# Patient Record
Sex: FEMALE | Race: BLACK OR AFRICAN AMERICAN | Employment: FULL TIME | URBAN - METROPOLITAN AREA
[De-identification: names, ages, dates, MRNs, and addresses within clinical notes are randomized per-mention and may not be internally consistent; named-entity substitution may affect disease eponyms.]

---

## 2017-01-09 ENCOUNTER — OFFICE VISIT (OUTPATIENT)
Dept: INTERNAL MEDICINE CLINIC | Age: 57
End: 2017-01-09

## 2017-01-09 VITALS
DIASTOLIC BLOOD PRESSURE: 89 MMHG | HEIGHT: 67 IN | WEIGHT: 260 LBS | RESPIRATION RATE: 20 BRPM | HEART RATE: 70 BPM | SYSTOLIC BLOOD PRESSURE: 151 MMHG | BODY MASS INDEX: 40.81 KG/M2 | TEMPERATURE: 98.1 F | OXYGEN SATURATION: 98 %

## 2017-01-09 DIAGNOSIS — M17.12 OSTEOARTHRITIS OF LEFT KNEE, UNSPECIFIED OSTEOARTHRITIS TYPE: ICD-10-CM

## 2017-01-09 RX ORDER — HYDROCODONE BITARTRATE AND ACETAMINOPHEN 7.5; 325 MG/1; MG/1
TABLET ORAL
Qty: 180 TAB | Refills: 0 | Status: SHIPPED | OUTPATIENT
Start: 2017-01-09 | End: 2017-02-10 | Stop reason: SDUPTHER

## 2017-01-09 NOTE — PROGRESS NOTES
1. Have you been to the ER, urgent care clinic since your last visit? Hospitalized since your last visit? No.    2. Have you seen or consulted any other health care providers outside of the [address] [place] [address] since your last visit? Include any pap smears or colon screening. No.  Had pain med today.

## 2017-01-09 NOTE — MR AVS SNAPSHOT
Visit Information Date & Time Provider Department Dept. Phone Encounter #  
 1/9/2017  2:30 PM Forrest Roblero, [id number] [id number] Follow-up Instructions Return in about 4 weeks (around 2/6/2017) for pain med. Your Appointments 2/17/2017  2:45 PM  
ROUTINE CARE with Forrest Roblero MD  
[address] [facility] [address] [place] [address]) Appt Note: [address] [facility] [address] [place] [address]  
[phone]  
  
   
 [facility] [address] Upcoming Health Maintenance Date Due  
 FOOT EXAM Q1 3/4/1968 Pneumococcal 19-64 Medium Risk (1 of 1 - PPSV23) 3/4/1977 DTaP/Tdap/Td series (1 - Tdap) 3/4/1979 PAP AKA CERVICAL CYTOLOGY 3/4/1979 EYE EXAM RETINAL OR DILATED Q1 3/31/2016 MICROALBUMIN Q1 1/8/2017 LIPID PANEL Q1 5/2/2017 HEMOGLOBIN A1C Q6M 5/14/2017 BREAST CANCER SCRN MAMMOGRAM 10/13/2017 COLONOSCOPY 6/14/2020 Allergies as of 1/9/2017  Review Complete On: 1/9/2017 By: Riddhi Evans LPN Severity Noted Reaction Type Reactions Influenza Virus Vaccine, Specific High 05/08/2013   Side Effect Other (comments) States body got very hot Tramadol  05/20/2014    Seizures Aspirin Low 06/15/2010   Side Effect Other (comments) Pt reports having a h/o gastric ulcers. Pt was on IBU when she was dx'd. Current Immunizations  Reviewed on 5/9/2013 Name Date Influenza Vaccine Split  Deferred (Patient Refused) Not reviewed this visit You Were Diagnosed With   
  
 Codes Comments Osteoarthritis of left knee, unspecified osteoarthritis type     ICD-10-CM: M17.9 ICD-9-CM: 715.96 Vitals BP Pulse Temp Resp Height(growth percentile) Weight(growth percentile) 151/89 (BP 1 Location: Right arm, BP Patient Position: Sitting) 70 98.1 °F (36.7 °C) (Oral) 20 5' 7\" (1.702 m) 260 lb (117.9 kg) SpO2 BMI OB Status Smoking Status 98% 40.72 kg/m2 Hysterectomy Former Smoker Vitals History BMI and BSA Data Body Mass Index Body Surface Area 40.72 kg/m 2 2.36 m 2 Preferred Pharmacy Pharmacy Name Phone [facility] [phone] Your Updated Medication List  
  
   
This list is accurate as of: 1/9/17  3:48 PM.  Always use your most recent med list. amLODIPine 10 mg tablet Commonly known as:  Harley Chafe TAKE 1 TABLET BY MOUTH DAILY  
  
 atorvastatin 20 mg tablet Commonly known as:  LIPITOR  
TAKE 1 TABLET BY MOUTH DAILY FOR CHOLESTEROL  
  
 escitalopram oxalate 10 mg tablet Commonly known as:  Celesta Murders TAKE 1 TABLET BY MOUTH EVERY DAY  
  
 FLONASE 50 mcg/actuation nasal spray Generic drug:  fluticasone 2 Sprays by Both Nostrils route daily. * glimepiride 4 mg tablet Commonly known as:  AMARYL Take 1 Tab by mouth two (2) times a day. * glimepiride 4 mg tablet Commonly known as:  AMARYL  
TAKE 1 TABLET BY MOUTH TWICE DAILY * glimepiride 4 mg tablet Commonly known as:  AMARYL  
TAKE 1 TABLET BY MOUTH TWICE DAILY * glucose blood VI test strips strip Commonly known as:  blood glucose test  
Check sugars bid  Dx: 250.02  
  
 * glucose blood VI test strips strip Commonly known as:  ACCU-CHEK MEENU PLUS TEST STRP  
USE TO TEST BLOOD SUGAR TWO TO THREE TIMES A DAY AS DIRECTED * glucose blood VI test strips strip Commonly known as:  ACCU-CHEK MEENU PLUS TEST STRP Check sugars 3 times a day Dx E11.65  
  
 * HYDROcodone-acetaminophen  mg tablet Commonly known as:  Smiley Didier Take 1 Tab by mouth every six (6) hours as needed for Pain. Max Daily Amount: 4 Tabs. * HYDROcodone-acetaminophen 7.5-325 mg per tablet Commonly known as:  Smiley Didier  
1 tab every 4-6 hours as needed for pain. Max of 6 pills per day  
  
 ibuprofen 800 mg tablet Commonly known as:  MOTRIN  
TAKE 1 TABLET BY MOUTH EVERY 6 HOURS AS NEEDED FOR PAIN WITH FOOD  
  
 meclizine 25 mg tablet Commonly known as:  ANTIVERT Take 1 Tab by mouth three (3) times daily as needed for Dizziness. metFORMIN 1,000 mg tablet Commonly known as:  GLUCOPHAGE  
TAKE 1 TABLET BY MOUTH TWICE DAILY WITH MEALS  
  
 metoprolol succinate 100 mg tablet Commonly known as:  TOPROL-XL  
TAKE 1 TABLET BY MOUTH DAILY FOR HIGH BLOOD PRESSURE  
  
 ondansetron 4 mg disintegrating tablet Commonly known as:  ZOFRAN ODT Take 1 Tab by mouth every eight (8) hours as needed for Nausea. valsartan-hydroCHLOROthiazide 320-25 mg per tablet Commonly known as:  DIOVAN-HCT  
TAKE 1 TABLET BY MOUTH DAILY  
  
 zolpidem 10 mg tablet Commonly known as:  AMBIEN  
TAKE 1 TABLET BY MOUTH EVERY NIGHT AT BEDTIME AS NEEDED * Notice: This list has 8 medication(s) that are the same as other medications prescribed for you. Read the directions carefully, and ask your doctor or other care provider to review them with you. Prescriptions Printed Refills HYDROcodone-acetaminophen (NORCO) 7.5-325 mg per tablet 0 Si tab every 4-6 hours as needed for pain. Max of 6 pills per day Class: Print We Performed the Following PAIN MGMT PANEL W/REFL, UR [[id number] Custom] Follow-up Instructions Return in about 4 weeks (around 2017) for pain med. Introducing hospitals & HEALTH SERVICES! Dear [employer] [place]: Thank you for requesting a [employer] account. Our records indicate that you already have an active [employer] account. You can access your account anytime at [email or web address]. [email or web address] Did you know that you can access your hospital and ER discharge instructions at any time in [employer]? You can also review all of your test results from your hospital stay or ER visit. Additional Information If you have questions, please visit the Frequently Asked Questions section of the [email or web address] website at [email or web address] Remember, [facility] is NOT to be used for urgent needs. For medical emergencies, dial 911. Now available from your iPhone and Android! Please provide this summary of care documentation to your next provider. Your primary care clinician is listed as [address]. If you have any questions after today's visit, please call [phone].

## 2017-01-09 NOTE — PROGRESS NOTES
Marvin Stevens is a 60 y.o. female and presents with Back Pain and Leg Pain  . Last pain pill was yest and today. C/o pills wearing off too soon and she is really hurting. The 10mg tablet makes her feel sick to her stomach and she doesn't like it. She want to decrease the dose. Review of Systems  Constitutional: negative for fevers, chills, anorexia and weight loss  Eyes:   negative for visual disturbance and irritation  ENT:   negative for tinnitus,sore throat,nasal congestion,ear pains. hoarseness  Respiratory:  negative for cough, hemoptysis, dyspnea,wheezing  CV:   negative for chest pain, palpitations, lower extremity edema  GI:   negative for nausea, vomiting, diarrhea, abdominal pain,melena  Endo:               negative for polyuria,polydipsia,polyphagia,heat intolerance  Genitourinary: negative for frequency, dysuria and hematuria  Integument:  negative for rash and pruritus  Hematologic:  negative for easy bruising and gum/nose bleeding  Musculoskel: negative for myalgias, arthralgias, back pain, muscle weakness, joint pain  Neurological:  negative for headaches, dizziness, vertigo, memory problems and gait   Behavl/Psych: negative for feelings of anxiety, depression, mood changes    Past Medical History   Diagnosis Date    Acute pancreatitis 8/9/2012    Acute pancreatitis 1/21/2011    Arthritis      both knees    Chronic pain      knees    Delivery normal      x1    Diabetes ([facility] [address]     DJD (degenerative joint disease) of knee     Fatty liver 1/22/2011    GERD (gastroesophageal reflux disease)     Hypertension     Hypothyroidism     Obesity     PUD (peptic ulcer disease)     UTI (urinary tract infection) 1/21/2011     Past Surgical History   Procedure Laterality Date    Hx parathyroidectomy  01/27/11    Hx breast lumpectomy       left breast    Hx gyn       hysterectomy    Hx orthopaedic       left total knee replacement    Hx orthopaedic  6/16/15     RIGHT TOTAL KNEE ARTHROPLASTY Social History     Social History    Marital status:      Spouse name: N/A    Number of children: N/A    Years of education: N/A     Social History Main Topics    Smoking status: Former Smoker     Packs/day: 0.25     Years: 25.00     Types: Cigarettes     Quit date: 1/24/2008    Smokeless tobacco: Never Used    Alcohol use No    Drug use: No    Sexual activity: No     Other Topics Concern    None     Social History Narrative     Current Outpatient Prescriptions   Medication Sig Dispense Refill    HYDROcodone-acetaminophen (NORCO) 7.5-325 mg per tablet 1 tab every 4-6 hours as needed for pain. Max of 6 pills per day 180 Tab 0    zolpidem (AMBIEN) 10 mg tablet TAKE 1 TABLET BY MOUTH EVERY NIGHT AT BEDTIME AS NEEDED 30 Tab 5    HYDROcodone-acetaminophen (NORCO)  mg tablet Take 1 Tab by mouth every six (6) hours as needed for Pain. Max Daily Amount: 4 Tabs.  120 Tab 0    glimepiride (AMARYL) 4 mg tablet TAKE 1 TABLET BY MOUTH TWICE DAILY 60 Tab 11    amLODIPine (NORVASC) 10 mg tablet TAKE 1 TABLET BY MOUTH DAILY 90 Tab 3    metFORMIN (GLUCOPHAGE) 1,000 mg tablet TAKE 1 TABLET BY MOUTH TWICE DAILY WITH MEALS 180 Tab 3    valsartan-hydrochlorothiazide (DIOVAN-HCT) 320-25 mg per tablet TAKE 1 TABLET BY MOUTH DAILY 90 Tab 11    escitalopram oxalate (LEXAPRO) 10 mg tablet TAKE 1 TABLET BY MOUTH EVERY DAY 90 Tab 11    glimepiride (AMARYL) 4 mg tablet TAKE 1 TABLET BY MOUTH TWICE DAILY 180 Tab 11    metoprolol succinate (TOPROL-XL) 100 mg XL tablet TAKE 1 TABLET BY MOUTH DAILY FOR HIGH BLOOD PRESSURE 90 Tab 11    atorvastatin (LIPITOR) 20 mg tablet TAKE 1 TABLET BY MOUTH DAILY FOR CHOLESTEROL 90 Tab 11    glucose blood VI test strips (ACCU-CHEK MEENU PLUS TEST STRP) strip Check sugars 3 times a day Dx E11.65 300 Strip 11    ibuprofen (MOTRIN) 800 mg tablet TAKE 1 TABLET BY MOUTH EVERY 6 HOURS AS NEEDED FOR PAIN WITH FOOD 120 Tab 11    fluticasone (FLONASE) 50 mcg/actuation nasal spray 2 Sprays by Both Nostrils route daily.  glucose blood VI test strips (ACCU-CHEK MEENU PLUS TEST STRP) strip USE TO TEST BLOOD SUGAR TWO TO THREE TIMES A DAY AS DIRECTED 100 Strip 11    glimepiride (AMARYL) 4 mg tablet Take 1 Tab by mouth two (2) times a day. 60 Tab 11    glucose blood VI test strips (BLOOD GLUCOSE TEST) strip Check sugars bid  Dx: 250.02 100 Strip 11    meclizine (ANTIVERT) 25 mg tablet Take 1 Tab by mouth three (3) times daily as needed for Dizziness. 20 Tab 0    ondansetron (ZOFRAN ODT) 4 mg disintegrating tablet Take 1 Tab by mouth every eight (8) hours as needed for Nausea. 10 Tab 0     Allergies   Allergen Reactions    Influenza Virus Vaccine, Specific Other (comments)     States body got very hot    Tramadol Seizures    Aspirin Other (comments)     Pt reports having a h/o gastric ulcers. Pt was on IBU when she was dx'd. Objective:  Visit Vitals    /89 (BP 1 Location: Right arm, BP Patient Position: Sitting)    Pulse 70    Temp 98.1 °F (36.7 °C) (Oral)    Resp 20    Ht 5' 7\" (1.702 m)    Wt 260 lb (117.9 kg)    SpO2 98%    BMI 40.72 kg/m2     Physical Exam:   General appearance - alert, well appearing, and in no distress  Mental status - alert, oriented to person, place, and time  Chest - clear to auscultation, no wheezes, rales or rhonchi, symmetric air entry   Heart - normal rate, regular rhythm, normal S1, S2, no murmurs, rubs, clicks or gallops   Abdomen - soft, nontender, nondistended, no masses or organomegaly  Lymph- no adenopathy palpable  Ext-peripheral pulses normal, no pedal edema, no clubbing or cyanosis  Skin-Warm and dry.  no hyperpigmentation, vitiligo, or suspicious lesions  Neuro -alert, oriented, normal speech, no focal findings or movement disorder noted      Results for orders placed or performed in visit on 01/09/17   PAIN MGMT PANEL W/REFL, UR   Result Value Ref Range    Amphetamine Screen, urine Negative Mrosgy=9003 ng/mL    Barbiturates Screen, urine Negative Hzktjo=956 ng/mL    Benzodiazepines Screen, urine Negative Bqqxwu=398 ng/mL    Cannabinoid Screen, urine Negative Cutoff=20 ng/mL    Cocaine Metab. Screen, urine Negative Uprhta=904 ng/mL    Opiate Screen, urine Positive (A) Monfoe=287 ng/mL    Oxycodone/Oxymorphone, urine Negative Kohaem=817 ng/mL    Phencyclidine Screen, urine Negative Cutoff=25 ng/mL    Methadone Screen, urine Negative Xnpnhj=777 ng/mL    Propoxyphene Screen, urine Negative Titplk=841 ng/mL    Meperidine screen Negative Zqepsb=498 ng/mL    FENTANYL, URINE Negative Ybvxwu=9682 pg/mL    Tramadol Screen, urine Negative Gegide=220 ng/mL    Creatinine, urine 54.0 20.0 - 300.0 mg/dL    Specific Gravity 1.016     pH, urine 8.5 4.5 - 8.9    Please Note Comment        Assessment/Plan:    ICD-10-CM ICD-9-CM    1. Osteoarthritis of left knee, unspecified osteoarthritis type M17.9 715.96 HYDROcodone-acetaminophen (NORCO) 7.5-325 mg per tablet      PAIN MGMT PANEL W/REFL, UR     Orders Placed This Encounter    PAIN MGMT PANEL W/REFL, UR    HYDROcodone-acetaminophen (NORCO) 7.5-325 mg per tablet     Si tab every 4-6 hours as needed for pain. Max of 6 pills per day     Dispense:  180 Tab     Refill:  0     Chronic pain- UDS, changing frequency of dosing rather than increase the dose. Follow-up Disposition:  Return in about 4 weeks (around 2017) for pain med.

## 2017-01-18 LAB
AMPHETAMINES UR QL SCN: NEGATIVE NG/ML
BARBITURATES UR QL SCN: NEGATIVE NG/ML
BENZODIAZ UR QL SCN: NEGATIVE NG/ML
BZE UR QL SCN: NEGATIVE NG/ML
CANNABINOIDS UR QL SCN: NEGATIVE NG/ML
CREAT UR-MCNC: 54 MG/DL (ref 20–300)
FENTANYL+NORFENTANYL UR QL SCN: NEGATIVE PG/ML
MEPERIDINE UR QL: NEGATIVE NG/ML
METHADONE UR QL SCN: NEGATIVE NG/ML
OPIATES UR QL SCN: POSITIVE NG/ML
OXYCODONE+OXYMORPHONE UR QL SCN: NEGATIVE NG/ML
PCP UR QL: NEGATIVE NG/ML
PH UR: 8.5 [PH] (ref 4.5–8.9)
PLEASE NOTE:, 733157: ABNORMAL
PROPOXYPH UR QL SCN: NEGATIVE NG/ML
SP GR UR: 1.02
TRAMADOL UR QL SCN: NEGATIVE NG/ML

## 2017-02-10 ENCOUNTER — OFFICE VISIT (OUTPATIENT)
Dept: INTERNAL MEDICINE CLINIC | Age: 57
End: 2017-02-10

## 2017-02-10 VITALS
SYSTOLIC BLOOD PRESSURE: 136 MMHG | WEIGHT: 260 LBS | OXYGEN SATURATION: 97 % | HEART RATE: 72 BPM | TEMPERATURE: 98.2 F | RESPIRATION RATE: 20 BRPM | HEIGHT: 67 IN | BODY MASS INDEX: 40.81 KG/M2 | DIASTOLIC BLOOD PRESSURE: 89 MMHG

## 2017-02-10 DIAGNOSIS — I10 ESSENTIAL HYPERTENSION, BENIGN: Primary | ICD-10-CM

## 2017-02-10 DIAGNOSIS — M17.12 OSTEOARTHRITIS OF LEFT KNEE, UNSPECIFIED OSTEOARTHRITIS TYPE: ICD-10-CM

## 2017-02-10 RX ORDER — HYDROCODONE BITARTRATE AND ACETAMINOPHEN 7.5; 325 MG/1; MG/1
TABLET ORAL
Qty: 180 TAB | Refills: 0 | Status: SHIPPED | OUTPATIENT
Start: 2017-02-10 | End: 2017-05-12 | Stop reason: SDUPTHER

## 2017-02-10 RX ORDER — METOPROLOL SUCCINATE 100 MG/1
TABLET, EXTENDED RELEASE ORAL
Qty: 90 TAB | Refills: 3 | Status: SHIPPED | OUTPATIENT
Start: 2017-02-10 | End: 2018-03-12 | Stop reason: SDUPTHER

## 2017-02-10 RX ORDER — ATORVASTATIN CALCIUM 20 MG/1
TABLET, FILM COATED ORAL
Qty: 90 TAB | Refills: 11 | Status: SHIPPED | OUTPATIENT
Start: 2017-02-10 | End: 2018-03-19 | Stop reason: SDUPTHER

## 2017-02-10 NOTE — PROGRESS NOTES
Hardik Alfredo is a 60 y.o. female and presents with Medication Refill  . Pt has been doing much better with the increased dosing frequency. Last pain pill was yest and today. Review of Systems  Constitutional: negative for fevers, chills, anorexia and weight loss  Eyes:   negative for visual disturbance and irritation  ENT:   negative for tinnitus,sore throat,nasal congestion,ear pains. hoarseness  Respiratory:  negative for cough, hemoptysis, dyspnea,wheezing  CV:   negative for chest pain, palpitations, lower extremity edema  GI:   negative for nausea, vomiting, diarrhea, abdominal pain,melena  Endo:               negative for polyuria,polydipsia,polyphagia,heat intolerance  Genitourinary: negative for frequency, dysuria and hematuria  Integument:  negative for rash and pruritus  Hematologic:  negative for easy bruising and gum/nose bleeding  Musculoskel: negative for myalgias, arthralgias, back pain, muscle weakness,  Neurological:  negative for headaches, dizziness, vertigo, memory problems and gait   Behavl/Psych: negative for feelings of anxiety, depression, mood changes    Past Medical History   Diagnosis Date    Acute pancreatitis 8/9/2012    Acute pancreatitis 1/21/2011    Arthritis      both knees    Chronic pain      knees    Delivery normal      x1    [facility] ([facility] [address]     DJD (degenerative joint disease) of knee     Fatty liver 1/22/2011    GERD (gastroesophageal reflux disease)     Hypertension     Hypothyroidism     Obesity     PUD (peptic ulcer disease)     UTI (urinary tract infection) 1/21/2011     Past Surgical History   Procedure Laterality Date    Hx parathyroidectomy  01/27/11    Hx breast lumpectomy       left breast    Hx gyn       hysterectomy    Hx orthopaedic       left total knee replacement    Hx orthopaedic  6/16/15     RIGHT TOTAL KNEE ARTHROPLASTY      Social History     Social History    Marital status:      Spouse name: N/A    Number of children: N/A  Years of education: N/A     Social History Main Topics    Smoking status: Former Smoker     Packs/day: 0.25     Years: 25.00     Types: Cigarettes     Quit date: 1/24/2008    Smokeless tobacco: Never Used    Alcohol use No    Drug use: No    Sexual activity: No     Other Topics Concern    None     Social History Narrative     Current Outpatient Prescriptions   Medication Sig Dispense Refill    HYDROcodone-acetaminophen (NORCO) 7.5-325 mg per tablet 1 tab every 4-6 hours as needed for pain. Max of 6 pills per day 180 Tab 0    metoprolol succinate (TOPROL-XL) 100 mg tablet TAKE 1 TABLET BY MOUTH DAILY FOR HIGH BLOOD PRESSURE 90 Tab 3    atorvastatin (LIPITOR) 20 mg tablet TAKE 1 TABLET BY MOUTH DAILY FOR CHOLESTEROL 90 Tab 11    glucose blood VI test strips (ACCU-CHEK MEENU PLUS TEST STRP) strip Check sugars 3 times a day Dx E11.65 300 Strip 11    zolpidem (AMBIEN) 10 mg tablet TAKE 1 TABLET BY MOUTH EVERY NIGHT AT BEDTIME AS NEEDED 30 Tab 5    glimepiride (AMARYL) 4 mg tablet TAKE 1 TABLET BY MOUTH TWICE DAILY 60 Tab 11    amLODIPine (NORVASC) 10 mg tablet TAKE 1 TABLET BY MOUTH DAILY 90 Tab 3    metFORMIN (GLUCOPHAGE) 1,000 mg tablet TAKE 1 TABLET BY MOUTH TWICE DAILY WITH MEALS 180 Tab 3    valsartan-hydrochlorothiazide (DIOVAN-HCT) 320-25 mg per tablet TAKE 1 TABLET BY MOUTH DAILY 90 Tab 11    escitalopram oxalate (LEXAPRO) 10 mg tablet TAKE 1 TABLET BY MOUTH EVERY DAY 90 Tab 11    glimepiride (AMARYL) 4 mg tablet TAKE 1 TABLET BY MOUTH TWICE DAILY 180 Tab 11    meclizine (ANTIVERT) 25 mg tablet Take 1 Tab by mouth three (3) times daily as needed for Dizziness. 20 Tab 0    ibuprofen (MOTRIN) 800 mg tablet TAKE 1 TABLET BY MOUTH EVERY 6 HOURS AS NEEDED FOR PAIN WITH FOOD 120 Tab 11    fluticasone (FLONASE) 50 mcg/actuation nasal spray 2 Sprays by Both Nostrils route daily.       glucose blood VI test strips (ACCU-CHEK MEENU PLUS TEST STRP) strip USE TO TEST BLOOD SUGAR TWO TO THREE TIMES A DAY AS DIRECTED 100 Strip 11    glimepiride (AMARYL) 4 mg tablet Take 1 Tab by mouth two (2) times a day. 60 Tab 11    glucose blood VI test strips (BLOOD GLUCOSE TEST) strip Check sugars bid  Dx: 250.02 100 Strip 11    ondansetron (ZOFRAN ODT) 4 mg disintegrating tablet Take 1 Tab by mouth every eight (8) hours as needed for Nausea. 10 Tab 0     Allergies   Allergen Reactions    Influenza Virus Vaccine, Specific Other (comments)     States body got very hot    Tramadol Seizures    Aspirin Other (comments)     Pt reports having a h/o gastric ulcers. Pt was on IBU when she was dx'd. Objective:  Visit Vitals    /89 (BP 1 Location: Left arm, BP Patient Position: Sitting)    Pulse 72    Temp 98.2 °F (36.8 °C) (Oral)    Resp 20    Ht 5' 7\" (1.702 m)    Wt 260 lb (117.9 kg)    SpO2 97%    BMI 40.72 kg/m2     Physical Exam:   General appearance - alert, well appearing, and in no distress  Mental status - alert, oriented to person, place, and time  Chest - clear to auscultation, no wheezes, rales or rhonchi, symmetric air entry   Heart - normal rate, regular rhythm, normal S1, S2, no murmurs, rubs, clicks or gallops   Abdomen - soft, nontender, nondistended, no masses or organomegaly  Lymph- no adenopathy palpable  Ext-peripheral pulses normal, no pedal edema, no clubbing or cyanosis  Skin-Warm and dry. no hyperpigmentation, vitiligo, or suspicious lesions  Neuro -alert, oriented, normal speech, no focal findings or movement disorder noted    Assessment/Plan:    ICD-10-CM ICD-9-CM    1. Essential hypertension, benign I10 401.1    2. Osteoarthritis of left knee, unspecified osteoarthritis type M17.9 715.96 HYDROcodone-acetaminophen (NORCO) 7.5-325 mg per tablet      PAIN MGMT PANEL W/REFL, UR     Orders Placed This Encounter    PAIN MGMT PANEL W/REFL, UR    HYDROcodone-acetaminophen (NORCO) 7.5-325 mg per tablet     Si tab every 4-6 hours as needed for pain.  Max of 6 pills per day Dispense:  180 Tab     Refill:  0    metoprolol succinate (TOPROL-XL) 100 mg tablet     Sig: TAKE 1 TABLET BY MOUTH DAILY FOR HIGH BLOOD PRESSURE     Dispense:  90 Tab     Refill:  3     **Patient requests 90 days supply**    atorvastatin (LIPITOR) 20 mg tablet     Sig: TAKE 1 TABLET BY MOUTH DAILY FOR CHOLESTEROL     Dispense:  90 Tab     Refill:  11     **Patient requests 90 days supply**    glucose blood VI test strips (ACCU-CHEK MEENU PLUS TEST STRP) strip     Sig: Check sugars 3 times a day Dx E11.65     Dispense:  300 Strip     Refill:  11     Chronic knee pain due to DJD- UDS, pain meds refilled x 3m  HTN & hyperlipidemia- stable    Follow-up Disposition:  Return in about 3 months (around 5/10/2017) for pain med.

## 2017-02-10 NOTE — PROGRESS NOTES
1. Have you been to the ER, urgent care clinic since your last visit? Hospitalized since your last visit? No.    2. Have you seen or consulted any other health care providers outside of the [address] [place] [address] since your last visit? Include any pap smears or colon screening. No.  Had pain med med today.

## 2017-02-10 NOTE — MR AVS SNAPSHOT
Visit Information Date & Time Provider Department Dept. Phone Encounter #  
 2/10/2017  3:00 PM Romulo Vidal, [address]  [id number] Follow-up Instructions Return in about 3 months (around 5/10/2017) for pain med. Upcoming Health Maintenance Date Due  
 FOOT EXAM Q1 3/4/1968 Pneumococcal 19-64 Medium Risk (1 of 1 - PPSV23) 3/4/1977 DTaP/Tdap/Td series (1 - Tdap) 3/4/1979 PAP AKA CERVICAL CYTOLOGY 3/4/1979 EYE EXAM RETINAL OR DILATED Q1 3/31/2016 MICROALBUMIN Q1 1/8/2017 LIPID PANEL Q1 5/2/2017 HEMOGLOBIN A1C Q6M 5/14/2017 BREAST CANCER SCRN MAMMOGRAM 10/13/2017 COLONOSCOPY 6/14/2020 Allergies as of 2/10/2017  Review Complete On: 2/10/2017 By: Rena Wilson LPN Severity Noted Reaction Type Reactions Influenza Virus Vaccine, Specific High 05/08/2013   Side Effect Other (comments) States body got very hot Tramadol  05/20/2014    Seizures Aspirin Low 06/15/2010   Side Effect Other (comments) Pt reports having a h/o gastric ulcers. Pt was on IBU when she was dx'd. Current Immunizations  Reviewed on 5/9/2013 Name Date Influenza Vaccine Split  Deferred (Patient Refused) Not reviewed this visit You Were Diagnosed With   
  
 Codes Comments Essential hypertension, benign    -  Primary ICD-10-CM: I10 
ICD-9-CM: 401.1 Osteoarthritis of left knee, unspecified osteoarthritis type     ICD-10-CM: M17.9 ICD-9-CM: 715.96 Vitals BP Pulse Temp Resp Height(growth percentile) Weight(growth percentile) 136/89 (BP 1 Location: Left arm, BP Patient Position: Sitting) 72 98.2 °F (36.8 °C) (Oral) 20 5' 7\" (1.702 m) 260 lb (117.9 kg) SpO2 BMI OB Status Smoking Status 97% 40.72 kg/m2 Hysterectomy Former Smoker Vitals History BMI and BSA Data Body Mass Index Body Surface Area 40.72 kg/m 2 2.36 m 2 Preferred Pharmacy Pharmacy Name Phone [address] [phone] Your Updated Medication List  
  
   
This list is accurate as of: 2/10/17  3:22 PM.  Always use your most recent med list. amLODIPine 10 mg tablet Commonly known as:  Suzon Salle TAKE 1 TABLET BY MOUTH DAILY  
  
 atorvastatin 20 mg tablet Commonly known as:  LIPITOR  
TAKE 1 TABLET BY MOUTH DAILY FOR CHOLESTEROL  
  
 escitalopram oxalate 10 mg tablet Commonly known as:  Neptali Dub TAKE 1 TABLET BY MOUTH EVERY DAY  
  
 FLONASE 50 mcg/actuation nasal spray Generic drug:  fluticasone 2 Sprays by Both Nostrils route daily. * glimepiride 4 mg tablet Commonly known as:  AMARYL Take 1 Tab by mouth two (2) times a day. * glimepiride 4 mg tablet Commonly known as:  AMARYL  
TAKE 1 TABLET BY MOUTH TWICE DAILY * glimepiride 4 mg tablet Commonly known as:  AMARYL  
TAKE 1 TABLET BY MOUTH TWICE DAILY * glucose blood VI test strips strip Commonly known as:  blood glucose test  
Check sugars bid  Dx: 250.02  
  
 * glucose blood VI test strips strip Commonly known as:  ACCU-CHEK MEENU PLUS TEST STRP  
USE TO TEST BLOOD SUGAR TWO TO THREE TIMES A DAY AS DIRECTED * glucose blood VI test strips strip Commonly known as:  ACCU-CHEK MEENU PLUS TEST STRP Check sugars 3 times a day Dx E11.65 HYDROcodone-acetaminophen 7.5-325 mg per tablet Commonly known as:  Lew Songster  
1 tab every 4-6 hours as needed for pain. Max of 6 pills per day  
  
 ibuprofen 800 mg tablet Commonly known as:  MOTRIN  
TAKE 1 TABLET BY MOUTH EVERY 6 HOURS AS NEEDED FOR PAIN WITH FOOD  
  
 meclizine 25 mg tablet Commonly known as:  ANTIVERT Take 1 Tab by mouth three (3) times daily as needed for Dizziness. metFORMIN 1,000 mg tablet Commonly known as:  GLUCOPHAGE  
TAKE 1 TABLET BY MOUTH TWICE DAILY WITH MEALS  
  
 metoprolol succinate 100 mg tablet Commonly known as:  TOPROL-XL  
TAKE 1 TABLET BY MOUTH DAILY FOR HIGH BLOOD PRESSURE  
  
 ondansetron 4 mg disintegrating tablet Commonly known as:  ZOFRAN ODT Take 1 Tab by mouth every eight (8) hours as needed for Nausea. valsartan-hydroCHLOROthiazide 320-25 mg per tablet Commonly known as:  DIOVAN-HCT  
TAKE 1 TABLET BY MOUTH DAILY  
  
 zolpidem 10 mg tablet Commonly known as:  AMBIEN  
TAKE 1 TABLET BY MOUTH EVERY NIGHT AT BEDTIME AS NEEDED * Notice: This list has 6 medication(s) that are the same as other medications prescribed for you. Read the directions carefully, and ask your doctor or other care provider to review them with you. Prescriptions Printed Refills HYDROcodone-acetaminophen (NORCO) 7.5-325 mg per tablet 0 Si tab every 4-6 hours as needed for pain. Max of 6 pills per day Class: Print Prescriptions Sent to Pharmacy Refills  
 metoprolol succinate (TOPROL-XL) 100 mg tablet 3 Sig: TAKE 1 TABLET BY MOUTH DAILY FOR HIGH BLOOD PRESSURE Class: Normal  
 Pharmacy: [facility] [address] [place] [address] Ph #: [phone]  
 atorvastatin (LIPITOR) 20 mg tablet 11 Sig: TAKE 1 TABLET BY MOUTH DAILY FOR CHOLESTEROL Class: Normal  
 Pharmacy: [facility] [address] [place] [address] Ph #: [phone]  
 glucose blood VI test strips (ACCU-CHEK MEENU PLUS TEST STRP) strip 11 Sig: Check sugars 3 times a day Dx E11.65 Class: Normal  
 Pharmacy: [facility] [address] [place] [address] Ph #: [phone] We Performed the Following PAIN MGMT PANEL W/REFL, UR [[id number] Custom] Follow-up Instructions Return in about 3 months (around 5/10/2017) for pain med. Introducing \[facility] [place] [facility]\"" & HEALTH SERVICES! Dear Burak Cason: Thank you for requesting a [employer] account. Our records indicate that you already have an active [employer] account. You can access your account anytime at [email or web address] Did you know that you can access your hospital and ER discharge instructions at any time in [employer]? You can also review all of your test results from your hospital stay or ER visit. Additional Information If you have questions, please visit the Frequently Asked Questions section of the [employer] website at [email or web address] Remember, [employer] is NOT to be used for urgent needs. For medical emergencies, dial 911. Now available from your iPhone and Android! Please provide this summary of care documentation to your next provider. Your primary care clinician is listed as Kezia Parker. If you have any questions after today's visit, please call [phone].

## 2017-02-16 LAB
AMPHETAMINES UR QL SCN: NEGATIVE NG/ML
BARBITURATES UR QL SCN: NEGATIVE NG/ML
BENZODIAZ UR QL SCN: NEGATIVE NG/ML
BZE UR QL SCN: NEGATIVE NG/ML
CANNABINOIDS UR QL SCN: NEGATIVE NG/ML
CREAT UR-MCNC: 56.9 MG/DL (ref 20–300)
FENTANYL+NORFENTANYL UR QL SCN: NEGATIVE PG/ML
MEPERIDINE UR QL: NEGATIVE NG/ML
METHADONE UR QL SCN: NEGATIVE NG/ML
OPIATES UR QL SCN: POSITIVE NG/ML
OXYCODONE+OXYMORPHONE UR QL SCN: NEGATIVE NG/ML
PCP UR QL: NEGATIVE NG/ML
PH UR: 5.9 [PH] (ref 4.5–8.9)
PLEASE NOTE:, 733157: ABNORMAL
PROPOXYPH UR QL SCN: NEGATIVE NG/ML
SP GR UR: 1.01
TRAMADOL UR QL SCN: NEGATIVE NG/ML

## 2017-02-21 RX ORDER — IBUPROFEN 800 MG/1
TABLET ORAL
Qty: 120 TAB | Refills: 0 | Status: SHIPPED | OUTPATIENT
Start: 2017-02-21 | End: 2017-03-24 | Stop reason: SDUPTHER

## 2017-03-06 ENCOUNTER — OFFICE VISIT (OUTPATIENT)
Dept: INTERNAL MEDICINE CLINIC | Age: 57
End: 2017-03-06

## 2017-03-06 VITALS
RESPIRATION RATE: 18 BRPM | HEART RATE: 78 BPM | TEMPERATURE: 98 F | WEIGHT: 258.3 LBS | BODY MASS INDEX: 40.54 KG/M2 | HEIGHT: 67 IN | SYSTOLIC BLOOD PRESSURE: 149 MMHG | OXYGEN SATURATION: 97 % | DIASTOLIC BLOOD PRESSURE: 79 MMHG

## 2017-03-06 DIAGNOSIS — R14.0 ABDOMINAL BLOATING: Primary | ICD-10-CM

## 2017-03-06 NOTE — PATIENT INSTRUCTIONS

## 2017-03-06 NOTE — PROGRESS NOTES
Subjective: (As above and below)     Chief Complaint   Patient presents with    Bloated     abdominal bloating, painful, since last week      Marlys ChemaGurmeet Mckeon is a 59y.o. year old female who presents for abdominal bloating since Wednesday. She has been having increasing abdominal distention since Wednesday. She is unable to zip up her normal pants all the way or adjust her belt to it's usual hole. She reports no fevers, no nausea or vomiting. No urinary symptoms. She reports having normal bowel movements daily. She has generalized cramping all over. She has not been eating anything out of the ordinary. She states she can hear her stomach \"gurgling\"    When symptoms began she took otc stool softeners, milk of magnesium, and magnesium citrate. Since taking these things she continues to have bowel movements, although they are looser. She denies any hematochezia, no foul or frothy stools. She has not taken anything today. She states this has never occurred before. No known hx of liver disease. She is 2 pounds LESS than last visit on 2/10/17. Reviewed PmHx, RxHx, FmHx, SocHx, AllgHx and updated in chart.   Family History   Problem Relation Age of Onset   Umang Adilson Cancer Mother     Hypertension Father     Diabetes Sister     Hypertension Sister     Diabetes Brother     Hypertension Brother        Past Medical History:   Diagnosis Date    Acute pancreatitis 8/9/2012    Acute pancreatitis 1/21/2011    Arthritis     both knees    Chronic pain     knees    Delivery normal     x1    Diabetes ([facility] [address]     DJD (degenerative joint disease) of knee     Fatty liver 1/22/2011    GERD (gastroesophageal reflux disease)     Hypertension     Hypothyroidism     Obesity     PUD (peptic ulcer disease)     UTI (urinary tract infection) 1/21/2011      Social History     Social History    Marital status:      Spouse name: N/A    Number of children: N/A    Years of education: N/A     Social History Main Topics    Smoking status: Former Smoker     Packs/day: 0.25     Years: 25.00     Types: Cigarettes     Quit date: 1/24/2008    Smokeless tobacco: Never Used    Alcohol use No    Drug use: No    Sexual activity: No     Other Topics Concern    None     Social History Narrative          Current Outpatient Prescriptions   Medication Sig    HYDROcodone-acetaminophen (NORCO) 7.5-325 mg per tablet 1 tab every 4-6 hours as needed for pain. Max of 6 pills per day    metoprolol succinate (TOPROL-XL) 100 mg tablet TAKE 1 TABLET BY MOUTH DAILY FOR HIGH BLOOD PRESSURE    atorvastatin (LIPITOR) 20 mg tablet TAKE 1 TABLET BY MOUTH DAILY FOR CHOLESTEROL    glucose blood VI test strips (ACCU-CHEK MEENU PLUS TEST STRP) strip Check sugars 3 times a day Dx E11.65    zolpidem (AMBIEN) 10 mg tablet TAKE 1 TABLET BY MOUTH EVERY NIGHT AT BEDTIME AS NEEDED    glimepiride (AMARYL) 4 mg tablet TAKE 1 TABLET BY MOUTH TWICE DAILY    amLODIPine (NORVASC) 10 mg tablet TAKE 1 TABLET BY MOUTH DAILY    metFORMIN (GLUCOPHAGE) 1,000 mg tablet TAKE 1 TABLET BY MOUTH TWICE DAILY WITH MEALS    valsartan-hydrochlorothiazide (DIOVAN-HCT) 320-25 mg per tablet TAKE 1 TABLET BY MOUTH DAILY    escitalopram oxalate (LEXAPRO) 10 mg tablet TAKE 1 TABLET BY MOUTH EVERY DAY    glimepiride (AMARYL) 4 mg tablet TAKE 1 TABLET BY MOUTH TWICE DAILY    fluticasone (FLONASE) 50 mcg/actuation nasal spray 2 Sprays by Both Nostrils route daily.  glimepiride (AMARYL) 4 mg tablet Take 1 Tab by mouth two (2) times a day.  glucose blood VI test strips (BLOOD GLUCOSE TEST) strip Check sugars bid  Dx: 250.02    ibuprofen (MOTRIN) 800 mg tablet TAKE 1 TABLET BY MOUTH EVERY 6 HOURS AS NEEDED FOR PAIN WITH FOOD    meclizine (ANTIVERT) 25 mg tablet Take 1 Tab by mouth three (3) times daily as needed for Dizziness.  ondansetron (ZOFRAN ODT) 4 mg disintegrating tablet Take 1 Tab by mouth every eight (8) hours as needed for Nausea.     glucose blood VI test strips (ACCU-CHEK MEENU PLUS TEST STRP) strip USE TO TEST BLOOD SUGAR TWO TO THREE TIMES A DAY AS DIRECTED     No current facility-administered medications for this visit. Review of Systems:   Constitutional:    Negative for fever and chills, negative diaphoresis. HEENT:              Negative for neck pain and stiffness. Eyes:                  Negative for visual disturbance, itching, redness or discharge. Respiratory:        Negative for cough and shortness of breath. Cardiovascular:  Negative for chest pain and palpitations. Gastrointestinal: Negative for nausea, vomiting, diarrhea or constipation. +abdominal bloating, generalized cramping  Genitourinary:     Negative for dysuria and frequency. Musculoskeletal: Negative for falls, tenderness and swelling. Skin:                    Negative for rash, masses or lesions. Neurological:       Negative for dizzyness, seizure, loss of consciousness, weakness and numbness. Objective:     Vitals:    03/06/17 1458   BP: 149/79   Pulse: 78   Resp: 18   Temp: 98 °F (36.7 °C)   TempSrc: Oral   SpO2: 97%   Weight: 258 lb 4.8 oz (117.2 kg)   Height: 5' 7\" (1.702 m)       Results for orders placed or performed in visit on 02/10/17   PAIN MGMT PANEL W/REFL, UR   Result Value Ref Range    Amphetamine Screen, urine Negative Awyfib=6811 ng/mL    Barbiturates Screen, urine Negative Kbvxbw=167 ng/mL    Benzodiazepines Screen, urine Negative Lbnhze=196 ng/mL    Cannabinoid Screen, urine Negative Cutoff=20 ng/mL    Cocaine Metab.  Screen, urine Negative Ttsqyc=561 ng/mL    Opiate Screen, urine Positive (A) Dqvaeh=863 ng/mL    Oxycodone/Oxymorphone, urine Negative Jrhcrd=237 ng/mL    Phencyclidine Screen, urine Negative Cutoff=25 ng/mL    Methadone Screen, urine Negative Tpwzmy=636 ng/mL    Propoxyphene Screen, urine Negative Ilqzhd=727 ng/mL    Meperidine screen Negative Tnwpap=419 ng/mL    FENTANYL, URINE Negative Htctbj=4904 pg/mL    Tramadol Screen, urine Negative Vezhjf=411 ng/mL    Creatinine, urine 56.9 20.0 - 300.0 mg/dL    Specific Gravity 1.012     pH, urine 5.9 4.5 - 8.9    Please Note Comment          Physical Examination: General appearance - alert, well appearing, and in no distress  Chest - clear to auscultation, no wheezes, rales or rhonchi, symmetric air entry  Heart - normal rate, regular rhythm, normal S1, S2, no murmurs, rubs, clicks or gallops  Abdomen - abdomen is quite bloated, it is soft, BS present throughout, discomfort with palpation above and below umbilicus. Assessment/ Plan:   Follow-up Disposition:  Return if symptoms worsen or fail to improve. 1. Abdominal bloating  Pt go get this done stat. Advised if worsening symptoms tonight, present to ED. Advised hold on otc meds  - US ABD COMP; Future        I have discussed the diagnosis with the patient and the intended plan as seen in the above orders. The patient has received an after-visit summary and questions were answered concerning future plans. Pt conveyed understanding of plan. Medication Side Effects and Warnings were discussed with patient: yes  Patient Labs were reviewed: yes  Patient Past Records were reviewed:  yes    Sandi Junior.  Marleni Arnold NP

## 2017-03-06 NOTE — MR AVS SNAPSHOT
Visit Information Date & Time Provider Department Dept. Phone Encounter #  
 3/6/2017  3:30 PM Fransisca Zamudio, [address] [id number] Follow-up Instructions Return if symptoms worsen or fail to improve. Your Appointments 3/6/2017  3:30 PM  
ROUTINE CARE with Fransisca Zamudio NP  
[address]) Appt Note: Swollen stomach, cp 30.00 pb 90.00 la 3/6/17  
 [place] [address] [address]  
[phone]  
  
   
 [place] [address] 5/12/2017  3:00 PM  
ACUTE CARE with Carrie Tan MD  
[address]) Appt Note: for pain med. [place] [address] [address]  
[phone]  
  
   
 [place] [address] Upcoming Health Maintenance Date Due  
 FOOT EXAM Q1 3/4/1968 Pneumococcal 19-64 Medium Risk (1 of 1 - PPSV23) 3/4/1977 DTaP/Tdap/Td series (1 - Tdap) 3/4/1979 PAP AKA CERVICAL CYTOLOGY 3/4/1979 EYE EXAM RETINAL OR DILATED Q1 3/31/2016 MICROALBUMIN Q1 1/8/2017 LIPID PANEL Q1 5/2/2017 HEMOGLOBIN A1C Q6M 5/14/2017 BREAST CANCER SCRN MAMMOGRAM 10/13/2017 COLONOSCOPY 6/14/2020 Allergies as of 3/6/2017  Review Complete On: 3/6/2017 By: Buddy Glover. Shanon Zamudio NP Severity Noted Reaction Type Reactions Influenza Virus Vaccine, Specific High 05/08/2013   Side Effect Other (comments) States body got very hot Tramadol  05/20/2014    Seizures Aspirin Low 06/15/2010   Side Effect Other (comments) Pt reports having a h/o gastric ulcers. Pt was on IBU when she was dx'd. Current Immunizations  Reviewed on 5/9/2013 Name Date Influenza Vaccine Split  Deferred (Patient Refused) Not reviewed this visit You Were Diagnosed With   
  
 Codes Comments Abdominal bloating    -  Primary ICD-10-CM: R14.0 ICD-9-CM: 692. 3 Vitals BP Pulse Temp Resp Height(growth percentile) Weight(growth percentile) 149/79 (BP 1 Location: Left arm, BP Patient Position: Sitting) 78 98 °F (36.7 °C) (Oral) 18 5' 7\" (1.702 m) 258 lb 4.8 oz (117.2 kg) SpO2 BMI OB Status Smoking Status 97% 40.46 kg/m2 Hysterectomy Former Smoker Vitals History BMI and BSA Data Body Mass Index Body Surface Area 40.46 kg/m 2 2.35 m 2 Preferred Pharmacy Pharmacy Name Phone [facility] [phone] Your Updated Medication List  
  
   
This list is accurate as of: 3/6/17  3:23 PM.  Always use your most recent med list. amLODIPine 10 mg tablet Commonly known as:  Huerta Del TAKE 1 TABLET BY MOUTH DAILY  
  
 atorvastatin 20 mg tablet Commonly known as:  LIPITOR  
TAKE 1 TABLET BY MOUTH DAILY FOR CHOLESTEROL  
  
 escitalopram oxalate 10 mg tablet Commonly known as:  Linda Hart TAKE 1 TABLET BY MOUTH EVERY DAY  
  
 FLONASE 50 mcg/actuation nasal spray Generic drug:  fluticasone 2 Sprays by Both Nostrils route daily. * glimepiride 4 mg tablet Commonly known as:  AMARYL Take 1 Tab by mouth two (2) times a day. * glimepiride 4 mg tablet Commonly known as:  AMARYL  
TAKE 1 TABLET BY MOUTH TWICE DAILY * glimepiride 4 mg tablet Commonly known as:  AMARYL  
TAKE 1 TABLET BY MOUTH TWICE DAILY * glucose blood VI test strips strip Commonly known as:  blood glucose test  
Check sugars bid  Dx: 250.02  
  
 * glucose blood VI test strips strip Commonly known as:  ACCU-CHEK MEENU PLUS TEST STRP  
USE TO TEST BLOOD SUGAR TWO TO THREE TIMES A DAY AS DIRECTED * glucose blood VI test strips strip Commonly known as:  ACCU-CHEK MEENU PLUS TEST STRP Check sugars 3 times a day Dx E11.65 HYDROcodone-acetaminophen 7.5-325 mg per tablet Commonly known as:  Leana Arts  
1 tab every 4-6 hours as needed for pain. Max of 6 pills per day  
  
 ibuprofen 800 mg tablet Commonly known as:  MOTRIN  
TAKE 1 TABLET BY MOUTH EVERY 6 HOURS AS NEEDED FOR PAIN WITH FOOD  
  
 meclizine 25 mg tablet Commonly known as:  ANTIVERT Take 1 Tab by mouth three (3) times daily as needed for Dizziness. metFORMIN 1,000 mg tablet Commonly known as:  GLUCOPHAGE  
TAKE 1 TABLET BY MOUTH TWICE DAILY WITH MEALS  
  
 metoprolol succinate 100 mg tablet Commonly known as:  TOPROL-XL  
TAKE 1 TABLET BY MOUTH DAILY FOR HIGH BLOOD PRESSURE  
  
 ondansetron 4 mg disintegrating tablet Commonly known as:  ZOFRAN ODT Take 1 Tab by mouth every eight (8) hours as needed for Nausea. valsartan-hydroCHLOROthiazide 320-25 mg per tablet Commonly known as:  DIOVAN-HCT  
TAKE 1 TABLET BY MOUTH DAILY  
  
 zolpidem 10 mg tablet Commonly known as:  AMBIEN  
TAKE 1 TABLET BY MOUTH EVERY NIGHT AT BEDTIME AS NEEDED * Notice: This list has 6 medication(s) that are the same as other medications prescribed for you. Read the directions carefully, and ask your doctor or other care provider to review them with you. Follow-up Instructions Return if symptoms worsen or fail to improve. To-Do List   
 03/06/2017 Imaging:  US ABD COMP Referral Information Referral ID Referred By Referred To  
  
 [id number] Galindo LIM Not Available Visits Status Start Date End Date 1 New Request 3/6/17 3/6/18 If your referral has a status of pending review or denied, additional information will be sent to support the outcome of this decision. Patient Instructions Abdominal Pain: Care Instructions Your Care Instructions Abdominal pain has many possible causes. Some aren't serious and get better on their own in a few days. Others need more testing and treatment. If your pain continues or gets worse, you need to be rechecked and may need more tests to find out what is wrong. You may need surgery to correct the problem. Don't ignore new symptoms, such as fever, nausea and vomiting, urination problems, pain that gets worse, and dizziness. These may be signs of a more serious problem. Your doctor may have recommended a follow-up visit in the next 8 to 12 hours. If you are not getting better, you may need more tests or treatment. The doctor has checked you carefully, but problems can develop later. If you notice any problems or new symptoms, get medical treatment right away. Follow-up care is a key part of your treatment and safety. Be sure to make and go to all appointments, and call your doctor if you are having problems. It's also a good idea to know your test results and keep a list of the medicines you take. How can you care for yourself at home? · Rest until you feel better. · To prevent dehydration, drink plenty of fluids, enough so that your urine is light yellow or clear like water. Choose water and other caffeine-free clear liquids until you feel better. If you have kidney, heart, or liver disease and have to limit fluids, talk with your doctor before you increase the amount of fluids you drink. · If your stomach is upset, eat mild foods, such as rice, dry toast or crackers, bananas, and applesauce. Try eating several small meals instead of two or three large ones. · Wait until 48 hours after all symptoms have gone away before you have spicy foods, alcohol, and drinks that contain caffeine. · Do not eat foods that are high in fat. · Avoid anti-inflammatory medicines such as aspirin, ibuprofen (Advil, Motrin), and naproxen (Aleve). These can cause stomach upset. Talk to your doctor if you take daily aspirin for another health problem. When should you call for help? Call 911 anytime you think you may need emergency care. For example, call if: · You passed out (lost consciousness). · You pass maroon or very bloody stools. · You vomit blood or what looks like coffee grounds. · You have new, severe belly pain. Call your doctor now or seek immediate medical care if: 
· Your pain gets worse, especially if it becomes focused in one area of your belly. · You have a new or higher fever. · Your stools are black and look like tar, or they have streaks of blood. · You have unexpected vaginal bleeding. · You have symptoms of a urinary tract infection. These may include: 
¨ Pain when you urinate. ¨ Urinating more often than usual. 
¨ Blood in your urine. · You are dizzy or lightheaded, or you feel like you may faint. Watch closely for changes in your health, and be sure to contact your doctor if: 
· You are not getting better after 1 day (24 hours). Where can you learn more? Go to http://kendell-jonathan.info/. Enter Z513 in the search box to learn more about \"Abdominal Pain: Care Instructions. \" Current as of: May 27, 2016 Content Version: 11.1 © 3712-1700 [employer]MyApp. Care instructions adapted under license by [employer] (which disclaims liability or warranty for this information). If you have questions about a medical condition or this instruction, always ask your healthcare professional. [address] any warranty or liability for your use of this information. Introducing [facility] [place] & HEALTH SERVICES! Dear Valarie Carl: Thank you for requesting a [employer] account. Our records indicate that you already have an active [employer] account. You can access your account anytime at [email or web address] Did you know that you can access your hospital and ER discharge instructions at any time in [employer]? You can also review all of your test results from your hospital stay or ER visit. Additional Information If you have questions, please visit the Frequently Asked Questions section of the [employer] website at [email or web address] Remember, [employer] is NOT to be used for urgent needs. For medical emergencies, dial 911. Now available from your iPhone and Android! Please provide this summary of care documentation to your next provider. Your primary care clinician is listed as Aniceto Habermann. If you have any questions after today's visit, please call [phone].

## 2017-03-07 ENCOUNTER — HOSPITAL ENCOUNTER (OUTPATIENT)
Dept: ULTRASOUND IMAGING | Age: 57
Discharge: HOME | End: 2017-03-07
Attending: NURSE PRACTITIONER
Payer: COMMERCIAL

## 2017-03-07 ENCOUNTER — TELEPHONE (OUTPATIENT)
Dept: INTERNAL MEDICINE CLINIC | Age: 57
End: 2017-03-07

## 2017-03-07 ENCOUNTER — DOCUMENTATION ONLY (OUTPATIENT)
Dept: INTERNAL MEDICINE CLINIC | Age: 57
End: 2017-03-07

## 2017-03-07 ENCOUNTER — HOSPITAL ENCOUNTER (EMERGENCY)
Age: 57
Discharge: HOME | End: 2017-03-07
Attending: EMERGENCY MEDICINE | Admitting: EMERGENCY MEDICINE
Payer: COMMERCIAL

## 2017-03-07 ENCOUNTER — APPOINTMENT (OUTPATIENT)
Dept: CT IMAGING | Age: 57
End: 2017-03-07
Attending: EMERGENCY MEDICINE
Payer: COMMERCIAL

## 2017-03-07 VITALS
SYSTOLIC BLOOD PRESSURE: 154 MMHG | DIASTOLIC BLOOD PRESSURE: 93 MMHG | RESPIRATION RATE: 18 BRPM | OXYGEN SATURATION: 96 % | BODY MASS INDEX: 40.17 KG/M2 | WEIGHT: 255.95 LBS | HEIGHT: 67 IN | HEART RATE: 76 BPM | TEMPERATURE: 98.2 F

## 2017-03-07 DIAGNOSIS — R14.0 ABDOMINAL BLOATING: ICD-10-CM

## 2017-03-07 DIAGNOSIS — R14.0 ABDOMINAL BLOATING: Primary | ICD-10-CM

## 2017-03-07 DIAGNOSIS — R10.10 UPPER ABDOMINAL PAIN: Primary | ICD-10-CM

## 2017-03-07 LAB
ALBUMIN SERPL BCP-MCNC: 4.1 G/DL (ref 3.5–5)
ALBUMIN/GLOB SERPL: 1.5 {RATIO} (ref 1.1–2.2)
ALP SERPL-CCNC: 70 U/L (ref 45–117)
ALT SERPL-CCNC: 45 U/L (ref 12–78)
ANION GAP BLD CALC-SCNC: 6 MMOL/L (ref 5–15)
APPEARANCE UR: CLEAR
AST SERPL W P-5'-P-CCNC: 25 U/L (ref 15–37)
BACTERIA URNS QL MICRO: ABNORMAL /HPF
BASOPHILS # BLD AUTO: 0 K/UL (ref 0–0.1)
BASOPHILS # BLD: 0 % (ref 0–1)
BILIRUB SERPL-MCNC: 0.6 MG/DL (ref 0.2–1)
BILIRUB UR QL: NEGATIVE
BUN SERPL-MCNC: 13 MG/DL (ref 6–20)
BUN/CREAT SERPL: 18 (ref 12–20)
CALCIUM SERPL-MCNC: 9.4 MG/DL (ref 8.5–10.1)
CHLORIDE SERPL-SCNC: 104 MMOL/L (ref 97–108)
CO2 SERPL-SCNC: 33 MMOL/L (ref 21–32)
COLOR UR: ABNORMAL
CREAT SERPL-MCNC: 0.72 MG/DL (ref 0.55–1.02)
EOSINOPHIL # BLD: 0.1 K/UL (ref 0–0.4)
EOSINOPHIL NFR BLD: 2 % (ref 0–7)
EPITH CASTS URNS QL MICRO: ABNORMAL /LPF
ERYTHROCYTE [DISTWIDTH] IN BLOOD BY AUTOMATED COUNT: 12.9 % (ref 11.5–14.5)
GLOBULIN SER CALC-MCNC: 2.7 G/DL (ref 2–4)
GLUCOSE SERPL-MCNC: 123 MG/DL (ref 65–100)
GLUCOSE UR STRIP.AUTO-MCNC: NEGATIVE MG/DL
HCT VFR BLD AUTO: 40.8 % (ref 35–47)
HGB BLD-MCNC: 13.5 G/DL (ref 11.5–16)
HGB UR QL STRIP: NEGATIVE
HYALINE CASTS URNS QL MICRO: ABNORMAL /LPF (ref 0–5)
KETONES UR QL STRIP.AUTO: NEGATIVE MG/DL
LACTATE BLD-SCNC: 1.6 MMOL/L (ref 0.4–2)
LACTATE SERPL-SCNC: 2.6 MMOL/L (ref 0.4–2)
LEUKOCYTE ESTERASE UR QL STRIP.AUTO: NEGATIVE
LIPASE SERPL-CCNC: 164 U/L (ref 73–393)
LYMPHOCYTES # BLD AUTO: 34 % (ref 12–49)
LYMPHOCYTES # BLD: 2.5 K/UL (ref 0.8–3.5)
MCH RBC QN AUTO: 29.7 PG (ref 26–34)
MCHC RBC AUTO-ENTMCNC: 33.1 G/DL (ref 30–36.5)
MCV RBC AUTO: 89.7 FL (ref 80–99)
MONOCYTES # BLD: 0.5 K/UL (ref 0–1)
MONOCYTES NFR BLD AUTO: 7 % (ref 5–13)
NEUTS SEG # BLD: 4.1 K/UL (ref 1.8–8)
NEUTS SEG NFR BLD AUTO: 57 % (ref 32–75)
NITRITE UR QL STRIP.AUTO: NEGATIVE
PH UR STRIP: 6 [PH] (ref 5–8)
PLATELET # BLD AUTO: 331 K/UL (ref 150–400)
POTASSIUM SERPL-SCNC: 4.2 MMOL/L (ref 3.5–5.1)
PROT SERPL-MCNC: 6.8 G/DL (ref 6.4–8.2)
PROT UR STRIP-MCNC: NEGATIVE MG/DL
RBC # BLD AUTO: 4.55 M/UL (ref 3.8–5.2)
RBC #/AREA URNS HPF: ABNORMAL /HPF (ref 0–5)
SODIUM SERPL-SCNC: 143 MMOL/L (ref 136–145)
SP GR UR REFRACTOMETRY: <1.005 (ref 1–1.03)
TROPONIN I SERPL-MCNC: <0.04 NG/ML
UROBILINOGEN UR QL STRIP.AUTO: 0.2 EU/DL (ref 0.2–1)
WBC # BLD AUTO: 7.2 K/UL (ref 3.6–11)
WBC URNS QL MICRO: ABNORMAL /HPF (ref 0–4)

## 2017-03-07 PROCEDURE — 74011250636 HC RX REV CODE- 250/636: Performed by: EMERGENCY MEDICINE

## 2017-03-07 PROCEDURE — 84484 ASSAY OF TROPONIN QUANT: CPT | Performed by: EMERGENCY MEDICINE

## 2017-03-07 PROCEDURE — 76700 US EXAM ABDOM COMPLETE: CPT

## 2017-03-07 PROCEDURE — 96361 HYDRATE IV INFUSION ADD-ON: CPT

## 2017-03-07 PROCEDURE — 93005 ELECTROCARDIOGRAM TRACING: CPT

## 2017-03-07 PROCEDURE — 83605 ASSAY OF LACTIC ACID: CPT | Performed by: EMERGENCY MEDICINE

## 2017-03-07 PROCEDURE — 74177 CT ABD & PELVIS W/CONTRAST: CPT

## 2017-03-07 PROCEDURE — 74011250637 HC RX REV CODE- 250/637: Performed by: EMERGENCY MEDICINE

## 2017-03-07 PROCEDURE — 83605 ASSAY OF LACTIC ACID: CPT

## 2017-03-07 PROCEDURE — 80053 COMPREHEN METABOLIC PANEL: CPT | Performed by: EMERGENCY MEDICINE

## 2017-03-07 PROCEDURE — 96375 TX/PRO/DX INJ NEW DRUG ADDON: CPT

## 2017-03-07 PROCEDURE — 36415 COLL VENOUS BLD VENIPUNCTURE: CPT | Performed by: EMERGENCY MEDICINE

## 2017-03-07 PROCEDURE — 96374 THER/PROPH/DIAG INJ IV PUSH: CPT

## 2017-03-07 PROCEDURE — 74011000250 HC RX REV CODE- 250: Performed by: EMERGENCY MEDICINE

## 2017-03-07 PROCEDURE — 99284 EMERGENCY DEPT VISIT MOD MDM: CPT

## 2017-03-07 PROCEDURE — 83690 ASSAY OF LIPASE: CPT | Performed by: EMERGENCY MEDICINE

## 2017-03-07 PROCEDURE — 74011636320 HC RX REV CODE- 636/320: Performed by: EMERGENCY MEDICINE

## 2017-03-07 PROCEDURE — 85025 COMPLETE CBC W/AUTO DIFF WBC: CPT | Performed by: EMERGENCY MEDICINE

## 2017-03-07 PROCEDURE — 81001 URINALYSIS AUTO W/SCOPE: CPT | Performed by: EMERGENCY MEDICINE

## 2017-03-07 RX ORDER — ONDANSETRON 2 MG/ML
4 INJECTION INTRAMUSCULAR; INTRAVENOUS
Status: COMPLETED | OUTPATIENT
Start: 2017-03-07 | End: 2017-03-07

## 2017-03-07 RX ORDER — LIDOCAINE HYDROCHLORIDE 20 MG/ML
10 SOLUTION OROPHARYNGEAL
Status: COMPLETED | OUTPATIENT
Start: 2017-03-07 | End: 2017-03-07

## 2017-03-07 RX ORDER — SODIUM CHLORIDE 9 MG/ML
50 INJECTION, SOLUTION INTRAVENOUS
Status: COMPLETED | OUTPATIENT
Start: 2017-03-07 | End: 2017-03-07

## 2017-03-07 RX ORDER — SODIUM CHLORIDE 0.9 % (FLUSH) 0.9 %
10 SYRINGE (ML) INJECTION
Status: COMPLETED | OUTPATIENT
Start: 2017-03-07 | End: 2017-03-07

## 2017-03-07 RX ORDER — MORPHINE SULFATE 10 MG/ML
6 INJECTION, SOLUTION INTRAMUSCULAR; INTRAVENOUS
Status: COMPLETED | OUTPATIENT
Start: 2017-03-07 | End: 2017-03-07

## 2017-03-07 RX ADMIN — ONDANSETRON HYDROCHLORIDE 4 MG: 2 INJECTION, SOLUTION INTRAMUSCULAR; INTRAVENOUS at 21:20

## 2017-03-07 RX ADMIN — DIATRIZOATE MEGLUMINE AND DIATRIZOATE SODIUM 30 ML: 600; 100 SOLUTION ORAL; RECTAL at 21:20

## 2017-03-07 RX ADMIN — SODIUM CHLORIDE 1000 ML: 900 INJECTION, SOLUTION INTRAVENOUS at 22:08

## 2017-03-07 RX ADMIN — MORPHINE SULFATE 6 MG: 10 INJECTION INTRAMUSCULAR; INTRAVENOUS; SUBCUTANEOUS at 21:23

## 2017-03-07 RX ADMIN — ALUMINUM HYDROXIDE AND MAGNESIUM HYDROXIDE 30 ML: 200; 200 SUSPENSION ORAL at 23:02

## 2017-03-07 RX ADMIN — Medication 10 ML: at 22:39

## 2017-03-07 RX ADMIN — SODIUM CHLORIDE 50 ML/HR: 900 INJECTION, SOLUTION INTRAVENOUS at 22:39

## 2017-03-07 RX ADMIN — IOPAMIDOL 100 ML: 755 INJECTION, SOLUTION INTRAVENOUS at 22:39

## 2017-03-07 RX ADMIN — LIDOCAINE HYDROCHLORIDE 10 ML: 20 SOLUTION ORAL; TOPICAL at 23:02

## 2017-03-07 NOTE — LETTER
[address] 
[facility] EMERGENCY DEPT 
[address] [place] [address] [address] [id number] 
[phone] Work/School Note Date: 3/7/2017 To Whom It May concern: 
 
Adams Lubin was seen and treated today in the emergency room by the following provider(s): 
Attending Provider: Eve Knight MD. Adams Lubin may return to work on 3/8/17. Sincerely, Alyssa Gould RN

## 2017-03-07 NOTE — LETTER
[address] 
[facility] EMERGENCY DEPT 
[address] [place] [address] [address] [id number] [phone] Work/School Note Date: 3/7/2017 To Whom It May concern: 
 
Shae Baum was seen and treated today in the emergency room by the following provider(s): 
Attending Provider: Matt Gergg MD. Shae Baum may return to work on 3/9/17. Sincerely, Jose Menchaca RN

## 2017-03-07 NOTE — PROGRESS NOTES
Per Meron Romero NP request. I scheduled patient for CT scan of abdomen with and without contrast. Patient is scheduled at [facility] ([facility]] 03/09/2017 arrival time 9:30am to drink contrast for 11:30am appointment. Per [employer], they will obtain authorization.

## 2017-03-07 NOTE — TELEPHONE ENCOUNTER
Verified   Pt is unchanged ( no worsening but no improvement)  Reviewed US results    sched CT scan for Thursday (1st avail) but pt strongly advised to go to ED if any worsening  Pt verb understanding

## 2017-03-08 ENCOUNTER — TELEPHONE (OUTPATIENT)
Dept: INTERNAL MEDICINE CLINIC | Age: 57
End: 2017-03-08

## 2017-03-08 LAB
ATRIAL RATE: 55 BPM
CALCULATED P AXIS, ECG09: 47 DEGREES
CALCULATED R AXIS, ECG10: -5 DEGREES
CALCULATED T AXIS, ECG11: 67 DEGREES
DIAGNOSIS, 93000: NORMAL
P-R INTERVAL, ECG05: 190 MS
Q-T INTERVAL, ECG07: 426 MS
QRS DURATION, ECG06: 90 MS
QTC CALCULATION (BEZET), ECG08: 407 MS
VENTRICULAR RATE, ECG03: 55 BPM

## 2017-03-08 NOTE — ED PROVIDER NOTES
HPI Comments: Melina Pike is a 59 y.o. female with PMhx significant for HTN, DM, hypothyroidism, DJD, and GERD who presents ambulatory to the ED c/o an acute onset of abdominal distention and diffuse upper abdominal pain x 1 week. She reports associated sx of nausea and decreased flatus although her last BM was earlier today. Pt states that she was seen by her PCP who ordered an abdominal US which was unremarkable. Pt expresses that she was sent home with instructions to present to the ED if her sx did not resolve or if her sx worsened. She notes that she has an appointment for a CT in 2 days but could not tolerate the pain leading her to the ED for further evaluation. She denies any chest pain, SOB, fever, vomiting, or chills at this time. She denies history of bowel obstruction. PCP: Jordan Roca MD      There are no other complaints, changes or physical findings at this time. Written by Miki Oppenheim Barrett, ED Scribe, as dictated by Riddhi Patel MD     The history is provided by the patient. No  was used.         Past Medical History:   Diagnosis Date    Acute pancreatitis 8/9/2012    Acute pancreatitis 1/21/2011    Arthritis     both knees    Chronic pain     knees    Delivery normal     x1    Diabetes ([facility] [address]     DJD (degenerative joint disease) of knee     Fatty liver 1/22/2011    GERD (gastroesophageal reflux disease)     Hypertension     Hypothyroidism     Obesity     PUD (peptic ulcer disease)     UTI (urinary tract infection) 1/21/2011       Past Surgical History:   Procedure Laterality Date    HX BREAST LUMPECTOMY      left breast    HX GYN      hysterectomy    HX ORTHOPAEDIC      left total knee replacement    HX ORTHOPAEDIC  6/16/15    RIGHT TOTAL KNEE ARTHROPLASTY     HX PARATHYROIDECTOMY  01/27/11         Family History:   Problem Relation Age of Onset    Cancer Mother     Hypertension Father     Diabetes Sister     Hypertension Sister    Jose A Aguilar Diabetes Brother     Hypertension Brother        Social History     Social History    Marital status:      Spouse name: N/A    Number of children: N/A    Years of education: N/A     Occupational History    Not on file. Social History Main Topics    Smoking status: Former Smoker     Packs/day: 0.25     Years: 25.00     Types: Cigarettes     Quit date: 1/24/2008    Smokeless tobacco: Never Used    Alcohol use No    Drug use: No    Sexual activity: No     Other Topics Concern    Not on file     Social History Narrative         ALLERGIES: Influenza virus vaccine, specific; Tramadol; and Aspirin    Review of Systems   Constitutional: Negative for chills, fatigue and fever. HENT: Negative for congestion, rhinorrhea and sore throat. Eyes: Negative for pain, discharge and visual disturbance. Respiratory: Negative for cough, chest tightness, shortness of breath and wheezing. Cardiovascular: Negative for chest pain, palpitations and leg swelling. Gastrointestinal: Positive for abdominal distention, abdominal pain (upper abd) and nausea. Negative for constipation, diarrhea and vomiting.        (+) decreased flatus    Genitourinary: Negative for dysuria, frequency and hematuria. Musculoskeletal: Negative for arthralgias, back pain and myalgias. Skin: Negative for rash. Neurological: Negative for dizziness, weakness, light-headedness and headaches. Psychiatric/Behavioral: Negative. Vitals:    03/07/17 1845 03/07/17 2158   BP: 158/78 (!) 161/93   Pulse: 70 65   Resp: 18 18   Temp: 98.2 °F (36.8 °C)    SpO2: 100% 98%   Weight: 116.1 kg (255 lb 15.3 oz)    Height: 5' 7\" (1.702 m)             Physical Exam   Constitutional: She is oriented to person, place, and time. She appears well-developed and well-nourished. No distress. HENT:   Head: Normocephalic and atraumatic. Eyes: EOM are normal. Right eye exhibits no discharge. Left eye exhibits no discharge. No scleral icterus. Neck: Normal range of motion. Neck supple. No tracheal deviation present. Cardiovascular: Normal rate, regular rhythm, normal heart sounds and intact distal pulses. Exam reveals no gallop and no friction rub. No murmur heard. Pulmonary/Chest: Effort normal and breath sounds normal. No respiratory distress. She has no wheezes. She has no rales. Abdominal: Soft. She exhibits distension. There is tenderness (diffuse upper and LLQ). There is no rebound and no guarding. Musculoskeletal: Normal range of motion. She exhibits no edema. Lymphadenopathy:     She has no cervical adenopathy. Neurological: She is alert and oriented to person, place, and time. No focal neuro deficits   Skin: Skin is warm and dry. No rash noted. Psychiatric: She has a normal mood and affect. Nursing note and vitals reviewed. MDM  Number of Diagnoses or Management Options  Upper abdominal pain:   Diagnosis management comments:     Differential includes ileus, obstruction, diverticulitis, pancreatitis, cholecystitis, gastritis, GERD, PUD, ischemia, atypical ACS  - CBC, CMP, lipase, lactate  - CT a/p  - GI cocktail, symptomatic management and reevaluate         Amount and/or Complexity of Data Reviewed  Clinical lab tests: ordered and reviewed  Tests in the radiology section of CPT®: ordered and reviewed  Tests in the medicine section of CPT®: ordered and reviewed  Review and summarize past medical records: yes  Independent visualization of images, tracings, or specimens: yes    Patient Progress  Patient progress: stable    ED Course       Procedures    EKG interpretation: (Preliminary)  2215  Rhythm: sinus bradycardia; and regular . Rate (approx.): 55; Axis: normal; IL interval: normal; QRS interval: normal ; ST/T wave: normal;  Other findings: normal.  Written by Sarita Jo ED Scribe as dictated by Luciano Vilchis MD      PROGRESS NOTE:  11:20 PM  Labs and imaging unremarkable. Patient is tolerating po. Discussed CT results including renal lesion requiring further imaging and advised follow up with PCP for renal CT/MRI. Discussed results and follow up plan with patient. Provided customary return to ED instructions. Patient expressed understanding. Donavan Barcenas MD    LABORATORY TESTS:  Recent Results (from the past 12 hour(s))   CBC WITH AUTOMATED DIFF    Collection Time: 03/07/17  8:01 PM   Result Value Ref Range    WBC 7.2 3.6 - 11.0 K/uL    RBC 4.55 3.80 - 5.20 M/uL    HGB 13.5 11.5 - 16.0 g/dL    HCT 40.8 35.0 - 47.0 %    MCV 89.7 80.0 - 99.0 FL    MCH 29.7 26.0 - 34.0 PG    MCHC 33.1 30.0 - 36.5 g/dL    RDW 12.9 11.5 - 14.5 %    PLATELET 719 667 - 272 K/uL    NEUTROPHILS 57 32 - 75 %    LYMPHOCYTES 34 12 - 49 %    MONOCYTES 7 5 - 13 %    EOSINOPHILS 2 0 - 7 %    BASOPHILS 0 0 - 1 %    ABS. NEUTROPHILS 4.1 1.8 - 8.0 K/UL    ABS. LYMPHOCYTES 2.5 0.8 - 3.5 K/UL    ABS. MONOCYTES 0.5 0.0 - 1.0 K/UL    ABS. EOSINOPHILS 0.1 0.0 - 0.4 K/UL    ABS. BASOPHILS 0.0 0.0 - 0.1 K/UL   METABOLIC PANEL, COMPREHENSIVE    Collection Time: 03/07/17  8:01 PM   Result Value Ref Range    Sodium 143 136 - 145 mmol/L    Potassium 4.2 3.5 - 5.1 mmol/L    Chloride 104 97 - 108 mmol/L    CO2 33 (H) 21 - 32 mmol/L    Anion gap 6 5 - 15 mmol/L    Glucose 123 (H) 65 - 100 mg/dL    BUN 13 6 - 20 MG/DL    Creatinine 0.72 0.55 - 1.02 MG/DL    BUN/Creatinine ratio 18 12 - 20      GFR est AA >60 >60 ml/min/1.73m2    GFR est non-AA >60 >60 ml/min/1.73m2    Calcium 9.4 8.5 - 10.1 MG/DL    Bilirubin, total 0.6 0.2 - 1.0 MG/DL    ALT (SGPT) 45 12 - 78 U/L    AST (SGOT) 25 15 - 37 U/L    Alk.  phosphatase 70 45 - 117 U/L    Protein, total 6.8 6.4 - 8.2 g/dL    Albumin 4.1 3.5 - 5.0 g/dL    Globulin 2.7 2.0 - 4.0 g/dL    A-G Ratio 1.5 1.1 - 2.2     LIPASE    Collection Time: 03/07/17  8:01 PM   Result Value Ref Range    Lipase 164 73 - 393 U/L   LACTIC ACID, PLASMA    Collection Time: 03/07/17  9:18 PM   Result Value Ref Range    Lactic acid 2.6 (HH) 0.4 - 2.0 MMOL/L   TROPONIN I    Collection Time: 03/07/17  9:18 PM   Result Value Ref Range    Troponin-I, Qt. <0.04 <0.05 ng/mL   EKG, 12 LEAD, INITIAL    Collection Time: 03/07/17 10:15 PM   Result Value Ref Range    Ventricular Rate 55 BPM    Atrial Rate 55 BPM    P-R Interval 190 ms    QRS Duration 90 ms    Q-T Interval 426 ms    QTC Calculation (Bezet) 407 ms    Calculated P Axis 47 degrees    Calculated R Axis -5 degrees    Calculated T Axis 67 degrees    Diagnosis       Sinus bradycardia  When compared with ECG of 08-MAY-2013 11:36,  No significant change was found         IMAGING RESULTS:  CT ABD PELV W CONT   Final Result   EXAM: CT abdomen pelvis with contrast     INDICATION: Abdominal distention and abdominal pain.     COMPARISON: Ultrasound abdomen 3/7/2017.     TECHNIQUE: Helical CT of the abdomen and pelvis with oral contrast following  the uneventful intravenous administration of nonionic contrast. Coronal and  sagittal reformats are performed. CT dose reduction was achieved through use of  a standardized protocol tailored for this examination and automatic exposure  control for dose modulation.     FINDINGS:   The visualized lung bases demonstrate no mass or consolidation. The heart size  is normal. There is no pericardial or pleural effusion.     Liver is diffusely low in attenuation without focal mass. The spleen, pancreas,  and adrenal glands are normal. The kidneys are symmetric without hydronephrosis. A 1.4 cm lesion at the upper pole of the right kidney with a Hounsfield unit of  45 is incompletely characterized (series 601B, image 98). A few tiny low-density  bilateral kidney lesions are too small to characterize. The gall bladder is  present without intra- or extra-hepatic biliary dilatation.      There are no dilated bowel loops. The appendix is normal. There are no  enlarged lymph nodes. There is no free fluid or free air. The aorta tapers  without aneurysm.  There is mild aortoiliac atherosclerosis.     The urinary bladder is normal. There is no pelvic mass.      There is a small fat-containing umbilical hernia.     The bony structures are age-appropriate.     IMPRESSION  IMPRESSION:   1. There is no acute abnormality in the abdomen or pelvis. Hepatic steatosis.     2. A 1.4 cm lesion at the upper pole of the right kidney is incompletely  characterized. This is greater density than a simple cyst and further evaluation  with nonemergent MRI or CT without and with contrast is recommended. MEDICATIONS GIVEN:  Medications   diatrizoate meglumine-d.sodium (MD-GASTROVIEW,GASTROGRAFIN) 66-10 % contrast solution 30 mL (30 mL Oral Given 3/7/17 2120)   morphine injection 6 mg (6 mg IntraVENous Given 3/7/17 2123)   ondansetron (ZOFRAN) injection 4 mg (4 mg IntraVENous Given 3/7/17 2120)   sodium chloride 0.9 % bolus infusion 1,000 mL (1,000 mL IntraVENous New Bag 3/7/17 2208)   0.9% sodium chloride infusion (50 mL/hr IntraVENous New Bag 3/7/17 2239)   iopamidol (ISOVUE-370) 76 % injection 100 mL (100 mL IntraVENous Given 3/7/17 2239)   sodium chloride (NS) flush 10 mL (10 mL IntraVENous Given 3/7/17 2239)   aluminum-magnesium hydroxide (MAALOX) oral suspension 30 mL (30 mL Oral Given 3/7/17 2302)   lidocaine (XYLOCAINE) 2 % viscous solution 10 mL (10 mL Mouth/Throat Given 3/7/17 2302)       IMPRESSION:  1. Upper abdominal pain        PLAN:  1. Current Discharge Medication List      CONTINUE these medications which have NOT CHANGED    Details   ibuprofen (MOTRIN) 800 mg tablet TAKE 1 TABLET BY MOUTH EVERY 6 HOURS AS NEEDED FOR PAIN WITH FOOD  Qty: 120 Tab, Refills: 0      HYDROcodone-acetaminophen (NORCO) 7.5-325 mg per tablet 1 tab every 4-6 hours as needed for pain.  Max of 6 pills per day  Qty: 180 Tab, Refills: 0    Associated Diagnoses: Osteoarthritis of left knee, unspecified osteoarthritis type      metoprolol succinate (TOPROL-XL) 100 mg tablet TAKE 1 TABLET BY MOUTH DAILY FOR HIGH BLOOD PRESSURE  Qty: 90 Tab, Refills: 3    Comments: **Patient requests 90 days supply**      atorvastatin (LIPITOR) 20 mg tablet TAKE 1 TABLET BY MOUTH DAILY FOR CHOLESTEROL  Qty: 90 Tab, Refills: 11    Comments: **Patient requests 90 days supply**      !! glucose blood VI test strips (ACCU-CHEK MEENU PLUS TEST STRP) strip Check sugars 3 times a day Dx E11.65  Qty: 300 Strip, Refills: 11      zolpidem (AMBIEN) 10 mg tablet TAKE 1 TABLET BY MOUTH EVERY NIGHT AT BEDTIME AS NEEDED  Qty: 30 Tab, Refills: 5      !! glimepiride (AMARYL) 4 mg tablet TAKE 1 TABLET BY MOUTH TWICE DAILY  Qty: 60 Tab, Refills: 11      amLODIPine (NORVASC) 10 mg tablet TAKE 1 TABLET BY MOUTH DAILY  Qty: 90 Tab, Refills: 3      metFORMIN (GLUCOPHAGE) 1,000 mg tablet TAKE 1 TABLET BY MOUTH TWICE DAILY WITH MEALS  Qty: 180 Tab, Refills: 3      valsartan-hydrochlorothiazide (DIOVAN-HCT) 320-25 mg per tablet TAKE 1 TABLET BY MOUTH DAILY  Qty: 90 Tab, Refills: 11    Comments: **Patient requests 90 days supply**      escitalopram oxalate (LEXAPRO) 10 mg tablet TAKE 1 TABLET BY MOUTH EVERY DAY  Qty: 90 Tab, Refills: 11    Comments: **Patient requests 90 days supply**      !! glimepiride (AMARYL) 4 mg tablet TAKE 1 TABLET BY MOUTH TWICE DAILY  Qty: 180 Tab, Refills: 11    Comments: **Patient requests 90 days supply**      meclizine (ANTIVERT) 25 mg tablet Take 1 Tab by mouth three (3) times daily as needed for Dizziness. Qty: 20 Tab, Refills: 0      fluticasone (FLONASE) 50 mcg/actuation nasal spray 2 Sprays by Both Nostrils route daily. ondansetron (ZOFRAN ODT) 4 mg disintegrating tablet Take 1 Tab by mouth every eight (8) hours as needed for Nausea. Qty: 10 Tab, Refills: 0      !! glucose blood VI test strips (ACCU-CHEK MEENU PLUS TEST STRP) strip USE TO TEST BLOOD SUGAR TWO TO THREE TIMES A DAY AS DIRECTED  Qty: 100 Strip, Refills: 11      !! glimepiride (AMARYL) 4 mg tablet Take 1 Tab by mouth two (2) times a day.   Qty: 60 Tab, Refills: 11    Associated Diagnoses: Diabetes mellitus due to underlying condition without complications ([facility] [address]      ! ! glucose blood VI test strips (BLOOD GLUCOSE TEST) strip Check sugars bid  Dx: 250.02  Qty: 100 Strip, Refills: 11       !! - Potential duplicate medications found. Please discuss with provider. 2.   Follow-up Information     Follow up With Details Comments 4060 Hoang Acevedo MD In 2 days  [place]  [address]      Naomy De León MD In 2 days Gastroenterology [address] 7 21       [facility] EMERGENCY DEPT  As needed, If symptoms worsen [address]  [address] [facility]  [phone]        3. Return to ED if worse   Discharge Note:  11:25 PM  The patient is ready for discharge. The patient's signs, symptoms, diagnosis, and discharge instruction have been discussed and the patient has conveyed their understanding. The patient is to follow up as recommended or return to the ER should their symptoms worsen. Plan has been discussed and the patient is in agreement. Written by Candy Jo ED Scribe, as dictated by Mike Bustos MD    Attestation: This note is prepared by Henrry Jo, acting as Scribe for Mike Bustos MD.      Mike Bustos MD: The scribe's documentation has been prepared under my direction and personally reviewed by me in its entirety. I confirm that the note above accurately reflects all work, treatment, procedures, and medical decision making performed by me.

## 2017-03-08 NOTE — TELEPHONE ENCOUNTER
Spoke with patient regarding CT scan/recent ED visit, unclear cause of symptoms. She is going to follow up with MD regarding need for more follow up on kidney cyst and follow up on stomach symptoms if not improving. recc watch food that she eats - try smaller more frequent meals, nothing late at night.  rtc if worsening

## 2017-03-08 NOTE — DISCHARGE INSTRUCTIONS

## 2017-03-08 NOTE — ED NOTES
Dr. Stacia Rosales gave and reviewed discharge instructions with the patient. The patient verbalized understanding. The patient was given opportunity for questions. Patient discharged in stable condition to the waiting room with male visitor.

## 2017-03-08 NOTE — ED NOTES
Assumed care of pt from triage. Pt presents to ED with chief complaint of epigastric pain and lower abdominal pain since yesterday. Pt was seen here yesterday for the abdominal pain. Pt is A&O x 4. Pt denies any other symptoms at this time. Pt resting comfortably on the stretcher in a position of comfort. Pt in no acute distress at this time. Call bell within reach. Side rails x 2. Stretcher locked in the lowest position. Pt aware of plan to await for MD/PA-C/NP assessment, and pt/family verbalizes understanding. Will continue to monitor.

## 2017-03-08 NOTE — ED NOTES
Hourly rounds completed. Concerns and questions addressed at this time. Pt in no acute distress at this time. Call bell within reach. Side rails x 2. Stretcher locked in lowest position.

## 2017-05-12 ENCOUNTER — OFFICE VISIT (OUTPATIENT)
Dept: INTERNAL MEDICINE CLINIC | Age: 57
End: 2017-05-12

## 2017-05-12 VITALS
TEMPERATURE: 98.2 F | RESPIRATION RATE: 20 BRPM | WEIGHT: 266 LBS | OXYGEN SATURATION: 98 % | HEIGHT: 67 IN | HEART RATE: 68 BPM | BODY MASS INDEX: 41.75 KG/M2 | SYSTOLIC BLOOD PRESSURE: 149 MMHG | DIASTOLIC BLOOD PRESSURE: 80 MMHG

## 2017-05-12 DIAGNOSIS — I10 ESSENTIAL HYPERTENSION: Primary | ICD-10-CM

## 2017-05-12 DIAGNOSIS — M17.12 OSTEOARTHRITIS OF LEFT KNEE, UNSPECIFIED OSTEOARTHRITIS TYPE: ICD-10-CM

## 2017-05-12 DIAGNOSIS — E11.00 UNCONTROLLED TYPE 2 DIABETES MELLITUS WITH HYPEROSMOLARITY WITHOUT COMA, WITHOUT LONG-TERM CURRENT USE OF INSULIN (HCC): ICD-10-CM

## 2017-05-12 LAB
CHOLEST SERPL-MCNC: >100 MG/DL
GLUCOSE POC: 224 MG/DL
HBA1C MFR BLD HPLC: 8.4 %
HDLC SERPL-MCNC: 31 MG/DL
LDL CHOLESTEROL POC: NORMAL
NON-HDL GOAL (POC): NORMAL
TCHOL/HDL RATIO (POC): NORMAL
TRIGL SERPL-MCNC: 212 MG/DL

## 2017-05-12 RX ORDER — ZOLPIDEM TARTRATE 10 MG/1
TABLET ORAL
Qty: 30 TAB | Refills: 5 | Status: SHIPPED | OUTPATIENT
Start: 2017-05-12 | End: 2017-08-01 | Stop reason: DRUGHIGH

## 2017-05-12 RX ORDER — ESCITALOPRAM OXALATE 10 MG/1
TABLET ORAL
Qty: 90 TAB | Refills: 3 | Status: SHIPPED | OUTPATIENT
Start: 2017-05-12 | End: 2017-10-23 | Stop reason: SDUPTHER

## 2017-05-12 RX ORDER — GLIMEPIRIDE 4 MG/1
TABLET ORAL
Qty: 180 TAB | Refills: 11 | Status: SHIPPED | OUTPATIENT
Start: 2017-05-12 | End: 2018-05-29 | Stop reason: SDUPTHER

## 2017-05-12 RX ORDER — HYDROCODONE BITARTRATE AND ACETAMINOPHEN 7.5; 325 MG/1; MG/1
TABLET ORAL
Qty: 180 TAB | Refills: 0 | Status: SHIPPED | OUTPATIENT
Start: 2017-05-12 | End: 2017-08-01 | Stop reason: SDUPTHER

## 2017-05-12 RX ORDER — VALSARTAN AND HYDROCHLOROTHIAZIDE 320; 25 MG/1; MG/1
TABLET, FILM COATED ORAL
Qty: 90 TAB | Refills: 3 | Status: SHIPPED | OUTPATIENT
Start: 2017-05-12 | End: 2017-08-01 | Stop reason: SDUPTHER

## 2017-05-12 RX ORDER — AMLODIPINE BESYLATE 10 MG/1
TABLET ORAL
Qty: 90 TAB | Refills: 3 | Status: SHIPPED | OUTPATIENT
Start: 2017-05-12 | End: 2017-08-21 | Stop reason: SDUPTHER

## 2017-05-12 NOTE — PROGRESS NOTES
1. Have you been to the ER, urgent care clinic since your last visit? Hospitalized since your last visit? Yes When: 3/7/17 [facility] ED  abd. pain. 2. Have you seen or consulted any other health care providers outside of the [facility] since your last visit? Include any pap smears or colon screening. No.  Had pain med today. blood glucose Veto Nicole /Lipids verbal order DR. Templeton/MARY Pickard.

## 2017-05-12 NOTE — MR AVS SNAPSHOT
Visit Information Date & Time Provider Department Dept. Phone Encounter #  
 5/12/2017  3:00  [address] [id number] Follow-up Instructions Return in about 3 months (around 8/12/2017) for pain med. Upcoming Health Maintenance Date Due  
 FOOT EXAM Q1 3/4/1968 Pneumococcal 19-64 Medium Risk (1 of 1 - PPSV23) 3/4/1977 DTaP/Tdap/Td series (1 - Tdap) 3/4/1979 PAP AKA CERVICAL CYTOLOGY 3/4/1979 EYE EXAM RETINAL OR DILATED Q1 3/31/2016 MICROALBUMIN Q1 1/8/2017 LIPID PANEL Q1 5/2/2017 HEMOGLOBIN A1C Q6M 5/14/2017 INFLUENZA AGE 9 TO ADULT 8/1/2017 BREAST CANCER SCRN MAMMOGRAM 10/13/2017 COLONOSCOPY 6/14/2020 Allergies as of 5/12/2017  Review Complete On: 5/12/2017 By: Liat Flores LPN Severity Noted Reaction Type Reactions Influenza Virus Vaccine, Specific High 05/08/2013   Side Effect Other (comments) States body got very hot Tramadol  05/20/2014    Seizures Aspirin Low 06/15/2010   Side Effect Other (comments) Pt reports having a h/o gastric ulcers. Pt was on IBU when she was dx'd. Current Immunizations  Reviewed on 5/9/2013 Name Date Influenza Vaccine Split  Deferred (Patient Refused) Not reviewed this visit You Were Diagnosed With   
  
 Codes Comments Essential hypertension    -  Primary ICD-10-CM: I10 
ICD-9-CM: 401.9 Osteoarthritis of left knee, unspecified osteoarthritis type     ICD-10-CM: M17.12 
ICD-9-CM: 715.96 Diabetes mellitus due to underlying condition with hyperosmolarity without coma, with long-term current use of insulin (HCC)     ICD-10-CM: E08.00, Z79.4 ICD-9-CM: 249.20, V58.67 Vitals Resp Height(growth percentile) Weight(growth percentile) BMI OB Status Smoking Status 20 5' 7\" (1.702 m) 266 lb (120.7 kg) 41.66 kg/m2 Hysterectomy Former Smoker Vitals History BMI and BSA Data Body Mass Index Body Surface Area  
 41.66 kg/m 2 2.39 m 2 Preferred Pharmacy Pharmacy Name Phone [facility] [phone] Your Updated Medication List  
  
   
This list is accurate as of: 5/12/17  3:08 PM.  Always use your most recent med list. amLODIPine 10 mg tablet Commonly known as:  [facility] TAKE 1 TABLET BY MOUTH DAILY  
  
 atorvastatin 20 mg tablet Commonly known as:  LIPITOR  
TAKE 1 TABLET BY MOUTH DAILY FOR CHOLESTEROL  
  
 escitalopram oxalate 10 mg tablet Commonly known as:  Candice Gambler TAKE 1 TABLET BY MOUTH EVERY DAY  
  
 FLONASE 50 mcg/actuation nasal spray Generic drug:  fluticasone 2 Sprays by Both Nostrils route daily. * glimepiride 4 mg tablet Commonly known as:  AMARYL Take 1 Tab by mouth two (2) times a day. * glimepiride 4 mg tablet Commonly known as:  AMARYL  
TAKE 1 TABLET BY MOUTH TWICE DAILY * glimepiride 4 mg tablet Commonly known as:  AMARYL  
TAKE 1 TABLET BY MOUTH TWICE DAILY * glucose blood VI test strips strip Commonly known as:  blood glucose test  
Check sugars bid  Dx: 250.02  
  
 * glucose blood VI test strips strip Commonly known as:  ACCU-CHEK MEENU PLUS TEST STRP  
USE TO TEST BLOOD SUGAR TWO TO THREE TIMES A DAY AS DIRECTED * glucose blood VI test strips strip Commonly known as:  ACCU-CHEK MEENU PLUS TEST STRP Check sugars 3 times a day Dx E11.65 HYDROcodone-acetaminophen 7.5-325 mg per tablet Commonly known as:  Jarome Marc  
1 tab every 4-6 hours as needed for pain. Max of 6 pills per day  
  
 ibuprofen 800 mg tablet Commonly known as:  MOTRIN  
TAKE 1 TABLET BY MOUTH EVERY 6 HOURS AS NEEDED FOR PAIN WITH FOOD  
  
 meclizine 25 mg tablet Commonly known as:  ANTIVERT Take 1 Tab by mouth three (3) times daily as needed for Dizziness. metFORMIN 1,000 mg tablet Commonly known as:  GLUCOPHAGE  
TAKE 1 TABLET BY MOUTH TWICE DAILY WITH MEALS  
  
 metoprolol succinate 100 mg tablet Commonly known as:  TOPROL-XL  
TAKE 1 TABLET BY MOUTH DAILY FOR HIGH BLOOD PRESSURE  
  
 ondansetron 4 mg disintegrating tablet Commonly known as:  ZOFRAN ODT Take 1 Tab by mouth every eight (8) hours as needed for Nausea. valsartan-hydroCHLOROthiazide 320-25 mg per tablet Commonly known as:  DIOVAN-HCT  
TAKE 1 TABLET BY MOUTH DAILY  
  
 zolpidem 10 mg tablet Commonly known as:  AMBIEN  
TAKE 1 TABLET BY MOUTH EVERY NIGHT AT BEDTIME AS NEEDED * Notice: This list has 6 medication(s) that are the same as other medications prescribed for you. Read the directions carefully, and ask your doctor or other care provider to review them with you. Prescriptions Printed Refills HYDROcodone-acetaminophen (NORCO) 7.5-325 mg per tablet 0 Si tab every 4-6 hours as needed for pain. Max of 6 pills per day Class: Print Prescriptions Sent to Pharmacy Refills  
 escitalopram oxalate (LEXAPRO) 10 mg tablet 3 Sig: TAKE 1 TABLET BY MOUTH EVERY DAY Class: Normal  
 Pharmacy: [address] Ph #: [phone]  
 amLODIPine (NORVASC) 10 mg tablet 3 Sig: TAKE 1 TABLET BY MOUTH DAILY Class: Normal  
 Pharmacy: [address] Ph #: [phone]  
 valsartan-hydroCHLOROthiazide (DIOVAN-HCT) 320-25 mg per tablet 3 Sig: TAKE 1 TABLET BY MOUTH DAILY Class: Normal  
 Pharmacy: [address] Ph #: [phone]  
 glimepiride (AMARYL) 4 mg tablet 11 Sig: TAKE 1 TABLET BY MOUTH TWICE DAILY  Class: Normal  
 Pharmacy: [address] [place], [address] [facility] [place] [address]  #: [phone] We Performed the Following PAIN MGMT PANEL W/REFL, UR [[id number] Custom] Follow-up Instructions Return in about 3 months (around 8/12/2017) for pain med. [facility] SERVICES! Dear Miesha Mckeon: Thank you for requesting a [employer] account. Our records indicate that you already have an active [employer]Flow account. You can access your account anytime at [email or web address] Did you know that you can access your hospital and ER discharge instructions at any time in [employer]Flow? You can also review all of your test results from your hospital stay or ER visit. Additional Information If you have questions, please visit the Frequently Asked Questions section of the RawFlow website at [email or web address] Remember, RawFlow is NOT to be used for urgent needs. For medical emergencies, dial 911. Now available from your iPhone and Android! Please provide this summary of care documentation to your next provider. Your primary care clinician is listed as Nel Bishop. If you have any questions after today's visit, please call [phone].

## 2017-05-20 LAB
AMPHETAMINES UR QL SCN: NEGATIVE NG/ML
BARBITURATES UR QL SCN: NEGATIVE NG/ML
BENZODIAZ UR QL SCN: NEGATIVE NG/ML
BZE UR QL SCN: NEGATIVE NG/ML
CANNABINOIDS UR QL SCN: NEGATIVE NG/ML
CREAT UR-MCNC: 45.8 MG/DL (ref 20–300)
FENTANYL+NORFENTANYL UR QL SCN: NEGATIVE PG/ML
MEPERIDINE UR QL: NEGATIVE NG/ML
METHADONE UR QL SCN: NEGATIVE NG/ML
OPIATES UR QL SCN: POSITIVE NG/ML
OXYCODONE+OXYMORPHONE UR QL SCN: NEGATIVE NG/ML
PCP UR QL: NEGATIVE NG/ML
PH UR: 5.8 [PH] (ref 4.5–8.9)
PLEASE NOTE:, 733157: ABNORMAL
PROPOXYPH UR QL SCN: NEGATIVE NG/ML
SP GR UR: 1.01
TRAMADOL UR QL SCN: NEGATIVE NG/ML

## 2017-06-15 RX ORDER — VALSARTAN AND HYDROCHLOROTHIAZIDE 320; 25 MG/1; MG/1
TABLET, FILM COATED ORAL
Qty: 90 TAB | Refills: 0 | Status: SHIPPED | OUTPATIENT
Start: 2017-06-15 | End: 2017-08-01 | Stop reason: DRUGHIGH

## 2017-06-16 RX ORDER — BLOOD SUGAR DIAGNOSTIC
STRIP MISCELLANEOUS
Qty: 300 STRIP | Refills: 0 | Status: SHIPPED | OUTPATIENT
Start: 2017-06-16 | End: 2017-10-04 | Stop reason: SDUPTHER

## 2017-06-18 NOTE — PROGRESS NOTES
Marcy Santillan is a 59 y.o. female and presents with Medication Refill  . Pt comes in to get refill on her pain meds and also to f/u on diabetes. Compliant with meds and DM diet. No CP, SOB, or edema. Checks BS every day. Review of Systems  Constitutional: negative for fevers, chills, anorexia and weight loss  Eyes:   negative for visual disturbance and irritation  ENT:   negative for tinnitus,sore throat,nasal congestion,ear pains. hoarseness  Respiratory:  negative for cough, hemoptysis, dyspnea,wheezing  CV:   negative for chest pain, palpitations, lower extremity edema  GI:   negative for nausea, vomiting, diarrhea, abdominal pain,melena  Endo:               negative for polyuria,polydipsia,polyphagia,heat intolerance  Genitourinary: negative for frequency, dysuria and hematuria  Integument:  negative for rash and pruritus  Hematologic:  negative for easy bruising and gum/nose bleeding  Musculoskel: negative for myalgias, arthralgias, back pain, muscle weakness, joint pain  Neurological:  negative for headaches, dizziness, vertigo, memory problems and gait   Behavl/Psych: negative for feelings of anxiety, depression, mood changes    Past Medical History:   Diagnosis Date    Acute pancreatitis 8/9/2012    Acute pancreatitis 1/21/2011    Arthritis     both knees    Chronic pain     knees    Delivery normal     x1    [facility] ([facility] [address]     DJD (degenerative joint disease) of knee     Fatty liver 1/22/2011    GERD (gastroesophageal reflux disease)     Hypertension     Hypothyroidism     Obesity     PUD (peptic ulcer disease)     UTI (urinary tract infection) 1/21/2011     Past Surgical History:   Procedure Laterality Date    HX BREAST LUMPECTOMY      left breast    HX GYN      hysterectomy    HX ORTHOPAEDIC      left total knee replacement    HX ORTHOPAEDIC  6/16/15    RIGHT TOTAL KNEE ARTHROPLASTY     HX PARATHYROIDECTOMY  01/27/11     Social History     Social History    Marital status:  Spouse name: N/A    Number of children: N/A    Years of education: N/A     Social History Main Topics    Smoking status: Former Smoker     Packs/day: 0.25     Years: 25.00     Types: Cigarettes     Quit date: 1/24/2008    Smokeless tobacco: Never Used    Alcohol use No    Drug use: No    Sexual activity: No     Other Topics Concern    None     Social History Narrative     Current Outpatient Prescriptions   Medication Sig Dispense Refill    escitalopram oxalate (LEXAPRO) 10 mg tablet TAKE 1 TABLET BY MOUTH EVERY DAY 90 Tab 3    amLODIPine (NORVASC) 10 mg tablet TAKE 1 TABLET BY MOUTH DAILY 90 Tab 3    valsartan-hydroCHLOROthiazide (DIOVAN-HCT) 320-25 mg per tablet TAKE 1 TABLET BY MOUTH DAILY 90 Tab 3    glimepiride (AMARYL) 4 mg tablet TAKE 1 TABLET BY MOUTH TWICE DAILY 180 Tab 11    HYDROcodone-acetaminophen (NORCO) 7.5-325 mg per tablet 1 tab every 4-6 hours as needed for pain. Max of 6 pills per day 180 Tab 0    zolpidem (AMBIEN) 10 mg tablet TAKE 1 TABLET BY MOUTH EVERY NIGHT AT BEDTIME AS NEEDED 30 Tab 5    ibuprofen (MOTRIN) 800 mg tablet TAKE 1 TABLET BY MOUTH EVERY 6 HOURS AS NEEDED FOR PAIN WITH FOOD 120 Tab 5    metFORMIN (GLUCOPHAGE) 1,000 mg tablet TAKE 1 TABLET BY MOUTH TWICE DAILY WITH MEALS 180 Tab 3    metoprolol succinate (TOPROL-XL) 100 mg tablet TAKE 1 TABLET BY MOUTH DAILY FOR HIGH BLOOD PRESSURE 90 Tab 3    atorvastatin (LIPITOR) 20 mg tablet TAKE 1 TABLET BY MOUTH DAILY FOR CHOLESTEROL 90 Tab 11    glucose blood VI test strips (ACCU-CHEK MEENU PLUS TEST STRP) strip Check sugars 3 times a day Dx E11.65 300 Strip 11    glimepiride (AMARYL) 4 mg tablet TAKE 1 TABLET BY MOUTH TWICE DAILY 60 Tab 11    fluticasone (FLONASE) 50 mcg/actuation nasal spray 2 Sprays by Both Nostrils route daily.       ACCU-CHEK MEENU PLUS TEST STRP strip CHECK SUGAR THREE TIMES DAILY 300 Strip 0    valsartan-hydroCHLOROthiazide (DIOVAN-HCT) 320-25 mg per tablet TAKE 1 TABLET BY MOUTH DAILY 90 Tab 0    LEXAPRO 10 mg tablet TAKE 1 TABLET BY MOUTH EVERY DAY 30 Tab 11    ondansetron (ZOFRAN ODT) 4 mg disintegrating tablet Take 1 Tab by mouth every eight (8) hours as needed for Nausea. 10 Tab 0     Allergies   Allergen Reactions    Influenza Virus Vaccine, Specific Other (comments)     States body got very hot    Tramadol Seizures    Aspirin Other (comments)     Pt reports having a h/o gastric ulcers. Pt was on IBU when she was dx'd. Objective:  Visit Vitals    /80 (BP 1 Location: Right arm, BP Patient Position: Sitting)    Pulse 68    Temp 98.2 °F (36.8 °C) (Oral)    Resp 20    Ht 5' 7\" (1.702 m)    Wt 266 lb (120.7 kg)    SpO2 98%    BMI 41.66 kg/m2     Physical Exam:   General appearance - alert, well appearing, and in no distress  Mental status - alert, oriented to person, place, and time  Chest - clear to auscultation, no wheezes, rales or rhonchi, symmetric air entry   Heart - normal rate, regular rhythm, normal S1, S2, no murmurs, rubs, clicks or gallops   Abdomen - soft, nontender, nondistended, no masses or organomegaly  Lymph- no adenopathy palpable  Ext-peripheral pulses normal, no pedal edema, no clubbing or cyanosis  Skin-Warm and dry. no hyperpigmentation, vitiligo, or suspicious lesions  Neuro -alert, oriented, normal speech, no focal findings or movement disorder noted      Results for orders placed or performed in visit on 05/12/17   PAIN MGMT PANEL W/REFL, UR   Result Value Ref Range    Amphetamine Screen, urine Negative Dxoyly=7017 ng/mL    Barbiturates Screen, urine Negative Itrfox=718 ng/mL    Benzodiazepines Screen, urine Negative Fqhtlj=665 ng/mL    Cannabinoid Screen, urine Negative Cutoff=20 ng/mL    Cocaine Metab.  Screen, urine Negative Arufef=238 ng/mL    Opiate Screen, urine Positive (A) Yqjlxb=246 ng/mL    Oxycodone/Oxymorphone, urine Negative Tcbtfs=652 ng/mL    Phencyclidine Screen, urine Negative Cutoff=25 ng/mL    Methadone Screen, urine Negative Sjopqe=391 ng/mL    Propoxyphene Screen, urine Negative Nghuki=904 ng/mL    Meperidine screen Negative Npkgrv=872 ng/mL    FENTANYL, URINE Negative Ihsfdg=4512 pg/mL    Tramadol Screen, urine Negative Dhmzhp=779 ng/mL    Creatinine, urine 45.8 20.0 - 300.0 mg/dL    Specific Gravity 1.014     pH, urine 5.8 4.5 - 8.9    Please Note Comment    AMB POC GLUCOSE BLOOD, BY GLUCOSE MONITORING DEVICE   Result Value Ref Range    Glucose  mg/dL   AMB POC HEMOGLOBIN A1C   Result Value Ref Range    Hemoglobin A1c (POC) 8.4 %   AMB POC LIPID PROFILE   Result Value Ref Range    Cholesterol (POC) >100     Triglycerides (POC) 212     HDL Cholesterol (POC) 31     LDL Cholesterol (POC) n/a     Non-HDL Goal (POC) n/a     TChol/HDL Ratio (POC) n/a        Assessment/Plan:    ICD-10-CM ICD-9-CM    1. Essential hypertension I10 401.9    2. Osteoarthritis of left knee, unspecified osteoarthritis type M17.12 715.96 HYDROcodone-acetaminophen (NORCO) 7.5-325 mg per tablet      PAIN MGMT PANEL W/REFL, UR      REFERRAL TO PAIN CLINIC   3.  Diabetes mellitus due to underlying condition with hyperosmolarity without coma, with long-term current use of insulin ([facility]) E08.00 249.20 AMB POC GLUCOSE BLOOD, BY GLUCOSE MONITORING DEVICE    Z79.4 V58.67 AMB POC HEMOGLOBIN A1C      AMB POC LIPID PROFILE     Orders Placed This Encounter    PAIN MGMT PANEL W/REFL, UR    REFERRAL TO PAIN CLINIC     Referral Priority:   Routine     Referral Type:   Consultation     Referral Reason:   Specialty Services Required    AMB POC GLUCOSE BLOOD, BY GLUCOSE MONITORING DEVICE    AMB POC HEMOGLOBIN A1C    AMB POC LIPID PROFILE    escitalopram oxalate (LEXAPRO) 10 mg tablet     Sig: TAKE 1 TABLET BY MOUTH EVERY DAY     Dispense:  90 Tab     Refill:  3     **Patient requests 90 days supply**    amLODIPine (NORVASC) 10 mg tablet     Sig: TAKE 1 TABLET BY MOUTH DAILY     Dispense:  90 Tab     Refill:  3    valsartan-hydroCHLOROthiazide (DIOVAN-HCT) 320-25 mg per tablet     Sig: TAKE 1 TABLET BY MOUTH DAILY     Dispense:  90 Tab     Refill:  3     **Patient requests 90 days supply**    glimepiride (AMARYL) 4 mg tablet     Sig: TAKE 1 TABLET BY MOUTH TWICE DAILY     Dispense:  180 Tab     Refill:  11     **Patient requests 90 days supply**    HYDROcodone-acetaminophen (NORCO) 7.5-325 mg per tablet     Si tab every 4-6 hours as needed for pain. Max of 6 pills per day     Dispense:  180 Tab     Refill:  0    zolpidem (AMBIEN) 10 mg tablet     Sig: TAKE 1 TABLET BY MOUTH EVERY NIGHT AT BEDTIME AS NEEDED     Dispense:  30 Tab     Refill:  5     Chronic pain due to DJD in knees- UDS, pain meds x 3m, pain clinic referral  DM- slightly worsening control. Cont to work on diet adherence  HTN-elevated on Diovan and norvasc (max dose)  Insomnia- ambien refilled    Follow-up Disposition:  Return in about 3 months (around 2017) for pain med.

## 2017-08-01 ENCOUNTER — OFFICE VISIT (OUTPATIENT)
Dept: INTERNAL MEDICINE CLINIC | Age: 57
End: 2017-08-01

## 2017-08-01 VITALS
TEMPERATURE: 98.2 F | OXYGEN SATURATION: 96 % | HEART RATE: 77 BPM | RESPIRATION RATE: 18 BRPM | WEIGHT: 261.9 LBS | SYSTOLIC BLOOD PRESSURE: 150 MMHG | BODY MASS INDEX: 41.11 KG/M2 | HEIGHT: 67 IN | DIASTOLIC BLOOD PRESSURE: 88 MMHG

## 2017-08-01 DIAGNOSIS — M17.12 OSTEOARTHRITIS OF LEFT KNEE, UNSPECIFIED OSTEOARTHRITIS TYPE: ICD-10-CM

## 2017-08-01 DIAGNOSIS — Z79.891 LONG TERM PRESCRIPTION OPIATE USE: Primary | ICD-10-CM

## 2017-08-01 DIAGNOSIS — I10 ESSENTIAL HYPERTENSION: ICD-10-CM

## 2017-08-01 DIAGNOSIS — Z79.4 TYPE 2 DIABETES MELLITUS WITH HYPEROSMOLARITY WITHOUT COMA, WITH LONG-TERM CURRENT USE OF INSULIN (HCC): ICD-10-CM

## 2017-08-01 DIAGNOSIS — M17.0 PRIMARY OSTEOARTHRITIS OF BOTH KNEES: ICD-10-CM

## 2017-08-01 DIAGNOSIS — E78.5 HYPERLIPIDEMIA, UNSPECIFIED HYPERLIPIDEMIA TYPE: ICD-10-CM

## 2017-08-01 DIAGNOSIS — E11.00 TYPE 2 DIABETES MELLITUS WITH HYPEROSMOLARITY WITHOUT COMA, WITH LONG-TERM CURRENT USE OF INSULIN (HCC): ICD-10-CM

## 2017-08-01 DIAGNOSIS — E11.42 DIABETIC POLYNEUROPATHY ASSOCIATED WITH TYPE 2 DIABETES MELLITUS (HCC): ICD-10-CM

## 2017-08-01 DIAGNOSIS — Z12.39 SCREENING FOR BREAST CANCER: ICD-10-CM

## 2017-08-01 RX ORDER — PREGABALIN 75 MG/1
75 CAPSULE ORAL 2 TIMES DAILY
Qty: 60 CAP | Refills: 3 | Status: SHIPPED | OUTPATIENT
Start: 2017-08-01 | End: 2017-09-11 | Stop reason: SINTOL

## 2017-08-01 RX ORDER — VALSARTAN AND HYDROCHLOROTHIAZIDE 160; 12.5 MG/1; MG/1
1 TABLET, FILM COATED ORAL DAILY
Qty: 90 TAB | Refills: 3 | Status: SHIPPED | OUTPATIENT
Start: 2017-08-01 | End: 2017-09-11

## 2017-08-01 RX ORDER — ZOLPIDEM TARTRATE 5 MG/1
5 TABLET ORAL
Qty: 30 TAB | Refills: 2 | Status: SHIPPED | OUTPATIENT
Start: 2017-08-01 | End: 2017-11-17 | Stop reason: SDUPTHER

## 2017-08-01 RX ORDER — HYDROCODONE BITARTRATE AND ACETAMINOPHEN 7.5; 325 MG/1; MG/1
1 TABLET ORAL
Qty: 120 TAB | Refills: 0 | Status: SHIPPED | OUTPATIENT
Start: 2017-08-01 | End: 2017-09-11 | Stop reason: SDUPTHER

## 2017-08-01 RX ORDER — VALSARTAN AND HYDROCHLOROTHIAZIDE 160; 12.5 MG/1; MG/1
1 TABLET, FILM COATED ORAL DAILY
Qty: 60 TAB | Refills: 3 | Status: SHIPPED | OUTPATIENT
Start: 2017-08-01 | End: 2017-08-01 | Stop reason: SDUPTHER

## 2017-08-01 NOTE — PATIENT INSTRUCTIONS
Insomnia: Care Instructions  Your Care Instructions  Insomnia is the inability to sleep well. It is a common problem for most people at some time. Insomnia may make it hard for you to get to sleep, stay asleep, or sleep as long as you need to. This can make you tired and grouchy during the day. It can also make you forgetful, less effective at work, and unhappy. Insomnia can be caused by conditions such as depression or anxiety. Pain can also affect your ability to sleep. When these problems are solved, the insomnia usually clears up. But sometimes bad sleep habits can cause insomnia. If insomnia is affecting your work or your enjoyment of life, you can take steps to improve your sleep. Follow-up care is a key part of your treatment and safety. Be sure to make and go to all appointments, and call your doctor if you are having problems. It's also a good idea to know your test results and keep a list of the medicines you take. How can you care for yourself at home? What to avoid  · Do not have drinks with caffeine, such as coffee or black tea, for 8 hours before bed. · Do not smoke or use other types of tobacco near bedtime. Nicotine is a stimulant and can keep you awake. · Avoid drinking alcohol late in the evening, because it can cause you to wake in the middle of the night. · Do not eat a big meal close to bedtime. If you are hungry, eat a light snack. · Do not drink a lot of water close to bedtime, because the need to urinate may wake you up during the night. · Do not read or watch TV in bed. Use the bed only for sleeping and sexual activity. What to try  · Go to bed at the same time every night, and wake up at the same time every morning. Do not take naps during the day. · Keep your bedroom quiet, dark, and cool. · Sleep on a comfortable pillow and mattress. · If watching the clock makes you anxious, turn it facing away from you so you cannot see the time.   · If you worry when you lie down, start a worry book. Well before bedtime, write down your worries, and then set the book and your concerns aside. · Try meditation or other relaxation techniques before you go to bed. · If you cannot fall asleep, get up and go to another room until you feel sleepy. Do something relaxing. Repeat your bedtime routine before you go to bed again. · Make your house quiet and calm about an hour before bedtime. Turn down the lights, turn off the TV, log off the computer, and turn down the volume on music. This can help you relax after a busy day. When should you call for help? Watch closely for changes in your health, and be sure to contact your doctor if:  · Your efforts to improve your sleep do not work. · Your insomnia gets worse. · You have been feeling down, depressed, or hopeless or have lost interest in things that you usually enjoy. Where can you learn more? Go to http://kendellPyron Solarjonathan.info/. Enter P513 in the search box to learn more about \"Insomnia: Care Instructions. \"  Current as of: July 26, 2016  Content Version: 11.3  © 9270-8370 [employer]. Care instructions adapted under license by [employer] (which disclaims liability or warranty for this information). If you have questions about a medical condition or this instruction, always ask your healthcare professional. [address] any warranty or liability for your use of this information. Learning About Diabetes Food Guidelines  Your Care Instructions  Meal planning is important to manage diabetes. It helps keep your blood sugar at a target level (which you set with your doctor). You don't have to eat special foods. You can eat what your family eats, including sweets once in a while. But you do have to pay attention to how often you eat and how much you eat of certain foods.   You may want to work with a dietitian or a certified diabetes educator (CDE) to help you plan meals and snacks. A dietitian or CDE can also help you lose weight if that is one of your goals. What should you know about eating carbs? Managing the amount of carbohydrate (carbs) you eat is an important part of healthy meals when you have diabetes. Carbohydrate is found in many foods. · Learn which foods have carbs. And learn the amounts of carbs in different foods. ¨ Bread, cereal, pasta, and rice have about 15 grams of carbs in a serving. A serving is 1 slice of bread (1 ounce), ½ cup of cooked cereal, or 1/3 cup of cooked pasta or rice. ¨ Fruits have 15 grams of carbs in a serving. A serving is 1 small fresh fruit, such as an apple or orange; ½ of a banana; ½ cup of cooked or canned fruit; ½ cup of fruit juice; 1 cup of melon or raspberries; or 2 tablespoons of dried fruit. ¨ Milk and no-sugar-added yogurt have 15 grams of carbs in a serving. A serving is 1 cup of milk or 2/3 cup of no-sugar-added yogurt. ¨ Starchy vegetables have 15 grams of carbs in a serving. A serving is ½ cup of mashed potatoes or sweet potato; 1 cup winter squash; ½ of a small baked potato; ½ cup of cooked beans; or ½ cup cooked corn or green peas. · Learn how much carbs to eat each day and at each meal. A dietitian or CDE can teach you how to keep track of the amount of carbs you eat. This is called carbohydrate counting. · If you are not sure how to count carbohydrate grams, use the Plate Method to plan meals. It is a good, quick way to make sure that you have a balanced meal. It also helps you spread carbs throughout the day. ¨ Divide your plate by types of foods. Put non-starchy vegetables on half the plate, meat or other protein food on one-quarter of the plate, and a grain or starchy vegetable in the final quarter of the plate.  To this you can add a small piece of fruit and 1 cup of milk or yogurt, depending on how many carbs you are supposed to eat at a meal.  · Try to eat about the same amount of carbs at each meal. Do zoltan Alvarez up\" your daily allowance of carbs to eat at one meal.  · Proteins have very little or no carbs per serving. Examples of proteins are beef, chicken, turkey, fish, eggs, tofu, cheese, cottage cheese, and peanut butter. A serving size of meat is 3 ounces, which is about the size of a deck of cards. Examples of meat substitute serving sizes (equal to 1 ounce of meat) are 1/4 cup of cottage cheese, 1 egg, 1 tablespoon of peanut butter, and ½ cup of tofu. How can you eat out and still eat healthy? · Learn to estimate the serving sizes of foods that have carbohydrate. If you measure food at home, it will be easier to estimate the amount in a serving of restaurant food. · If the meal you order has too much carbohydrate (such as potatoes, corn, or baked beans), ask to have a low-carbohydrate food instead. Ask for a salad or green vegetables. · If you use insulin, check your blood sugar before and after eating out to help you plan how much to eat in the future. · If you eat more carbohydrate at a meal than you had planned, take a walk or do other exercise. This will help lower your blood sugar. What else should you know? · Limit saturated fat, such as the fat from meat and dairy products. This is a healthy choice because people who have diabetes are at higher risk of heart disease. So choose lean cuts of meat and nonfat or low-fat dairy products. Use olive or canola oil instead of butter or shortening when cooking. · Don't skip meals. Your blood sugar may drop too low if you skip meals and take insulin or certain medicines for diabetes. · Check with your doctor before you drink alcohol. Alcohol can cause your blood sugar to drop too low. Alcohol can also cause a bad reaction if you take certain diabetes medicines. Follow-up care is a key part of your treatment and safety. Be sure to make and go to all appointments, and call your doctor if you are having problems.  It's also a good idea to know your test results and keep a list of the medicines you take. Where can you learn more? Go to http://kendell-jonathan.info/. Enter I938 in the search box to learn more about \"Learning About Diabetes Food Guidelines. \"  Current as of: March 13, 2017  Content Version: 11.3  © 3232-9927 [employer]. Care instructions adapted under license by [employer] (which disclaims liability or warranty for this information). If you have questions about a medical condition or this instruction, always ask your healthcare professional. [address] any warranty or liability for your use of this information.

## 2017-08-01 NOTE — PROGRESS NOTES
1. Have you been to the ER, urgent care clinic since your last visit? Hospitalized since your last visit? No    2. Have you seen or consulted any other health care providers outside of the [address] [place] [address] since your last visit? Include any pap smears or colon screening. No     Patient states pain of 7 of 10 to upper back and arms.  Last pain med was 9 am.    PHQ over the last two weeks 8/1/2017   Little interest or pleasure in doing things Not at all   Feeling down, depressed or hopeless Not at all   Total Score PHQ 2 0

## 2017-08-01 NOTE — PROGRESS NOTES
Jaime Guardado is a 59 y.o. female and presents with Medication Refill  Pt name lola Benz        Subjective:    Hypertension Review:  The patient has essential hypertension  Diet and Lifestyle: generally follows a  low sodium diet, exercises sporadically  Home BP Monitoring: is not measured at home. Pertinent ROS: taking medications as instructed, no medication side effects noted, no TIA's, no chest pain on exertion, no dyspnea on exertion, no swelling of ankles. Diabetes Mellitus Review:  She has diabetes mellitus. Diabetic ROS - medication compliance: compliant all of the time, diabetic diet compliance: compliant all of the time, home glucose monitoring: is performed. Known diabetic complications: none  Cardiovascular risk factors: family history, dyslipidemia, diabetes mellitus, obesity, hypertension  Current diabetic medications include oral agents  Eye exam current (within one year): no  Weight trend: stable  Prior visit with dietician: no  Current diet: \"healthy\" diet  in general  Current exercise: walking  Current monitoring regimen: home blood tests - daily  Home blood sugar records: trend: stable  Any episodes of hypoglycemia? no  Is She on ACE inhibitor or angiotensin II receptor blocker? Yes       OA knee-s/p harmony TKR    LE pain-pt relays she takes norco for le neuropathy.  checked and shows no sign of misuse or abuse. Pt brings-in her pill bottle. Med was filled 4 days ago, but she only has ~12 pills in the bottle. She relays she only took a few with her. The rest are at home. Nausea-pt has been feeling nauseous since starting valsartan/hctz 320/25.     Review of Systems  Constitutional: negative for fevers, chills, anorexia and weight loss  Respiratory:  negative for cough, hemoptysis, dyspnea,wheezing  CV:   negative for chest pain, palpitations, lower extremity edema  GI:   positive for nausea, no vomiting, diarrhea, abdominal pain,melena  Endo:               negative for polyuria,polydipsia,polyphagia,heat intolerance  Genitourinary: negative for frequency, dysuria and hematuria  Integument:  negative for rash and pruritus  Hematologic:  negative for easy bruising and gum/nose bleeding  Musculoskel: negative for myalgias, arthralgias, back pain, muscle weakness, joint pain  Behavl/Psych: negative for feelings of anxiety, depression, mood changes    Past Medical History:   Diagnosis Date    Acute pancreatitis 8/9/2012    Acute pancreatitis 1/21/2011    Arthritis     both knees    Chronic pain     knees    Delivery normal     x1    Diabetes ([facility] [address]     DJD (degenerative joint disease) of knee     Fatty liver 1/22/2011    GERD (gastroesophageal reflux disease)     Hypertension     Hypothyroidism     Obesity     PUD (peptic ulcer disease)     UTI (urinary tract infection) 1/21/2011     Past Surgical History:   Procedure Laterality Date    HX BREAST LUMPECTOMY      left breast    HX GYN      hysterectomy    HX ORTHOPAEDIC      left total knee replacement    HX ORTHOPAEDIC  6/16/15    RIGHT TOTAL KNEE ARTHROPLASTY     HX PARATHYROIDECTOMY  01/27/11     Social History     Social History    Marital status:      Spouse name: N/A    Number of children: N/A    Years of education: N/A     Social History Main Topics    Smoking status: Former Smoker     Packs/day: 0.25     Years: 25.00     Types: Cigarettes     Quit date: 1/24/2008    Smokeless tobacco: Never Used    Alcohol use No    Drug use: No    Sexual activity: No     Other Topics Concern    None     Social History Narrative     Family History   Problem Relation Age of Onset    Cancer Mother     Hypertension Father     Diabetes Sister     Hypertension Sister     Diabetes Brother     Hypertension Brother      Current Outpatient Prescriptions   Medication Sig Dispense Refill    valsartan-hydroCHLOROthiazide (DIOVAN-HCT) 160-12.5 mg per tablet Take 1 Tab by mouth daily.  60 Tab 3    HYDROcodone-acetaminophen (NORCO) 7.5-325 mg per tablet Take 1 Tab by mouth every six (6) hours as needed for Pain. Max Daily Amount: 4 Tabs. 1 tab every 6 hours as needed for pain. Max of 4 pills per day  Indications: Pain 120 Tab 0    pregabalin (LYRICA) 75 mg capsule Take 1 Cap by mouth two (2) times a day. Max Daily Amount: 150 mg. Indications: DIABETIC PERIPHERAL NEUROPATHY 60 Cap 3    zolpidem (AMBIEN) 5 mg tablet Take 1 Tab by mouth nightly as needed for Sleep. Max Daily Amount: 5 mg. 30 Tab 2    ACCU-CHEK MEENU PLUS TEST STRP strip CHECK SUGAR THREE TIMES DAILY 300 Strip 0    LEXAPRO 10 mg tablet TAKE 1 TABLET BY MOUTH EVERY DAY 30 Tab 11    amLODIPine (NORVASC) 10 mg tablet TAKE 1 TABLET BY MOUTH DAILY 90 Tab 3    glimepiride (AMARYL) 4 mg tablet TAKE 1 TABLET BY MOUTH TWICE DAILY 180 Tab 11    metFORMIN (GLUCOPHAGE) 1,000 mg tablet TAKE 1 TABLET BY MOUTH TWICE DAILY WITH MEALS 180 Tab 3    metoprolol succinate (TOPROL-XL) 100 mg tablet TAKE 1 TABLET BY MOUTH DAILY FOR HIGH BLOOD PRESSURE 90 Tab 3    atorvastatin (LIPITOR) 20 mg tablet TAKE 1 TABLET BY MOUTH DAILY FOR CHOLESTEROL 90 Tab 11    glucose blood VI test strips (ACCU-CHEK MEENU PLUS TEST STRP) strip Check sugars 3 times a day Dx E11.65 300 Strip 11    escitalopram oxalate (LEXAPRO) 10 mg tablet TAKE 1 TABLET BY MOUTH EVERY DAY 90 Tab 3    ibuprofen (MOTRIN) 800 mg tablet TAKE 1 TABLET BY MOUTH EVERY 6 HOURS AS NEEDED FOR PAIN WITH FOOD 120 Tab 5    glimepiride (AMARYL) 4 mg tablet TAKE 1 TABLET BY MOUTH TWICE DAILY 60 Tab 11    fluticasone (FLONASE) 50 mcg/actuation nasal spray 2 Sprays by Both Nostrils route daily.  ondansetron (ZOFRAN ODT) 4 mg disintegrating tablet Take 1 Tab by mouth every eight (8) hours as needed for Nausea.  10 Tab 0     Allergies   Allergen Reactions    Influenza Virus Vaccine, Specific Other (comments)     States body got very hot    Tramadol Seizures    Aspirin Other (comments)     Pt reports having a h/o gastric ulcers. Pt was on IBU when she was dx'd. Objective:  Visit Vitals    /88 (BP 1 Location: Right arm, BP Patient Position: Sitting)    Pulse 77    Temp 98.2 °F (36.8 °C) (Oral)    Resp 18    Ht 5' 7\" (1.702 m)    Wt 261 lb 14.4 oz (118.8 kg)    SpO2 96%    BMI 41.02 kg/m2     Physical Exam:   General appearance - alert, well appearing, and in no distress obese, pleasant  Mental status - alert, oriented to person, place, and time  EYE-MATEUSZ, EOMI, corneas normal, no foreign bodies  Neck - supple, no significant adenopathy   Chest - clear to auscultation, no wheezes, rales or rhonchi, symmetric air entry   Heart - normal rate, regular rhythm, normal S1, S2, no murmurs, rubs, clicks or gallops   Abdomen - soft, nontender, nondistended, no masses or organomegaly  Lymph- no adenopathy palpable  Ext-peripheral pulses normal, no pedal edema, no clubbing or cyanosis  Skin-Warm and dry. no hyperpigmentation, vitiligo, or suspicious lesions  Neuro -alert, oriented, normal speech, no focal findings or movement disorder noted  Neck-normal C-spine, no tenderness, full ROM without pain      Results for orders placed or performed in visit on 05/12/17   PAIN MGMT PANEL W/REFL, UR   Result Value Ref Range    Amphetamine Screen, urine Negative Icbfhj=5675 ng/mL    Barbiturates Screen, urine Negative Axbget=251 ng/mL    Benzodiazepines Screen, urine Negative Qhpota=489 ng/mL    Cannabinoid Screen, urine Negative Cutoff=20 ng/mL    Cocaine Metab.  Screen, urine Negative Cijckv=498 ng/mL    Opiate Screen, urine Positive (A) Ysawqr=174 ng/mL    Oxycodone/Oxymorphone, urine Negative Qcrzxu=785 ng/mL    Phencyclidine Screen, urine Negative Cutoff=25 ng/mL    Methadone Screen, urine Negative Gptpdk=440 ng/mL    Propoxyphene Screen, urine Negative Rbilhw=371 ng/mL    Meperidine screen Negative Hdwodv=111 ng/mL    FENTANYL, URINE Negative Rrbzac=4895 pg/mL    Tramadol Screen, urine Negative Qahgfg=644 ng/mL Creatinine, urine 45.8 20.0 - 300.0 mg/dL    Specific Gravity 1.014     pH, urine 5.8 4.5 - 8.9    Please Note Comment    AMB POC GLUCOSE BLOOD, BY GLUCOSE MONITORING DEVICE   Result Value Ref Range    Glucose  mg/dL   AMB POC HEMOGLOBIN A1C   Result Value Ref Range    Hemoglobin A1c (POC) 8.4 %   AMB POC LIPID PROFILE   Result Value Ref Range    Cholesterol (POC) >100     Triglycerides (POC) 212     HDL Cholesterol (POC) 31     LDL Cholesterol (POC) n/a     Non-HDL Goal (POC) n/a     TChol/HDL Ratio (POC) n/a        Assessment/Plan:    ICD-10-CM ICD-9-CM    1. Long term prescription opiate use Z79.891 V58.69 COMPLIANCE DRUG SCREEN/PRESCRIPTION MONITORING   2. Essential hypertension I10 401.9 valsartan-hydroCHLOROthiazide (DIOVAN-HCT) 160-12.5 mg per tablet   3. Type 2 diabetes mellitus with hyperosmolarity without coma, with long-term current use of insulin (HCC) E11.00 250.20     Z79.4 V58.67    4. Primary osteoarthritis of both knees M17.0 715.16    5. Hyperlipidemia, unspecified hyperlipidemia type E78.5 272.4    6. Osteoarthritis of left knee, unspecified osteoarthritis type M17.12 715.96 HYDROcodone-acetaminophen (NORCO) 7.5-325 mg per tablet   7. Screening for breast cancer Z12.39 V76.10 [facility] MAMMO BI SCREENING INCL CAD   8. Diabetic polyneuropathy associated with type 2 diabetes mellitus (HCC) E11.42 250.60      357.2      Orders Placed This Encounter    [facility] MAMMO BI SCREENING INCL CAD     Standing Status:   Future     Standing Expiration Date:   9/1/2018     Order Specific Question:   Reason for Exam     Answer:   breast ca screening    COMPLIANCE DRUG SCREEN/PRESCRIPTION MONITORING    valsartan-hydroCHLOROthiazide (DIOVAN-HCT) 160-12.5 mg per tablet     Sig: Take 1 Tab by mouth daily. Dispense:  60 Tab     Refill:  3    HYDROcodone-acetaminophen (NORCO) 7.5-325 mg per tablet     Sig: Take 1 Tab by mouth every six (6) hours as needed for Pain. Max Daily Amount: 4 Tabs.  1 tab every 6 hours as needed for pain. Max of 4 pills per day  Indications: Pain     Dispense:  120 Tab     Refill:  0    pregabalin (LYRICA) 75 mg capsule     Sig: Take 1 Cap by mouth two (2) times a day. Max Daily Amount: 150 mg. Indications: DIABETIC PERIPHERAL NEUROPATHY     Dispense:  60 Cap     Refill:  3    zolpidem (AMBIEN) 5 mg tablet     Sig: Take 1 Tab by mouth nightly as needed for Sleep. Max Daily Amount: 5 mg. Dispense:  30 Tab     Refill:  2       Decreased # norcos mthly  Start lyrica  Decrease ambien from 10-5mg  Decrease valsartan 160/12.5mg, monitor nausea. Unclear if nausea is indeed caused bythe valsartan  F/u 3 mths  ,Take 81mg aspirin daily  Patient Instructions        Insomnia: Care Instructions  Your Care Instructions  Insomnia is the inability to sleep well. It is a common problem for most people at some time. Insomnia may make it hard for you to get to sleep, stay asleep, or sleep as long as you need to. This can make you tired and grouchy during the day. It can also make you forgetful, less effective at work, and unhappy. Insomnia can be caused by conditions such as depression or anxiety. Pain can also affect your ability to sleep. When these problems are solved, the insomnia usually clears up. But sometimes bad sleep habits can cause insomnia. If insomnia is affecting your work or your enjoyment of life, you can take steps to improve your sleep. Follow-up care is a key part of your treatment and safety. Be sure to make and go to all appointments, and call your doctor if you are having problems. It's also a good idea to know your test results and keep a list of the medicines you take. How can you care for yourself at home? What to avoid  · Do not have drinks with caffeine, such as coffee or black tea, for 8 hours before bed. · Do not smoke or use other types of tobacco near bedtime. Nicotine is a stimulant and can keep you awake.   · Avoid drinking alcohol late in the evening, because it can cause you to wake in the middle of the night. · Do not eat a big meal close to bedtime. If you are hungry, eat a light snack. · Do not drink a lot of water close to bedtime, because the need to urinate may wake you up during the night. · Do not read or watch TV in bed. Use the bed only for sleeping and sexual activity. What to try  · Go to bed at the same time every night, and wake up at the same time every morning. Do not take naps during the day. · Keep your bedroom quiet, dark, and cool. · Sleep on a comfortable pillow and mattress. · If watching the clock makes you anxious, turn it facing away from you so you cannot see the time. · If you worry when you lie down, start a worry book. Well before bedtime, write down your worries, and then set the book and your concerns aside. · Try meditation or other relaxation techniques before you go to bed. · If you cannot fall asleep, get up and go to another room until you feel sleepy. Do something relaxing. Repeat your bedtime routine before you go to bed again. · Make your house quiet and calm about an hour before bedtime. Turn down the lights, turn off the TV, log off the computer, and turn down the volume on music. This can help you relax after a busy day. When should you call for help? Watch closely for changes in your health, and be sure to contact your doctor if:  · Your efforts to improve your sleep do not work. · Your insomnia gets worse. · You have been feeling down, depressed, or hopeless or have lost interest in things that you usually enjoy. Where can you learn more? Go to http://kendell-jonathan.info/. Enter P513 in the search box to learn more about \"Insomnia: Care Instructions. \"  Current as of: July 26, 2016  Content Version: 11.3  © 9112-6298 [employer]. Care instructions adapted under license by [employer] (which disclaims liability or warranty for this information).  If you have questions about a medical condition or this instruction, always ask your healthcare professional. [address] any warranty or liability for your use of this information. Learning About Diabetes Food Guidelines  Your Care Instructions  Meal planning is important to manage diabetes. It helps keep your blood sugar at a target level (which you set with your doctor). You don't have to eat special foods. You can eat what your family eats, including sweets once in a while. But you do have to pay attention to how often you eat and how much you eat of certain foods. You may want to work with a dietitian or a certified diabetes educator (CDE) to help you plan meals and snacks. A dietitian or CDE can also help you lose weight if that is one of your goals. What should you know about eating carbs? Managing the amount of carbohydrate (carbs) you eat is an important part of healthy meals when you have diabetes. Carbohydrate is found in many foods. · Learn which foods have carbs. And learn the amounts of carbs in different foods. ¨ Bread, cereal, pasta, and rice have about 15 grams of carbs in a serving. A serving is 1 slice of bread (1 ounce), ½ cup of cooked cereal, or 1/3 cup of cooked pasta or rice. ¨ Fruits have 15 grams of carbs in a serving. A serving is 1 small fresh fruit, such as an apple or orange; ½ of a banana; ½ cup of cooked or canned fruit; ½ cup of fruit juice; 1 cup of melon or raspberries; or 2 tablespoons of dried fruit. ¨ Milk and no-sugar-added yogurt have 15 grams of carbs in a serving. A serving is 1 cup of milk or 2/3 cup of no-sugar-added yogurt. ¨ Starchy vegetables have 15 grams of carbs in a serving. A serving is ½ cup of mashed potatoes or sweet potato; 1 cup winter squash; ½ of a small baked potato; ½ cup of cooked beans; or ½ cup cooked corn or green peas.   · Learn how much carbs to eat each day and at each meal. A dietitian or CDE can teach you how to keep track of the amount of carbs you eat. This is called carbohydrate counting. · If you are not sure how to count carbohydrate grams, use the Plate Method to plan meals. It is a good, quick way to make sure that you have a balanced meal. It also helps you spread carbs throughout the day. ¨ Divide your plate by types of foods. Put non-starchy vegetables on half the plate, meat or other protein food on one-quarter of the plate, and a grain or starchy vegetable in the final quarter of the plate. To this you can add a small piece of fruit and 1 cup of milk or yogurt, depending on how many carbs you are supposed to eat at a meal.  · Try to eat about the same amount of carbs at each meal. Do not \"save up\" your daily allowance of carbs to eat at one meal.  · Proteins have very little or no carbs per serving. Examples of proteins are beef, chicken, turkey, fish, eggs, tofu, cheese, cottage cheese, and peanut butter. A serving size of meat is 3 ounces, which is about the size of a deck of cards. Examples of meat substitute serving sizes (equal to 1 ounce of meat) are 1/4 cup of cottage cheese, 1 egg, 1 tablespoon of peanut butter, and ½ cup of tofu. How can you eat out and still eat healthy? · Learn to estimate the serving sizes of foods that have carbohydrate. If you measure food at home, it will be easier to estimate the amount in a serving of restaurant food. · If the meal you order has too much carbohydrate (such as potatoes, corn, or baked beans), ask to have a low-carbohydrate food instead. Ask for a salad or green vegetables. · If you use insulin, check your blood sugar before and after eating out to help you plan how much to eat in the future. · If you eat more carbohydrate at a meal than you had planned, take a walk or do other exercise. This will help lower your blood sugar. What else should you know? · Limit saturated fat, such as the fat from meat and dairy products.  This is a healthy choice because people who have diabetes are at higher risk of heart disease. So choose lean cuts of meat and nonfat or low-fat dairy products. Use olive or canola oil instead of butter or shortening when cooking. · Don't skip meals. Your blood sugar may drop too low if you skip meals and take insulin or certain medicines for diabetes. · Check with your doctor before you drink alcohol. Alcohol can cause your blood sugar to drop too low. Alcohol can also cause a bad reaction if you take certain diabetes medicines. Follow-up care is a key part of your treatment and safety. Be sure to make and go to all appointments, and call your doctor if you are having problems. It's also a good idea to know your test results and keep a list of the medicines you take. Where can you learn more? Go to http://kendell-jonathan.info/. Enter R156 in the search box to learn more about \"Learning About Diabetes Food Guidelines. \"  Current as of: March 13, 2017  Content Version: 11.3  © 2319-8859 [employer]. Care instructions adapted under license by [employer] (which disclaims liability or warranty for this information). If you have questions about a medical condition or this instruction, always ask your healthcare professional. [place] [address] any warranty or liability for your use of this information. Follow-up Disposition:  Return in about 3 months (around 11/1/2017) for pap and reg f/u. I have reviewed with the patient details of the assessment and plan and all questions were answered. Relevent patient education was performed. The most recent lab findings were reviewed with the patient. An After Visit Summary was printed and given to the patient.

## 2017-08-09 LAB — DRUGS UR: NORMAL

## 2017-09-11 ENCOUNTER — HOSPITAL ENCOUNTER (OUTPATIENT)
Dept: GENERAL RADIOLOGY | Age: 57
Discharge: HOME | End: 2017-09-11

## 2017-09-11 ENCOUNTER — OFFICE VISIT (OUTPATIENT)
Dept: INTERNAL MEDICINE CLINIC | Age: 57
End: 2017-09-11

## 2017-09-11 VITALS
HEIGHT: 67 IN | SYSTOLIC BLOOD PRESSURE: 148 MMHG | TEMPERATURE: 97.2 F | WEIGHT: 263 LBS | HEART RATE: 63 BPM | OXYGEN SATURATION: 98 % | DIASTOLIC BLOOD PRESSURE: 74 MMHG | RESPIRATION RATE: 20 BRPM | BODY MASS INDEX: 41.28 KG/M2

## 2017-09-11 DIAGNOSIS — G89.29 CHRONIC MIDLINE LOW BACK PAIN WITH BILATERAL SCIATICA: ICD-10-CM

## 2017-09-11 DIAGNOSIS — M54.41 CHRONIC MIDLINE LOW BACK PAIN WITH BILATERAL SCIATICA: ICD-10-CM

## 2017-09-11 DIAGNOSIS — M54.42 CHRONIC MIDLINE LOW BACK PAIN WITH BILATERAL SCIATICA: Primary | ICD-10-CM

## 2017-09-11 DIAGNOSIS — M54.42 CHRONIC MIDLINE LOW BACK PAIN WITH BILATERAL SCIATICA: ICD-10-CM

## 2017-09-11 DIAGNOSIS — Z79.891 CHRONIC PRESCRIPTION OPIATE USE: ICD-10-CM

## 2017-09-11 DIAGNOSIS — G89.29 CHRONIC MIDLINE LOW BACK PAIN WITH BILATERAL SCIATICA: Primary | ICD-10-CM

## 2017-09-11 DIAGNOSIS — M17.12 OSTEOARTHRITIS OF LEFT KNEE, UNSPECIFIED OSTEOARTHRITIS TYPE: ICD-10-CM

## 2017-09-11 DIAGNOSIS — M54.41 CHRONIC MIDLINE LOW BACK PAIN WITH BILATERAL SCIATICA: Primary | ICD-10-CM

## 2017-09-11 RX ORDER — DICLOFENAC SODIUM 10 MG/G
GEL TOPICAL 4 TIMES DAILY
Qty: 100 G | Refills: 3 | Status: SHIPPED | OUTPATIENT
Start: 2017-09-11 | End: 2018-08-14

## 2017-09-11 RX ORDER — HYDROCODONE BITARTRATE AND ACETAMINOPHEN 7.5; 325 MG/1; MG/1
1 TABLET ORAL
Qty: 120 TAB | Refills: 0 | Status: SHIPPED | OUTPATIENT
Start: 2017-09-25 | End: 2017-10-23 | Stop reason: DRUGHIGH

## 2017-09-11 RX ORDER — VALSARTAN AND HYDROCHLOROTHIAZIDE 320; 25 MG/1; MG/1
1 TABLET, FILM COATED ORAL DAILY
Qty: 60 TAB | Refills: 3
Start: 2017-09-11 | End: 2018-06-08 | Stop reason: SDUPTHER

## 2017-09-11 NOTE — PROGRESS NOTES
Pinky Bernard is a 59 y.o. female and presents with Medication Refill and Follow-up  . Subjective:    Pt comes-in for pain med refill. Pt relays she could not tolerate the lyrica given due to SE. Pt has been using the narcotic pain medication more than written. Ot was recently filled 8/24 and she is here for refill. Upon doing a pt chasrt review, pts x-ray of her ls spine was nml. Pt relays she has pain in her back and legs. She says she went to PT/water tx and wa seeing a Pain Mngmnt MD in [facility], but stopped due to $$. Pt was being tx for pain of unclear etiology. Pt also relays she went back to taking the higher dose of valsartan as her sweats she attributed to the high dosage resolved.     Review of Systems  Constitutional: negative for fevers, chills, anorexia and weight loss  Respiratory:  negative for cough, hemoptysis, dyspnea,wheezing  CV:   negative for chest pain, palpitations, lower extremity edema  GI:   negative for nausea, vomiting, diarrhea, abdominal pain,melena  Genitourinary: negative for frequency, dysuria and hematuria  Integument:  negative for rash and pruritus  Neurological:  negative for headaches, dizziness, vertigo, memory problems and gait   Behavl/Psych: negative for feelings of anxiety, depression, mood changes    Past Medical History:   Diagnosis Date    Acute pancreatitis 8/9/2012    Acute pancreatitis 1/21/2011    Arthritis     both knees    Chronic pain     knees    Delivery normal     x1    [facility] ([facility] [address]     DJD (degenerative joint disease) of knee     Fatty liver 1/22/2011    GERD (gastroesophageal reflux disease)     Hypertension     Hypothyroidism     Obesity     PUD (peptic ulcer disease)     UTI (urinary tract infection) 1/21/2011     Past Surgical History:   Procedure Laterality Date    HX BREAST LUMPECTOMY      left breast    HX GYN      hysterectomy    HX ORTHOPAEDIC      left total knee replacement    HX ORTHOPAEDIC  6/16/15    RIGHT TOTAL KNEE ARTHROPLASTY     HX PARATHYROIDECTOMY  01/27/11     Social History     Social History    Marital status:      Spouse name: N/A    Number of children: N/A    Years of education: N/A     Social History Main Topics    Smoking status: Former Smoker     Packs/day: 0.25     Years: 25.00     Types: Cigarettes     Quit date: 1/24/2008    Smokeless tobacco: Never Used    Alcohol use No    Drug use: No    Sexual activity: No     Other Topics Concern    None     Social History Narrative     Family History   Problem Relation Age of Onset    Cancer Mother     Hypertension Father     Diabetes Sister     Hypertension Sister     Diabetes Brother     Hypertension Brother      Current Outpatient Prescriptions   Medication Sig Dispense Refill    diclofenac (VOLTAREN) 1 % gel Apply  to affected area four (4) times daily. 100 g 3    [START ON 9/25/2017] HYDROcodone-acetaminophen (NORCO) 7.5-325 mg per tablet Take 1 Tab by mouth every six (6) hours as needed for Pain. Max Daily Amount: 4 Tabs. 1 tab every 6 hours as needed for pain. Max of 4 pills per day  Indications: Pain 120 Tab 0    valsartan-hydroCHLOROthiazide (DIOVAN-HCT) 320-25 mg per tablet Take 1 Tab by mouth daily. 60 Tab 3    amLODIPine (NORVASC) 10 mg tablet TAKE 1 TABLET BY MOUTH DAILY 90 Tab 0    zolpidem (AMBIEN) 5 mg tablet Take 1 Tab by mouth nightly as needed for Sleep.  Max Daily Amount: 5 mg. 30 Tab 2    ACCU-CHEK MEENU PLUS TEST STRP strip CHECK SUGAR THREE TIMES DAILY 300 Strip 0    LEXAPRO 10 mg tablet TAKE 1 TABLET BY MOUTH EVERY DAY 30 Tab 11    escitalopram oxalate (LEXAPRO) 10 mg tablet TAKE 1 TABLET BY MOUTH EVERY DAY 90 Tab 3    glimepiride (AMARYL) 4 mg tablet TAKE 1 TABLET BY MOUTH TWICE DAILY 180 Tab 11    ibuprofen (MOTRIN) 800 mg tablet TAKE 1 TABLET BY MOUTH EVERY 6 HOURS AS NEEDED FOR PAIN WITH FOOD 120 Tab 5    metFORMIN (GLUCOPHAGE) 1,000 mg tablet TAKE 1 TABLET BY MOUTH TWICE DAILY WITH MEALS 180 Tab 3    metoprolol succinate (TOPROL-XL) 100 mg tablet TAKE 1 TABLET BY MOUTH DAILY FOR HIGH BLOOD PRESSURE 90 Tab 3    atorvastatin (LIPITOR) 20 mg tablet TAKE 1 TABLET BY MOUTH DAILY FOR CHOLESTEROL 90 Tab 11    glucose blood VI test strips (ACCU-CHEK MEENU PLUS TEST STRP) strip Check sugars 3 times a day Dx E11.65 300 Strip 11    glimepiride (AMARYL) 4 mg tablet TAKE 1 TABLET BY MOUTH TWICE DAILY 60 Tab 11    fluticasone (FLONASE) 50 mcg/actuation nasal spray 2 Sprays by Both Nostrils route daily. Allergies   Allergen Reactions    Influenza Virus Vaccine, Specific Other (comments)     States body got very hot    Tramadol Seizures    Aspirin Other (comments)     Pt reports having a h/o gastric ulcers. Pt was on IBU when she was dx'd. Objective:  Visit Vitals    /74 (BP 1 Location: Left arm, BP Patient Position: Sitting)    Pulse 63    Temp 97.2 °F (36.2 °C) (Oral)    Resp 20    Ht 5' 7\" (1.702 m)    Wt 263 lb (119.3 kg)    SpO2 98%    BMI 41.19 kg/m2     Physical Exam:   General appearance - alert, well appearing, and in no distress obese  Mental status - alert, oriented to person, place, and time  EYE-EOMI  Neck - supple,   Chest - symmetric air entry    Abdomen - obese  Ext-no pedal edema, no clubbing or cyanosis  Skin-Warm and dry. no hyperpigmentation, vitiligo, or suspicious lesions  Neuro -alert, oriented, normal speech, no focal findings or movement disorder noted        Results for orders placed or performed in visit on 08/01/17   COMPLIANCE DRUG SCREEN/PRESCRIPTION MONITORING   Result Value Ref Range    Summary FINAL        Assessment/Plan:    ICD-10-CM ICD-9-CM    1. Chronic midline low back pain with bilateral sciatica M54.41 724.2 XR SPINE LUMB MIN 4 V    M54.42 724.3     G89.29 338.29    2. Osteoarthritis of left knee, unspecified osteoarthritis type M17.12 715.96 HYDROcodone-acetaminophen (NORCO) 7.5-325 mg per tablet   3.  Chronic prescription opiate use Z79.891 V58.69 Orders Placed This Encounter    XR SPINE LUMB MIN 4 V     Standing Status:   Future     Standing Expiration Date:   10/11/2018     Order Specific Question:   Reason for Exam     Answer:   back pain    diclofenac (VOLTAREN) 1 % gel     Sig: Apply  to affected area four (4) times daily. Dispense:  100 g     Refill:  3    HYDROcodone-acetaminophen (NORCO) 7.5-325 mg per tablet     Sig: Take 1 Tab by mouth every six (6) hours as needed for Pain. Max Daily Amount: 4 Tabs. 1 tab every 6 hours as needed for pain. Max of 4 pills per day  Indications: Pain     Dispense:  120 Tab     Refill:  0    valsartan-hydroCHLOROthiazide (DIOVAN-HCT) 320-25 mg per tablet     Sig: Take 1 Tab by mouth daily. Dispense:  60 Tab     Refill:  3     It is unclear to me what pt has been tx for given nml studies. Pt reports she has NO pain in her knees, only her \"legs\"  Check x-ray back  Refill med and post date to 9/25/17  Start voltaren gel for back to space out need for   There are no Patient Instructions on file for this visit. Follow-up Disposition:  Return in about 2 months (around 11/11/2017) for routine f/u. I have reviewed with the patient details of the assessment and plan and all questions were answered. Relevent patient education was performed. The most recent lab findings were reviewed with the patient. An After Visit Summary was printed and given to the patient.

## 2017-09-11 NOTE — PROGRESS NOTES
1. Have you been to the ER, urgent care clinic since your last visit? Hospitalized since your last visit? No.    2. Have you seen or consulted any other health care providers outside of the [address] [place] [address] since your last visit? Include any pap smears or colon screening.  No.

## 2017-10-09 ENCOUNTER — APPOINTMENT (OUTPATIENT)
Dept: GENERAL RADIOLOGY | Age: 57
End: 2017-10-09
Attending: PHYSICIAN ASSISTANT
Payer: COMMERCIAL

## 2017-10-09 ENCOUNTER — HOSPITAL ENCOUNTER (EMERGENCY)
Age: 57
Discharge: HOME | End: 2017-10-09
Attending: EMERGENCY MEDICINE
Payer: COMMERCIAL

## 2017-10-09 VITALS
SYSTOLIC BLOOD PRESSURE: 153 MMHG | RESPIRATION RATE: 16 BRPM | WEIGHT: 261.47 LBS | DIASTOLIC BLOOD PRESSURE: 82 MMHG | TEMPERATURE: 98.2 F | BODY MASS INDEX: 36.6 KG/M2 | OXYGEN SATURATION: 100 % | HEART RATE: 72 BPM | HEIGHT: 71 IN

## 2017-10-09 DIAGNOSIS — M54.50 CHRONIC BILATERAL LOW BACK PAIN WITHOUT SCIATICA: Primary | ICD-10-CM

## 2017-10-09 DIAGNOSIS — Z86.59 H/O: DEPRESSION: ICD-10-CM

## 2017-10-09 DIAGNOSIS — G89.29 CHRONIC BILATERAL LOW BACK PAIN WITHOUT SCIATICA: Primary | ICD-10-CM

## 2017-10-09 PROCEDURE — 74011250637 HC RX REV CODE- 250/637: Performed by: PHYSICIAN ASSISTANT

## 2017-10-09 PROCEDURE — 99283 EMERGENCY DEPT VISIT LOW MDM: CPT

## 2017-10-09 PROCEDURE — 72100 X-RAY EXAM L-S SPINE 2/3 VWS: CPT

## 2017-10-09 RX ORDER — LIDOCAINE 50 MG/G
1 PATCH TOPICAL EVERY 24 HOURS
Status: DISCONTINUED | OUTPATIENT
Start: 2017-10-09 | End: 2017-10-09 | Stop reason: HOSPADM

## 2017-10-09 RX ORDER — HYDROCODONE BITARTRATE AND ACETAMINOPHEN 5; 325 MG/1; MG/1
1 TABLET ORAL
Status: COMPLETED | OUTPATIENT
Start: 2017-10-09 | End: 2017-10-09

## 2017-10-09 RX ORDER — METHYLPREDNISOLONE 4 MG/1
TABLET ORAL
Qty: 1 DOSE PACK | Refills: 0 | Status: SHIPPED | OUTPATIENT
Start: 2017-10-09 | End: 2017-10-23

## 2017-10-09 RX ORDER — METHOCARBAMOL 750 MG/1
750 TABLET, FILM COATED ORAL 4 TIMES DAILY
Qty: 20 TAB | Refills: 0 | Status: SHIPPED | OUTPATIENT
Start: 2017-10-09 | End: 2017-10-23

## 2017-10-09 RX ADMIN — HYDROCODONE BITARTRATE AND ACETAMINOPHEN 1 TABLET: 5; 325 TABLET ORAL at 16:43

## 2017-10-09 NOTE — ED NOTES
Pt given discharge instructions by Jose Eduardo Moy. Discharged ambulatory with steady gait. No acute distress at time of discharge. Declines wheelchair.

## 2017-10-09 NOTE — LETTER
[address] 
[facility] EMERGENCY DEPT 
[address] [place] [address] [address] [id number] [phone] Work/School Note Date: 10/9/2017 To Whom It May concern: 
 
Basil Munoz was seen and treated today in the emergency room by the following provider(s): 
Attending Provider: Ubaldo Yusuf MD 
Physician Assistant: James Du. Jose Eduardo Moy. Basil Munoz may return to work on 10/11/2017. Sincerely, 
 
 
 
 
James Du.  Jose Eduardo Moy

## 2017-10-09 NOTE — ED NOTES
At bedside to apply lidocaine patch as ordered. Pt refuses patch. \"I don't do patches because of the blood clots. \" Spoke with Jose Eduardo Moy. New orders received.

## 2017-10-09 NOTE — ED PROVIDER NOTES
[facility] [address]  EMERGENCY DEPARTMENT HISTORY AND PHYSICAL EXAM       Date of Service: 10/9/2017   Patient Name: Kimberly Hernandez   YOB: 1960  Medical Record Number: [id number]    History of Presenting Illness     Chief Complaint   Patient presents with    Back Pain     Pt reports lower back pain x several months that radiates down bilateral legs. History Provided By:  patient    Additional History:   Kimberly Hernandez is a 59 y.o. female with PMhx significant for HTN, DM, arthritis and chronic pain of bilateral knees, and bilateral total knee replacement who presents ambulatory to the ED with cc of acute on chronic low back pain that radiates down her bilateral legs x several months. She states her PCP manages her chronic pain, however she has since run out of hydrocodone and is unable to schedule an appointment with PCP at this time. She endorses taking Motrin at home wnr of sx's. Pt denies any recent falls, injury, or trauma that could contribute to her current sx's. Pt explicitly requesting imaging while in the ED. She specifically denies urinary/fecal incontinence, fever, and dysuria. Social Hx: - Tobacco, - EtOH, - Illicit Drugs    There are no other complaints, changes or physical findings at this time.     Primary Care Provider: Jono Daily MD   Ortho: Liam Rm MD    Past History     Past Medical History:   Past Medical History:   Diagnosis Date    Acute pancreatitis 8/9/2012    Acute pancreatitis 1/21/2011    Arthritis     both knees    Chronic pain     knees    Delivery normal     x1    Diabetes ([facility] [address]     DJD (degenerative joint disease) of knee     Fatty liver 1/22/2011    GERD (gastroesophageal reflux disease)     Hypertension     Hypothyroidism     Obesity     PUD (peptic ulcer disease)     UTI (urinary tract infection) 1/21/2011        Past Surgical History:   Past Surgical History:   Procedure Laterality Date    HX BREAST LUMPECTOMY      left breast    HX GYN      hysterectomy    HX ORTHOPAEDIC      left total knee replacement    HX ORTHOPAEDIC  6/16/15    RIGHT TOTAL KNEE ARTHROPLASTY     HX PARATHYROIDECTOMY  01/27/11        Family History:   Family History   Problem Relation Age of Onset    Cancer Mother     Hypertension Father     Diabetes Sister     Hypertension Sister     Diabetes Brother     Hypertension Brother         Social History:   Social History   Substance Use Topics    Smoking status: Former Smoker     Packs/day: 0.25     Years: 25.00     Types: Cigarettes     Quit date: 1/24/2008    Smokeless tobacco: Never Used    Alcohol use No        Allergies: Allergies   Allergen Reactions    Influenza Virus Vaccine, Specific Other (comments)     States body got very hot    Tramadol Seizures    Aspirin Other (comments)     Pt reports having a h/o gastric ulcers. Pt was on IBU when she was dx'd. Review of Systems   Review of Systems   Constitutional: Positive for chills. Negative for activity change, appetite change, diaphoresis, fever and unexpected weight change. HENT: Negative for congestion, hearing loss, rhinorrhea, sinus pressure, sneezing, sore throat and trouble swallowing. Eyes: Negative for pain, redness, itching and visual disturbance. Respiratory: Negative for cough, shortness of breath and wheezing. Cardiovascular: Negative for chest pain, palpitations and leg swelling. Gastrointestinal: Negative for abdominal pain, constipation, diarrhea, nausea and vomiting.        - fecal incontinence   Genitourinary: Negative for dysuria. - urinary incontinence   Musculoskeletal: Positive for back pain (low) and myalgias (bilateral legs). Negative for arthralgias and gait problem. Skin: Negative for color change, pallor, rash and wound. Neurological: Negative for tremors, weakness, light-headedness, numbness and headaches.    All other systems reviewed and are negative. Physical Exam  Vitals:    10/09/17 1509   BP: 153/82   Pulse: 72   Resp: 16   Temp: 98.2 °F (36.8 °C)   SpO2: 100%   Weight: 118.6 kg (261 lb 7.5 oz)   Height: 5' 11\" (1.803 m)       Physical Exam   Constitutional: She is oriented to person, place, and time. She appears well-developed and well-nourished. No distress. 59 y.o.  female in NAD  Communicates appropriately and in full sentences   HENT:   Head: Normocephalic and atraumatic. Eyes: Conjunctivae are normal. Pupils are equal, round, and reactive to light. Right eye exhibits no discharge. Left eye exhibits no discharge. Neck: Normal range of motion. Neck supple. No nuchal rigidity or meningeal signs   Pulmonary/Chest: Effort normal. No respiratory distress. Musculoskeletal: Normal range of motion. She exhibits no edema, tenderness or deformity. No neurologic, motor, vascular, or compartment embarrassment observed on exam. No focal neurologic deficits. BACK: Normal spinal curvatures. No step off or deformity. NT to palpation along midline. Negative seated SLR bilaterally. Flexion/extension movement's at pt's baseline. Ambulatory without difficulty. Neurological: She is alert and oriented to person, place, and time. No focal neuro deficits. NVI. Neurologically intact of UE and LE B/L  Sensation intact and symmetrical of UE and LE B/L. Strength 5/5 of UE B/L, Strength 5/5 of LE B/L. Symmetric bulk and tone of LE muscle groups. Skin: Skin is warm and dry. No rash noted. She is not diaphoretic. No erythema. No pallor. Psychiatric: She has a normal mood and affect.  Her behavior is normal.       Medical Decision Making   I reviewed our electronic medical record system for any past medical records that were available that may contribute to the patients current condition, the nursing notes and vital signs from today's visit     Nursing notes will be reviewed as they become available in realtime while the pt is in the ED. Provider Notes:      DDx: lumbar strain, lumbar sprain, HNP, DDD, DJD, spondylolysis, spondylolisthesis, muscle spasm, compression fracture      ED Course:  4:08 PM  The patients presenting problems have been discussed, and they are in agreement with the care plan formulated and outlined with them. I have encouraged them to ask questions as they arise throughout their visit. Will continue to monitor. Diagnostic Study Results     Radiologic Studies -  The following have been ordered and reviewed:  XR SPINE LUMB 2 OR 3 V   Final Result   EXAM:  XR SPINE LUMB 2 OR 3 V  INDICATION: Lower back pain with no injury, patient requesting x-rays. COMPARISON: 12/13/2013.     FINDINGS: AP, lateral and spot lateral views of the lumbar spine demonstrate  mild degenerative changes with anterior osteophytes of the upper lumbar spine. There is mild facet arthrosis. The vertebral body heights are preserved. There  is no fracture, subluxation or other acute abnormality.     IMPRESSION  IMPRESSION: Lumbar spondylosis, unchanged. Vital Signs-Reviewed the patient's vital signs. Patient Vitals for the past 12 hrs:   Temp Pulse Resp BP SpO2   10/09/17 1509 98.2 °F (36.8 °C) 72 16 153/82 100 %       Medications Given in the ED:  Medications   lidocaine (LIDODERM) 5 % patch 1 Patch (1 Patch TransDERmal Refused 10/9/17 1617)   HYDROcodone-acetaminophen (NORCO) 5-325 mg per tablet 1 Tab (1 Tab Oral Given 10/9/17 1643)       Diagnosis:  Clinical Impression:   1. Chronic bilateral low back pain without sciatica    2. H/O: depression         Plan:  1. Return precautions  2. Medications as prescribed  3. Follow-ups as discussed  Discharge Medication List as of 10/9/2017  5:15 PM      START taking these medications    Details   methocarbamol (ROBAXIN-750) 750 mg tablet Take 1 Tab by mouth four (4) times daily. , Normal, Disp-20 Tab, R-0      methylPREDNISolone (MEDROL, SHLOMO,) 4 mg tablet Per dose pack, Normal, Disp-1 Dose Pack, R-0         CONTINUE these medications which have NOT CHANGED    Details   ! ! ACCU-CHEK MEENU PLUS TEST STRP strip CHECK SUGAR THREE TIMES DAILY, Normal, Disp-300 Strip, R-5      diclofenac (VOLTAREN) 1 % gel Apply  to affected area four (4) times daily. , Normal, Disp-100 g, R-3      HYDROcodone-acetaminophen (NORCO) 7.5-325 mg per tablet Take 1 Tab by mouth every six (6) hours as needed for Pain. Max Daily Amount: 4 Tabs. 1 tab every 6 hours as needed for pain. Max of 4 pills per day  Indications: Pain, Print, Disp-120 Tab, R-0      valsartan-hydroCHLOROthiazide (DIOVAN-HCT) 320-25 mg per tablet Take 1 Tab by mouth daily. , No Print, Disp-60 Tab, R-3      amLODIPine (NORVASC) 10 mg tablet TAKE 1 TABLET BY MOUTH DAILY, Normal, Disp-90 Tab, R-0      zolpidem (AMBIEN) 5 mg tablet Take 1 Tab by mouth nightly as needed for Sleep. Max Daily Amount: 5 mg., Print, Disp-30 Tab, R-2      !!  LEXAPRO 10 mg tablet TAKE 1 TABLET BY MOUTH EVERY DAY, Normal, Disp-30 Tab, R-11      !! escitalopram oxalate (LEXAPRO) 10 mg tablet TAKE 1 TABLET BY MOUTH EVERY DAY, Normal**Patient requests 90 days supply**Disp-90 Tab, R-3      !! glimepiride (AMARYL) 4 mg tablet TAKE 1 TABLET BY MOUTH TWICE DAILY, Normal**Patient requests 90 days supply**Disp-180 Tab, R-11      ibuprofen (MOTRIN) 800 mg tablet TAKE 1 TABLET BY MOUTH EVERY 6 HOURS AS NEEDED FOR PAIN WITH FOOD, Normal, Disp-120 Tab, R-5      metFORMIN (GLUCOPHAGE) 1,000 mg tablet TAKE 1 TABLET BY MOUTH TWICE DAILY WITH MEALS, Normal, Disp-180 Tab, R-3      metoprolol succinate (TOPROL-XL) 100 mg tablet TAKE 1 TABLET BY MOUTH DAILY FOR HIGH BLOOD PRESSURE, Normal**Patient requests 90 days supply**Disp-90 Tab, R-3      atorvastatin (LIPITOR) 20 mg tablet TAKE 1 TABLET BY MOUTH DAILY FOR CHOLESTEROL, Normal**Patient requests 90 days supply**Disp-90 Tab, R-11      !! glucose blood VI test strips (ACCU-CHEK MEENU PLUS TEST STRP) strip Check sugars 3 times a day Dx E11.65, Normal, Disp-300 Strip, R-11      !! glimepiride (AMARYL) 4 mg tablet TAKE 1 TABLET BY MOUTH TWICE DAILY, Normal, Disp-60 Tab, R-11      fluticasone (FLONASE) 50 mcg/actuation nasal spray 2 Sprays by Both Nostrils route daily. , Historical Med       !! - Potential duplicate medications found. Please discuss with provider. Follow-up Information     Follow up With Details Comments [address], [place] Schedule an appointment as soon as possible for a visit in 2 days As needed, If symptoms worsen, Possible further evaluation and treatment [address] [place]  [phone]      \[facility]\"" EMERGENCY DEPT Go to As needed, If symptoms worsen [address] [facility] [address]  [phone]    Brandon Diego MD Schedule an appointment as soon as possible for a visit in 2 days As needed, If symptoms worsen, Possible further evaluation and treatment [address] [address]  [address] [phone]          Return to the closest emergency room or follow up sooner for any deterioration    Disposition:  DISCHARGE NOTE:  5:20 PM  Crista Carla  results have been reviewed with her. She has been counseled regarding her diagnosis. She verbally conveys understanding and agreement of the signs, symptoms, diagnosis, treatment and prognosis and additionally agrees to follow up as recommended with Dr. Seble Johnson MD in 24 - 48 hours. She also agrees with the care-plan and conveys that all of her questions have been answered. I have also put together some discharge instructions for her that include: 1) educational information regarding their diagnosis, 2) how to care for their diagnosis at home, as well a 3) list of reasons why they would want to return to the ED prior to their follow-up appointment, should their condition change. She and/or family's questions have been answered.  I have encouraged them to see the official results in [facility]\" or to retrieve the specifics of their results from medical records. _______________________________   Attestations: This note is prepared by Wes Muir, acting as Scribe for [employer], PA-C. [employer], PA-C: The scribe's documentation has been prepared under my direction and personally reviewed by me in its entirety.  I confirm that the note above accurately reflects all work, treatment, procedures, and medical decision making performed by me.      _______________________________     Kaley Becerra from [address]

## 2017-10-09 NOTE — DISCHARGE INSTRUCTIONS
Learning About How to Have a Healthy Back  What causes back pain? Back pain is often caused by overuse, strain, or injury. For example, people often hurt their backs playing sports or working in the yard, being jolted in a car accident, or lifting something too heavy. Aging plays a part too. Your bones and muscles tend to lose strength as you age, which makes injury more likely. The spongy discs between the bones of the spine (vertebrae) may suffer from wear and tear and no longer provide enough cushion between the bones. A disc that bulges or breaks open (herniated disc) can press on nerves, causing back pain. In some people, back pain is the result of arthritis, broken vertebrae caused by bone loss (osteoporosis), illness, or a spine problem. Although most people have back pain at one time or another, there are steps you can take to make it less likely. How can you have a healthy back? Reduce stress on your back through good posture  Slumping or slouching alone may not cause low back pain. But after the back has been strained or injured, bad posture can make pain worse. · Sleep in a position that maintains your back's normal curves and on a mattress that feels comfortable. Sleep on your side with a pillow between your knees, or sleep on your back with a pillow under your knees. These positions can reduce strain on your back. · Stand and sit up straight. \"Good posture\" generally means your ears, shoulders, and hips are in a straight line. · If you must stand for a long time, put one foot on a stool, ledge, or box. Switch feet every now and then. · Sit in a chair that is low enough to let you place both feet flat on the floor with both knees nearly level with your hips. If your chair or desk is too high, use a footrest to raise your knees. Place a small pillow, a rolled-up towel, or a lumbar roll in the curve of your back if you need extra support.   · Try a kneeling chair, which helps tilt your hips forward. This takes pressure off your lower back. · Try sitting on an exercise ball. It can rock from side to side, which helps keep your back loose. · When driving, keep your knees nearly level with your hips. Sit straight, and drive with both hands on the steering wheel. Your arms should be in a slightly bent position. Reduce stress on your back through careful lifting  · Squat down, bending at the hips and knees only. If you need to, put one knee to the floor and extend your other knee in front of you, bent at a right angle (half kneeling). · Press your chest straight forward. This helps keep your upper back straight while keeping a slight arch in your low back. · Hold the load as close to your body as possible, at the level of your belly button (navel). · Use your feet to change direction, taking small steps. · Lead with your hips as you change direction. Keep your shoulders in line with your hips as you move. · Set down your load carefully, squatting with your knees and hips only. Exercise and stretch your back  · Do some exercise on most days of the week, if your doctor says it is okay. You can walk, run, swim, or cycle. · Stretch your back muscles. Here are a few exercises to try:  Beatriz Gal on your back, and gently pull one bent knee to your chest. Put that foot back on the floor, and then pull the other knee to your chest.  ¨ Do pelvic tilts. Lie on your back with your knees bent. Tighten your stomach muscles. Pull your belly button (navel) in and up toward your ribs. You should feel like your back is pressing to the floor and your hips and pelvis are slightly lifting off the floor. Hold for 6 seconds while breathing smoothly. ¨ Sit with your back flat against a wall. · Keep your core muscles strong. The muscles of your back, belly (abdomen), and buttocks support your spine. ¨ Pull in your belly and imagine pulling your navel toward your spine. Hold this for 6 seconds, then relax.  Remember to keep breathing normally as you tense your muscles. ¨ Do curl-ups. Always do them with your knees bent. Keep your low back on the floor, and curl your shoulders toward your knees using a smooth, slow motion. Keep your arms folded across your chest. If this bothers your neck, try putting your hands behind your neck (not your head), with your elbows spread apart. ¨ Lie on your back with your knees bent and your feet flat on the floor. Tighten your belly muscles, and then push with your feet and raise your buttocks up a few inches. Hold this position 6 seconds as you continue to breathe normally, then lower yourself slowly to the floor. Repeat 8 to 12 times. ¨ If you like group exercise, try Pilates or yoga. These classes have poses that strengthen the core muscles. Lead a healthy lifestyle  · Stay at a healthy weight to avoid strain on your back. · Do not smoke. Smoking increases the risk of osteoporosis, which weakens the spine. If you need help quitting, talk to your doctor about stop-smoking programs and medicines. These can increase your chances of quitting for good. Where can you learn more? Go to http://[employer] Managementjonathan.info/. Enter L315 in the search box to learn more about \"Learning About How to Have a Healthy Back. \"  Current as of: March 21, 2017  Content Version: 11.3  © 2491-8158 [employer]. Care instructions adapted under license by [employer] (which disclaims liability or warranty for this information). If you have questions about a medical condition or this instruction, always ask your healthcare professional. [place] [address] any warranty or liability for your use of this information. Back Pain: Care Instructions  Your Care Instructions    Back pain has many possible causes. It is often related to problems with muscles and ligaments of the back. It may also be related to problems with the nerves, discs, or bones of the back.  Moving, lifting, standing, sitting, or sleeping in an awkward way can strain the back. Sometimes you don't notice the injury until later. Arthritis is another common cause of back pain. Although it may hurt a lot, back pain usually improves on its own within several weeks. Most people recover in 12 weeks or less. Using good home treatment and being careful not to stress your back can help you feel better sooner. Follow-up care is a key part of your treatment and safety. Be sure to make and go to all appointments, and call your doctor if you are having problems. Its also a good idea to know your test results and keep a list of the medicines you take. How can you care for yourself at home? · Sit or lie in positions that are most comfortable and reduce your pain. Try one of these positions when you lie down:  ¨ Lie on your back with your knees bent and supported by large pillows. ¨ Lie on the floor with your legs on the seat of a sofa or chair. Chloe Louis on your side with your knees and hips bent and a pillow between your legs. ¨ Lie on your stomach if it does not make pain worse. · Do not sit up in bed, and avoid soft couches and twisted positions. Bed rest can help relieve pain at first, but it delays healing. Avoid bed rest after the first day of back pain. · Change positions every 30 minutes. If you must sit for long periods of time, take breaks from sitting. Get up and walk around, or lie in a comfortable position. · Try using a heating pad on a low or medium setting for 15 to 20 minutes every 2 or 3 hours. Try a warm shower in place of one session with the heating pad. · You can also try an ice pack for 10 to 15 minutes every 2 to 3 hours. Put a thin cloth between the ice pack and your skin. · Take pain medicines exactly as directed. ¨ If the doctor gave you a prescription medicine for pain, take it as prescribed.   ¨ If you are not taking a prescription pain medicine, ask your doctor if you can take an over-the-counter medicine. · Take short walks several times a day. You can start with 5 to 10 minutes, 3 or 4 times a day, and work up to longer walks. Walk on level surfaces and avoid hills and stairs until your back is better. · Return to work and other activities as soon as you can. Continued rest without activity is usually not good for your back. · To prevent future back pain, do exercises to stretch and strengthen your back and stomach. Learn how to use good posture, safe lifting techniques, and proper body mechanics. When should you call for help? Call your doctor now or seek immediate medical care if:  · You have new or worsening numbness in your legs. · You have new or worsening weakness in your legs. (This could make it hard to stand up.)  · You lose control of your bladder or bowels. Watch closely for changes in your health, and be sure to contact your doctor if:  · Your pain gets worse. · You are not getting better after 2 weeks. Where can you learn more? Go to http://kendell-jonathan.info/. Enter O003 in the search box to learn more about \"Back Pain: Care Instructions. \"  Current as of: March 21, 2017  Content Version: 11.3  © 4306-5729 [employer]. Care instructions adapted under license by [employer] (which disclaims liability or warranty for this information). If you have questions about a medical condition or this instruction, always ask your healthcare professional. [place] [address] any warranty or liability for your use of this information. Learning About Relief for Back Pain  What is back tension and strain? Back strain happens when you overstretch, or pull, a muscle in your back. You may hurt your back in an accident or when you exercise or lift something. Most back pain will get better with rest and time. You can take care of yourself at home to help your back heal.  What can you do first to relieve back pain?   When you first feel back pain, try these steps:  · Walk. Take a short walk (10 to 20 minutes) on a level surface (no slopes, hills, or stairs) every 2 to 3 hours. Walk only distances you can manage without pain, especially leg pain. · Relax. Find a comfortable position for rest. Some people are comfortable on the floor or a medium-firm bed with a small pillow under their head and another under their knees. Some people prefer to lie on their side with a pillow between their knees. Don't stay in one position for too long. · Try heat or ice. Try using a heating pad on a low or medium setting, or take a warm shower, for 15 to 20 minutes every 2 to 3 hours. Or you can buy single-use heat wraps that last up to 8 hours. You can also try an ice pack for 10 to 15 minutes every 2 to 3 hours. You can use an ice pack or a bag of frozen vegetables wrapped in a thin towel. There is not strong evidence that either heat or ice will help, but you can try them to see if they help. You may also want to try switching between heat and cold. · Take pain medicine exactly as directed. ¨ If the doctor gave you a prescription medicine for pain, take it as prescribed. ¨ If you are not taking a prescription pain medicine, ask your doctor if you can take an over-the-counter medicine. What else can you do? · Stretch and exercise. Exercises that increase flexibility may relieve your pain and make it easier for your muscles to keep your spine in a good, neutral position. And don't forget to keep walking. · Do self-massage. You can use self-massage to unwind after work or school or to energize yourself in the morning. You can easily massage your feet, hands, or neck. Self-massage works best if you are in comfortable clothes and are sitting or lying in a comfortable position. Use oil or lotion to massage bare skin. · Reduce stress.  Back pain can lead to a vicious [place]: Distress about the pain tenses the muscles in your back, which in turn causes more pain. Learn how to relax your mind and your muscles to lower your stress. Where can you learn more? Go to http://kendell-jonathan.info/. Enter C585 in the search box to learn more about \"Learning About Relief for Back Pain. \"  Current as of: March 21, 2017  Content Version: 11.3  © 4220-8728 [employer]. Care instructions adapted under license by [employer] (which disclaims liability or warranty for this information). If you have questions about a medical condition or this instruction, always ask your healthcare professional. [place] [address] any warranty or liability for your use of this information.

## 2017-10-23 ENCOUNTER — OFFICE VISIT (OUTPATIENT)
Dept: INTERNAL MEDICINE CLINIC | Age: 57
End: 2017-10-23

## 2017-10-23 VITALS
BODY MASS INDEX: 36.26 KG/M2 | DIASTOLIC BLOOD PRESSURE: 95 MMHG | RESPIRATION RATE: 18 BRPM | HEART RATE: 71 BPM | SYSTOLIC BLOOD PRESSURE: 155 MMHG | TEMPERATURE: 98.1 F | WEIGHT: 259 LBS | HEIGHT: 71 IN | OXYGEN SATURATION: 97 %

## 2017-10-23 DIAGNOSIS — M54.16 LUMBAR RADICULOPATHY: Primary | ICD-10-CM

## 2017-10-23 DIAGNOSIS — Z79.891 CHRONIC PRESCRIPTION OPIATE USE: ICD-10-CM

## 2017-10-23 PROBLEM — M47.22 OSTEOARTHRITIS OF SPINE WITH RADICULOPATHY, CERVICAL REGION: Status: ACTIVE | Noted: 2017-10-23

## 2017-10-23 PROBLEM — E66.9 OBESITY (BMI 30-39.9): Status: ACTIVE | Noted: 2017-10-23

## 2017-10-23 RX ORDER — HYDROCODONE BITARTRATE AND ACETAMINOPHEN 5; 325 MG/1; MG/1
1 TABLET ORAL
Qty: 120 TAB | Refills: 0 | Status: SHIPPED | OUTPATIENT
Start: 2017-10-23 | End: 2017-11-21 | Stop reason: SDUPTHER

## 2017-10-23 NOTE — PROGRESS NOTES
1. Have you been to the ER, urgent care clinic since your last visit? Hospitalized since your last visit? Yes When: 10/9/2017 [facility] for back pain    2. Have you seen or consulted any other health care providers outside of the [address] [place] [address] since your last visit? Include any pap smears or colon screening. No     Patient states pain of 9/10 to lower back and legs.

## 2017-10-23 NOTE — MR AVS SNAPSHOT
Visit Information Date & Time Provider Department Dept. Phone Encounter #  
 10/23/2017  3:20 PM Jono Daily MD [id number]  [id number] Follow-up Instructions Return in about 5 weeks (around 11/27/2017). Your Appointments 11/3/2017  3:00 PM  
ROUTINE CARE with Jono Daily MD  
[facility]) Appt Note: 3 month follow up and pap; 3 month follow up and pap [place] [address] [facility] [id number]  
[phone]  
  
   
 [place] [address] Upcoming Health Maintenance Date Due  
 FOOT EXAM Q1 3/4/1968 Pneumococcal 19-64 Medium Risk (1 of 1 - PPSV23) 3/4/1977 DTaP/Tdap/Td series (1 - Tdap) 3/4/1979 PAP AKA CERVICAL CYTOLOGY 3/4/1979 EYE EXAM RETINAL OR DILATED Q1 3/31/2016 MICROALBUMIN Q1 1/8/2017 BREAST CANCER SCRN MAMMOGRAM 10/13/2017 HEMOGLOBIN A1C Q6M 11/12/2017 LIPID PANEL Q1 5/12/2018 COLONOSCOPY 6/14/2020 Allergies as of 10/23/2017  Review Complete On: 10/23/2017 By: Fantasma Vyas LPN Severity Noted Reaction Type Reactions Influenza Virus Vaccine, Specific High 05/08/2013   Side Effect Other (comments) States body got very hot Tramadol  05/20/2014    Seizures Aspirin Low 06/15/2010   Side Effect Other (comments) Pt reports having a h/o gastric ulcers. Pt was on IBU when she was dx'd. Current Immunizations  Reviewed on 5/9/2013 Name Date Influenza Vaccine Split  Deferred (Patient Refused) Not reviewed this visit You Were Diagnosed With   
  
 Codes Comments Lumbar radiculopathy    -  Primary ICD-10-CM: M54.16 
ICD-9-CM: 724.4 Vitals BP Pulse Temp Resp Height(growth percentile) Weight(growth percentile) (!) 155/95 71 98.1 °F (36.7 °C) (Oral) 18 5' 11\" (1.803 m) 259 lb (117.5 kg) SpO2 BMI OB Status Smoking Status 97% 36.12 kg/m2 Hysterectomy Former Smoker Vitals History BMI and BSA Data Body Mass Index Body Surface Area  
 36.12 kg/m 2 2.43 m 2 Preferred Pharmacy Pharmacy Name Phone Alli Mejia [phone] Your Updated Medication List  
  
   
This list is accurate as of: 10/23/17  4:08 PM.  Always use your most recent med list. amLODIPine 10 mg tablet Commonly known as:  Lennis Eagles TAKE 1 TABLET BY MOUTH DAILY  
  
 atorvastatin 20 mg tablet Commonly known as:  LIPITOR  
TAKE 1 TABLET BY MOUTH DAILY FOR CHOLESTEROL  
  
 diclofenac 1 % Gel Commonly known as:  VOLTAREN Apply  to affected area four (4) times daily. FLONASE 50 mcg/actuation nasal spray Generic drug:  fluticasone 2 Sprays by Both Nostrils route daily. * glimepiride 4 mg tablet Commonly known as:  AMARYL  
TAKE 1 TABLET BY MOUTH TWICE DAILY * glimepiride 4 mg tablet Commonly known as:  AMARYL  
TAKE 1 TABLET BY MOUTH TWICE DAILY * glucose blood VI test strips strip Commonly known as:  ACCU-CHEK MEENU PLUS TEST STRP Check sugars 3 times a day Dx E11.65  
  
 * ACCU-CHEK MEENU PLUS TEST STRP strip Generic drug:  glucose blood VI test strips CHECK SUGAR THREE TIMES DAILY HYDROcodone-acetaminophen 5-325 mg per tablet Commonly known as:  [address] Take 1 Tab by mouth every six (6) hours as needed for Pain (NOT TO BE TAKEN AS A STANDING DOSE). Max Daily Amount: 4 Tabs. LEXAPRO 10 mg tablet Generic drug:  escitalopram oxalate TAKE 1 TABLET BY MOUTH EVERY DAY  
  
 metFORMIN 1,000 mg tablet Commonly known as:  GLUCOPHAGE  
TAKE 1 TABLET BY MOUTH TWICE DAILY WITH MEALS  
  
 metoprolol succinate 100 mg tablet Commonly known as:  TOPROL-XL  
TAKE 1 TABLET BY MOUTH DAILY FOR HIGH BLOOD PRESSURE  
  
 valsartan-hydroCHLOROthiazide 320-25 mg per tablet Commonly known as:  DIOVAN-HCT Take 1 Tab by mouth daily. zolpidem 5 mg tablet Commonly known as:  AMBIEN Take 1 Tab by mouth nightly as needed for Sleep. Max Daily Amount: 5 mg.  
  
 * Notice: This list has 4 medication(s) that are the same as other medications prescribed for you. Read the directions carefully, and ask your doctor or other care provider to review them with you. Prescriptions Printed Refills HYDROcodone-acetaminophen (NORCO) 5-325 mg per tablet 0 Sig: Take 1 Tab by mouth every six (6) hours as needed for Pain (NOT TO BE TAKEN AS A STANDING DOSE). Max Daily Amount: 4 Tabs. Class: Print Route: Oral  
  
We Performed the Following REFERRAL TO ORTHOPEDICS [UKX717 Custom] Follow-up Instructions Return in about 5 weeks (around 11/27/2017). Referral Information Referral ID Referred By Referred To  
  
 [id number] ATILIO, [facility] [id number] [place], [address] [place] [address], 3 [facility] Visits Status Start Date End Date 1 New Request 10/23/17 10/23/18 If your referral has a status of pending review or denied, additional information will be sent to support the outcome of this decision. Patient Instructions Back Pain: Care Instructions Your Care Instructions Back pain has many possible causes. It is often related to problems with muscles and ligaments of the back. It may also be related to problems with the nerves, discs, or bones of the back. Moving, lifting, standing, sitting, or sleeping in an awkward way can strain the back. Sometimes you don't notice the injury until later. Arthritis is another common cause of back pain. Although it may hurt a lot, back pain usually improves on its own within several weeks. Most people recover in 12 weeks or less. Using good home treatment and being careful not to stress your back can help you feel better sooner. Follow-up care is a key part of your treatment and safety. Be sure to make and go to all appointments, and call your doctor if you are having problems. Its also a good idea to know your test results and keep a list of the medicines you take. How can you care for yourself at home? · Sit or lie in positions that are most comfortable and reduce your pain. Try one of these positions when you lie down: ¨ Lie on your back with your knees bent and supported by large pillows. ¨ Lie on the floor with your legs on the seat of a sofa or chair. Josephus Phlegm on your side with your knees and hips bent and a pillow between your legs. ¨ Lie on your stomach if it does not make pain worse. · Do not sit up in bed, and avoid soft couches and twisted positions. Bed rest can help relieve pain at first, but it delays healing. Avoid bed rest after the first day of back pain. · Change positions every 30 minutes. If you must sit for long periods of time, take breaks from sitting. Get up and walk around, or lie in a comfortable position. · Try using a heating pad on a low or medium setting for 15 to 20 minutes every 2 or 3 hours. Try a warm shower in place of one session with the heating pad. · You can also try an ice pack for 10 to 15 minutes every 2 to 3 hours. Put a thin cloth between the ice pack and your skin. · Take pain medicines exactly as directed. ¨ If the doctor gave you a prescription medicine for pain, take it as prescribed. ¨ If you are not taking a prescription pain medicine, ask your doctor if you can take an over-the-counter medicine. · Take short walks several times a day. You can start with 5 to 10 minutes, 3 or 4 times a day, and work up to longer walks. Walk on level surfaces and avoid hills and stairs until your back is better. · Return to work and other activities as soon as you can. Continued rest without activity is usually not good for your back. · To prevent future back pain, do exercises to stretch and strengthen your back and stomach. Learn how to use good posture, safe lifting techniques, and proper body mechanics. When should you call for help? Call your doctor now or seek immediate medical care if: 
· You have new or worsening numbness in your legs. · You have new or worsening weakness in your legs. (This could make it hard to stand up.) · You lose control of your bladder or bowels. Watch closely for changes in your health, and be sure to contact your doctor if: 
· Your pain gets worse. · You are not getting better after 2 weeks. Where can you learn more? Go to http://kendellReally Simplejonathan.info/. Enter X551 in the search box to learn more about \"Back Pain: Care Instructions. \" Current as of: March 21, 2017 Content Version: 11.3 © 2704-6312 [employer]. Care instructions adapted under license by [employer] (which disclaims liability or warranty for this information). If you have questions about a medical condition or this instruction, always ask your healthcare professional. [address] any warranty or liability for your use of this information. Chronic Pain: Care Instructions Your Care Instructions Chronic pain is pain that lasts a long time (months or even years) and may or may not have a clear cause. It is different from acute pain, which usually does have a clear causelike an injury or illnessand gets better over time. Chronic pain: 
· Lasts over time but may vary from day to day. · Does not go away despite efforts to end it. · May disrupt your sleep and lead to fatigue. · May cause depression or anxiety. · May make your muscles tense, causing more pain. · Can disrupt your work, hobbies, home life, and relationships with friends and family. Chronic pain is a very real condition. It is not just in your head. Treatment can help and usually includes several methods used together, such as medicines, physical therapy, exercise, and other treatments. Learning how to relax and changing negative thought patterns can also help you cope. Chronic pain is complex. Taking an active role in your treatment will help you better manage your pain. Tell your doctor if you have trouble dealing with your pain. You may have to try several things before you find what works best for you. Follow-up care is a key part of your treatment and safety. Be sure to make and go to all appointments, and call your doctor if you are having problems. Its also a good idea to know your test results and keep a list of the medicines you take. How can you care for yourself at home? · Pace yourself. Break up large jobs into smaller tasks. Save harder tasks for days when you have less pain, or go back and forth between hard tasks and easier ones. Take rest breaks. · Relax, and reduce stress. Relaxation techniques such as deep breathing or meditation can help. · Keep moving. Gentle, daily exercise can help reduce pain over the long run. Try low- or no-impact exercises such as walking, swimming, and stationary biking. Do stretches to stay flexible. · Try heat, cold packs, and massage. · Get enough sleep. Chronic pain can make you tired and drain your energy. Talk with your doctor if you have trouble sleeping because of pain. · Think positive. Your thoughts can affect your pain level. Do things that you enjoy to distract yourself when you have pain instead of focusing on the pain. See a movie, read a book, listen to music, or spend time with a friend. · If you think you are depressed, talk to your doctor about treatment. · Keep a daily pain diary. Record how your moods, thoughts, sleep patterns, activities, and medicine affect your pain.  You may find that your pain is worse during or after certain activities or when you are feeling a certain emotion. Having a record of your pain can help you and your doctor find the best ways to treat your pain. · Take pain medicines exactly as directed. ¨ If the doctor gave you a prescription medicine for pain, take it as prescribed. ¨ If you are not taking a prescription pain medicine, ask your doctor if you can take an over-the-counter medicine. Reducing constipation caused by pain medicine · Include fruits, vegetables, beans, and whole grains in your diet each day. These foods are high in fiber. · Drink plenty of fluids, enough so that your urine is light yellow or clear like water. If you have kidney, heart, or liver disease and have to limit fluids, talk with your doctor before you increase the amount of fluids you drink. · If your doctor recommends it, get more exercise. Walking is a good choice. Bit by bit, increase the amount you walk every day. Try for at least 30 minutes on most days of the week. · Schedule time each day for a bowel movement. A daily routine may help. Take your time and do not strain when having a bowel movement. When should you call for help? Call your doctor now or seek immediate medical care if: 
· Your pain gets worse or is out of control. · You feel down or blue, or you do not enjoy things like you once did. You may be depressed, which is common in people with chronic pain. Depression can be treated. · You have vomiting or cramps for more than 2 hours. Watch closely for changes in your health, and be sure to contact your doctor if: 
· You cannot sleep because of pain. · You are very worried or anxious about your pain. · You have trouble taking your pain medicine. · You have any concerns about your pain medicine. · You have trouble with bowel movements, such as: 
¨ No bowel movement in 3 days. ¨ Blood in the anal area, in your stool, or on the toilet paper. ¨ Diarrhea for more than 24 hours. Where can you learn more? Go to http://kendell-jonathan.info/. Enter N004 in the search box to learn more about \"Chronic Pain: Care Instructions. \" Current as of: October 14, 2016 Content Version: 11.3 © 9849-8234 [employer]en. Care instructions adapted under license by [employer] (which disclaims liability or warranty for this information). If you have questions about a medical condition or this instruction, always ask your healthcare professional. [address] any warranty or liability for your use of this information. Introducing [facility]! Dear Cortez Shaw: Thank you for requesting a NeoChord account. Our records indicate that you already have an active NeoChord account. You can access your account anytime at [email or web address]. [email or web address] Did you know that you can access your hospital and ER discharge instructions at any time in NeoChord? You can also review all of your test results from your hospital stay or ER visit. Additional Information If you have questions, please visit the Frequently Asked Questions section of the NeoChord website at [email or web address] Remember, NeoChord is NOT to be used for urgent needs. For medical emergencies, dial 911. Now available from your iPhone and Android! Please provide this summary of care documentation to your next provider. Your primary care clinician is listed as Shari Whitman. If you have any questions after today's visit, please call [phone].

## 2017-10-23 NOTE — PATIENT INSTRUCTIONS
Back Pain: Care Instructions  Your Care Instructions    Back pain has many possible causes. It is often related to problems with muscles and ligaments of the back. It may also be related to problems with the nerves, discs, or bones of the back. Moving, lifting, standing, sitting, or sleeping in an awkward way can strain the back. Sometimes you don't notice the injury until later. Arthritis is another common cause of back pain. Although it may hurt a lot, back pain usually improves on its own within several weeks. Most people recover in 12 weeks or less. Using good home treatment and being careful not to stress your back can help you feel better sooner. Follow-up care is a key part of your treatment and safety. Be sure to make and go to all appointments, and call your doctor if you are having problems. Its also a good idea to know your test results and keep a list of the medicines you take. How can you care for yourself at home? · Sit or lie in positions that are most comfortable and reduce your pain. Try one of these positions when you lie down:  ¨ Lie on your back with your knees bent and supported by large pillows. ¨ Lie on the floor with your legs on the seat of a sofa or chair. Kip Isabel on your side with your knees and hips bent and a pillow between your legs. ¨ Lie on your stomach if it does not make pain worse. · Do not sit up in bed, and avoid soft couches and twisted positions. Bed rest can help relieve pain at first, but it delays healing. Avoid bed rest after the first day of back pain. · Change positions every 30 minutes. If you must sit for long periods of time, take breaks from sitting. Get up and walk around, or lie in a comfortable position. · Try using a heating pad on a low or medium setting for 15 to 20 minutes every 2 or 3 hours. Try a warm shower in place of one session with the heating pad. · You can also try an ice pack for 10 to 15 minutes every 2 to 3 hours.  Put a thin cloth between the ice pack and your skin. · Take pain medicines exactly as directed. ¨ If the doctor gave you a prescription medicine for pain, take it as prescribed. ¨ If you are not taking a prescription pain medicine, ask your doctor if you can take an over-the-counter medicine. · Take short walks several times a day. You can start with 5 to 10 minutes, 3 or 4 times a day, and work up to longer walks. Walk on level surfaces and avoid hills and stairs until your back is better. · Return to work and other activities as soon as you can. Continued rest without activity is usually not good for your back. · To prevent future back pain, do exercises to stretch and strengthen your back and stomach. Learn how to use good posture, safe lifting techniques, and proper body mechanics. When should you call for help? Call your doctor now or seek immediate medical care if:  · You have new or worsening numbness in your legs. · You have new or worsening weakness in your legs. (This could make it hard to stand up.)  · You lose control of your bladder or bowels. Watch closely for changes in your health, and be sure to contact your doctor if:  · Your pain gets worse. · You are not getting better after 2 weeks. Where can you learn more? Go to http://kendell-jonathan.info/. Enter H917 in the search box to learn more about \"Back Pain: Care Instructions. \"  Current as of: March 21, 2017  Content Version: 11.3  © 7558-9958 [employer]. Care instructions adapted under license by [employer]ner (which disclaims liability or warranty for this information). If you have questions about a medical condition or this instruction, always ask your healthcare professional. [address] any warranty or liability for your use of this information.        Chronic Pain: Care Instructions  Your Care Instructions  Chronic pain is pain that lasts a long time (months or even years) and may or may not have a clear cause. It is different from acute pain, which usually does have a clear cause--like an injury or illness--and gets better over time. Chronic pain:  · Lasts over time but may vary from day to day. · Does not go away despite efforts to end it. · May disrupt your sleep and lead to fatigue. · May cause depression or anxiety. · May make your muscles tense, causing more pain. · Can disrupt your work, hobbies, home life, and relationships with friends and family. Chronic pain is a very real condition. It is not just in your head. Treatment can help and usually includes several methods used together, such as medicines, physical therapy, exercise, and other treatments. Learning how to relax and changing negative thought patterns can also help you cope. Chronic pain is complex. Taking an active role in your treatment will help you better manage your pain. Tell your doctor if you have trouble dealing with your pain. You may have to try several things before you find what works best for you. Follow-up care is a key part of your treatment and safety. Be sure to make and go to all appointments, and call your doctor if you are having problems. Its also a good idea to know your test results and keep a list of the medicines you take. How can you care for yourself at home? · Pace yourself. Break up large jobs into smaller tasks. Save harder tasks for days when you have less pain, or go back and forth between hard tasks and easier ones. Take rest breaks. · Relax, and reduce stress. Relaxation techniques such as deep breathing or meditation can help. · Keep moving. Gentle, daily exercise can help reduce pain over the long run. Try low- or no-impact exercises such as walking, swimming, and stationary biking. Do stretches to stay flexible. · Try heat, cold packs, and massage. · Get enough sleep. Chronic pain can make you tired and drain your energy.  Talk with your doctor if you have trouble sleeping because of pain. · Think positive. Your thoughts can affect your pain level. Do things that you enjoy to distract yourself when you have pain instead of focusing on the pain. See a movie, read a book, listen to music, or spend time with a friend. · If you think you are depressed, talk to your doctor about treatment. · Keep a daily pain diary. Record how your moods, thoughts, sleep patterns, activities, and medicine affect your pain. You may find that your pain is worse during or after certain activities or when you are feeling a certain emotion. Having a record of your pain can help you and your doctor find the best ways to treat your pain. · Take pain medicines exactly as directed. ¨ If the doctor gave you a prescription medicine for pain, take it as prescribed. ¨ If you are not taking a prescription pain medicine, ask your doctor if you can take an over-the-counter medicine. Reducing constipation caused by pain medicine  · Include fruits, vegetables, beans, and whole grains in your diet each day. These foods are high in fiber. · Drink plenty of fluids, enough so that your urine is light yellow or clear like water. If you have kidney, heart, or liver disease and have to limit fluids, talk with your doctor before you increase the amount of fluids you drink. · If your doctor recommends it, get more exercise. Walking is a good choice. Bit by bit, increase the amount you walk every day. Try for at least 30 minutes on most days of the week. · Schedule time each day for a bowel movement. A daily routine may help. Take your time and do not strain when having a bowel movement. When should you call for help? Call your doctor now or seek immediate medical care if:  · Your pain gets worse or is out of control. · You feel down or blue, or you do not enjoy things like you once did. You may be depressed, which is common in people with chronic pain. Depression can be treated.   · You have vomiting or cramps for more than 2 hours. Watch closely for changes in your health, and be sure to contact your doctor if:  · You cannot sleep because of pain. · You are very worried or anxious about your pain. · You have trouble taking your pain medicine. · You have any concerns about your pain medicine. · You have trouble with bowel movements, such as:  ¨ No bowel movement in 3 days. ¨ Blood in the anal area, in your stool, or on the toilet paper. ¨ Diarrhea for more than 24 hours. Where can you learn more? Go to http://kendlel-jonathan.info/. Enter N004 in the search box to learn more about \"Chronic Pain: Care Instructions. \"  Current as of: October 14, 2016  Content Version: 11.3  © 7559-6614 Gesplan. Care instructions adapted under license by [facility] (which disclaims liability or warranty for this information). If you have questions about a medical condition or this instruction, always ask your healthcare professional. [place] [address] any warranty or liability for your use of this information.

## 2017-10-23 NOTE — PROGRESS NOTES
Martita Alainz is a 59 y.o. female and presents with Medication Refill  . Subjective:    Pt comes-in for pain med refill. Pt relays she ran out of the pain medication and had to go to the ED. They did an x-ray which confirmed lumbar spondylosis and was given a muscle relaxant and medrol dose vikki. Pt relays it did not help at all. Pt is requesting a refill of her norco pills. Explained to patient that wewill be weaning her off the narcotic pain medication and provide her with a referral to see an orthopedist for an eval for injections and/or eval for surgery. Chronic narcotics are not a long term solution to this type of pain as first line tx. Pt is very concerned re:withdrawal sxs. Pt has tried gabapentin/lyrica/lidoderm and voltaren gel.     Review of Systems  Constitutional: negative for fevers, chills, anorexia and weight loss  Respiratory:  negative for cough, hemoptysis, dyspnea,wheezing  CV:   negative for chest pain, palpitations, lower extremity edema  GI:   negative for nausea, vomiting, diarrhea, abdominal pain,melena  Genitourinary: negative for frequency, dysuria and hematuria  Integument:  negative for rash and pruritus  Neurological:  negative for headaches, dizziness, vertigo, memory problems and gait   Behavl/Psych: negative for feelings of anxiety, depression, mood changes    Past Medical History:   Diagnosis Date    Acute pancreatitis 8/9/2012    Acute pancreatitis 1/21/2011    Arthritis     both knees    Chronic pain     knees    Delivery normal     x1    Diabetes ([facility] [address]     DJD (degenerative joint disease) of knee     Fatty liver 1/22/2011    GERD (gastroesophageal reflux disease)     Hypertension     Hypothyroidism     Obesity     PUD (peptic ulcer disease)     UTI (urinary tract infection) 1/21/2011     Past Surgical History:   Procedure Laterality Date    HX BREAST LUMPECTOMY      left breast    HX GYN      hysterectomy    HX ORTHOPAEDIC      left total knee replacement  HX ORTHOPAEDIC  6/16/15    RIGHT TOTAL KNEE ARTHROPLASTY     HX PARATHYROIDECTOMY  01/27/11     Social History     Social History    Marital status:      Spouse name: N/A    Number of children: N/A    Years of education: N/A     Social History Main Topics    Smoking status: Former Smoker     Packs/day: 0.25     Years: 25.00     Types: Cigarettes     Quit date: 1/24/2008    Smokeless tobacco: Never Used    Alcohol use No    Drug use: No    Sexual activity: No     Other Topics Concern    None     Social History Narrative     Family History   Problem Relation Age of Onset   Velta Ganser Cancer Mother     Hypertension Father     Diabetes Sister     Hypertension Sister     Diabetes Brother     Hypertension Brother      Current Outpatient Prescriptions   Medication Sig Dispense Refill    HYDROcodone-acetaminophen (NORCO) 5-325 mg per tablet Take 1 Tab by mouth every six (6) hours as needed for Pain (NOT TO BE TAKEN AS A STANDING DOSE). Max Daily Amount: 4 Tabs. 120 Tab 0    ACCU-CHEK MEENU PLUS TEST STRP strip CHECK SUGAR THREE TIMES DAILY 300 Strip 5    valsartan-hydroCHLOROthiazide (DIOVAN-HCT) 320-25 mg per tablet Take 1 Tab by mouth daily. 60 Tab 3    amLODIPine (NORVASC) 10 mg tablet TAKE 1 TABLET BY MOUTH DAILY 90 Tab 0    zolpidem (AMBIEN) 5 mg tablet Take 1 Tab by mouth nightly as needed for Sleep.  Max Daily Amount: 5 mg. 30 Tab 2    LEXAPRO 10 mg tablet TAKE 1 TABLET BY MOUTH EVERY DAY 30 Tab 11    glimepiride (AMARYL) 4 mg tablet TAKE 1 TABLET BY MOUTH TWICE DAILY 180 Tab 11    metFORMIN (GLUCOPHAGE) 1,000 mg tablet TAKE 1 TABLET BY MOUTH TWICE DAILY WITH MEALS 180 Tab 3    metoprolol succinate (TOPROL-XL) 100 mg tablet TAKE 1 TABLET BY MOUTH DAILY FOR HIGH BLOOD PRESSURE 90 Tab 3    atorvastatin (LIPITOR) 20 mg tablet TAKE 1 TABLET BY MOUTH DAILY FOR CHOLESTEROL 90 Tab 11    glucose blood VI test strips (ACCU-CHEK MEENU PLUS TEST STRP) strip Check sugars 3 times a day Dx E11.65 300 Strip 11    glimepiride (AMARYL) 4 mg tablet TAKE 1 TABLET BY MOUTH TWICE DAILY 60 Tab 11    fluticasone (FLONASE) 50 mcg/actuation nasal spray 2 Sprays by Both Nostrils route daily.  diclofenac (VOLTAREN) 1 % gel Apply  to affected area four (4) times daily. 100 g 3     Allergies   Allergen Reactions    Influenza Virus Vaccine, Specific Other (comments)     States body got very hot    Tramadol Seizures    Aspirin Other (comments)     Pt reports having a h/o gastric ulcers. Pt was on IBU when she was dx'd. Objective:  Visit Vitals    BP (!) 155/95    Pulse 71    Temp 98.1 °F (36.7 °C) (Oral)    Resp 18    Ht 5' 11\" (1.803 m)    Wt 259 lb (117.5 kg)    SpO2 97%    BMI 36.12 kg/m2     Physical Exam:   General appearance - alert, well appearing, and in no distress obese  Mental status - alert, oriented to person, place, and time  EYE-EOMI  Neck - supple,   Chest - symmetric air entry    Abdomen - obese  Ext-no pedal edema, no clubbing or cyanosis  Skin-Warm and dry. no hyperpigmentation, vitiligo, or suspicious lesions  Neuro -alert, oriented, normal speech, no focal findings or movement disorder noted        Results for orders placed or performed in visit on 08/01/17   COMPLIANCE DRUG SCREEN/PRESCRIPTION MONITORING   Result Value Ref Range    Summary FINAL        Assessment/Plan:    ICD-10-CM ICD-9-CM    1. Lumbar radiculopathy M54.16 724.4 REFERRAL TO ORTHOPEDICS      HYDROcodone-acetaminophen (NORCO) 5-325 mg per tablet      COMPLIANCE DRUG SCREEN/PRESCRIPTION MONITORING   2.  Chronic prescription opiate use Z79.891 V58.69 REFERRAL TO ORTHOPEDICS      HYDROcodone-acetaminophen (NORCO) 5-325 mg per tablet      COMPLIANCE DRUG SCREEN/PRESCRIPTION MONITORING     Orders Placed This Encounter    COMPLIANCE DRUG SCREEN/PRESCRIPTION MONITORING   George Rodriguez [address] [place] [address]     Referral Priority:   Routine     Referral Type:   Consultation     Referral Reason:   Specialty Services Required     Referral Location:   [facility]     Referred to Provider:   Julian Kent MD    HYDROcodone-acetaminophen [facility]) 5-325 mg per tablet     Sig: Take 1 Tab by mouth every six (6) hours as needed for Pain (NOT TO BE TAKEN AS A STANDING DOSE). Max Daily Amount: 4 Tabs. Dispense:  120 Tab     Refill:  0     Norco strength decreased  Cont voltaren gel  D/w pt wt loss  Referred to ortho  F/u 5-6 weeks  Patient Instructions        Back Pain: Care Instructions  Your Care Instructions    Back pain has many possible causes. It is often related to problems with muscles and ligaments of the back. It may also be related to problems with the nerves, discs, or bones of the back. Moving, lifting, standing, sitting, or sleeping in an awkward way can strain the back. Sometimes you don't notice the injury until later. Arthritis is another common cause of back pain. Although it may hurt a lot, back pain usually improves on its own within several weeks. Most people recover in 12 weeks or less. Using good home treatment and being careful not to stress your back can help you feel better sooner. Follow-up care is a key part of your treatment and safety. Be sure to make and go to all appointments, and call your doctor if you are having problems. Its also a good idea to know your test results and keep a list of the medicines you take. How can you care for yourself at home? · Sit or lie in positions that are most comfortable and reduce your pain. Try one of these positions when you lie down:  ¨ Lie on your back with your knees bent and supported by large pillows. ¨ Lie on the floor with your legs on the seat of a sofa or chair. Clovia Evener on your side with your knees and hips bent and a pillow between your legs. ¨ Lie on your stomach if it does not make pain worse. · Do not sit up in bed, and avoid soft couches and twisted positions. Bed rest can help relieve pain at first, but it delays healing.  Avoid bed rest after the first day of back pain. · Change positions every 30 minutes. If you must sit for long periods of time, take breaks from sitting. Get up and walk around, or lie in a comfortable position. · Try using a heating pad on a low or medium setting for 15 to 20 minutes every 2 or 3 hours. Try a warm shower in place of one session with the heating pad. · You can also try an ice pack for 10 to 15 minutes every 2 to 3 hours. Put a thin cloth between the ice pack and your skin. · Take pain medicines exactly as directed. ¨ If the doctor gave you a prescription medicine for pain, take it as prescribed. ¨ If you are not taking a prescription pain medicine, ask your doctor if you can take an over-the-counter medicine. · Take short walks several times a day. You can start with 5 to 10 minutes, 3 or 4 times a day, and work up to longer walks. Walk on level surfaces and avoid hills and stairs until your back is better. · Return to work and other activities as soon as you can. Continued rest without activity is usually not good for your back. · To prevent future back pain, do exercises to stretch and strengthen your back and stomach. Learn how to use good posture, safe lifting techniques, and proper body mechanics. When should you call for help? Call your doctor now or seek immediate medical care if:  · You have new or worsening numbness in your legs. · You have new or worsening weakness in your legs. (This could make it hard to stand up.)  · You lose control of your bladder or bowels. Watch closely for changes in your health, and be sure to contact your doctor if:  · Your pain gets worse. · You are not getting better after 2 weeks. Where can you learn more? Go to http://kendell-jonathan.info/. Enter V683 in the search box to learn more about \"Back Pain: Care Instructions. \"  Current as of: March 21, 2017  Content Version: 11.3  © 2968-1960 "[employer].  Care instructions adapted under license by [employer] (which disclaims liability or warranty for this information). If you have questions about a medical condition or this instruction, always ask your healthcare professional. [address] any warranty or liability for your use of this information. Chronic Pain: Care Instructions  Your Care Instructions  Chronic pain is pain that lasts a long time (months or even years) and may or may not have a clear cause. It is different from acute pain, which usually does have a clear cause--like an injury or illness--and gets better over time. Chronic pain:  · Lasts over time but may vary from day to day. · Does not go away despite efforts to end it. · May disrupt your sleep and lead to fatigue. · May cause depression or anxiety. · May make your muscles tense, causing more pain. · Can disrupt your work, hobbies, home life, and relationships with friends and family. Chronic pain is a very real condition. It is not just in your head. Treatment can help and usually includes several methods used together, such as medicines, physical therapy, exercise, and other treatments. Learning how to relax and changing negative thought patterns can also help you cope. Chronic pain is complex. Taking an active role in your treatment will help you better manage your pain. Tell your doctor if you have trouble dealing with your pain. You may have to try several things before you find what works best for you. Follow-up care is a key part of your treatment and safety. Be sure to make and go to all appointments, and call your doctor if you are having problems. Its also a good idea to know your test results and keep a list of the medicines you take. How can you care for yourself at home? · Pace yourself. Break up large jobs into smaller tasks. Save harder tasks for days when you have less pain, or go back and forth between hard tasks and easier ones. Take rest breaks.   · Relax, and reduce stress. Relaxation techniques such as deep breathing or meditation can help. · Keep moving. Gentle, daily exercise can help reduce pain over the long run. Try low- or no-impact exercises such as walking, swimming, and stationary biking. Do stretches to stay flexible. · Try heat, cold packs, and massage. · Get enough sleep. Chronic pain can make you tired and drain your energy. Talk with your doctor if you have trouble sleeping because of pain. · Think positive. Your thoughts can affect your pain level. Do things that you enjoy to distract yourself when you have pain instead of focusing on the pain. See a movie, read a book, listen to music, or spend time with a friend. · If you think you are depressed, talk to your doctor about treatment. · Keep a daily pain diary. Record how your moods, thoughts, sleep patterns, activities, and medicine affect your pain. You may find that your pain is worse during or after certain activities or when you are feeling a certain emotion. Having a record of your pain can help you and your doctor find the best ways to treat your pain. · Take pain medicines exactly as directed. ¨ If the doctor gave you a prescription medicine for pain, take it as prescribed. ¨ If you are not taking a prescription pain medicine, ask your doctor if you can take an over-the-counter medicine. Reducing constipation caused by pain medicine  · Include fruits, vegetables, beans, and whole grains in your diet each day. These foods are high in fiber. · Drink plenty of fluids, enough so that your urine is light yellow or clear like water. If you have kidney, heart, or liver disease and have to limit fluids, talk with your doctor before you increase the amount of fluids you drink. · If your doctor recommends it, get more exercise. Walking is a good choice. Bit by bit, increase the amount you walk every day. Try for at least 30 minutes on most days of the week.   · Schedule time each day for a bowel movement. A daily routine may help. Take your time and do not strain when having a bowel movement. When should you call for help? Call your doctor now or seek immediate medical care if:  · Your pain gets worse or is out of control. · You feel down or blue, or you do not enjoy things like you once did. You may be depressed, which is common in people with chronic pain. Depression can be treated. · You have vomiting or cramps for more than 2 hours. Watch closely for changes in your health, and be sure to contact your doctor if:  · You cannot sleep because of pain. · You are very worried or anxious about your pain. · You have trouble taking your pain medicine. · You have any concerns about your pain medicine. · You have trouble with bowel movements, such as:  ¨ No bowel movement in 3 days. ¨ Blood in the anal area, in your stool, or on the toilet paper. ¨ Diarrhea for more than 24 hours. Where can you learn more? Go to http://kendell-jonathan.info/. Enter N004 in the search box to learn more about \"Chronic Pain: Care Instructions. \"  Current as of: October 14, 2016  Content Version: 11.3  © 9019-5246 [employer]. Care instructions adapted under license by [employer] (which disclaims liability or warranty for this information). If you have questions about a medical condition or this instruction, always ask your healthcare professional. [place] [address] any warranty or liability for your use of this information. Follow-up Disposition:  Return in about 5 weeks (around 11/27/2017). I have reviewed with the patient details of the assessment and plan and all questions were answered. Relevent patient education was performed. The most recent lab findings were reviewed with the patient. An After Visit Summary was printed and given to the patient.

## 2017-11-17 RX ORDER — ZOLPIDEM TARTRATE 5 MG/1
5 TABLET ORAL
Qty: 30 TAB | Refills: 1 | Status: SHIPPED | OUTPATIENT
Start: 2017-11-17 | End: 2018-05-07 | Stop reason: SDUPTHER

## 2017-11-21 ENCOUNTER — OFFICE VISIT (OUTPATIENT)
Dept: INTERNAL MEDICINE CLINIC | Age: 57
End: 2017-11-21

## 2017-11-21 VITALS
OXYGEN SATURATION: 98 % | HEART RATE: 80 BPM | RESPIRATION RATE: 20 BRPM | BODY MASS INDEX: 36.93 KG/M2 | HEIGHT: 71 IN | SYSTOLIC BLOOD PRESSURE: 168 MMHG | TEMPERATURE: 97.5 F | DIASTOLIC BLOOD PRESSURE: 80 MMHG | WEIGHT: 263.8 LBS

## 2017-11-21 DIAGNOSIS — I10 ESSENTIAL HYPERTENSION: ICD-10-CM

## 2017-11-21 DIAGNOSIS — Z79.4 TYPE 2 DIABETES MELLITUS WITH HYPEROSMOLARITY WITHOUT COMA, WITH LONG-TERM CURRENT USE OF INSULIN (HCC): ICD-10-CM

## 2017-11-21 DIAGNOSIS — Z79.891 CHRONIC PRESCRIPTION OPIATE USE: Primary | ICD-10-CM

## 2017-11-21 DIAGNOSIS — M54.16 LUMBAR RADICULOPATHY: ICD-10-CM

## 2017-11-21 DIAGNOSIS — E11.00 TYPE 2 DIABETES MELLITUS WITH HYPEROSMOLARITY WITHOUT COMA, WITH LONG-TERM CURRENT USE OF INSULIN (HCC): ICD-10-CM

## 2017-11-21 LAB — HBA1C MFR BLD HPLC: 8.7 %

## 2017-11-21 RX ORDER — HYDROCODONE BITARTRATE AND ACETAMINOPHEN 5; 325 MG/1; MG/1
1 TABLET ORAL
Qty: 120 TAB | Refills: 0 | Status: SHIPPED | OUTPATIENT
Start: 2017-11-21 | End: 2017-12-19 | Stop reason: SDUPTHER

## 2017-11-21 NOTE — PROGRESS NOTES
1. Have you been to the ER, urgent care clinic since your last visit? Hospitalized since your last visit? Yes When: 10/9/2017 [facility] for neck and back pain. 2. Have you seen or consulted any other health care providers outside of the [address] [place] [address] since your last visit? Include any pap smears or colon screening. Yes When: Dental     C/o lower back pain, and bilateral leg pain 6/10. Patient offered and decline Pneumonia vaccine.

## 2017-11-21 NOTE — PROGRESS NOTES
Wilfrid Graham is a 59 y.o. female and presents with Back Pain and Leg Pain (bilateral)  . Subjective:    Chronic opiate use-pt comes-in for pain med refill. Pt has lumbar radiculopathy. Pt relays she did not make an appointment w ortho for eval ?injections bc the # was incorrect. Number called and verified. Pts  checked, there is no evidence of misuse or abuse. Pts urine tox reviewed, + opiates only. Dosage strength was decreased last refill and pt has no complaints this visit. Will decrease quantity NV    Hypertension Review:  The patient has essential hypertension  Diet and Lifestyle: generally follows a  low sodium diet, exercises never  Home BP Monitoring: is not measured at home. Pertinent ROS: taking medications as instructed, no medication side effects noted, no TIA's, no chest pain on exertion, no dyspnea on exertion, no swelling of ankles.          Review of Systems  Constitutional: negative for fevers, chills, anorexia and weight loss  Respiratory:  negative for cough, hemoptysis, dyspnea,wheezing  CV:   negative for chest pain, palpitations, lower extremity edema  GI:   negative for nausea, vomiting, diarrhea, abdominal pain,melena  Musculoskel: positive for myalgias, arthralgias, back pain, joint pain  Neurological:  negative for headaches, dizziness, vertigo, memory problems and gait   Behavl/Psych: negative for feelings of anxiety, depression, mood changes    Past Medical History:   Diagnosis Date    Acute pancreatitis 8/9/2012    Acute pancreatitis 1/21/2011    Arthritis     both knees    Chronic pain     knees    Delivery normal     x1    Diabetes ([facility] [address]     DJD (degenerative joint disease) of knee     Fatty liver 1/22/2011    GERD (gastroesophageal reflux disease)     Hypertension     Hypothyroidism     Obesity     PUD (peptic ulcer disease)     UTI (urinary tract infection) 1/21/2011     Past Surgical History:   Procedure Laterality Date    HX BREAST LUMPECTOMY      left breast    HX GYN      hysterectomy    HX ORTHOPAEDIC      left total knee replacement    HX ORTHOPAEDIC  6/16/15    RIGHT TOTAL KNEE ARTHROPLASTY     HX PARATHYROIDECTOMY  01/27/11     Social History     Social History    Marital status:      Spouse name: N/A    Number of children: N/A    Years of education: N/A     Social History Main Topics    Smoking status: Former Smoker     Packs/day: 0.25     Years: 25.00     Types: Cigarettes     Quit date: 1/24/2008    Smokeless tobacco: Never Used    Alcohol use No    Drug use: No    Sexual activity: No     Other Topics Concern    None     Social History Narrative     Family History   Problem Relation Age of Onset   [address] [facility] Cancer Mother     Hypertension Father     Diabetes Sister     Hypertension Sister     Diabetes Brother     Hypertension Brother      Current Outpatient Prescriptions   Medication Sig Dispense Refill    HYDROcodone-acetaminophen (NORCO) 5-325 mg per tablet Take 1 Tab by mouth every six (6) hours as needed for Pain (NOT TO BE TAKEN AS A STANDING DOSE). Max Daily Amount: 4 Tabs. 120 Tab 0    zolpidem (AMBIEN) 5 mg tablet Take 1 Tab by mouth nightly as needed for Sleep (NOT TO BE USED AS A STANDING DOSE. THIS IS A PRN MEDICATION. THIS WILL NOT BE REFILLED REGULARLY). Max Daily Amount: 5 mg. 30 Tab 1    ACCU-CHEK MEENU PLUS TEST STRP strip CHECK SUGAR THREE TIMES DAILY 300 Strip 5    valsartan-hydroCHLOROthiazide (DIOVAN-HCT) 320-25 mg per tablet Take 1 Tab by mouth daily.  60 Tab 3    amLODIPine (NORVASC) 10 mg tablet TAKE 1 TABLET BY MOUTH DAILY 90 Tab 0    LEXAPRO 10 mg tablet TAKE 1 TABLET BY MOUTH EVERY DAY 30 Tab 11    glimepiride (AMARYL) 4 mg tablet TAKE 1 TABLET BY MOUTH TWICE DAILY 180 Tab 11    metFORMIN (GLUCOPHAGE) 1,000 mg tablet TAKE 1 TABLET BY MOUTH TWICE DAILY WITH MEALS 180 Tab 3    metoprolol succinate (TOPROL-XL) 100 mg tablet TAKE 1 TABLET BY MOUTH DAILY FOR HIGH BLOOD PRESSURE 90 Tab 3    atorvastatin (LIPITOR) 20 mg tablet TAKE 1 TABLET BY MOUTH DAILY FOR CHOLESTEROL 90 Tab 11    glucose blood VI test strips (ACCU-CHEK MEENU PLUS TEST STRP) strip Check sugars 3 times a day Dx E11.65 300 Strip 11    diclofenac (VOLTAREN) 1 % gel Apply  to affected area four (4) times daily. 100 g 3    glimepiride (AMARYL) 4 mg tablet TAKE 1 TABLET BY MOUTH TWICE DAILY 60 Tab 11    fluticasone (FLONASE) 50 mcg/actuation nasal spray 2 Sprays by Both Nostrils route daily. Allergies   Allergen Reactions    Influenza Virus Vaccine, Specific Other (comments)     States body got very hot    Tramadol Seizures    Aspirin Other (comments)     Pt reports having a h/o gastric ulcers. Pt was on IBU when she was dx'd. Objective:  Visit Vitals    /80 (BP 1 Location: Right arm, BP Patient Position: Sitting)    Pulse 80    Temp 97.5 °F (36.4 °C) (Oral)    Resp 20    Ht 5' 11\" (1.803 m)    Wt 263 lb 12.8 oz (119.7 kg)    SpO2 98%    BMI 36.79 kg/m2     Physical Exam:   General appearance - alert, obese  Mental status - alert, oriented to person, place, and time  EYE-EOMI  Neck - supple,   Chest - symmetric air entry    Abdomen - obese  Ext-no pedal edema, no clubbing or cyanosis  Skin-Warm and dry. no hyperpigmentation, vitiligo, or suspicious lesions  Neuro -alert, oriented, normal speech, no focal findings or movement disorder noted        Results for orders placed or performed in visit on 11/21/17   AMB POC HEMOGLOBIN A1C   Result Value Ref Range    Hemoglobin A1c (POC) 8.7 %       Assessment/Plan:    ICD-10-CM ICD-9-CM    1. Chronic prescription opiate use Z79.891 V58.69 COMPLIANCE DRUG SCREEN/PRESCRIPTION MONITORING      REFERRAL TO ORTHOPEDICS      HYDROcodone-acetaminophen (NORCO) 5-325 mg per tablet   2. Lumbar radiculopathy M54.16 724.4 COMPLIANCE DRUG SCREEN/PRESCRIPTION MONITORING      REFERRAL TO ORTHOPEDICS      HYDROcodone-acetaminophen (NORCO) 5-325 mg per tablet   3.  Type 2 diabetes mellitus with hyperosmolarity without coma, with long-term current use of insulin (HCC) E11.00 250.20 AMB POC HEMOGLOBIN A1C    Z79.4 V58.67    4. Essential hypertension I10 401.9      Orders Placed This Encounter    COMPLIANCE DRUG SCREEN/PRESCRIPTION MONITORING   Bert Cardona [facility]     Referral Priority:   Routine     Referral Type:   Consultation     Referral Reason:   Specialty Services Required     [facility]   [place] [address] [facility]     Referred to Provider:   Rakesh Briggs MD    AMB POC HEMOGLOBIN A1C    HYDROcodone-acetaminophen (NORCO) 5-325 mg per tablet     Sig: Take 1 Tab by mouth every six (6) hours as needed for Pain (NOT TO BE TAKEN AS A STANDING DOSE). Max Daily Amount: 4 Tabs. Dispense:  120 Tab     Refill:  0     lose weight, increase physical activity  There are no Patient Instructions on file for this visit. Follow-up Disposition:  Return in about 3 months (around 2/21/2018) for routine dm care. I have reviewed with the patient details of the assessment and plan and all questions were answered. Relevent patient education was performed. The most recent lab findings were reviewed with the patient. An After Visit Summary was printed and given to the patient.

## 2017-11-28 LAB — DRUGS UR: NORMAL

## 2017-12-19 ENCOUNTER — DOCUMENTATION ONLY (OUTPATIENT)
Dept: INTERNAL MEDICINE CLINIC | Age: 57
End: 2017-12-19

## 2017-12-19 DIAGNOSIS — Z79.891 CHRONIC PRESCRIPTION OPIATE USE: ICD-10-CM

## 2017-12-19 DIAGNOSIS — M54.16 LUMBAR RADICULOPATHY: ICD-10-CM

## 2017-12-19 RX ORDER — HYDROCODONE BITARTRATE AND ACETAMINOPHEN 5; 325 MG/1; MG/1
1 TABLET ORAL
Qty: 90 TAB | Refills: 0 | Status: SHIPPED | OUTPATIENT
Start: 2017-12-19 | End: 2018-01-17 | Stop reason: SDUPTHER

## 2017-12-19 NOTE — PROGRESS NOTES
Writer call patient @ [phone] @ 10 am /11am  No answer left message on voice mail for patient to call the office @ [phone].

## 2018-02-05 ENCOUNTER — OFFICE VISIT (OUTPATIENT)
Dept: INTERNAL MEDICINE CLINIC | Age: 58
End: 2018-02-05

## 2018-02-05 VITALS
BODY MASS INDEX: 36.54 KG/M2 | DIASTOLIC BLOOD PRESSURE: 71 MMHG | SYSTOLIC BLOOD PRESSURE: 144 MMHG | HEIGHT: 71 IN | HEART RATE: 82 BPM | WEIGHT: 261 LBS | OXYGEN SATURATION: 97 % | TEMPERATURE: 98.4 F | RESPIRATION RATE: 20 BRPM

## 2018-02-05 DIAGNOSIS — I10 ESSENTIAL HYPERTENSION: ICD-10-CM

## 2018-02-05 DIAGNOSIS — Z12.39 BREAST CANCER SCREENING: ICD-10-CM

## 2018-02-05 DIAGNOSIS — E66.9 OBESITY (BMI 30-39.9): ICD-10-CM

## 2018-02-05 DIAGNOSIS — Z79.891 CHRONIC PRESCRIPTION OPIATE USE: ICD-10-CM

## 2018-02-05 DIAGNOSIS — M54.16 LUMBAR RADICULOPATHY: Primary | ICD-10-CM

## 2018-02-05 RX ORDER — HYDROCODONE BITARTRATE AND ACETAMINOPHEN 5; 325 MG/1; MG/1
1 TABLET ORAL
Qty: 60 TAB | Refills: 0 | Status: SHIPPED | OUTPATIENT
Start: 2018-02-05 | End: 2018-03-14

## 2018-02-05 RX ORDER — HYDROCODONE BITARTRATE AND ACETAMINOPHEN 5; 325 MG/1; MG/1
1 TABLET ORAL
Qty: 60 TAB | Refills: 0 | Status: SHIPPED | OUTPATIENT
Start: 2018-02-05 | End: 2018-02-05 | Stop reason: SDUPTHER

## 2018-02-05 NOTE — PROGRESS NOTES
Kulwant Kumar is a 59 y.o. female and presents with Medication Refill  . Subjective:    Chronic opiate use-pt comes-in for pain med refill. Pt has lumbar radiculopathy. Pt relays she did not make an appointment w ortho for eval ?injections. Pts  checked, there is no evidence of misuse or abuse. Pts urine tox reviewed, + opiates only. D    Hypertension Review:  The patient has essential hypertension  Diet and Lifestyle: generally follows a  low sodium diet, exercises never  Home BP Monitoring: is not measured at home. Pertinent ROS: taking medications as instructed, no medication side effects noted, no TIA's, no chest pain on exertion, no dyspnea on exertion, no swelling of ankles.    BP Readings from Last 3 Encounters:   02/05/18 144/71   11/21/17 168/80   10/23/17 (!) 155/95           Review of Systems  Constitutional: negative for fevers, chills, anorexia and weight loss  Respiratory:  negative for cough, hemoptysis, dyspnea,wheezing  CV:   negative for chest pain, palpitations, lower extremity edema  GI:   negative for nausea, vomiting, diarrhea, abdominal pain,melena  Musculoskel: positive for myalgias, arthralgias, back pain, joint pain  Neurological:  negative for headaches, dizziness, vertigo, memory problems and gait   Behavl/Psych: negative for feelings of anxiety, depression, mood changes    Past Medical History:   Diagnosis Date    Acute pancreatitis 8/9/2012    Acute pancreatitis 1/21/2011    Arthritis     both knees    Chronic pain     knees    Delivery normal     x1    Diabetes ([facility] [address]     DJD (degenerative joint disease) of knee     Fatty liver 1/22/2011    GERD (gastroesophageal reflux disease)     Hypertension     Hypothyroidism     Obesity     PUD (peptic ulcer disease)     UTI (urinary tract infection) 1/21/2011     Past Surgical History:   Procedure Laterality Date    HX BREAST LUMPECTOMY      left breast    HX GYN      hysterectomy    HX ORTHOPAEDIC      left total knee replacement    HX ORTHOPAEDIC  6/16/15    RIGHT TOTAL KNEE ARTHROPLASTY     HX PARATHYROIDECTOMY  01/27/11     Social History     Social History    Marital status:      Spouse name: N/A    Number of children: N/A    Years of education: N/A     Social History Main Topics    Smoking status: Former Smoker     Packs/day: 0.25     Years: 25.00     Types: Cigarettes     Quit date: 1/24/2008    Smokeless tobacco: Never Used    Alcohol use No    Drug use: No    Sexual activity: No     Other Topics Concern    None     Social History Narrative     Family History   Problem Relation Age of Onset   [place] Cancer Mother     Hypertension Father     Diabetes Sister     Hypertension Sister     Diabetes Brother     Hypertension Brother      Current Outpatient Prescriptions   Medication Sig Dispense Refill    HYDROcodone-acetaminophen (NORCO) 5-325 mg per tablet Take 1 Tab by mouth every twelve (12) hours as needed for Pain (NOT TO BE TAKEN AS A STANDING DOSE). Max Daily Amount: 2 Tabs. 60 Tab 0    zolpidem (AMBIEN) 5 mg tablet Take 1 Tab by mouth nightly as needed for Sleep (NOT TO BE USED AS A STANDING DOSE. THIS IS A PRN MEDICATION. THIS WILL NOT BE REFILLED REGULARLY). Max Daily Amount: 5 mg. 30 Tab 1    ACCU-CHEK MEENU PLUS TEST STRP strip CHECK SUGAR THREE TIMES DAILY 300 Strip 5    diclofenac (VOLTAREN) 1 % gel Apply  to affected area four (4) times daily. 100 g 3    valsartan-hydroCHLOROthiazide (DIOVAN-HCT) 320-25 mg per tablet Take 1 Tab by mouth daily.  60 Tab 3    amLODIPine (NORVASC) 10 mg tablet TAKE 1 TABLET BY MOUTH DAILY 90 Tab 0    LEXAPRO 10 mg tablet TAKE 1 TABLET BY MOUTH EVERY DAY 30 Tab 11    glimepiride (AMARYL) 4 mg tablet TAKE 1 TABLET BY MOUTH TWICE DAILY 180 Tab 11    metFORMIN (GLUCOPHAGE) 1,000 mg tablet TAKE 1 TABLET BY MOUTH TWICE DAILY WITH MEALS 180 Tab 3    metoprolol succinate (TOPROL-XL) 100 mg tablet TAKE 1 TABLET BY MOUTH DAILY FOR HIGH BLOOD PRESSURE 90 Tab 3    atorvastatin (LIPITOR) 20 mg tablet TAKE 1 TABLET BY MOUTH DAILY FOR CHOLESTEROL 90 Tab 11    glucose blood VI test strips (ACCU-CHEK MEENU PLUS TEST STRP) strip Check sugars 3 times a day Dx E11.65 300 Strip 11    glimepiride (AMARYL) 4 mg tablet TAKE 1 TABLET BY MOUTH TWICE DAILY 60 Tab 11    fluticasone (FLONASE) 50 mcg/actuation nasal spray 2 Sprays by Both Nostrils route daily. Allergies   Allergen Reactions    Influenza Virus Vaccine, Specific Other (comments)     States body got very hot    Tramadol Seizures    Aspirin Other (comments)     Pt reports having a h/o gastric ulcers. Pt was on IBU when she was dx'd. Objective:  Visit Vitals    /71 (BP 1 Location: Left arm, BP Patient Position: Sitting)    Pulse 82    Temp 98.4 °F (36.9 °C) (Oral)    Resp 20    Ht 5' 11\" (1.803 m)    Wt 261 lb (118.4 kg)    SpO2 97%    BMI 36.4 kg/m2     Physical Exam:   General appearance - alert, obese  Mental status - alert, oriented to person, place, and time  EYE-EOMI  Neck - supple,   Chest - symmetric air entry    Abdomen - obese  Ext-no pedal edema, no clubbing or cyanosis  Skin-Warm and dry. no hyperpigmentation, vitiligo, or suspicious lesions  Neuro -alert, oriented, normal speech, no focal findings or movement disorder noted        Results for orders placed or performed in visit on 11/21/17   COMPLIANCE DRUG SCREEN/PRESCRIPTION MONITORING   Result Value Ref Range    Summary FINAL    AMB POC HEMOGLOBIN A1C   Result Value Ref Range    Hemoglobin A1c (POC) 8.7 %       Assessment/Plan:    ICD-10-CM ICD-9-CM    1. Lumbar radiculopathy M54.16 724.4 HYDROcodone-acetaminophen (NORCO) 5-325 mg per tablet      DISCONTINUED: HYDROcodone-acetaminophen (NORCO) 5-325 mg per tablet   2. Chronic prescription opiate use Z79.891 V58.69 HYDROcodone-acetaminophen (NORCO) 5-325 mg per tablet      DISCONTINUED: HYDROcodone-acetaminophen (NORCO) 5-325 mg per tablet   3.  Breast cancer screening Z12.31 V76.10 [facility] MAMMO BI SCREENING INCL CAD   4. Essential hypertension I10 401.9    5. Obesity (BMI 30-39. 9) E66.9 278.00      Orders Placed This Encounter    [facility] MAMMO BI SCREENING INCL CAD     Standing Status:   Future     Standing Expiration Date:   3/5/2019     Order Specific Question:   Reason for Exam     Answer:   breast cancer screening    DISCONTD: HYDROcodone-acetaminophen (NORCO) 5-325 mg per tablet     Sig: Take 1 Tab by mouth every twelve (12) hours as needed for Pain (NOT TO BE TAKEN AS A STANDING DOSE). Max Daily Amount: 2 Tabs. Dispense:  60 Tab     Refill:  0    HYDROcodone-acetaminophen (NORCO) 5-325 mg per tablet     Sig: Take 1 Tab by mouth every twelve (12) hours as needed for Pain (NOT TO BE TAKEN AS A STANDING DOSE). Max Daily Amount: 2 Tabs. Dispense:  60 Tab     Refill:  0   ortho referral regiven  lose weight, increase physical activity  There are no Patient Instructions on file for this visit. Follow-up Disposition:  Return in about 3 months (around 5/5/2018). I have reviewed with the patient details of the assessment and plan and all questions were answered. Relevent patient education was performed. The most recent lab findings were reviewed with the patient. An After Visit Summary was printed and given to the patient.

## 2018-02-05 NOTE — MR AVS SNAPSHOT
[facility] [address] [address] 
[phone] Patient: Kulwant Kumar MRN: [id number] FTL:[birth year] Visit Information Date & Time Provider Department Dept. Phone Encounter #  
 2/5/2018  8:30 AM [facility], [facility] [facility] [address] [id number] Follow-up Instructions Return in about 3 months (around 5/5/2018). Upcoming Health Maintenance Date Due  
 FOOT EXAM Q1 3/4/1968 DTaP/Tdap/Td series (1 - Tdap) 3/4/1979 PAP AKA CERVICAL CYTOLOGY 3/4/1979 EYE EXAM RETINAL OR DILATED Q1 3/31/2016 MICROALBUMIN Q1 1/8/2017 BREAST CANCER SCRN MAMMOGRAM 10/13/2017 ZOSTER VACCINE AGE 60> 1/4/2018 LIPID PANEL Q1 5/12/2018 HEMOGLOBIN A1C Q6M 5/21/2018 COLONOSCOPY 6/14/2020 Allergies as of 2/5/2018  Review Complete On: 2/5/2018 By: Cathy Fry LPN Severity Noted Reaction Type Reactions Influenza Virus Vaccine, Specific High 05/08/2013   Side Effect Other (comments) States body got very hot Tramadol  05/20/2014    Seizures Aspirin Low 06/15/2010   Side Effect Other (comments) Pt reports having a h/o gastric ulcers. Pt was on IBU when she was dx'd. Current Immunizations  Reviewed on 5/9/2013 Name Date Influenza Vaccine Split  Deferred (Patient Refused) Not reviewed this visit You Were Diagnosed With   
  
 Codes Comments Lumbar radiculopathy    -  Primary ICD-10-CM: M54.16 
ICD-9-CM: 724.4 Chronic prescription opiate use     ICD-10-CM: U69.611 ICD-9-CM: V58.69 Breast cancer screening     ICD-10-CM: Z12.31 
ICD-9-CM: V76.10 Essential hypertension     ICD-10-CM: I10 
ICD-9-CM: 401.9 Obesity (BMI 30-39. 9)     ICD-10-CM: E66.9 ICD-9-CM: 278.00 Vitals BP Pulse Temp Resp Height(growth percentile) Weight(growth percentile)  144/71 (BP 1 Location: Left arm, BP Patient Position: Sitting) 82 98.4 °F (36.9 °C) (Oral) 20 5' 11\" (1.803 m) 261 lb (118.4 kg) SpO2 BMI OB Status Smoking Status 97% 36.4 kg/m2 Hysterectomy Former Smoker Vitals History BMI and BSA Data Body Mass Index Body Surface Area  
 36.4 kg/m 2 2.44 m 2 Preferred Pharmacy Pharmacy Name Phone [address] [phone] Your Updated Medication List  
  
   
This list is accurate as of: 2/5/18  9:11 AM.  Always use your most recent med list. amLODIPine 10 mg tablet Commonly known as:  Senora Devonte TAKE 1 TABLET BY MOUTH DAILY  
  
 atorvastatin 20 mg tablet Commonly known as:  LIPITOR  
TAKE 1 TABLET BY MOUTH DAILY FOR CHOLESTEROL  
  
 diclofenac 1 % Gel Commonly known as:  VOLTAREN Apply  to affected area four (4) times daily. FLONASE 50 mcg/actuation nasal spray Generic drug:  fluticasone 2 Sprays by Both Nostrils route daily. * glimepiride 4 mg tablet Commonly known as:  AMARYL  
TAKE 1 TABLET BY MOUTH TWICE DAILY * glimepiride 4 mg tablet Commonly known as:  AMARYL  
TAKE 1 TABLET BY MOUTH TWICE DAILY * glucose blood VI test strips strip Commonly known as:  ACCU-CHEK MEENU PLUS TEST STRP Check sugars 3 times a day Dx E11.65  
  
 * ACCU-CHEK MEENU PLUS TEST STRP strip Generic drug:  glucose blood VI test strips CHECK SUGAR THREE TIMES DAILY HYDROcodone-acetaminophen 5-325 mg per tablet Commonly known as:  Rudine Kilbourne Take 1 Tab by mouth every twelve (12) hours as needed for Pain (NOT TO BE TAKEN AS A STANDING DOSE). Max Daily Amount: 2 Tabs. LEXAPRO 10 mg tablet Generic drug:  escitalopram oxalate TAKE 1 TABLET BY MOUTH EVERY DAY  
  
 metFORMIN 1,000 mg tablet Commonly known as:  GLUCOPHAGE  
TAKE 1 TABLET BY MOUTH TWICE DAILY WITH MEALS  
  
 metoprolol succinate 100 mg tablet Commonly known as:  TOPROL-XL  
 TAKE 1 TABLET BY MOUTH DAILY FOR HIGH BLOOD PRESSURE  
  
 valsartan-hydroCHLOROthiazide 320-25 mg per tablet Commonly known as:  DIOVAN-HCT Take 1 Tab by mouth daily. zolpidem 5 mg tablet Commonly known as:  AMBIEN Take 1 Tab by mouth nightly as needed for Sleep (NOT TO BE USED AS A STANDING DOSE. THIS IS A PRN MEDICATION. THIS WILL NOT BE REFILLED REGULARLY). Max Daily Amount: 5 mg.  
  
 * Notice: This list has 4 medication(s) that are the same as other medications prescribed for you. Read the directions carefully, and ask your doctor or other care provider to review them with you. Prescriptions Printed Refills HYDROcodone-acetaminophen (NORCO) 5-325 mg per tablet 0 Sig: Take 1 Tab by mouth every twelve (12) hours as needed for Pain (NOT TO BE TAKEN AS A STANDING DOSE). Max Daily Amount: 2 Tabs. Class: Print Route: Oral  
  
Follow-up Instructions Return in about 3 months (around 5/5/2018). To-Do List   
 02/12/2018 Imaging:  [facility] MAMMO BI SCREENING INCL CAD Introducing [facility]! Dear Mohsen Getting: Thank you for requesting a Tello account. Our records indicate that you already have an active Tello account. You can access your account anytime at [email or web address]/"[email or web address] Did you know that you can access your hospital and ER discharge instructions at any time in Tello? You can also review all of your test results from your hospital stay or ER visit. Additional Information If you have questions, please visit the Frequently Asked Questions section of the Tello website at [email or web address] Remember, Tello is NOT to be used for urgent needs. For medical emergencies, dial 911. Now available from your iPhone and Android! Please provide this summary of care documentation to your next provider. Your primary care clinician is listed as Henrry Mathews.  If you have any questions after today's visit, please call [phone].

## 2018-02-05 NOTE — PROGRESS NOTES
1. Have you been to the ER, urgent care clinic since your last visit? Hospitalized since your last visit? No    2. Have you seen or consulted any other health care providers outside of the [place] [address] since your last visit? Include any pap smears or colon screening.  No.  PHQ over the last two weeks 2/5/2018   Little interest or pleasure in doing things -   Feeling down, depressed or hopeless Not at all   Total Score PHQ 2 -

## 2018-03-12 RX ORDER — METOPROLOL SUCCINATE 100 MG/1
TABLET, EXTENDED RELEASE ORAL
Qty: 90 TAB | Refills: 3 | Status: SHIPPED | OUTPATIENT
Start: 2018-03-12 | End: 2019-03-21 | Stop reason: SDUPTHER

## 2018-03-14 ENCOUNTER — OFFICE VISIT (OUTPATIENT)
Dept: INTERNAL MEDICINE CLINIC | Age: 58
End: 2018-03-14

## 2018-03-14 VITALS
HEIGHT: 71 IN | TEMPERATURE: 98.5 F | OXYGEN SATURATION: 97 % | DIASTOLIC BLOOD PRESSURE: 75 MMHG | SYSTOLIC BLOOD PRESSURE: 142 MMHG | RESPIRATION RATE: 20 BRPM | BODY MASS INDEX: 36.82 KG/M2 | HEART RATE: 81 BPM | WEIGHT: 263 LBS

## 2018-03-14 DIAGNOSIS — F41.9 ANXIETY DISORDER, UNSPECIFIED TYPE: ICD-10-CM

## 2018-03-14 DIAGNOSIS — M17.0 PRIMARY OSTEOARTHRITIS OF BOTH KNEES: Primary | ICD-10-CM

## 2018-03-14 RX ORDER — TRAMADOL HYDROCHLORIDE 50 MG/1
50 TABLET ORAL
Qty: 90 TAB | Refills: 1 | OUTPATIENT
Start: 2018-03-14 | End: 2019-10-07

## 2018-03-14 NOTE — PROGRESS NOTES
1. Have you been to the ER, urgent care clinic since your last visit? Hospitalized since your last visit? No.    2. Have you seen or consulted any other health care providers outside of the [address] [place] [address] since your last visit? Include any pap smears or colon screening.  No.

## 2018-03-14 NOTE — PROGRESS NOTES
James Gutierrez is a 59 y.o. female and presents with No chief complaint on file. .  Subjective:      Pt comes-in for pain med refill. Pt has not had pain med x > 1 week. She thinks that she is going through withdrawals. Pt relays pain is not that bad today. She is going for PT and is scheduled for MRI this month. Pt relays she feels anxious and is unsure if that is a sx of withdrawal.  Pt is taking lexapro and ambien for anxiety/depression and insomnia respectively.       Review of Systems  Constitutional: negative for fevers, chills, anorexia and weight loss  Respiratory:  negative for cough, hemoptysis, dyspnea,wheezing  CV:   negative for chest pain, palpitations, lower extremity edema  GI:   negative for nausea, vomiting, diarrhea, abdominal pain,melena  Musculoskel: Minimal joint pain  Neurological:  negative for headaches, dizziness, vertigo, memory problems and gait   Behavl/Psych: positive for feelings of anxiety, depression, mood changes    Past Medical History:   Diagnosis Date    Acute pancreatitis 8/9/2012    Acute pancreatitis 1/21/2011    Arthritis     both knees    Chronic pain     knees    Delivery normal     x1    Diabetes ([facility] [address]     DJD (degenerative joint disease) of knee     Fatty liver 1/22/2011    GERD (gastroesophageal reflux disease)     Hypertension     Hypothyroidism     Obesity     PUD (peptic ulcer disease)     UTI (urinary tract infection) 1/21/2011     Past Surgical History:   Procedure Laterality Date    HX BREAST LUMPECTOMY      left breast    HX GYN      hysterectomy    HX ORTHOPAEDIC      left total knee replacement    HX ORTHOPAEDIC  6/16/15    RIGHT TOTAL KNEE ARTHROPLASTY     HX PARATHYROIDECTOMY  01/27/11     Social History     Social History    Marital status:      Spouse name: N/A    Number of children: N/A    Years of education: N/A     Social History Main Topics    Smoking status: Former Smoker     Packs/day: 0.25     Years: 25.00     Types: Cigarettes     Quit date: 1/24/2008    Smokeless tobacco: Never Used    Alcohol use No    Drug use: No    Sexual activity: No     Other Topics Concern    None     Social History Narrative     Family History   Problem Relation Age of Onset    Cancer Mother     Hypertension Father     Diabetes Sister     Hypertension Sister     Diabetes Brother     Hypertension Brother      Current Outpatient Prescriptions   Medication Sig Dispense Refill    traMADol (ULTRAM) 50 mg tablet Take 1 Tab by mouth every eight (8) hours as needed for Pain. Max Daily Amount: 150 mg. Indications: Pain 90 Tab 1    metoprolol succinate (TOPROL-XL) 100 mg tablet TAKE 1 TABLET BY MOUTH DAILY FOR HIGH BLOOD PRESSURE 90 Tab 3    zolpidem (AMBIEN) 5 mg tablet Take 1 Tab by mouth nightly as needed for Sleep (NOT TO BE USED AS A STANDING DOSE. THIS IS A PRN MEDICATION. THIS WILL NOT BE REFILLED REGULARLY). Max Daily Amount: 5 mg. 30 Tab 1    diclofenac (VOLTAREN) 1 % gel Apply  to affected area four (4) times daily. 100 g 3    valsartan-hydroCHLOROthiazide (DIOVAN-HCT) 320-25 mg per tablet Take 1 Tab by mouth daily. 60 Tab 3    amLODIPine (NORVASC) 10 mg tablet TAKE 1 TABLET BY MOUTH DAILY 90 Tab 0    LEXAPRO 10 mg tablet TAKE 1 TABLET BY MOUTH EVERY DAY 30 Tab 11    glimepiride (AMARYL) 4 mg tablet TAKE 1 TABLET BY MOUTH TWICE DAILY 180 Tab 11    metFORMIN (GLUCOPHAGE) 1,000 mg tablet TAKE 1 TABLET BY MOUTH TWICE DAILY WITH MEALS 180 Tab 3    atorvastatin (LIPITOR) 20 mg tablet TAKE 1 TABLET BY MOUTH DAILY FOR CHOLESTEROL 90 Tab 11    glucose blood VI test strips (ACCU-CHEK MEENU PLUS TEST STRP) strip Check sugars 3 times a day Dx E11.65 300 Strip 11    fluticasone (FLONASE) 50 mcg/actuation nasal spray 2 Sprays by Both Nostrils route daily.        Allergies   Allergen Reactions    Influenza Virus Vaccine, Specific Other (comments)     States body got very hot    Aspirin Other (comments)     Pt reports having a h/o gastric ulcers. Pt was on IBU when she was dx'd. Objective:  Visit Vitals    /75 (BP 1 Location: Left arm, BP Patient Position: At rest)    Pulse 81    Temp 98.5 °F (36.9 °C) (Oral)    Resp 20    Ht 5' 11\" (1.803 m)    Wt 263 lb (119.3 kg)    SpO2 97%    BMI 36.68 kg/m2     Physical Exam:   General appearance - alert, morbidly obese in NAD  Mental status - alert, oriented to person, place, and time  EYE-EOMI  Neck - supple,   Chest - symmetric air entry    Abdomen - obese  Ext-no pedal edema, no clubbing or cyanosis  Skin-Warm and dry. no hyperpigmentation, vitiligo, or suspicious lesions  Neuro -alert, oriented, normal speech, no focal findings or movement disorder noted        Results for orders placed or performed in visit on 11/21/17   COMPLIANCE DRUG SCREEN/PRESCRIPTION MONITORING   Result Value Ref Range    Summary FINAL    AMB POC HEMOGLOBIN A1C   Result Value Ref Range    Hemoglobin A1c (POC) 8.7 %       Assessment/Plan:    ICD-10-CM ICD-9-CM    1. Primary osteoarthritis of both knees M17.0 715.16 traMADol (ULTRAM) 50 mg tablet   2. Anxiety disorder, unspecified type F41.9 300.00      Orders Placed This Encounter    traMADol (ULTRAM) 50 mg tablet     Sig: Take 1 Tab by mouth every eight (8) hours as needed for Pain. Max Daily Amount: 150 mg. Indications: Pain     Dispense:  90 Tab     Refill:  1     D/c norco as pt has not had in >10 days and pt was taking more often than was prescribed  Start tramadol and wean as tolerated  D/w pt she may need to see psychiatry to have anxiety sxs addressed  There are no Patient Instructions on file for this visit. Follow-up Disposition:  Return if symptoms worsen or fail to improve, for f/u routine/prn. I have reviewed with the patient details of the assessment and plan and all questions were answered. Relevent patient education was performed. The most recent lab findings were reviewed with the patient.     An After Visit Summary was printed and given to the patient.

## 2018-04-06 ENCOUNTER — HOSPITAL ENCOUNTER (OUTPATIENT)
Dept: MAMMOGRAPHY | Age: 58
Discharge: HOME | End: 2018-04-06
Payer: COMMERCIAL

## 2018-04-06 DIAGNOSIS — Z12.39 BREAST CANCER SCREENING: ICD-10-CM

## 2018-04-06 PROCEDURE — 77067 SCR MAMMO BI INCL CAD: CPT

## 2018-05-07 ENCOUNTER — OFFICE VISIT (OUTPATIENT)
Dept: INTERNAL MEDICINE CLINIC | Age: 58
End: 2018-05-07

## 2018-05-07 VITALS
SYSTOLIC BLOOD PRESSURE: 127 MMHG | BODY MASS INDEX: 36.4 KG/M2 | TEMPERATURE: 98.7 F | RESPIRATION RATE: 20 BRPM | WEIGHT: 260 LBS | HEIGHT: 71 IN | DIASTOLIC BLOOD PRESSURE: 77 MMHG | OXYGEN SATURATION: 97 % | HEART RATE: 92 BPM

## 2018-05-07 DIAGNOSIS — E66.01 MORBID OBESITY (HCC): ICD-10-CM

## 2018-05-07 DIAGNOSIS — F51.01 PRIMARY INSOMNIA: ICD-10-CM

## 2018-05-07 DIAGNOSIS — I10 ESSENTIAL HYPERTENSION: ICD-10-CM

## 2018-05-07 DIAGNOSIS — M17.0 PRIMARY OSTEOARTHRITIS OF BOTH KNEES: ICD-10-CM

## 2018-05-07 DIAGNOSIS — Z79.4 TYPE 2 DIABETES MELLITUS WITH HYPEROSMOLARITY WITHOUT COMA, WITH LONG-TERM CURRENT USE OF INSULIN (HCC): Primary | ICD-10-CM

## 2018-05-07 DIAGNOSIS — Z11.3 SCREEN FOR STD (SEXUALLY TRANSMITTED DISEASE): ICD-10-CM

## 2018-05-07 DIAGNOSIS — E21.0 HYPERPARATHYROIDISM, PRIMARY (HCC): ICD-10-CM

## 2018-05-07 DIAGNOSIS — E11.00 TYPE 2 DIABETES MELLITUS WITH HYPEROSMOLARITY WITHOUT COMA, WITH LONG-TERM CURRENT USE OF INSULIN (HCC): Primary | ICD-10-CM

## 2018-05-07 DIAGNOSIS — E78.00 PURE HYPERCHOLESTEROLEMIA: ICD-10-CM

## 2018-05-07 DIAGNOSIS — F33.9 EPISODE OF RECURRENT MAJOR DEPRESSIVE DISORDER, UNSPECIFIED DEPRESSION EPISODE SEVERITY (HCC): ICD-10-CM

## 2018-05-07 RX ORDER — ZOLPIDEM TARTRATE 5 MG/1
5 TABLET ORAL
Qty: 30 TAB | Refills: 1 | Status: SHIPPED | OUTPATIENT
Start: 2018-05-07 | End: 2018-06-20 | Stop reason: SDUPTHER

## 2018-05-07 RX ORDER — ESCITALOPRAM OXALATE 10 MG/1
TABLET ORAL
Qty: 90 TAB | Refills: 2 | Status: SHIPPED | OUTPATIENT
Start: 2018-05-07 | End: 2018-11-08 | Stop reason: SDUPTHER

## 2018-05-07 NOTE — PROGRESS NOTES
1. Have you been to the ER, urgent care clinic since your last visit? Hospitalized since your last visit? No.    2. Have you seen or consulted any other health care providers outside of the [address] [place] [address] since your last visit? Include any pap smears or colon screening.  No.  PHQ over the last two weeks 5/7/2018   Little interest or pleasure in doing things Not at all   Feeling down, depressed or hopeless Not at all   Total Score PHQ 2 0

## 2018-05-07 NOTE — PROGRESS NOTES
Augusto Torres is a 59 y.o. female and presents with Cold (x weeks) and Medication Refill  . Subjective:    Hypertension Review:  The patient has essential hypertension  Diet and Lifestyle: generally follows a  low sodium diet, exercises never  Home BP Monitoring: is not measured at home. Pertinent ROS: taking medications as instructed, no medication side effects noted, no TIA's, no chest pain on exertion, no dyspnea on exertion, no swelling of ankles.    BP Readings from Last 3 Encounters:   05/07/18 127/77   03/14/18 142/75   02/05/18 144/71     Type 2 DM-on metformin   -pt admits to dietary indiscretions and elevated blood sugars   -pt relays she had a retinal eye exam ~ 4 mths ago-nml   -  Lab Results   Component Value Date/Time    Hemoglobin A1c 7.4 (H) 06/10/2015 12:30 PM    Hemoglobin A1c (POC) 8.7 11/21/2017 11:41 AM     Hyperlipidemia-on atorvastatin 20mg   -  Lab Results   Component Value Date/Time    Cholesterol, total 103 05/02/2016 12:00 AM    Cholesterol (POC) >100 05/12/2017 03:28 PM    HDL Cholesterol 32 (L) 05/02/2016 12:00 AM    HDL Cholesterol (POC) 31 05/12/2017 03:28 PM    LDL Cholesterol (POC) n/a 05/12/2017 03:28 PM    LDL, calculated 16 05/02/2016 12:00 AM    VLDL, calculated 55 (H) 05/02/2016 12:00 AM    Triglyceride 277 (H) 05/02/2016 12:00 AM    Triglycerides (POC) 212 05/12/2017 03:28 PM    CHOL/HDL Ratio 4.6 05/08/2013 11:45 AM       Morbid obesity-  Wt Readings from Last 3 Encounters:   05/07/18 260 lb (117.9 kg)   03/14/18 263 lb (119.3 kg)   02/05/18 261 lb (118.4 kg)       Hyperparathyroidism-  Lab Results   Component Value Date/Time    Calcium 9.4 03/07/2017 08:01 PM    Phosphorus 2.9 01/28/2011 02:00 PM    PTH, Intact 151.2 (H) 01/23/2011 03:43 AM         Review of Systems  Constitutional: negative for fevers, chills, anorexia and weight loss  Respiratory:  negative for cough, hemoptysis, dyspnea,wheezing  CV:   negative for chest pain, palpitations, lower extremity edema  GI: negative for nausea, vomiting, diarrhea, abdominal pain,melena  Musculoskel: positive for myalgias, arthralgias, back pain, joint pain  Neurological:  negative for headaches, dizziness, vertigo, memory problems and gait   Behavl/Psych: negative for feelings of anxiety, depression, mood changes    Past Medical History:   Diagnosis Date    Acute pancreatitis 8/9/2012    Acute pancreatitis 1/21/2011    Arthritis     both knees    Chronic pain     knees    Delivery normal     x1    Diabetes ([facility] [address]     DJD (degenerative joint disease) of knee     Fatty liver 1/22/2011    GERD (gastroesophageal reflux disease)     Hypertension     Hypothyroidism     Obesity     PUD (peptic ulcer disease)     UTI (urinary tract infection) 1/21/2011     Past Surgical History:   Procedure Laterality Date    HX BREAST BIOPSY Left     Surgical Bx 1990 - BENIGN    HX BREAST LUMPECTOMY      left breast    HX GYN      hysterectomy    HX ORTHOPAEDIC      left total knee replacement    HX ORTHOPAEDIC  6/16/15    RIGHT TOTAL KNEE ARTHROPLASTY     HX PARATHYROIDECTOMY  01/27/11     Social History     Social History    Marital status:      Spouse name: N/A    Number of children: N/A    Years of education: N/A     Social History Main Topics    Smoking status: Former Smoker     Packs/day: 0.25     Years: 25.00     Types: Cigarettes     Quit date: 1/24/2008    Smokeless tobacco: Never Used    Alcohol use No    Drug use: No    Sexual activity: No     Other Topics Concern    None     Social History Narrative     Family History   Problem Relation Age of Onset    Cancer Mother     Hypertension Father     Diabetes Sister     Hypertension Sister     Diabetes Brother     Hypertension Brother      Current Outpatient Prescriptions   Medication Sig Dispense Refill    zolpidem (AMBIEN) 5 mg tablet Take 1 Tab by mouth nightly as needed for Sleep (NOT TO BE USED AS A STANDING DOSE. THIS IS A PRN MEDICATION.  THIS WILL NOT BE REFILLED REGULARLY). Max Daily Amount: 5 mg. 30 Tab 1    escitalopram oxalate (LEXAPRO) 10 mg tablet TAKE 1 TABLET BY MOUTH EVERY DAY 90 Tab 2    metFORMIN (GLUCOPHAGE) 1,000 mg tablet TAKE 1 TABLET BY MOUTH TWICE DAILY WITH MEALS 180 Tab 2    atorvastatin (LIPITOR) 20 mg tablet TAKE 1 TABLET BY MOUTH DAILY FOR CHOLESTEROL 90 Tab 2    traMADol (ULTRAM) 50 mg tablet Take 1 Tab by mouth every eight (8) hours as needed for Pain. Max Daily Amount: 150 mg. Indications: Pain 90 Tab 1    metoprolol succinate (TOPROL-XL) 100 mg tablet TAKE 1 TABLET BY MOUTH DAILY FOR HIGH BLOOD PRESSURE 90 Tab 3    diclofenac (VOLTAREN) 1 % gel Apply  to affected area four (4) times daily. 100 g 3    valsartan-hydroCHLOROthiazide (DIOVAN-HCT) 320-25 mg per tablet Take 1 Tab by mouth daily. 60 Tab 3    amLODIPine (NORVASC) 10 mg tablet TAKE 1 TABLET BY MOUTH DAILY 90 Tab 0    glimepiride (AMARYL) 4 mg tablet TAKE 1 TABLET BY MOUTH TWICE DAILY 180 Tab 11    glucose blood VI test strips (ACCU-CHEK MEENU PLUS TEST STRP) strip Check sugars 3 times a day Dx E11.65 300 Strip 11    fluticasone (FLONASE) 50 mcg/actuation nasal spray 2 Sprays by Both Nostrils route daily. Allergies   Allergen Reactions    Influenza Virus Vaccine, Specific Other (comments)     States body got very hot    Aspirin Other (comments)     Pt reports having a h/o gastric ulcers. Pt was on IBU when she was dx'd. Objective:  Visit Vitals    /77 (BP 1 Location: Left arm, BP Patient Position: Sitting)    Pulse 92    Temp 98.7 °F (37.1 °C) (Oral)    Resp 20    Ht 5' 11\" (1.803 m)    Wt 260 lb (117.9 kg)    SpO2 97%    BMI 36.26 kg/m2     Physical Exam:   General appearance - alert, obese  Mental status - alert, oriented to person, place, and time  EYE-EOMI  Neck - supple,   Chest - symmetric air entry    CVS-reg + S4 + 2/6 systolic murmur  Abdomen - obese  Ext-no pedal edema, no clubbing or cyanosis  Skin-Warm and dry.  no hyperpigmentation, vitiligo, or suspicious lesions  Neuro -alert, oriented, normal speech, no focal findings or movement disorder noted        Results for orders placed or performed in visit on 11/21/17   COMPLIANCE DRUG SCREEN/PRESCRIPTION MONITORING   Result Value Ref Range    Summary FINAL    AMB POC HEMOGLOBIN A1C   Result Value Ref Range    Hemoglobin A1c (POC) 8.7 %       Assessment/Plan:    ICD-10-CM ICD-9-CM    1. Type 2 diabetes mellitus with hyperosmolarity without coma, with long-term current use of insulin (HCC) G76.54 059.88 METABOLIC PANEL, COMPREHENSIVE    Z79.4 V58.67 LIPID PANEL      CBC W/O DIFF      HEMOGLOBIN A1C WITH EAG      MICROALBUMIN, UR, RAND      TSH REFLEX TO T4   2. Essential hypertension I10 401.9    3. Primary osteoarthritis of both knees M17.0 715.16    4. Pure hypercholesterolemia V93.54 182.4 METABOLIC PANEL, COMPREHENSIVE      LIPID PANEL   5. Morbid obesity ([facility]) E66.01 278.01    6. Screen for STD (sexually transmitted disease) Z11.3 V74.5 HEPATITIS C AB      T PALLIDUM SCREEN W/REFLEX      HIV 1/2 AG/AB, 4TH GENERATION,W RFLX CONFIRM   7. Hyperparathyroidism, primary ([facility] [address] E21.0 252.01 PTH INTACT      VITAMIN D, 25 HYDROXY   8. Primary insomnia F51.01 307.42 zolpidem (AMBIEN) 5 mg tablet   9. Episode of recurrent major depressive disorder, unspecified depression episode severity ([facility]) F33.9 296.30 escitalopram oxalate (LEXAPRO) 10 mg tablet     Orders Placed This Encounter    METABOLIC PANEL, COMPREHENSIVE    LIPID PANEL    CBC W/O DIFF    HEPATITIS C AB    HEMOGLOBIN A1C WITH EAG    T PALLIDUM SCREEN W/REFLEX    HIV 1/2 AG/AB, 4TH GENERATION,W RFLX CONFIRM    MICROALBUMIN, UR, RAND    TSH REFLEX TO T4    PTH INTACT    VITAMIN D, 25 HYDROXY     Standing Status:   Future     Standing Expiration Date:   5/7/2019    zolpidem (AMBIEN) 5 mg tablet     Sig: Take 1 Tab by mouth nightly as needed for Sleep (NOT TO BE USED AS A STANDING DOSE. THIS IS A PRN MEDICATION.  THIS WILL NOT BE REFILLED REGULARLY). Max Daily Amount: 5 mg. Dispense:  30 Tab     Refill:  1    escitalopram oxalate (LEXAPRO) 10 mg tablet     Sig: TAKE 1 TABLET BY MOUTH EVERY DAY     Dispense:  90 Tab     Refill:  2   1. Type 2 diabetes mellitus with hyperosmolarity without coma, with long-term current use of insulin (HCC)  NOT ideal  - METABOLIC PANEL, COMPREHENSIVE  - LIPID PANEL  - CBC W/O DIFF  - HEMOGLOBIN A1C WITH EAG  - MICROALBUMIN, UR, RAND  - TSH REFLEX TO T4    2. Essential hypertension  Cotrolled on current regimen    3. Primary osteoarthritis of both knees  Improved    4. Pure hypercholesterolemia  Recheck  - METABOLIC PANEL, COMPREHENSIVE  - LIPID PANEL    5. Morbid obesity (HCC)  D/w pt healthy diet/limiting sweets and carbs    6. Screen for STD (sexually transmitted disease)    - HEPATITIS C AB  - T PALLIDUM SCREEN W/REFLEX  - HIV 1/2 AG/AB, 4TH GENERATION,W RFLX CONFIRM    7. Hyperparathyroidism, primary ([address]  Recheck  - PTH INTACT  - VITAMIN D, 25 HYDROXY; Future    8. Primary insomnia  D/w pt NOT to use regularly  - zolpidem (AMBIEN) 5 mg tablet; Take 1 Tab by mouth nightly as needed for Sleep (NOT TO BE USED AS A STANDING DOSE. THIS IS A PRN MEDICATION. THIS WILL NOT BE REFILLED REGULARLY). Max Daily Amount: 5 mg. Dispense: 30 Tab; Refill: 1    9. Episode of recurrent major depressive disorder, unspecified depression episode severity (HCC)  Affect improved  - escitalopram oxalate (LEXAPRO) 10 mg tablet; TAKE 1 TABLET BY MOUTH EVERY DAY  Dispense: 90 Tab; Refill: 2    There are no Patient Instructions on file for this visit. Follow-up Disposition:  Return in about 4 months (around 9/7/2018). I have reviewed with the patient details of the assessment and plan and all questions were answered. Relevent patient education was performed. The most recent lab findings were reviewed with the patient. An After Visit Summary was printed and given to the patient.

## 2018-05-09 LAB
25(OH)D3+25(OH)D2 SERPL-MCNC: 15 NG/ML (ref 30–100)
ALBUMIN SERPL-MCNC: 4.7 G/DL (ref 3.6–4.8)
ALBUMIN/GLOB SERPL: 2 {RATIO} (ref 1.2–2.2)
ALP SERPL-CCNC: 72 IU/L (ref 39–117)
ALT SERPL-CCNC: 41 IU/L (ref 0–32)
AST SERPL-CCNC: 32 IU/L (ref 0–40)
BILIRUB SERPL-MCNC: 0.3 MG/DL (ref 0–1.2)
BUN SERPL-MCNC: 11 MG/DL (ref 8–27)
BUN/CREAT SERPL: 15 (ref 12–28)
CALCIUM SERPL-MCNC: 9.8 MG/DL (ref 8.7–10.3)
CHLORIDE SERPL-SCNC: 93 MMOL/L (ref 96–106)
CHOLEST SERPL-MCNC: 102 MG/DL (ref 100–199)
CO2 SERPL-SCNC: 29 MMOL/L (ref 18–29)
CREAT SERPL-MCNC: 0.73 MG/DL (ref 0.57–1)
ERYTHROCYTE [DISTWIDTH] IN BLOOD BY AUTOMATED COUNT: 13 % (ref 12.3–15.4)
EST. AVERAGE GLUCOSE BLD GHB EST-MCNC: 266 MG/DL
GFR SERPLBLD CREATININE-BSD FMLA CKD-EPI: 104 ML/MIN/1.73
GFR SERPLBLD CREATININE-BSD FMLA CKD-EPI: 90 ML/MIN/1.73
GLOBULIN SER CALC-MCNC: 2.4 G/DL (ref 1.5–4.5)
GLUCOSE SERPL-MCNC: 460 MG/DL (ref 65–99)
HBA1C MFR BLD: 10.9 % (ref 4.8–5.6)
HCT VFR BLD AUTO: 43.3 % (ref 34–46.6)
HCV AB S/CO SERPL IA: <0.1 S/CO RATIO (ref 0–0.9)
HDLC SERPL-MCNC: 27 MG/DL
HGB BLD-MCNC: 13.8 G/DL (ref 11.1–15.9)
HIV 1+2 AB+HIV1 P24 AG SERPL QL IA: NON REACTIVE
INTERPRETATION, 910389: NORMAL
LDLC SERPL CALC-MCNC: ABNORMAL MG/DL (ref 0–99)
Lab: NORMAL
MCH RBC QN AUTO: 29 PG (ref 26.6–33)
MCHC RBC AUTO-ENTMCNC: 31.9 G/DL (ref 31.5–35.7)
MCV RBC AUTO: 91 FL (ref 79–97)
MICROALBUMIN UR-MCNC: <3 UG/ML
PLATELET # BLD AUTO: 384 X10E3/UL (ref 150–379)
POTASSIUM SERPL-SCNC: 4.4 MMOL/L (ref 3.5–5.2)
PROT SERPL-MCNC: 7.1 G/DL (ref 6–8.5)
PTH-INTACT SERPL-MCNC: 45 PG/ML (ref 15–65)
RBC # BLD AUTO: 4.76 X10E6/UL (ref 3.77–5.28)
SODIUM SERPL-SCNC: 142 MMOL/L (ref 134–144)
T PALLIDUM AB SER QL IA: NEGATIVE
TRIGL SERPL-MCNC: 406 MG/DL (ref 0–149)
TSH SERPL DL<=0.005 MIU/L-ACNC: 1.24 UIU/ML (ref 0.45–4.5)
VLDLC SERPL CALC-MCNC: ABNORMAL MG/DL (ref 5–40)
WBC # BLD AUTO: 6.1 X10E3/UL (ref 3.4–10.8)

## 2018-05-14 DIAGNOSIS — E21.0 HYPERPARATHYROIDISM, PRIMARY (HCC): ICD-10-CM

## 2018-05-15 ENCOUNTER — TELEPHONE (OUTPATIENT)
Dept: INTERNAL MEDICINE CLINIC | Age: 58
End: 2018-05-15

## 2018-05-15 DIAGNOSIS — E55.9 VITAMIN D DEFICIENCY: Primary | ICD-10-CM

## 2018-05-15 RX ORDER — ERGOCALCIFEROL 1.25 MG/1
50000 CAPSULE ORAL
Qty: 12 CAP | Refills: 0 | Status: SHIPPED | OUTPATIENT
Start: 2018-05-15 | End: 2018-10-19 | Stop reason: ALTCHOICE

## 2018-05-15 NOTE — TELEPHONE ENCOUNTER
Pt called d/w pt low vitamin d level and changing amaryl to [place] and [place].     Pt admits to dietary indiscretiona nd she does NOT want januvia due to untoward SE in the past.  D/w pt if A1c is not improved NV, we will discuss starting an injection such as victoza

## 2018-06-08 RX ORDER — VALSARTAN AND HYDROCHLOROTHIAZIDE 320; 25 MG/1; MG/1
1 TABLET, FILM COATED ORAL DAILY
Qty: 60 TAB | Refills: 3 | Status: SHIPPED | OUTPATIENT
Start: 2018-06-08 | End: 2019-02-20 | Stop reason: SDUPTHER

## 2018-06-20 DIAGNOSIS — F51.01 PRIMARY INSOMNIA: ICD-10-CM

## 2018-06-21 RX ORDER — ZOLPIDEM TARTRATE 5 MG/1
TABLET ORAL
Qty: 30 TAB | Refills: 0 | Status: SHIPPED | OUTPATIENT
Start: 2018-06-21 | End: 2018-11-21 | Stop reason: SDUPTHER

## 2018-08-10 ENCOUNTER — TELEPHONE (OUTPATIENT)
Dept: INTERNAL MEDICINE CLINIC | Age: 58
End: 2018-08-10

## 2018-08-10 NOTE — TELEPHONE ENCOUNTER
I tried to reach patient today. Phone rang and then hung up. I was calling to inform patient of the Valsartan medication recall.   Patient needs to call pharmacy and verify lot number

## 2018-08-14 ENCOUNTER — OFFICE VISIT (OUTPATIENT)
Dept: INTERNAL MEDICINE CLINIC | Age: 58
End: 2018-08-14

## 2018-08-14 VITALS
SYSTOLIC BLOOD PRESSURE: 141 MMHG | HEIGHT: 71 IN | HEART RATE: 78 BPM | RESPIRATION RATE: 18 BRPM | WEIGHT: 262 LBS | TEMPERATURE: 98.1 F | OXYGEN SATURATION: 97 % | DIASTOLIC BLOOD PRESSURE: 78 MMHG | BODY MASS INDEX: 36.68 KG/M2

## 2018-08-14 DIAGNOSIS — M54.16 LUMBAR RADICULOPATHY: ICD-10-CM

## 2018-08-14 DIAGNOSIS — Z79.891 CHRONIC PRESCRIPTION OPIATE USE: ICD-10-CM

## 2018-08-14 DIAGNOSIS — E55.9 VITAMIN D DEFICIENCY: ICD-10-CM

## 2018-08-14 DIAGNOSIS — M17.0 PRIMARY OSTEOARTHRITIS OF BOTH KNEES: ICD-10-CM

## 2018-08-14 DIAGNOSIS — I10 ESSENTIAL HYPERTENSION: ICD-10-CM

## 2018-08-14 DIAGNOSIS — M47.22 OSTEOARTHRITIS OF SPINE WITH RADICULOPATHY, CERVICAL REGION: ICD-10-CM

## 2018-08-14 DIAGNOSIS — E66.01 MORBID OBESITY (HCC): ICD-10-CM

## 2018-08-14 DIAGNOSIS — E78.00 PURE HYPERCHOLESTEROLEMIA: ICD-10-CM

## 2018-08-14 DIAGNOSIS — Z79.4 TYPE 2 DIABETES MELLITUS WITH HYPEROSMOLARITY WITHOUT COMA, WITH LONG-TERM CURRENT USE OF INSULIN (HCC): Primary | ICD-10-CM

## 2018-08-14 DIAGNOSIS — E11.00 TYPE 2 DIABETES MELLITUS WITH HYPEROSMOLARITY WITHOUT COMA, WITH LONG-TERM CURRENT USE OF INSULIN (HCC): Primary | ICD-10-CM

## 2018-08-14 NOTE — PROGRESS NOTES
Nidia Hickman is a 59 y.o. female and presents with Medication Refill and Hypertension  . Subjective:    Hypertension Review:  The patient has essential hypertension  Diet and Lifestyle: generally follows a  low sodium diet, exercises never  Home BP Monitoring: is not measured at home. Pertinent ROS: taking medications as instructed, no medication side effects noted, no TIA's, no chest pain on exertion, no dyspnea on exertion, no swelling of ankles.    BP Readings from Last 3 Encounters:   08/14/18 141/78   05/07/18 127/77   03/14/18 142/75     Type 2 DM-on metformin   -pt admits to dietary indiscretions and elevated blood sugars   -pt relays she had a retinal eye exam ~ 4 mths ago-nml   -  Lab Results   Component Value Date/Time    Hemoglobin A1c 10.9 (H) 05/07/2018 03:55 PM    Hemoglobin A1c (POC) 8.7 11/21/2017 11:41 AM     Hyperlipidemia-on atorvastatin 20mg   -  Lab Results   Component Value Date/Time    Cholesterol, total 102 05/07/2018 03:55 PM    Cholesterol (POC) >100 05/12/2017 03:28 PM    HDL Cholesterol 27 (L) 05/07/2018 03:55 PM    HDL Cholesterol (POC) 31 05/12/2017 03:28 PM    LDL Cholesterol (POC) n/a 05/12/2017 03:28 PM    LDL, calculated Comment 05/07/2018 03:55 PM    VLDL, calculated Comment 05/07/2018 03:55 PM    Triglyceride 406 (H) 05/07/2018 03:55 PM    Triglycerides (POC) 212 05/12/2017 03:28 PM    CHOL/HDL Ratio 4.6 05/08/2013 11:45 AM       Morbid obesity-  Wt Readings from Last 3 Encounters:   08/14/18 262 lb (118.8 kg)   05/07/18 260 lb (117.9 kg)   03/14/18 263 lb (119.3 kg)       Hyperparathyroidism- normalized w vit d replacement   -pt completed 8 week course high dose vitamin d and requests a refill  Lab Results   Component Value Date/Time    Calcium 9.8 05/07/2018 03:55 PM    Phosphorus 2.9 01/28/2011 02:00 PM    PTH, Intact 45 05/07/2018 03:55 PM     Chronic Pain-pt is seeing Pain Mngmnt and is on a regimen of gabapentin, zolpidem and zohydro ER   -pts requests for tramadol declined as she is seeing pain Mngmnt        Review of Systems  Constitutional: negative for fevers, chills, anorexia and weight loss  Respiratory:  negative for cough, hemoptysis, dyspnea,wheezing  CV:   negative for chest pain, palpitations, lower extremity edema  GI:   negative for nausea, vomiting, diarrhea, abdominal pain,melena  Musculoskel: positive for myalgias, arthralgias, back pain, joint pain  Neurological:  negative for headaches, dizziness, vertigo, memory problems and gait   Behavl/Psych: negative for feelings of anxiety, depression, mood changes    Past Medical History:   Diagnosis Date    Acute pancreatitis 8/9/2012    Acute pancreatitis 1/21/2011    Arthritis     both knees    Chronic pain     knees    Delivery normal     x1    Diabetes ([facility] [address]     DJD (degenerative joint disease) of knee     Fatty liver 1/22/2011    GERD (gastroesophageal reflux disease)     Hypertension     Hypothyroidism     Obesity     PUD (peptic ulcer disease)     UTI (urinary tract infection) 1/21/2011     Past Surgical History:   Procedure Laterality Date    HX BREAST BIOPSY Left     Surgical Bx 1990 - BENIGN    HX BREAST LUMPECTOMY      left breast    HX GYN      hysterectomy    HX ORTHOPAEDIC      left total knee replacement    HX ORTHOPAEDIC  6/16/15    RIGHT TOTAL KNEE ARTHROPLASTY     HX PARATHYROIDECTOMY  01/27/11     Social History     Social History    Marital status:      Spouse name: N/A    Number of children: N/A    Years of education: N/A     Social History Main Topics    Smoking status: Former Smoker     Packs/day: 0.25     Years: 25.00     Types: Cigarettes     Quit date: 1/24/2008    Smokeless tobacco: Never Used    Alcohol use No    Drug use: No    Sexual activity: No     Other Topics Concern    None     Social History Narrative     Family History   Problem Relation Age of Onset    Cancer Mother     Hypertension Father     Diabetes Sister     Hypertension Sister    Verlinda Smoker Diabetes Brother     Hypertension Brother      Current Outpatient Prescriptions   Medication Sig Dispense Refill    amLODIPine (NORVASC) 10 mg tablet TAKE 1 TABLET BY MOUTH DAILY 90 Tab 2    zolpidem (AMBIEN) 5 mg tablet TAKE 1 TABLET BY MOUTH NIGHTLY AS NEEDED FOR SLEEP. MAXIMUM DAILY AMOUNT IS 5 MG( 1 TABLET). THIS IS AN AS NEEDED MEDICATION ONLY 30 Tab 0    valsartan-hydroCHLOROthiazide (DIOVAN-HCT) 320-25 mg per tablet Take 1 Tab by mouth daily. 60 Tab 3    glimepiride (AMARYL) 4 mg tablet TAKE 1 TABLET BY MOUTH TWICE DAILY 180 Tab 2    ergocalciferol (ERGOCALCIFEROL) 50,000 unit capsule Take 1 Cap by mouth every seven (7) days. Indications: VITAMIN D DEFICIENCY (HIGH DOSE THERAPY) 12 Cap 0    escitalopram oxalate (LEXAPRO) 10 mg tablet TAKE 1 TABLET BY MOUTH EVERY DAY 90 Tab 2    metFORMIN (GLUCOPHAGE) 1,000 mg tablet TAKE 1 TABLET BY MOUTH TWICE DAILY WITH MEALS 180 Tab 2    atorvastatin (LIPITOR) 20 mg tablet TAKE 1 TABLET BY MOUTH DAILY FOR CHOLESTEROL 90 Tab 2    traMADol (ULTRAM) 50 mg tablet Take 1 Tab by mouth every eight (8) hours as needed for Pain. Max Daily Amount: 150 mg. Indications: Pain 90 Tab 1    metoprolol succinate (TOPROL-XL) 100 mg tablet TAKE 1 TABLET BY MOUTH DAILY FOR HIGH BLOOD PRESSURE 90 Tab 3    glucose blood VI test strips (ACCU-CHEK MEENU PLUS TEST STRP) strip Check sugars 3 times a day Dx E11.65 300 Strip 11    diclofenac (VOLTAREN) 1 % gel Apply  to affected area four (4) times daily. 100 g 3    fluticasone (FLONASE) 50 mcg/actuation nasal spray 2 Sprays by Both Nostrils route daily. Allergies   Allergen Reactions    Influenza Virus Vaccine, Specific Other (comments)     States body got very hot    Aspirin Other (comments)     Pt reports having a h/o gastric ulcers. Pt was on IBU when she was dx'd.        Objective:  Visit Vitals    /78 (BP 1 Location: Left arm, BP Patient Position: Sitting)    Pulse 78    Temp 98.1 °F (36.7 °C) (Oral)    Resp 18  Ht 5' 11\" (1.803 m)    Wt 262 lb (118.8 kg)    SpO2 97%    BMI 36.54 kg/m2     Physical Exam:   General appearance - alert, obese  Mental status - alert, oriented to person, place, and time  EYE-EOMI  Neck - supple,   Chest - symmetric air entry    CVS-reg + S4 + 2/6 systolic murmur  Abdomen - obese  Ext-no pedal edema, no clubbing or cyanosis  Skin-Warm and dry. no hyperpigmentation, vitiligo, or suspicious lesions  Neuro -alert, oriented, normal speech, no focal findings or movement disorder noted        Results for orders placed or performed in visit on 05/61/31   METABOLIC PANEL, COMPREHENSIVE   Result Value Ref Range    Glucose 460 (H) 65 - 99 mg/dL    BUN 11 8 - 27 mg/dL    Creatinine 0.73 0.57 - 1.00 mg/dL    GFR est non-AA 90 >59 mL/min/1.73    GFR est  >59 mL/min/1.73    BUN/Creatinine ratio 15 12 - 28    Sodium 142 134 - 144 mmol/L    Potassium 4.4 3.5 - 5.2 mmol/L    Chloride 93 (L) 96 - 106 mmol/L    CO2 29 18 - 29 mmol/L    Calcium 9.8 8.7 - 10.3 mg/dL    Protein, total 7.1 6.0 - 8.5 g/dL    Albumin 4.7 3.6 - 4.8 g/dL    GLOBULIN, TOTAL 2.4 1.5 - 4.5 g/dL    A-G Ratio 2.0 1.2 - 2.2    Bilirubin, total 0.3 0.0 - 1.2 mg/dL    Alk.  phosphatase 72 39 - 117 IU/L    AST (SGOT) 32 0 - 40 IU/L    ALT (SGPT) 41 (H) 0 - 32 IU/L   LIPID PANEL   Result Value Ref Range    Cholesterol, total 102 100 - 199 mg/dL    Triglyceride 406 (H) 0 - 149 mg/dL    HDL Cholesterol 27 (L) >39 mg/dL    VLDL, calculated Comment 5 - 40 mg/dL    LDL, calculated Comment 0 - 99 mg/dL   CBC W/O DIFF   Result Value Ref Range    WBC 6.1 3.4 - 10.8 x10E3/uL    RBC 4.76 3.77 - 5.28 x10E6/uL    HGB 13.8 11.1 - 15.9 g/dL    HCT 43.3 34.0 - 46.6 %    MCV 91 79 - 97 fL    MCH 29.0 26.6 - 33.0 pg    MCHC 31.9 31.5 - 35.7 g/dL    RDW 13.0 12.3 - 15.4 %    PLATELET 874 (H) 728 - 379 x10E3/uL   HEPATITIS C AB   Result Value Ref Range    Hep C Virus Ab <0.1 0.0 - 0.9 s/co ratio   HEMOGLOBIN A1C WITH EAG   Result Value Ref Range Hemoglobin A1c 10.9 (H) 4.8 - 5.6 %    Estimated average glucose 266 mg/dL   T PALLIDUM SCREEN W/REFLEX   Result Value Ref Range    T. pallidum Abs Negative Negative   HIV 1/2 AG/AB, 4TH GENERATION,W RFLX CONFIRM   Result Value Ref Range    HIV SCREEN 4TH GENERATION WRFX Non Reactive Non Reactive   MICROALBUMIN, UR, RAND   Result Value Ref Range    Microalbumin, urine <3.0 Not Estab. ug/mL   TSH REFLEX TO T4   Result Value Ref Range    TSH 1.240 0.450 - 4.500 uIU/mL   PTH INTACT   Result Value Ref Range    PTH, Intact 45 15 - 65 pg/mL   VITAMIN D, 25 HYDROXY   Result Value Ref Range    VITAMIN D, 25-HYDROXY 15.0 (L) 30.0 - 100.0 ng/mL   CVD REPORT   Result Value Ref Range    INTERPRETATION Note    DIABETES PATIENT EDUCATION   Result Value Ref Range    PDF Image Not applicable        Assessment/Plan:    ICD-10-CM ICD-9-CM    1. Type 2 diabetes mellitus with hyperosmolarity without coma, with long-term current use of insulin ([facility]) E11.00 250.20 LIPID PANEL    O69.6 Y47.07 METABOLIC PANEL, COMPREHENSIVE      HEMOGLOBIN A1C WITH EAG   2. Vitamin D deficiency E55.9 268.9 VITAMIN D, 25 HYDROXY   3. Chronic prescription opiate use Z79.891 V58.69    4. Osteoarthritis of spine with radiculopathy, cervical region M47.22 721.0    5. Lumbar radiculopathy M54.16 724.4    6. Primary osteoarthritis of both knees M17.0 715.16    7. Essential hypertension I10 401.9    8. Pure hypercholesterolemia E78.00 272.0    9. Morbid obesity ([facility] [address] E66.01 278.01      Orders Placed This Encounter    VITAMIN D, 25 HYDROXY     Standing Status:   Future     Standing Expiration Date:   8/14/2019    LIPID PANEL    METABOLIC PANEL, COMPREHENSIVE    HEMOGLOBIN A1C WITH EAG   1. Type 2 diabetes mellitus with hyperosmolarity without coma, with long-term current use of insulin ([facility])  Recheck  - LIPID PANEL  - METABOLIC PANEL, COMPREHENSIVE  - HEMOGLOBIN A1C WITH EAG    2. Vitamin D deficiency  Recheck  - VITAMIN D, 25 HYDROXY; Future    3.  Chronic prescription opiate use  F/u Pain Mngmnt    4. Osteoarthritis of spine with radiculopathy, cervical region      5. Lumbar radiculopathy  Noted    6. Primary osteoarthritis of both knees  Noted    7. Essential hypertension  Controlled on current mngmnt    8. Pure hypercholesterolemia  Recheck    9. Morbid obesity ([address]  Noted      There are no Patient Instructions on file for this visit. Follow-up Disposition:  Return in about 4 months (around 12/14/2018) for DM/htn w pap 30 minute appt. I have reviewed with the patient details of the assessment and plan and all questions were answered. Relevent patient education was performed. The most recent lab findings were reviewed with the patient. An After Visit Summary was printed and given to the patient.

## 2018-08-14 NOTE — PROGRESS NOTES
Chief Complaint   Patient presents with    Medication Refill    Hypertension     1. Have you been to the ER, urgent care clinic since your last visit? Hospitalized since your last visit? No    2. Have you seen or consulted any other health care providers outside of the [address] [place] [address] since your last visit? Include any pap smears or colon screening.  No

## 2018-08-15 LAB
25(OH)D3+25(OH)D2 SERPL-MCNC: 34.5 NG/ML (ref 30–100)
ALBUMIN SERPL-MCNC: 5 G/DL (ref 3.6–4.8)
ALBUMIN/GLOB SERPL: 2.2 {RATIO} (ref 1.2–2.2)
ALP SERPL-CCNC: 76 IU/L (ref 39–117)
ALT SERPL-CCNC: 41 IU/L (ref 0–32)
AST SERPL-CCNC: 20 IU/L (ref 0–40)
BILIRUB SERPL-MCNC: 0.3 MG/DL (ref 0–1.2)
BUN SERPL-MCNC: 9 MG/DL (ref 8–27)
BUN/CREAT SERPL: 15 (ref 12–28)
CALCIUM SERPL-MCNC: 10.3 MG/DL (ref 8.7–10.3)
CHLORIDE SERPL-SCNC: 93 MMOL/L (ref 96–106)
CHOLEST SERPL-MCNC: 97 MG/DL (ref 100–199)
CO2 SERPL-SCNC: 29 MMOL/L (ref 20–29)
CREAT SERPL-MCNC: 0.59 MG/DL (ref 0.57–1)
EST. AVERAGE GLUCOSE BLD GHB EST-MCNC: 280 MG/DL
GLOBULIN SER CALC-MCNC: 2.3 G/DL (ref 1.5–4.5)
GLUCOSE SERPL-MCNC: 293 MG/DL (ref 65–99)
HBA1C MFR BLD: 11.4 % (ref 4.8–5.6)
HDLC SERPL-MCNC: 34 MG/DL
INTERPRETATION, 910389: NORMAL
LDLC SERPL CALC-MCNC: 26 MG/DL (ref 0–99)
Lab: NORMAL
POTASSIUM SERPL-SCNC: 4.6 MMOL/L (ref 3.5–5.2)
PROT SERPL-MCNC: 7.3 G/DL (ref 6–8.5)
SODIUM SERPL-SCNC: 139 MMOL/L (ref 134–144)
TRIGL SERPL-MCNC: 184 MG/DL (ref 0–149)
VLDLC SERPL CALC-MCNC: 37 MG/DL (ref 5–40)

## 2018-11-20 ENCOUNTER — HOSPITAL ENCOUNTER (OUTPATIENT)
Dept: GENERAL RADIOLOGY | Age: 58
Discharge: HOME | End: 2018-11-20
Payer: COMMERCIAL

## 2018-11-20 ENCOUNTER — OFFICE VISIT (OUTPATIENT)
Dept: INTERNAL MEDICINE CLINIC | Age: 58
End: 2018-11-20

## 2018-11-20 VITALS
WEIGHT: 266 LBS | OXYGEN SATURATION: 94 % | TEMPERATURE: 98.3 F | HEART RATE: 78 BPM | BODY MASS INDEX: 37.24 KG/M2 | RESPIRATION RATE: 18 BRPM | SYSTOLIC BLOOD PRESSURE: 132 MMHG | DIASTOLIC BLOOD PRESSURE: 87 MMHG | HEIGHT: 71 IN

## 2018-11-20 DIAGNOSIS — F33.9 EPISODE OF RECURRENT MAJOR DEPRESSIVE DISORDER, UNSPECIFIED DEPRESSION EPISODE SEVERITY (HCC): ICD-10-CM
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
## 2018-11-20 PROCEDURE — 72100 X-RAY EXAM L-S SPINE 2/3 VWS: CPT

## 2018-11-20 PROCEDURE — 72050 X-RAY EXAM NECK SPINE 4/5VWS: CPT

## 2018-11-20 RX ORDER — ESCITALOPRAM OXALATE 20 MG/1
TABLET ORAL
Qty: 90 TAB | Refills: 1 | Status: SHIPPED | OUTPATIENT
Start: 2018-11-20 | End: 2019-05-28 | Stop reason: SDUPTHER

## 2018-11-20 NOTE — PROGRESS NOTES
Chief Complaint Patient presents with  Fall  
  about a week ago 1. Have you been to the ER, urgent care clinic since your last visit? Hospitalized since your last visit? No 
 
2. Have you seen or consulted any other health care providers outside of the [address] [place] [address] since your last visit? Include any pap smears or colon screening.  No

## 2018-11-20 NOTE — PROGRESS NOTES
Ana Duran is a 59 y.o. female and presents with Fall (about a week ago) Artem Neri Subjective: 
Pt s/p glf at work ~ 1  1/2 week ago. She fell on her extended left wrist. No wrist pain. She hurt her neck and lower back and left leg. Pt did not seek medical attn. She is unclear why/how sjkayla fell. She THINKS it is because the elevator was not working and she had to walk-up a lot of stairs. Pt needs x-rays before her Pain Mngmnt appt on 11/30/18. Pt feels better than after the incident. No numbness/weakness of affected side. Pt relays she has been feeling sad/frustrated and short tempered. She has been on lexapro 10mg for a very long time. Her  is currently being tx for prostate ca. She denies SI Review of Systems Review of systems (12) negative, except noted above. Past Medical History:  
Diagnosis Date  Acute pancreatitis 8/9/2012  Acute pancreatitis 1/21/2011  Arthritis   
 both knees  Chronic pain   
 knees  Delivery normal   
 x1  
 Diabetes ([address]  DJD (degenerative joint disease) of knee  Fatty liver 1/22/2011  GERD (gastroesophageal reflux disease)  Hypertension  Hypothyroidism  Obesity  PUD (peptic ulcer disease)  UTI (urinary tract infection) 1/21/2011 Past Surgical History:  
Procedure Laterality Date  HX BREAST BIOPSY Left Surgical Bx 1990 - BENIGN  
 HX BREAST LUMPECTOMY    
 left breast  
 HX GYN    
 hysterectomy  HX ORTHOPAEDIC    
 left total knee replacement  HX ORTHOPAEDIC  6/16/15 RIGHT TOTAL KNEE ARTHROPLASTY  HX PARATHYROIDECTOMY  01/27/11 Family History Problem Relation Age of Onset  Cancer Mother  Hypertension Father  Diabetes Sister  Hypertension Sister  Diabetes Brother  Hypertension Brother Current Outpatient Medications Medication Sig Dispense Refill  escitalopram oxalate (LEXAPRO) 20 mg tablet TAKE 1 TABLET BY MOUTH EVERY DAY 90 Tab 1  
  glucose blood VI test strips (BLOOD GLUCOSE TEST) strip Use BID DxE11.9 200 Strip 11  
 amLODIPine (NORVASC) 10 mg tablet TAKE 1 TABLET BY MOUTH DAILY 90 Tab 2  
 zolpidem (AMBIEN) 5 mg tablet TAKE 1 TABLET BY MOUTH NIGHTLY AS NEEDED FOR SLEEP. MAXIMUM DAILY AMOUNT IS 5 MG( 1 TABLET). THIS IS AN AS NEEDED MEDICATION ONLY 30 Tab 0  
 valsartan-hydroCHLOROthiazide (DIOVAN-HCT) 320-25 mg per tablet Take 1 Tab by mouth daily. 60 Tab 3  
 glimepiride (AMARYL) 4 mg tablet TAKE 1 TABLET BY MOUTH TWICE DAILY 180 Tab 2  
 metFORMIN (GLUCOPHAGE) 1,000 mg tablet TAKE 1 TABLET BY MOUTH TWICE DAILY WITH MEALS 180 Tab 2  
 atorvastatin (LIPITOR) 20 mg tablet TAKE 1 TABLET BY MOUTH DAILY FOR CHOLESTEROL 90 Tab 2  
 traMADol (ULTRAM) 50 mg tablet Take 1 Tab by mouth every eight (8) hours as needed for Pain. Max Daily Amount: 150 mg. Indications: Pain 90 Tab 1  
 metoprolol succinate (TOPROL-XL) 100 mg tablet TAKE 1 TABLET BY MOUTH DAILY FOR HIGH BLOOD PRESSURE 90 Tab 3  
 fluticasone (FLONASE) 50 mcg/actuation nasal spray 2 Sprays by Both Nostrils route daily. Allergies Allergen Reactions  Influenza Virus Vaccine, Specific Other (comments) States body got very hot  Aspirin Other (comments) Pt reports having a h/o gastric ulcers. Pt was on IBU when she was dx'd. Objective: 
Visit Vitals /87 (BP 1 Location: Left arm, BP Patient Position: Sitting) Pulse 78 Temp 98.3 °F (36.8 °C) (Oral) Resp 18 Ht 5' 11\" (1.803 m) Wt 266 lb (120.7 kg) SpO2 94% BMI 37.10 kg/m² Physical Exam:  
General appearance - alert, obese Mental status - alert, oriented to person, place, and time EYE-EOMI Neck - supple, Chest - symmetric air entry Abdomen - obese Ext-no pedal edema, no clubbing or cyanosis Skin-Warm and dry. no hyperpigmentation, vitiligo, or suspicious lesions Neuro -alert, oriented, normal speech, no focal findings or movement disorder noted Results for orders placed or performed in visit on 08/14/18 LIPID PANEL Result Value Ref Range Cholesterol, total 97 (L) 100 - 199 mg/dL Triglyceride 184 (H) 0 - 149 mg/dL HDL Cholesterol 34 (L) >39 mg/dL VLDL, calculated 37 5 - 40 mg/dL LDL, calculated 26 0 - 99 mg/dL METABOLIC PANEL, COMPREHENSIVE Result Value Ref Range Glucose 293 (H) 65 - 99 mg/dL BUN 9 8 - 27 mg/dL Creatinine 0.59 0.57 - 1.00 mg/dL GFR est non- >59 mL/min/1.73 GFR est  >59 mL/min/1.73  
 BUN/Creatinine ratio 15 12 - 28 Sodium 139 134 - 144 mmol/L Potassium 4.6 3.5 - 5.2 mmol/L Chloride 93 (L) 96 - 106 mmol/L  
 CO2 29 20 - 29 mmol/L Calcium 10.3 8.7 - 10.3 mg/dL Protein, total 7.3 6.0 - 8.5 g/dL Albumin 5.0 (H) 3.6 - 4.8 g/dL GLOBULIN, TOTAL 2.3 1.5 - 4.5 g/dL A-G Ratio 2.2 1.2 - 2.2 Bilirubin, total 0.3 0.0 - 1.2 mg/dL Alk. phosphatase 76 39 - 117 IU/L  
 AST (SGOT) 20 0 - 40 IU/L  
 ALT (SGPT) 41 (H) 0 - 32 IU/L HEMOGLOBIN A1C WITH EAG Result Value Ref Range Hemoglobin A1c 11.4 (H) 4.8 - 5.6 % Estimated average glucose 280 mg/dL VITAMIN D, 25 HYDROXY Result Value Ref Range VITAMIN D, 25-HYDROXY 34.5 30.0 - 100.0 ng/mL CVD REPORT Result Value Ref Range INTERPRETATION Note DIABETES PATIENT EDUCATION Result Value Ref Range PDF Image Not applicable Assessment/Plan: ICD-10-CM ICD-9-CM 1. Fall, initial encounter [address] XXXA E888.9 XR SPINE LUMB MIN 4 V  
   XR SPINE CERV 4 OR 5 V  
2. Episode of recurrent major depressive disorder, unspecified depression episode severity (HCC) F33.9 296.30 escitalopram oxalate (LEXAPRO) 20 mg tablet Orders Placed This Encounter  XR SPINE LUMB MIN 4 V Standing Status:   Future Number of Occurrences:   1 Standing Expiration Date:   2/20/2019 Order Specific Question:   Reason for Exam  
  Answer:   s/p fall  XR SPINE CERV 4 OR 5 V Standing Status:   Future Number of Occurrences:   1 Standing Expiration Date:   5/20/2019 Order Specific Question:   Reason for Exam  
  Answer:   s/p fall pain  escitalopram oxalate (LEXAPRO) 20 mg tablet Sig: TAKE 1 TABLET BY MOUTH EVERY DAY Dispense:  90 Tab Refill:  1 INCREASE DOSE 1. Fall, initial encounter Exam nml, pt improved - XR SPINE LUMB MIN 4 V; Future - XR SPINE CERV 4 OR 5 V; Future 2. Episode of recurrent major depressive disorder, unspecified depression episode severity ([facility] [address] Increase dose and monitor sxs 
- escitalopram oxalate (LEXAPRO) 20 mg tablet; TAKE 1 TABLET BY MOUTH EVERY DAY  Dispense: 90 Tab; Refill: 1 There are no Patient Instructions on file for this visit. Follow-up Disposition: 
Return for pt has appt scheduled for next month already. I have reviewed with the patient details of the assessment and plan and all questions were answered. Relevent patient education was performed. The most recent lab findings were reviewed with the patient. An After Visit Summary was printed and given to the patient.

## 2018-11-21 DIAGNOSIS — F51.01 PRIMARY INSOMNIA: ICD-10-CM

## 2018-11-21 RX ORDER — ZOLPIDEM TARTRATE 5 MG/1
TABLET ORAL
Qty: 30 TAB | Refills: 0 | Status: SHIPPED | OUTPATIENT
Start: 2018-11-21 | End: 2019-01-09 | Stop reason: SDUPTHER

## 2019-01-08 ENCOUNTER — OFFICE VISIT (OUTPATIENT)
Dept: INTERNAL MEDICINE CLINIC | Age: 59
End: 2019-01-08

## 2019-01-08 VITALS
BODY MASS INDEX: 36.54 KG/M2 | OXYGEN SATURATION: 94 % | HEART RATE: 96 BPM | RESPIRATION RATE: 19 BRPM | HEIGHT: 71 IN | SYSTOLIC BLOOD PRESSURE: 147 MMHG | TEMPERATURE: 100.4 F | WEIGHT: 261 LBS | DIASTOLIC BLOOD PRESSURE: 85 MMHG

## 2019-01-08 DIAGNOSIS — R05.8 COUGH PRODUCTIVE OF PURULENT SPUTUM: Primary | ICD-10-CM

## 2019-01-08 RX ORDER — AZITHROMYCIN 250 MG/1
TABLET, FILM COATED ORAL
Qty: 6 TAB | Refills: 0 | Status: SHIPPED | OUTPATIENT
Start: 2019-01-08 | End: 2019-01-10

## 2019-01-08 RX ORDER — PROMETHAZINE HYDROCHLORIDE AND DEXTROMETHORPHAN HYDROBROMIDE 6.25; 15 MG/5ML; MG/5ML
2.5 SYRUP ORAL
Qty: 240 ML | Refills: 0 | Status: SHIPPED | OUTPATIENT
Start: 2019-01-08 | End: 2019-01-15

## 2019-01-08 NOTE — LETTER
NOTIFICATION RETURN TO WORK / SCHOOL 
 
1/8/2019 3:55 PM 
 
Ms. Thad Cheney [address] [place] [address] To Whom It May Concern: 
 
Thad Cheney is currently under the care of Marleni N Trell Bueno. She will return to work/school on: 01/14/18. If there are questions or concerns please have the patient contact our office. Sincerely, Jaiden Terry MD

## 2019-01-08 NOTE — PROGRESS NOTES
Shawna Pedroza is a 59 y.o. female and presents with Cold Symptoms  . Subjective:    Upper respiratory infection Review:  Shawna Pedroza is a 59 y.o. female who complains of  productive cough, myalgias, tactile fever  for the past 2 days. Pt works at a  and is exposed to a lot of ill children  Pt has no documented fever. Pt cannot get flu vaccines due to allergic rxn (\"shock\"). Pt describes her sputum as \"grey\". No chest pain  She denies a history of shortness of breath. Evaluation to date: none. Treatment to date: OTC products.   Relevant PMH: DM            Review of Systems  Constitutional: negative for fevers, chills, anorexia and weight loss  Respiratory:  negative for cough, hemoptysis, dyspnea,wheezing  CV:   negative for chest pain, palpitations, lower extremity edema  GI:   negative for nausea, vomiting, diarrhea, abdominal pain,melena  Musculoskel: positive for myalgias, arthralgias, back pain, joint pain  Neurological:  negative for headaches, dizziness, vertigo, memory problems and gait   Behavl/Psych: negative for feelings of anxiety, depression, mood changes    Past Medical History:   Diagnosis Date    Acute pancreatitis 8/9/2012    Acute pancreatitis 1/21/2011    Arthritis     both knees    Chronic pain     knees    Delivery normal     x1    Diabetes ([address]     DJD (degenerative joint disease) of knee     Fatty liver 1/22/2011    GERD (gastroesophageal reflux disease)     Hypertension     Hypothyroidism     Obesity     PUD (peptic ulcer disease)     UTI (urinary tract infection) 1/21/2011     Past Surgical History:   Procedure Laterality Date    HX BREAST BIOPSY Left     Surgical Bx 1990 - BENIGN    HX BREAST LUMPECTOMY      left breast    HX GYN      hysterectomy    HX ORTHOPAEDIC      left total knee replacement    HX ORTHOPAEDIC  6/16/15    RIGHT TOTAL KNEE ARTHROPLASTY     HX PARATHYROIDECTOMY  01/27/11     Social History     Socioeconomic History    Marital status:      Spouse name: Not on file    Number of children: Not on file    Years of education: Not on file    Highest education level: Not on file   Tobacco Use    Smoking status: Former Smoker     Packs/day: 0.25     Years: 25.00     Pack years: 6.25     Types: Cigarettes     Last attempt to quit: 1/24/2008     Years since quitting: 10.9    Smokeless tobacco: Never Used   Substance and Sexual Activity    Alcohol use: No     Alcohol/week: 0.0 oz    Drug use: No    Sexual activity: No     Family History   Problem Relation Age of Onset    Cancer Mother     Hypertension Father     Diabetes Sister     Hypertension Sister     Diabetes Brother     Hypertension Brother      Current Outpatient Medications   Medication Sig Dispense Refill    promethazine-dextromethorphan (PROMETHAZINE-DM) 6.25-15 mg/5 mL syrup Take 2.5 mL by mouth four (4) times daily as needed for Cough for up to 7 days. 240 mL 0    azithromycin (ZITHROMAX) 250 mg tablet Take 2 tablets today, then take 1 tablet daily 6 Tab 0    atorvastatin (LIPITOR) 20 mg tablet Take 1 Tab by mouth nightly. TAKE 1 TABLET BY MOUTH DAILY for cholesterol 90 Tab 0    zolpidem (AMBIEN) 5 mg tablet TAKE 1 TABLET BY MOUTH NIGHTLY AS NEEDED FOR SLEEP. MAXIMUM DAILY AMOUNT IS 5 MG( 1 TABLET). THIS IS AN AS NEEDED MEDICATION ONLY 30 Tab 0    escitalopram oxalate (LEXAPRO) 20 mg tablet TAKE 1 TABLET BY MOUTH EVERY DAY 90 Tab 1    glucose blood VI test strips (BLOOD GLUCOSE TEST) strip Use BID DxE11.9 200 Strip 11    amLODIPine (NORVASC) 10 mg tablet TAKE 1 TABLET BY MOUTH DAILY 90 Tab 2    valsartan-hydroCHLOROthiazide (DIOVAN-HCT) 320-25 mg per tablet Take 1 Tab by mouth daily. 60 Tab 3    glimepiride (AMARYL) 4 mg tablet TAKE 1 TABLET BY MOUTH TWICE DAILY 180 Tab 2    metFORMIN (GLUCOPHAGE) 1,000 mg tablet TAKE 1 TABLET BY MOUTH TWICE DAILY WITH MEALS 180 Tab 2    traMADol (ULTRAM) 50 mg tablet Take 1 Tab by mouth every eight (8) hours as needed for Pain. Max Daily Amount: 150 mg. Indications: Pain 90 Tab 1    metoprolol succinate (TOPROL-XL) 100 mg tablet TAKE 1 TABLET BY MOUTH DAILY FOR HIGH BLOOD PRESSURE 90 Tab 3    fluticasone (FLONASE) 50 mcg/actuation nasal spray 2 Sprays by Both Nostrils route daily. Allergies   Allergen Reactions    Influenza Virus Vaccine, Specific Other (comments)     States body got very hot    Aspirin Other (comments)     Pt reports having a h/o gastric ulcers. Pt was on IBU when she was dx'd. Objective:  Visit Vitals  /85 (BP 1 Location: Left arm, BP Patient Position: Sitting)   Pulse 96   Temp 100.4 °F (38 °C) (Oral)   Resp 19   Ht 5' 11\" (1.803 m)   Wt 261 lb (118.4 kg)   SpO2 94%   BMI 36.40 kg/m²     Physical Exam:   General appearance - alert, obese. Does not appear ill. Not toxic-appearing  Mental status - alert, oriented to person, place, and time  EYE-EOMI  Neck - supple,   Chest - coarse upper airway transmission throughout  CVS-reg + S4 + 2/6 systolic murmur  Abdomen - obese  Ext-no pedal edema, no clubbing or cyanosis  Skin-Warm and dry.  no hyperpigmentation, vitiligo, or suspicious lesions  Neuro -alert, oriented, normal speech, no focal findings or movement disorder noted        Results for orders placed or performed in visit on 08/14/18   LIPID PANEL   Result Value Ref Range    Cholesterol, total 97 (L) 100 - 199 mg/dL    Triglyceride 184 (H) 0 - 149 mg/dL    HDL Cholesterol 34 (L) >39 mg/dL    VLDL, calculated 37 5 - 40 mg/dL    LDL, calculated 26 0 - 99 mg/dL   METABOLIC PANEL, COMPREHENSIVE   Result Value Ref Range    Glucose 293 (H) 65 - 99 mg/dL    BUN 9 8 - 27 mg/dL    Creatinine 0.59 0.57 - 1.00 mg/dL    GFR est non- >59 mL/min/1.73    GFR est  >59 mL/min/1.73    BUN/Creatinine ratio 15 12 - 28    Sodium 139 134 - 144 mmol/L    Potassium 4.6 3.5 - 5.2 mmol/L    Chloride 93 (L) 96 - 106 mmol/L    CO2 29 20 - 29 mmol/L    Calcium 10.3 8.7 - 10.3 mg/dL    Protein, total 7.3 6.0 - 8.5 g/dL    Albumin 5.0 (H) 3.6 - 4.8 g/dL    GLOBULIN, TOTAL 2.3 1.5 - 4.5 g/dL    A-G Ratio 2.2 1.2 - 2.2    Bilirubin, total 0.3 0.0 - 1.2 mg/dL    Alk. phosphatase 76 39 - 117 IU/L    AST (SGOT) 20 0 - 40 IU/L    ALT (SGPT) 41 (H) 0 - 32 IU/L   HEMOGLOBIN A1C WITH EAG   Result Value Ref Range    Hemoglobin A1c 11.4 (H) 4.8 - 5.6 %    Estimated average glucose 280 mg/dL   VITAMIN D, 25 HYDROXY   Result Value Ref Range    VITAMIN D, 25-HYDROXY 34.5 30.0 - 100.0 ng/mL   CVD REPORT   Result Value Ref Range    INTERPRETATION Note    DIABETES PATIENT EDUCATION   Result Value Ref Range    PDF Image Not applicable        Assessment/Plan:    ICD-10-CM ICD-9-CM    1. Cough productive of purulent sputum R05 786.2 XR CHEST PA LAT      promethazine-dextromethorphan (PROMETHAZINE-DM) 6.25-15 mg/5 mL syrup      azithromycin (ZITHROMAX) 250 mg tablet     Orders Placed This Encounter    XR CHEST PA LAT     Standing Status:   Future     Standing Expiration Date:   2/8/2019     Scheduling Instructions:      CLINIC WILL SCHEDULE     Order Specific Question:   Reason for Exam     Answer:   cough, fever     Order Specific Question:   Is Patient Allergic to Contrast Dye? Answer:   No    promethazine-dextromethorphan (PROMETHAZINE-DM) 6.25-15 mg/5 mL syrup     Sig: Take 2.5 mL by mouth four (4) times daily as needed for Cough for up to 7 days. Dispense:  240 mL     Refill:  0    azithromycin (ZITHROMAX) 250 mg tablet     Sig: Take 2 tablets today, then take 1 tablet daily     Dispense:  6 Tab     Refill:  0     1. Cough productive of purulent sputum  URI vs influenza vs pneumonia vs sinusitis. .. Go to ED if acutely worsen  - XR CHEST PA LAT; Future  - promethazine-dextromethorphan (PROMETHAZINE-DM) 6.25-15 mg/5 mL syrup; Take 2.5 mL by mouth four (4) times daily as needed for Cough for up to 7 days. Dispense: 240 mL; Refill: 0  - azithromycin (ZITHROMAX) 250 mg tablet;  Take 2 tablets today, then take 1 tablet daily  Dispense: 6 Tab; Refill: 0      There are no Patient Instructions on file for this visit. Follow-up Disposition:  Return if symptoms worsen or fail to improve. I have reviewed with the patient details of the assessment and plan and all questions were answered. Relevent patient education was performed. The most recent lab findings were reviewed with the patient. An After Visit Summary was printed and given to the patient.

## 2019-01-08 NOTE — PROGRESS NOTES
Chief Complaint   Patient presents with    Cold Symptoms     1. Have you been to the ER, urgent care clinic since your last visit? Hospitalized since your last visit? No    2. Have you seen or consulted any other health care providers outside of the [address] [place] [address] since your last visit? Include any pap smears or colon screening.  No

## 2019-01-09 ENCOUNTER — HOSPITAL ENCOUNTER (OUTPATIENT)
Dept: GENERAL RADIOLOGY | Age: 59
Discharge: HOME | End: 2019-01-09
Payer: COMMERCIAL

## 2019-01-09 DIAGNOSIS — R05.8 COUGH PRODUCTIVE OF PURULENT SPUTUM: ICD-10-CM

## 2019-01-09 DIAGNOSIS — F51.01 PRIMARY INSOMNIA: ICD-10-CM

## 2019-01-09 PROCEDURE — 71046 X-RAY EXAM CHEST 2 VIEWS: CPT

## 2019-01-10 ENCOUNTER — TELEPHONE (OUTPATIENT)
Dept: INTERNAL MEDICINE CLINIC | Age: 59
End: 2019-01-10

## 2019-01-10 RX ORDER — METFORMIN HYDROCHLORIDE 1000 MG/1
TABLET ORAL
Qty: 180 TAB | Refills: 2 | Status: SHIPPED | OUTPATIENT
Start: 2019-01-10 | End: 2019-10-23 | Stop reason: SDUPTHER

## 2019-01-10 RX ORDER — ZOLPIDEM TARTRATE 5 MG/1
TABLET ORAL
Qty: 30 TAB | Refills: 0 | Status: SHIPPED | OUTPATIENT
Start: 2019-01-10 | End: 2019-04-08 | Stop reason: SDUPTHER

## 2019-01-10 RX ORDER — AMOXICILLIN AND CLAVULANATE POTASSIUM 875; 125 MG/1; MG/1
1 TABLET, FILM COATED ORAL EVERY 12 HOURS
Qty: 14 TAB | Refills: 0 | Status: SHIPPED | OUTPATIENT
Start: 2019-01-10 | End: 2019-02-05 | Stop reason: ALTCHOICE

## 2019-01-10 NOTE — TELEPHONE ENCOUNTER
Pt called, two pt identifier verified. Writer related CXR results negative as per provider. Pt verbalized understanding with no further questions.

## 2019-01-10 NOTE — TELEPHONE ENCOUNTER
----- Message from Carmina Zapien MD sent at 1/9/2019  1:49 PM EST -----  pls call her CXR negative--no pneumonia  ----- Message -----  From: Rai Rad Results In  Sent: 1/9/2019   1:19 PM  To: Carmina Zapien MD

## 2019-02-05 ENCOUNTER — OFFICE VISIT (OUTPATIENT)
Dept: INTERNAL MEDICINE CLINIC | Age: 59
End: 2019-02-05

## 2019-02-05 VITALS
TEMPERATURE: 100.2 F | BODY MASS INDEX: 36.54 KG/M2 | SYSTOLIC BLOOD PRESSURE: 120 MMHG | HEIGHT: 71 IN | OXYGEN SATURATION: 90 % | HEART RATE: 86 BPM | RESPIRATION RATE: 19 BRPM | DIASTOLIC BLOOD PRESSURE: 60 MMHG | WEIGHT: 261 LBS

## 2019-02-05 DIAGNOSIS — J11.1 INFLUENZA: Primary | ICD-10-CM

## 2019-02-05 LAB
QUICKVUE INFLUENZA TEST: POSITIVE
VALID INTERNAL CONTROL?: YES

## 2019-02-05 RX ORDER — OSELTAMIVIR PHOSPHATE 75 MG/1
75 CAPSULE ORAL DAILY
Qty: 5 CAP | Refills: 0 | Status: SHIPPED | OUTPATIENT
Start: 2019-02-05 | End: 2019-02-10

## 2019-02-05 NOTE — LETTER
NOTIFICATION RETURN TO WORK / SCHOOL 
 
2/5/2019 3:38 PM 
 
Ms. Yoan Delong [address] [facility] [address] To Whom It May Concern: 
 
Yoan Delong is currently under the care of Marleni N Trell Bueno. She will return to work/school on: 2/11/19. If there are questions or concerns please have the patient contact our office. Sincerely, Valery Russell MD

## 2019-02-05 NOTE — PROGRESS NOTES
Keeley Cerna is a 59 y.o. female and presents with Cold Symptoms (x 3 days) Lawanda Sotelo Subjective: 
 
Upper respiratory infection Review: 
Keeley Cerna is a 59 y.o. female who complains of dry cough, myalgias, fever 103.4  for the past4 days. Pt works at a  and is exposed to a lot of ill children Pt has no documented fever. Pt cannot get flu vaccines due to allergic rxn (\"shock\"). No chest pain She denies a history of shortness of breath. Evaluation to date: none. Treatment to date: OTC products. Relevant PMH: DM 
 
Rapid flu + A Review of Systems Constitutional: negative for fevers, chills, anorexia and weight loss Respiratory:  negative for cough, hemoptysis, dyspnea,wheezing CV:   negative for chest pain, palpitations, lower extremity edema GI:   negative for nausea, vomiting, diarrhea, abdominal pain,melena Musculoskel: positive for myalgias, arthralgias, back pain, joint pain Neurological:  negative for headaches, dizziness, vertigo, memory problems and gait Behavl/Psych: negative for feelings of anxiety, depression, mood changes Past Medical History:  
Diagnosis Date  Acute pancreatitis 8/9/2012  Acute pancreatitis 1/21/2011  Arthritis   
 both knees  Chronic pain   
 knees  Delivery normal   
 x1  
 Diabetes ([facility] [address]  DJD (degenerative joint disease) of knee  Fatty liver 1/22/2011  GERD (gastroesophageal reflux disease)  Hypertension  Hypothyroidism  Obesity  PUD (peptic ulcer disease)  UTI (urinary tract infection) 1/21/2011 Past Surgical History:  
Procedure Laterality Date  HX BREAST BIOPSY Left Surgical Bx 1990 - BENIGN  
 HX BREAST LUMPECTOMY    
 left breast  
 HX GYN    
 hysterectomy  HX ORTHOPAEDIC    
 left total knee replacement  HX ORTHOPAEDIC  6/16/15 RIGHT TOTAL KNEE ARTHROPLASTY  HX PARATHYROIDECTOMY  01/27/11 Social History Socioeconomic History  Marital status:   
 Spouse name: Not on file  Number of children: Not on file  Years of education: Not on file  Highest education level: Not on file Tobacco Use  Smoking status: Former Smoker Packs/day: 0.25 Years: 25.00 Pack years: 6.25 Types: Cigarettes Last attempt to quit: 2008 Years since quittin.0  Smokeless tobacco: Never Used Substance and Sexual Activity  Alcohol use: No  
  Alcohol/week: 0.0 oz  Drug use: No  
 Sexual activity: No  
 
Family History Problem Relation Age of Onset  Cancer Mother  Hypertension Father  Diabetes Sister  Hypertension Sister  Diabetes Brother  Hypertension Brother Current Outpatient Medications Medication Sig Dispense Refill  oseltamivir (TAMIFLU) 75 mg capsule Take 1 Cap by mouth daily for 5 days. 5 Cap 0  
 zolpidem (AMBIEN) 5 mg tablet TAKE 1 TABLET BY MOUTH NIGHTLY AS NEEDED FOR SLEEP. MAXIMUM DAILY AMOUNT IS 5 MG( 1 TABLET). THIS IS AN AS NEEDED MEDICATION ONLY 30 Tab 0  
 metFORMIN (GLUCOPHAGE) 1,000 mg tablet TAKE 1 TABLET BY MOUTH TWICE DAILY WITH MEALS 180 Tab 2  
 atorvastatin (LIPITOR) 20 mg tablet Take 1 Tab by mouth nightly. TAKE 1 TABLET BY MOUTH DAILY for cholesterol 90 Tab 0  
 escitalopram oxalate (LEXAPRO) 20 mg tablet TAKE 1 TABLET BY MOUTH EVERY DAY 90 Tab 1  
 glucose blood VI test strips (BLOOD GLUCOSE TEST) strip Use BID DxE11.9 200 Strip 11  
 amLODIPine (NORVASC) 10 mg tablet TAKE 1 TABLET BY MOUTH DAILY 90 Tab 2  
 valsartan-hydroCHLOROthiazide (DIOVAN-HCT) 320-25 mg per tablet Take 1 Tab by mouth daily. 60 Tab 3  
 glimepiride (AMARYL) 4 mg tablet TAKE 1 TABLET BY MOUTH TWICE DAILY 180 Tab 2  
 traMADol (ULTRAM) 50 mg tablet Take 1 Tab by mouth every eight (8) hours as needed for Pain. Max Daily Amount: 150 mg.  Indications: Pain 90 Tab 1  
 metoprolol succinate (TOPROL-XL) 100 mg tablet TAKE 1 TABLET BY MOUTH DAILY FOR HIGH BLOOD PRESSURE 90 Tab 3  
  fluticasone (FLONASE) 50 mcg/actuation nasal spray 2 Sprays by Both Nostrils route daily. Allergies Allergen Reactions  Influenza Virus Vaccine, Specific Other (comments) States body got very hot  Aspirin Other (comments) Pt reports having a h/o gastric ulcers. Pt was on IBU when she was dx'd. Objective: 
Visit Vitals /60 (BP 1 Location: Left arm, BP Patient Position: Sitting) Pulse 86 Temp 100.2 °F (37.9 °C) (Oral) Resp 19 Ht 5' 11\" (1.803 m) Wt 261 lb (118.4 kg) SpO2 90% BMI 36.40 kg/m² Physical Exam:  
General appearance - alert, obese. Does not appear ill. Not toxic-appearing Mental status - alert, oriented to person, place, and time EYE-EOMI Neck - supple, Chest - coarse upper airway transmission throughout CVS-reg + S4 + 2/6 systolic murmur Abdomen - obese Ext-no pedal edema, no clubbing or cyanosis Skin-Warm and dry. no hyperpigmentation, vitiligo, or suspicious lesions Neuro -alert, oriented, normal speech, no focal findings or movement disorder noted Results for orders placed or performed in visit on 02/05/19 AMB POC RAPID INFLUENZA TEST Result Value Ref Range VALID INTERNAL CONTROL POC Yes Quick[employer] Influenza test Positive Negative Assessment/Plan: ICD-10-CM ICD-9-CM 1. Influenza J11.1 487.1 oseltamivir (TAMIFLU) 75 mg capsule AMB POC RAPID INFLUENZA TEST Orders Placed This Encounter  AMB POC RAPID INFLUENZA TEST  oseltamivir (TAMIFLU) 75 mg capsule Sig: Take 1 Cap by mouth daily for 5 days. Dispense:  5 Cap Refill:  0  
 
1. Influenza Rest/increase PO fluids/otc antipyretics prn 
F/u if symptoms >10 days or worsening Go to ED if ACUTELY worsen 
 
- oseltamivir (TAMIFLU) 75 mg capsule; Take 1 Cap by mouth daily for 5 days. Dispense: 5 Cap; Refill: 0 
- AMB POC RAPID INFLUENZA TEST There are no Patient Instructions on file for this visit. Follow-up Disposition: Return if symptoms worsen or fail to improve. I have reviewed with the patient details of the assessment and plan and all questions were answered. Relevent patient education was performed. The most recent lab findings were reviewed with the patient. An After Visit Summary was printed and given to the patient.

## 2019-02-05 NOTE — PROGRESS NOTES
Chief Complaint Patient presents with  Cold Symptoms  
  x 3 days 1. Have you been to the ER, urgent care clinic since your last visit? Hospitalized since your last visit? No 
 
2. Have you seen or consulted any other health care providers outside of the [facility] since your last visit? Include any pap smears or colon screening.  No

## 2019-02-11 ENCOUNTER — TELEPHONE (OUTPATIENT)
Dept: INTERNAL MEDICINE CLINIC | Age: 59
End: 2019-02-11

## 2019-02-11 NOTE — TELEPHONE ENCOUNTER
Pt states she completed all of the medications you gave her for the flu. She is still coughing and has chest congestion. She did not go to work today. What should she do?  Pt # [phone]

## 2019-03-07 DIAGNOSIS — E78.00 PURE HYPERCHOLESTEROLEMIA: Primary | ICD-10-CM

## 2019-03-07 RX ORDER — ATORVASTATIN CALCIUM 20 MG/1
20 TABLET, FILM COATED ORAL
Qty: 90 TAB | Refills: 1 | Status: SHIPPED | OUTPATIENT
Start: 2019-03-07 | End: 2019-09-18 | Stop reason: SDUPTHER

## 2019-04-08 ENCOUNTER — OFFICE VISIT (OUTPATIENT)
Dept: INTERNAL MEDICINE CLINIC | Age: 59
End: 2019-04-08

## 2019-04-08 ENCOUNTER — TELEPHONE (OUTPATIENT)
Dept: INTERNAL MEDICINE CLINIC | Age: 59
End: 2019-04-08

## 2019-04-08 VITALS
HEIGHT: 71 IN | HEART RATE: 67 BPM | DIASTOLIC BLOOD PRESSURE: 72 MMHG | RESPIRATION RATE: 19 BRPM | SYSTOLIC BLOOD PRESSURE: 134 MMHG | OXYGEN SATURATION: 97 % | BODY MASS INDEX: 36.26 KG/M2 | TEMPERATURE: 99.1 F | WEIGHT: 259 LBS

## 2019-04-08 DIAGNOSIS — E66.01 MORBID OBESITY (HCC): ICD-10-CM

## 2019-04-08 DIAGNOSIS — E11.40 TYPE 2 DIABETES MELLITUS WITH DIABETIC NEUROPATHY, WITHOUT LONG-TERM CURRENT USE OF INSULIN (HCC): Primary | ICD-10-CM

## 2019-04-08 DIAGNOSIS — Z79.891 CHRONIC PRESCRIPTION OPIATE USE: ICD-10-CM

## 2019-04-08 DIAGNOSIS — K76.0 FATTY LIVER: ICD-10-CM

## 2019-04-08 DIAGNOSIS — F51.04 CHRONIC INSOMNIA: ICD-10-CM

## 2019-04-08 DIAGNOSIS — I10 ESSENTIAL HYPERTENSION: ICD-10-CM

## 2019-04-08 DIAGNOSIS — M47.22 OSTEOARTHRITIS OF SPINE WITH RADICULOPATHY, CERVICAL REGION: ICD-10-CM

## 2019-04-08 DIAGNOSIS — M54.16 LUMBAR RADICULOPATHY: ICD-10-CM

## 2019-04-08 DIAGNOSIS — E78.00 PURE HYPERCHOLESTEROLEMIA: ICD-10-CM

## 2019-04-08 LAB
GLUCOSE POC: 338 MG/DL
HBA1C MFR BLD HPLC: 10.4 %

## 2019-04-08 RX ORDER — ZOLPIDEM TARTRATE 5 MG/1
TABLET ORAL
Qty: 30 TAB | Refills: 2 | Status: SHIPPED | OUTPATIENT
Start: 2019-04-08 | End: 2019-06-20 | Stop reason: SDUPTHER

## 2019-04-08 NOTE — TELEPHONE ENCOUNTER
Two patient identifier confirmed  Called and spoke w/ pt and scheduled pt for f/u DM and A1c appt on 04/08/19.

## 2019-04-08 NOTE — PROGRESS NOTES
Antonieta Fields is a 62 y.o. female and presents with Diabetes  . Subjective:    Hypertension Review:  The patient has essential hypertension  Diet and Lifestyle: generally follows a  low sodium diet, exercises never  Home BP Monitoring: is not measured at home. Pertinent ROS: taking medications as instructed, no medication side effects noted, no TIA's, no chest pain on exertion, no dyspnea on exertion, no swelling of ankles. BP Readings from Last 3 Encounters:   04/08/19 134/72   02/05/19 120/60   01/08/19 147/85     Type 2 DM-on metformin and glimerpiride   -pt admits to dietary indiscretions and elevated blood sugars   -pt relays she had a death in her family and has not been eating well   -  Lab Results   Component Value Date/Time    Hemoglobin A1c 11.4 (H) 08/14/2018 01:14 PM    Hemoglobin A1c (POC) 10.4 04/08/2019 03:49 PM     Hyperlipidemia-on atorvastatin 20mg   -  Lab Results   Component Value Date/Time    Cholesterol, total 97 (L) 08/14/2018 01:14 PM    Cholesterol (POC) >100 05/12/2017 03:28 PM    HDL Cholesterol 34 (L) 08/14/2018 01:14 PM    HDL Cholesterol (POC) 31 05/12/2017 03:28 PM    LDL Cholesterol (POC) n/a 05/12/2017 03:28 PM    LDL, calculated 26 08/14/2018 01:14 PM    VLDL, calculated 37 08/14/2018 01:14 PM    Triglyceride 184 (H) 08/14/2018 01:14 PM    Triglycerides (POC) 212 05/12/2017 03:28 PM    CHOL/HDL Ratio 4.6 05/08/2013 11:45 AM       Morbid obesity-  Wt Readings from Last 3 Encounters:   04/08/19 259 lb (117.5 kg)   02/05/19 261 lb (118.4 kg)   01/08/19 261 lb (118.4 kg)     Chronic Pain-pt is seeing Pain Mngmnt and is on a regimen of gabapentin,       Chronic insomnia-as per pts , she was getting her zolpidem from her pain mngmnt provider (and 10mg, which is a higher dose than I was prescribing). Last rx 2/19. Pt relays she only asked him for the rx bc she couldn't get it here bc I was on vacation.  A)I didn't take any vacation in Feb B)We have providers IN OFFICE that cover me whilst I am on vacay.     Review of Systems  Constitutional: negative for fevers, chills, anorexia and weight loss  Respiratory:  negative for cough, hemoptysis, dyspnea,wheezing  CV:   negative for chest pain, palpitations, lower extremity edema  GI:   negative for nausea, vomiting, diarrhea, abdominal pain,melena  Musculoskel: positive for myalgias, arthralgias, back pain, joint pain  Neurological:  negative for headaches, dizziness, vertigo, memory problems and gait   Behavl/Psych: negative for feelings of anxiety, depression, mood changes    Past Medical History:   Diagnosis Date    Acute pancreatitis 2012    Acute pancreatitis 2011    Arthritis     both knees    Chronic pain     knees    Delivery normal     x1    Diabetes ([facility] [address]     DJD (degenerative joint disease) of knee     Fatty liver 2011    GERD (gastroesophageal reflux disease)     Hypertension     Hypothyroidism     Obesity     PUD (peptic ulcer disease)     UTI (urinary tract infection) 2011     Past Surgical History:   Procedure Laterality Date    HX BREAST BIOPSY Left     Surgical Bx  - BENIGN    HX BREAST LUMPECTOMY      left breast    HX GYN      hysterectomy    HX ORTHOPAEDIC      left total knee replacement    HX ORTHOPAEDIC  6/16/15    RIGHT TOTAL KNEE ARTHROPLASTY     HX PARATHYROIDECTOMY  11     Social History     Socioeconomic History    Marital status:      Spouse name: Not on file    Number of children: Not on file    Years of education: Not on file    Highest education level: Not on file   Tobacco Use    Smoking status: Former Smoker     Packs/day: 0.25     Years: 25.00     Pack years: 6.25     Types: Cigarettes     Last attempt to quit: 2008     Years since quittin.2    Smokeless tobacco: Never Used   Substance and Sexual Activity    Alcohol use: No     Alcohol/week: 0.0 oz    Drug use: No    Sexual activity: Never     Family History   Problem Relation Age of Onset  Cancer Mother     Hypertension Father     Diabetes Sister     Hypertension Sister     Diabetes Brother     Hypertension Brother      Current Outpatient Medications   Medication Sig Dispense Refill    SITagliptin (JANUVIA) 100 mg tablet Take 1 Tab by mouth daily. 90 Tab 1    zolpidem (AMBIEN) 5 mg tablet TAKE 1 TABLET BY MOUTH NIGHTLY AS NEEDED FOR SLEEP. MAXIMUM DAILY AMOUNT IS 5 MG( 1 TABLET). THIS IS AN AS NEEDED MEDICATION ONLY 30 Tab 2    metoprolol succinate (TOPROL-XL) 100 mg tablet TAKE 1 TABLET BY MOUTH DAILY FOR HIGH BLOOD PRESSURE 90 Tab 1    atorvastatin (LIPITOR) 20 mg tablet Take 1 Tab by mouth nightly. TAKE 1 TABLET BY MOUTH DAILY for cholesterol 90 Tab 1    valsartan-hydroCHLOROthiazide (DIOVAN-HCT) 320-25 mg per tablet TAKE 1 TABLET BY MOUTH DAILY 90 Tab 1    metFORMIN (GLUCOPHAGE) 1,000 mg tablet TAKE 1 TABLET BY MOUTH TWICE DAILY WITH MEALS 180 Tab 2    escitalopram oxalate (LEXAPRO) 20 mg tablet TAKE 1 TABLET BY MOUTH EVERY DAY 90 Tab 1    glucose blood VI test strips (BLOOD GLUCOSE TEST) strip Use BID DxE11.9 200 Strip 11    amLODIPine (NORVASC) 10 mg tablet TAKE 1 TABLET BY MOUTH DAILY 90 Tab 2    traMADol (ULTRAM) 50 mg tablet Take 1 Tab by mouth every eight (8) hours as needed for Pain. Max Daily Amount: 150 mg. Indications: Pain 90 Tab 1    fluticasone (FLONASE) 50 mcg/actuation nasal spray 2 Sprays by Both Nostrils route daily.  XTAMPZA ER 9 mg capsule Take 9 mg by mouth every twelve (12) hours. 0     Allergies   Allergen Reactions    Influenza Virus Vaccine, Specific Other (comments)     States body got very hot    Aspirin Other (comments)     Pt reports having a h/o gastric ulcers. Pt was on IBU when she was dx'd.        Objective:  Visit Vitals  /72 (BP 1 Location: Left arm, BP Patient Position: Sitting)   Pulse 67   Temp 99.1 °F (37.3 °C) (Oral)   Resp 19   Ht 5' 11\" (1.803 m)   Wt 259 lb (117.5 kg)   SpO2 97%   BMI 36.12 kg/m²     Physical Exam: General appearance - alert, obese  Mental status - alert, oriented to person, place, and time  EYE-EOMI  Neck - supple,   Chest - symmetric air entry    CVS-reg + S4 + 2/6 systolic murmur  Abdomen - obese  Ext-no pedal edema, no clubbing or cyanosis  Skin-Warm and dry. no hyperpigmentation, vitiligo, or suspicious lesions  Neuro -alert, oriented, normal speech, no focal findings or movement disorder noted        Results for orders placed or performed in visit on 04/08/19   AMB POC GLUCOSE BLOOD, BY GLUCOSE MONITORING DEVICE   Result Value Ref Range    Glucose  mg/dL   AMB POC HEMOGLOBIN A1C   Result Value Ref Range    Hemoglobin A1c (POC) 10.4 %       Assessment/Plan:    ICD-10-CM ICD-9-CM    1. Type 2 diabetes mellitus with diabetic neuropathy, without long-term current use of insulin ([facility]) E11.40 250.60 AMB POC GLUCOSE BLOOD, BY GLUCOSE MONITORING DEVICE     357.2 AMB POC HEMOGLOBIN A1C      REFERRAL TO DIABETIC EDUCATION      SITagliptin (JANUVIA) 100 mg tablet   2. Chronic insomnia F51.04 780.52 zolpidem (AMBIEN) 5 mg tablet   3. Pure hypercholesterolemia E78.00 272.0    4. Essential hypertension I10 401.9    5. Morbid obesity ([facility] [address] E66.01 278.01    6. Fatty liver K76.0 571.8    7. Osteoarthritis of spine with radiculopathy, cervical region M47.22 721.0    8. Lumbar radiculopathy M54.16 724.4    9. Chronic prescription opiate use Z79.891 V58.69      Orders Placed This Encounter    REFERRAL TO DIABETIC EDUCATION     Referral Priority:   Routine     Referral Type:   Consultation     Referral Reason:   Specialty Services Required     Number of Visits Requested:   1    AMB POC GLUCOSE BLOOD, BY GLUCOSE MONITORING DEVICE    AMB POC HEMOGLOBIN A1C    SITagliptin (JANUVIA) 100 mg tablet     Sig: Take 1 Tab by mouth daily. Dispense:  90 Tab     Refill:  1     Takes the place of glimepiride    zolpidem (AMBIEN) 5 mg tablet     Sig: TAKE 1 TABLET BY MOUTH NIGHTLY AS NEEDED FOR SLEEP.  MAXIMUM DAILY AMOUNT IS 5 MG( 1 TABLET). THIS IS AN AS NEEDED MEDICATION ONLY     Dispense:  30 Tab     Refill:  2    XTAMPZA ER 9 mg capsule     Sig: Take 9 mg by mouth every twelve (12) hours. Refill:  0   1. Type 2 diabetes mellitus with hyperosmolarity without coma, with long-term current use of insulin (HCC)  D/c glimerpiride  Add [place] and [place]  D/w pt add victoza NV if A1c>8%  - LIPID PANEL  - METABOLIC PANEL, COMPREHENSIVE  - HEMOGLOBIN A1C WITH EAG    2. Vitamin D deficiency  Recheck  - VITAMIN D, 25 HYDROXY; Future    3. Chronic prescription opiate use  F/u Pain Mngmnt  Cont xtampza and tramadol    4. Osteoarthritis of spine with radiculopathy, cervical region  As above    5. Lumbar radiculopathy  As above    6. Primary osteoarthritis of both knees  As above    7. Essential hypertension  Controlled on current mngmnt    8. Pure hypercholesterolemia  Controlled on statin    9. Morbid obesity ([address]  Noted      There are no Patient Instructions on file for this visit. Follow-up and Dispositions    · Return in about 3 months (around 7/8/2019) for DM f/u. I have reviewed with the patient details of the assessment and plan and all questions were answered. Relevent patient education was performed. The most recent lab findings were reviewed with the patient. An After Visit Summary was printed and given to the patient.

## 2019-04-08 NOTE — PROGRESS NOTES
Chief Complaint   Patient presents with    Diabetes     1. Have you been to the ER, urgent care clinic since your last visit? Hospitalized since your last visit? No    2. Have you seen or consulted any other health care providers outside of the [address] [place] [address] since your last visit? Include any pap smears or colon screening.  No      [facility] A1[facility] and Glucose Dr. Adalid Ferrer LPN

## 2019-04-09 PROBLEM — E66.01 MORBID OBESITY (HCC): Status: ACTIVE | Noted: 2019-04-09

## 2019-04-09 RX ORDER — OXYCODONE 9 MG/1
9 CAPSULE, EXTENDED RELEASE ORAL EVERY 12 HOURS
Refills: 0 | COMMUNITY
Start: 2019-03-12 | End: 2022-07-19

## 2019-09-19 ENCOUNTER — OFFICE VISIT (OUTPATIENT)
Dept: INTERNAL MEDICINE CLINIC | Age: 59
End: 2019-09-19

## 2019-09-19 VITALS
HEIGHT: 71 IN | WEIGHT: 267 LBS | DIASTOLIC BLOOD PRESSURE: 65 MMHG | HEART RATE: 75 BPM | BODY MASS INDEX: 37.38 KG/M2 | TEMPERATURE: 98.6 F | SYSTOLIC BLOOD PRESSURE: 134 MMHG | RESPIRATION RATE: 19 BRPM | OXYGEN SATURATION: 96 %

## 2019-09-19 DIAGNOSIS — N30.00 ACUTE CYSTITIS WITHOUT HEMATURIA: ICD-10-CM

## 2019-09-19 DIAGNOSIS — I10 ESSENTIAL HYPERTENSION: ICD-10-CM

## 2019-09-19 DIAGNOSIS — E78.00 PURE HYPERCHOLESTEROLEMIA: ICD-10-CM

## 2019-09-19 DIAGNOSIS — Z79.891 CHRONIC PRESCRIPTION OPIATE USE: ICD-10-CM

## 2019-09-19 DIAGNOSIS — Z79.4 TYPE 2 DIABETES MELLITUS WITH HYPEROSMOLARITY WITHOUT COMA, WITH LONG-TERM CURRENT USE OF INSULIN (HCC): Primary | ICD-10-CM

## 2019-09-19 DIAGNOSIS — E66.01 MORBID OBESITY (HCC): ICD-10-CM

## 2019-09-19 DIAGNOSIS — M47.22 OSTEOARTHRITIS OF SPINE WITH RADICULOPATHY, CERVICAL REGION: ICD-10-CM

## 2019-09-19 DIAGNOSIS — F51.04 CHRONIC INSOMNIA: ICD-10-CM

## 2019-09-19 DIAGNOSIS — M54.16 LUMBAR RADICULOPATHY: ICD-10-CM

## 2019-09-19 DIAGNOSIS — E11.00 TYPE 2 DIABETES MELLITUS WITH HYPEROSMOLARITY WITHOUT COMA, WITH LONG-TERM CURRENT USE OF INSULIN (HCC): Primary | ICD-10-CM

## 2019-09-19 LAB
BILIRUB UR QL STRIP: NEGATIVE
CHOLEST SERPL-MCNC: <100 MG/DL
GLUCOSE POC: 387 MG/DL
GLUCOSE UR-MCNC: NORMAL MG/DL
HBA1C MFR BLD HPLC: 10.7 %
HDLC SERPL-MCNC: 25 MG/DL
KETONES P FAST UR STRIP-MCNC: NEGATIVE MG/DL
LDL CHOLESTEROL POC: NORMAL MG/DL
NON-HDL GOAL (POC): NORMAL
PH UR STRIP: 5.5 [PH] (ref 4.6–8)
PROT UR QL STRIP: NORMAL
SP GR UR STRIP: 1.02 (ref 1–1.03)
TCHOL/HDL RATIO (POC): NORMAL
TRIGL SERPL-MCNC: 354 MG/DL
UA UROBILINOGEN AMB POC: NORMAL (ref 0.2–1)
URINALYSIS CLARITY POC: NORMAL
URINALYSIS COLOR POC: YELLOW
URINE BLOOD POC: NORMAL
URINE LEUKOCYTES POC: NORMAL
URINE NITRITES POC: NEGATIVE

## 2019-09-19 RX ORDER — CIPROFLOXACIN 500 MG/1
500 TABLET ORAL 2 TIMES DAILY
Qty: 14 TAB | Refills: 0 | Status: SHIPPED | OUTPATIENT
Start: 2019-09-19 | End: 2019-10-07

## 2019-09-19 RX ORDER — ZOLPIDEM TARTRATE 10 MG/1
TABLET ORAL
Qty: 30 TAB | Refills: 2 | Status: SHIPPED | OUTPATIENT
Start: 2019-09-19 | End: 2019-12-10 | Stop reason: SDUPTHER

## 2019-09-19 NOTE — PROGRESS NOTES
Chief Complaint   Patient presents with    Diabetes    Urinary Frequency     1. Have you been to the ER, urgent care clinic since your last visit? Hospitalized since your last visit? No    2. Have you seen or consulted any other health care providers outside of the [address] [place] [address] since your last visit? Include any pap smears or colon screening.  No

## 2019-09-19 NOTE — PROGRESS NOTES
Cammy Chung is a 62 y.o. female and presents with Diabetes and Urinary Frequency  . Subjective:    Hypertension Review:  The patient has essential hypertension  Diet and Lifestyle: generally follows a  low sodium diet, exercises never  Home BP Monitoring: is not measured at home. Pertinent ROS: taking medications as instructed, no medication side effects noted, no TIA's, no chest pain on exertion, no dyspnea on exertion, no swelling of ankles. BP Readings from Last 3 Encounters:   09/19/19 134/65   04/08/19 134/72   02/05/19 120/60     Type 2 DM-on metformin and januvia   -pt admits to dietary indiscretions and elevated blood sugars   -  Lab Results   Component Value Date/Time    Hemoglobin A1c 11.4 (H) 08/14/2018 01:14 PM    Hemoglobin A1c (POC) 10.7 09/19/2019 03:35 PM     Hyperlipidemia-on atorvastatin 20mg   -  Lab Results   Component Value Date/Time    Cholesterol, total 97 (L) 08/14/2018 01:14 PM    Cholesterol (POC) <100 09/19/2019 03:36 PM    HDL Cholesterol 34 (L) 08/14/2018 01:14 PM    HDL Cholesterol (POC) 25 09/19/2019 03:36 PM    LDL Cholesterol (POC) N/A 09/19/2019 03:36 PM    LDL, calculated 26 08/14/2018 01:14 PM    VLDL, calculated 37 08/14/2018 01:14 PM    Triglyceride 184 (H) 08/14/2018 01:14 PM    Triglycerides (POC) 354 09/19/2019 03:36 PM    CHOL/HDL Ratio 4.6 05/08/2013 11:45 AM       Morbid obesity-  Wt Readings from Last 3 Encounters:   09/19/19 267 lb (121.1 kg)   04/08/19 259 lb (117.5 kg)   02/05/19 261 lb (118.4 kg)     Chronic Pain-pt is seeing Pain Mngmnt and is on a regimen of gabapentin, tramadol and oxycodone 9mg      Chronic insomnia-pt requests an increase in her ambien back to 10mg as she was getting from her Pain Mngmnt provider. The 5mg (as is recommended for women) does not work. Pts  checked, there is no evidence of misuse or abuse. Last filled 8/26/19      Pt w c/o urinary frequency x weeks. No hematuria/fever/abd pain.  Not currently sex active  Results for orders placed or performed in visit on 09/19/19   AMB POC URINALYSIS DIP STICK AUTO W/O MICRO     Status: None   Result Value Ref Range Status    Color (UA POC) Yellow  Final    Clarity (UA POC) Cloudy  Final    Glucose (UA POC) 3+ Negative Final    Bilirubin (UA POC) Negative Negative Final    Ketones (UA POC) Negative Negative Final    Specific gravity (UA POC) 1.020 1.001 - 1.035 Final    Blood (UA POC) Trace Negative Final    pH (UA POC) 5.5 4.6 - 8.0 Final    Protein (UA POC) Trace Negative Final    Urobilinogen (UA POC) 0.2 mg/dL 0.2 - 1 Final    Nitrites (UA POC) Negative Negative Final    Leukocyte esterase (UA POC) Trace Negative Final               Review of Systems  Constitutional: negative for fevers, chills, anorexia and weight loss  Respiratory:  negative for cough, hemoptysis, dyspnea,wheezing  CV:   negative for chest pain, palpitations, lower extremity edema  GI:   negative for nausea, vomiting, diarrhea, abdominal pain,melena  Musculoskel: positive for myalgias, arthralgias, back pain, joint pain  Neurological:  negative for headaches, dizziness, vertigo, memory problems and gait   Behavl/Psych: negative for feelings of anxiety, depression, mood changes    Past Medical History:   Diagnosis Date    Acute pancreatitis 8/9/2012    Acute pancreatitis 1/21/2011    Arthritis     both knees    Chronic pain     knees    Delivery normal     x1    Diabetes ([facility] [address]     DJD (degenerative joint disease) of knee     Fatty liver 1/22/2011    GERD (gastroesophageal reflux disease)     Hypertension     Hypothyroidism     Obesity     PUD (peptic ulcer disease)     UTI (urinary tract infection) 1/21/2011     Past Surgical History:   Procedure Laterality Date    HX BREAST BIOPSY Left     Surgical Bx 1990 - BENIGN    HX BREAST LUMPECTOMY      left breast    HX GYN      hysterectomy    HX ORTHOPAEDIC      left total knee replacement    HX ORTHOPAEDIC  6/16/15    RIGHT TOTAL KNEE ARTHROPLASTY     HX PARATHYROIDECTOMY  11     Social History     Socioeconomic History    Marital status:      Spouse name: Not on file    Number of children: Not on file    Years of education: Not on file    Highest education level: Not on file   Tobacco Use    Smoking status: Former Smoker     Packs/day: 0.25     Years: 25.00     Pack years: 6.25     Types: Cigarettes     Last attempt to quit: 2008     Years since quittin.6    Smokeless tobacco: Never Used   Substance and Sexual Activity    Alcohol use: No     Alcohol/week: 0.0 standard drinks    Drug use: No    Sexual activity: Never     Family History   Problem Relation Age of Onset    Cancer Mother     Hypertension Father     Diabetes Sister     Hypertension Sister     Diabetes Brother     Hypertension Brother      Current Outpatient Medications   Medication Sig Dispense Refill    zolpidem (AMBIEN) 10 mg tablet TAKE 1 TABLET BY MOUTH NIGHTLY AS NEEDED FOR SLEEP. MAXIMUM DAILY AMOUNT IS 5 MG( 1 TABLET). THIS IS AN AS NEEDED MEDICATION ONLY 30 Tab 2    ciprofloxacin HCl (CIPRO) 500 mg tablet Take 1 Tab by mouth two (2) times a day. 14 Tab 0    atorvastatin (LIPITOR) 20 mg tablet TAKE 1 TABLET BY MOUTH EVERY NIGHT FOR CHOLESTEROL 90 Tab 1    metoprolol succinate (TOPROL-XL) 100 mg tablet TAKE 1 TABLET BY MOUTH DAILY FOR HIGH BLOOD PRESSURE 90 Tab 1    valsartan-hydroCHLOROthiazide (DIOVAN-HCT) 320-25 mg per tablet TAKE 1 TABLET BY MOUTH DAILY 90 Tab 1    escitalopram oxalate (LEXAPRO) 20 mg tablet TAKE 1 TABLET BY MOUTH EVERY DAY 90 Tab 0    amLODIPine (NORVASC) 10 mg tablet TAKE 1 TABLET BY MOUTH DAILY 90 Tab 1    XTAMPZA ER 9 mg capsule Take 9 mg by mouth every twelve (12) hours.   0    metFORMIN (GLUCOPHAGE) 1,000 mg tablet TAKE 1 TABLET BY MOUTH TWICE DAILY WITH MEALS 180 Tab 2    glucose blood VI test strips (BLOOD GLUCOSE TEST) strip Use BID DxE11.9 200 Strip 11    escitalopram oxalate (LEXAPRO) 20 mg tablet TAKE 1 TABLET BY MOUTH EVERY DAY 90 Tab 1    SITagliptin (JANUVIA) 100 mg tablet Take 1 Tab by mouth daily. 90 Tab 1    traMADol (ULTRAM) 50 mg tablet Take 1 Tab by mouth every eight (8) hours as needed for Pain. Max Daily Amount: 150 mg. Indications: Pain 90 Tab 1    fluticasone (FLONASE) 50 mcg/actuation nasal spray 2 Sprays by Both Nostrils route daily. Allergies   Allergen Reactions    Influenza Virus Vaccine, Specific Other (comments)     States body got very hot    Aspirin Other (comments)     Pt reports having a h/o gastric ulcers. Pt was on IBU when she was dx'd. Objective:  Visit Vitals  /65 (BP 1 Location: Left arm, BP Patient Position: Sitting)   Pulse 75   Temp 98.6 °F (37 °C) (Oral)   Resp 19   Ht 5' 11\" (1.803 m)   Wt 267 lb (121.1 kg)   SpO2 96%   BMI 37.24 kg/m²     Physical Exam:   General appearance - alert, obese  Mental status - alert, oriented to person, place, and time  EYE-EOMI  Neck - supple,   Chest - symmetric air entry    CVS-reg + S4 + 2/6 systolic murmur  Abdomen - obese  Ext-no pedal edema, no clubbing or cyanosis  Skin-Warm and dry.  no hyperpigmentation, vitiligo, or suspicious lesions  Neuro -alert, oriented, normal speech, no focal findings or movement disorder noted        Results for orders placed or performed in visit on 09/19/19   AMB POC URINALYSIS DIP STICK AUTO W/O MICRO   Result Value Ref Range    Color (UA POC) Yellow     Clarity (UA POC) Cloudy     Glucose (UA POC) 3+ Negative    Bilirubin (UA POC) Negative Negative    Ketones (UA POC) Negative Negative    Specific gravity (UA POC) 1.020 1.001 - 1.035    Blood (UA POC) Trace Negative    pH (UA POC) 5.5 4.6 - 8.0    Protein (UA POC) Trace Negative    Urobilinogen (UA POC) 0.2 mg/dL 0.2 - 1    Nitrites (UA POC) Negative Negative    Leukocyte esterase (UA POC) Trace Negative   AMB POC HEMOGLOBIN A1C   Result Value Ref Range    Hemoglobin A1c (POC) 10.7 %   AMB POC GLUCOSE BLOOD, BY GLUCOSE MONITORING DEVICE   Result Value Ref Range    Glucose  mg/dL   AMB POC LIPID PROFILE   Result Value Ref Range    Cholesterol (POC) <100     Triglycerides (POC) 354     HDL Cholesterol (POC) 25     LDL Cholesterol (POC) N/A MG/DL    Non-HDL Goal (POC) N/A     TChol/HDL Ratio (POC) N/A        Assessment/Plan:    ICD-10-CM ICD-9-CM    1. Type 2 diabetes mellitus with hyperosmolarity without coma, with long-term current use of insulin (HCC) E11.00 250.20 AMB POC HEMOGLOBIN A1C    Z79.4 V58.67 AMB POC GLUCOSE BLOOD, BY GLUCOSE MONITORING DEVICE      AMB POC LIPID PROFILE   2. Acute cystitis without hematuria N30.00 595.0 AMB POC URINALYSIS DIP STICK AUTO W/O MICRO      ciprofloxacin HCl (CIPRO) 500 mg tablet   3. Morbid obesity ([address] E66.01 278.01    4. Essential hypertension I10 401.9    5. Pure hypercholesterolemia E78.00 272.0    6. Lumbar radiculopathy M54.16 724.4    7. Osteoarthritis of spine with radiculopathy, cervical region M47.22 721.0    8. Chronic prescription opiate use Z79.891 V58.69    9. Chronic insomnia F51.04 780.52 zolpidem (AMBIEN) 10 mg tablet     Orders Placed This Encounter    AMB POC URINALYSIS DIP STICK AUTO W/O MICRO    AMB POC HEMOGLOBIN A1C    AMB POC GLUCOSE BLOOD, BY GLUCOSE MONITORING DEVICE    AMB POC LIPID PROFILE    zolpidem (AMBIEN) 10 mg tablet     Sig: TAKE 1 TABLET BY MOUTH NIGHTLY AS NEEDED FOR SLEEP. MAXIMUM DAILY AMOUNT IS 5 MG( 1 TABLET). THIS IS AN AS NEEDED MEDICATION ONLY     Dispense:  30 Tab     Refill:  2    ciprofloxacin HCl (CIPRO) 500 mg tablet     Sig: Take 1 Tab by mouth two (2) times a day. Dispense:  14 Tab     Refill:  0   1. Type 2 diabetes mellitus with hyperosmolarity without coma, with long-term current use of insulin ([facility])  POORLY CONTROLLED  D/w pt addition of ozempic or insulin.    Pt refuses as she believes she can lower her sugars w dietary compliance  Recheck in 3 mths and add injection if A1c>8% NV  - AMB POC HEMOGLOBIN A1C  - AMB POC GLUCOSE BLOOD, BY GLUCOSE MONITORING DEVICE  - AMB POC LIPID PROFILE    2. Urinary frequency  rx cipro  - AMB POC URINALYSIS DIP STICK AUTO W/O MICRO    3. Morbid obesity ([address]  Noted    4. Essential hypertension  Controlled on current regimen    5. Pure hypercholesterolemia  Cont statin    6. Lumbar radiculopathy  Noted  F/u Pain Mngmnt    7. Osteoarthritis of spine with radiculopathy, cervical region  Noted    8. Chronic prescription opiate use  Noted    9. Chronic insomnia  Dose adjusted  - zolpidem (AMBIEN) 10 mg tablet; TAKE 1 TABLET BY MOUTH NIGHTLY AS NEEDED FOR SLEEP. MAXIMUM DAILY AMOUNT IS 5 MG( 1 TABLET). THIS IS AN AS NEEDED MEDICATION ONLY  Dispense: 30 Tab; Refill: 2        There are no Patient Instructions on file for this visit. Follow-up and Dispositions    · Return in about 3 months (around 12/19/2019) for for routine pap and DM NV 30 minutes. I have reviewed with the patient details of the assessment and plan and all questions were answered. Relevent patient education was performed. The most recent lab findings were reviewed with the patient. An After Visit Summary was printed and given to the patient.

## 2019-09-20 PROBLEM — F51.04 CHRONIC INSOMNIA: Status: ACTIVE | Noted: 2019-09-20

## 2019-10-07 ENCOUNTER — APPOINTMENT (OUTPATIENT)
Dept: GENERAL RADIOLOGY | Age: 59
End: 2019-10-07
Payer: COMMERCIAL

## 2019-10-07 ENCOUNTER — HOSPITAL ENCOUNTER (EMERGENCY)
Age: 59
Discharge: HOME | End: 2019-10-07
Attending: EMERGENCY MEDICINE
Payer: COMMERCIAL

## 2019-10-07 VITALS
TEMPERATURE: 98.5 F | HEIGHT: 69 IN | HEART RATE: 70 BPM | BODY MASS INDEX: 39.25 KG/M2 | WEIGHT: 264.99 LBS | RESPIRATION RATE: 14 BRPM | OXYGEN SATURATION: 96 % | SYSTOLIC BLOOD PRESSURE: 143 MMHG | DIASTOLIC BLOOD PRESSURE: 97 MMHG

## 2019-10-07 DIAGNOSIS — M54.6 ACUTE BILATERAL THORACIC BACK PAIN: Primary | ICD-10-CM

## 2019-10-07 DIAGNOSIS — M54.50 ACUTE BILATERAL LOW BACK PAIN WITHOUT SCIATICA: ICD-10-CM

## 2019-10-07 DIAGNOSIS — M51.36 DDD (DEGENERATIVE DISC DISEASE), LUMBAR: ICD-10-CM

## 2019-10-07 PROCEDURE — 74011250637 HC RX REV CODE- 250/637: Performed by: PHYSICIAN ASSISTANT

## 2019-10-07 PROCEDURE — 72072 X-RAY EXAM THORAC SPINE 3VWS: CPT

## 2019-10-07 PROCEDURE — 72100 X-RAY EXAM L-S SPINE 2/3 VWS: CPT

## 2019-10-07 PROCEDURE — 99283 EMERGENCY DEPT VISIT LOW MDM: CPT

## 2019-10-07 RX ORDER — TRAMADOL HYDROCHLORIDE 50 MG/1
50 TABLET ORAL
Qty: 12 TAB | Refills: 0 | Status: SHIPPED | OUTPATIENT
Start: 2019-10-07 | End: 2019-10-12

## 2019-10-07 RX ORDER — METHOCARBAMOL 750 MG/1
750 TABLET, FILM COATED ORAL 3 TIMES DAILY
Qty: 15 TAB | Refills: 0 | Status: SHIPPED | OUTPATIENT
Start: 2019-10-07 | End: 2019-10-12

## 2019-10-07 RX ORDER — TRAMADOL HYDROCHLORIDE 50 MG/1
50 TABLET ORAL
Status: COMPLETED | OUTPATIENT
Start: 2019-10-07 | End: 2019-10-07

## 2019-10-07 RX ORDER — DIAZEPAM 5 MG/1
5 TABLET ORAL
Status: COMPLETED | OUTPATIENT
Start: 2019-10-07 | End: 2019-10-07

## 2019-10-07 RX ADMIN — TRAMADOL HYDROCHLORIDE 50 MG: 50 TABLET ORAL at 12:51

## 2019-10-07 RX ADMIN — DIAZEPAM 5 MG: 5 TABLET ORAL at 12:51

## 2019-10-07 NOTE — ED PROVIDER NOTES
EMERGENCY DEPARTMENT HISTORY AND PHYSICAL EXAM      Date: 10/7/2019  Patient Name: Abdiel Conklin    History of Presenting Illness     Chief Complaint   Patient presents with    Back Pain     Back pain since Friday after straining back Thursday. Pain rated 10/10 while ambulating. No sensory changes or motor weakness. No spinal trauma. Gait is steady, but slow. Pain with palpation in lower thoracic / upper lumbar area of spine       History Provided By: Patient    HPI: Abdiel Conklin, 62 y.o. female presents ambulatory to the Emergency Dept with c/o mid to low back pain since doing some repetitive lifting on Thursday, 10/3/19. She denied trauma/fall. She states the back pain began the following day. She notices increased pain with position changes/movement. She denied h/o chronic neck or back pain. She denied numbness/tingling/weakness. No incontinence of bowel/bladder. She denied cough/congestion or shortness of breath. No abdominal pain, N/V. No constipation/straining. No rash/lesion. Chief Complaint: back pain  Duration: 4 Days  Timing:  Acute  Location: back  Quality: Aching and Dull  Severity: Moderate  Modifying Factors: increased pain with movement, position changes  Associated Symptoms: denies any other associated signs or symptoms        There are no other complaints, changes, or physical findings at this time. PCP: Marck Villa MD    Current Facility-Administered Medications   Medication Dose Route Frequency Provider Last Rate Last Dose    traMADol (ULTRAM) tablet 50 mg  50 mg Oral NOW Hoang Mesa, [address]        diazePAM (VALIUM) tablet 5 mg  5 mg Oral NOW Yolanda EUGENE, [address]         Current Outpatient Medications   Medication Sig Dispense Refill    methocarbamol (ROBAXIN) 750 mg tablet Take 1 Tab by mouth three (3) times daily for 5 days. 15 Tab 0    traMADol (ULTRAM) 50 mg tablet Take 1 Tab by mouth every eight (8) hours as needed for Pain for up to 5 days.  Max Daily Amount: 150 mg. 12 Tab 0    zolpidem (AMBIEN) 10 mg tablet TAKE 1 TABLET BY MOUTH NIGHTLY AS NEEDED FOR SLEEP. MAXIMUM DAILY AMOUNT IS 5 MG( 1 TABLET). THIS IS AN AS NEEDED MEDICATION ONLY 30 Tab 2    ciprofloxacin HCl (CIPRO) 500 mg tablet Take 1 Tab by mouth two (2) times a day. 14 Tab 0    atorvastatin (LIPITOR) 20 mg tablet TAKE 1 TABLET BY MOUTH EVERY NIGHT FOR CHOLESTEROL 90 Tab 1    metoprolol succinate (TOPROL-XL) 100 mg tablet TAKE 1 TABLET BY MOUTH DAILY FOR HIGH BLOOD PRESSURE 90 Tab 1    valsartan-hydroCHLOROthiazide (DIOVAN-HCT) 320-25 mg per tablet TAKE 1 TABLET BY MOUTH DAILY 90 Tab 1    escitalopram oxalate (LEXAPRO) 20 mg tablet TAKE 1 TABLET BY MOUTH EVERY DAY 90 Tab 1    escitalopram oxalate (LEXAPRO) 20 mg tablet TAKE 1 TABLET BY MOUTH EVERY DAY 90 Tab 0    amLODIPine (NORVASC) 10 mg tablet TAKE 1 TABLET BY MOUTH DAILY 90 Tab 1    XTAMPZA ER 9 mg capsule Take 9 mg by mouth every twelve (12) hours. 0    SITagliptin (JANUVIA) 100 mg tablet Take 1 Tab by mouth daily. 90 Tab 1    metFORMIN (GLUCOPHAGE) 1,000 mg tablet TAKE 1 TABLET BY MOUTH TWICE DAILY WITH MEALS 180 Tab 2    glucose blood VI test strips (BLOOD GLUCOSE TEST) strip Use BID DxE11.9 200 Strip 11    fluticasone (FLONASE) 50 mcg/actuation nasal spray 2 Sprays by Both Nostrils route daily.          Past History     Past Medical History:  Past Medical History:   Diagnosis Date    Acute pancreatitis 8/9/2012    Acute pancreatitis 1/21/2011    Arthritis     both knees    Chronic pain     knees    Delivery normal     x1    Diabetes ([facility] [address]     DJD (degenerative joint disease) of knee     Fatty liver 1/22/2011    GERD (gastroesophageal reflux disease)     Hypertension     Hypothyroidism     Obesity     PUD (peptic ulcer disease)     UTI (urinary tract infection) 1/21/2011       Past Surgical History:  Past Surgical History:   Procedure Laterality Date    HX BREAST BIOPSY Left     Surgical Bx 1990 - BENIGN    HX BREAST LUMPECTOMY      left breast    HX GYN      hysterectomy    HX ORTHOPAEDIC      left total knee replacement    HX ORTHOPAEDIC  6/16/15    RIGHT TOTAL KNEE ARTHROPLASTY     HX PARATHYROIDECTOMY  11       Family History:  Family History   Problem Relation Age of Onset    Cancer Mother     Hypertension Father     Diabetes Sister     Hypertension Sister     Diabetes Brother     Hypertension Brother        Social History:  Social History     Tobacco Use    Smoking status: Former Smoker     Packs/day: 0.25     Years: 25.00     Pack years: 6.25     Types: Cigarettes     Last attempt to quit: 2008     Years since quittin.7    Smokeless tobacco: Never Used   Substance Use Topics    Alcohol use: No     Alcohol/week: 0.0 standard drinks    Drug use: No       Allergies: Allergies   Allergen Reactions    Influenza Virus Vaccine, Specific Other (comments)     States body got very hot    Aspirin Other (comments)     Pt reports having a h/o gastric ulcers. Pt was on IBU when she was dx'd. Review of Systems   Review of Systems   Constitutional: Negative for chills and fever. HENT: Negative for congestion, rhinorrhea and sore throat. Respiratory: Negative for cough and shortness of breath. Cardiovascular: Negative for chest pain and palpitations. Gastrointestinal: Negative for abdominal pain, diarrhea, nausea and vomiting. Endocrine: Negative for polydipsia, polyphagia and polyuria. Genitourinary: Negative for dysuria and hematuria. Musculoskeletal: Positive for back pain. Negative for neck pain and neck stiffness. Skin: Negative for rash and wound. Allergic/Immunologic: Negative for food allergies and immunocompromised state. Neurological: Negative for weakness and numbness. Hematological: Negative for adenopathy. Does not bruise/bleed easily. Psychiatric/Behavioral: Negative for agitation and confusion.        Physical Exam   Physical Exam   Constitutional: She is oriented to person, place, and time. She appears well-developed and well-nourished. No distress. WDWN AA female, alert, in moderate discomfort   HENT:   Head: Normocephalic and atraumatic. Nose: Nose normal.   Mouth/Throat: Oropharynx is clear and moist. No oropharyngeal exudate. Eyes: Conjunctivae and EOM are normal. Right eye exhibits no discharge. Left eye exhibits no discharge. No scleral icterus. Neck: Normal range of motion. Neck supple. No JVD present. No tracheal deviation present. No thyromegaly present. Cardiovascular: Normal rate, regular rhythm and normal heart sounds. Pulmonary/Chest: Effort normal and breath sounds normal. No respiratory distress. She has no wheezes. Abdominal: Soft. There is no tenderness. No CVAT   Musculoskeletal: She exhibits tenderness. She exhibits no edema. Decreased A/P ROM to thoracolumbar paraspinous musculature billat due to tenderness with palpation and movement. No deformity noted. No midline spinal tenderness. Pt observed to ambulate without deficit. 2+ distal pulses, NVI. Sensation grossly intact to light touch.;   Lymphadenopathy:     She has no cervical adenopathy. Neurological: She is alert and oriented to person, place, and time. She exhibits normal muscle tone. Coordination normal.   Skin: Skin is warm and dry. She is not diaphoretic. Psychiatric: She has a normal mood and affect. Her behavior is normal. Judgment normal.   Nursing note and vitals reviewed. Diagnostic Study Results     Labs -   No results found for this or any previous visit (from the past 12 hour(s)). Radiologic Studies -   XR SPINE THORAC 3 V   Final Result   IMPRESSION:  No evidence of fracture                  XR SPINE LUMB 2 OR 3 V   Final Result   IMPRESSION:  No evidence of acute fracture. Degenerative change probably at at   L1-2 and L2-3. Medical Decision Making   I am the first provider for this patient.     I reviewed the vital signs, available nursing notes, past medical history, past surgical history, family history and social history. Vital Signs-Reviewed the patient's vital signs. Patient Vitals for the past 12 hrs:   Temp Pulse Resp BP SpO2   10/07/19 1132 98.5 °F (36.9 °C) 70 14 (!) 143/97 96 %         Records Reviewed: Nursing Notes, Old Medical Records, Previous Radiology Studies and Previous Laboratory Studies    Provider Notes (Medical Decision Making):   Contusion, DDD, fx, spasm      ED Course:   Initial assessment performed. The patients presenting problems have been discussed, and they are in agreement with the care plan formulated and outlined with them. I have encouraged them to ask questions as they arise throughout their visit. DISCHARGE NOTE:  The care plan has been outline with the patient and/or family, who verbally conveyed understanding and agreement. Available results have been reviewed. Patient and/or family understand the follow up plan as outlined and discharge instructions. Should their condition deterioration at any time after discharge the patient agrees to return, follow up sooner than outlined or seek medical assistance at the closest Emergency Room as soon as possible. Questions have been answered. Discharge instructions and educational information regarding the patient's diagnosis as well a list of reasons why the patient would want to seek immediate medical attention, should their condition change, were reviewed directly with the patient/family       PLAN:  1. Current Discharge Medication List      START taking these medications    Details   methocarbamol (ROBAXIN) 750 mg tablet Take 1 Tab by mouth three (3) times daily for 5 days. Qty: 15 Tab, Refills: 0      traMADol (ULTRAM) 50 mg tablet Take 1 Tab by mouth every eight (8) hours as needed for Pain for up to 5 days.  Max Daily Amount: 150 mg.  Qty: 12 Tab, Refills: 0    Associated Diagnoses: Acute bilateral thoracic back pain; Acute bilateral low back pain without sciatica           2. Follow-up Information     Follow up With Specialties Details Why Contact Info    Reji Quevedo MD Internal Medicine   [address]  [phone]      Patricio Carson MD Orthopedic Surgery  As needed [address]  [address] [facility] [address]  [phone]      [facility] EMERGENCY DEPT Emergency Medicine  If symptoms worsen [address]  [address] [facility]  [phone]        Return to ED if worse     Diagnosis     Clinical Impression:   1. Acute bilateral thoracic back pain    2.  Acute bilateral low back pain without sciatica

## 2019-10-07 NOTE — LETTER
[address] 
\[facility]\"" EMERGENCY DEPT 
[address] [facility] [place] [address] [phone] Work/School Note Date: 10/7/2019 To Whom It May concern: 
 
Fareed Ramachandran was seen and treated today in the emergency room by the following provider(s): 
Attending Provider: Luther Lanza MD 
Physician Assistant: Jose Eduardo Bernabe. Fareed Ramachandran may return to work on Thursday, October 10, 2019. Sincerely, Ynes Avelar RN

## 2019-10-07 NOTE — DISCHARGE INSTRUCTIONS
Rest, ice/cool compresses several times daily x 2 days, then alternate ice/moist heat, gentle stretching, massage. Avoid prolonged sitting. Follow up with primary care for recheck. Contact information provided for Orthopedist if symptoms persist.  Return to the Emergency Dept for any continued/worsening pain. Patient Education        Back Stretches: Exercises  Introduction  Here are some examples of exercises for stretching your back. Start each exercise slowly. Ease off the exercise if you start to have pain. Your doctor or physical therapist will tell you when you can start these exercises and which ones will work best for you. How to do the exercises  Overhead stretch    1. Stand comfortably with your feet shoulder-width apart. 2. Looking straight ahead, raise both arms over your head and reach toward the ceiling. Do not allow your head to tilt back. 3. Hold for 15 to 30 seconds, then lower your arms to your sides. 4. Repeat 2 to 4 times. Side stretch    1. Stand comfortably with your feet shoulder-width apart. 2. Raise one arm over your head, and then lean to the other side. 3. Slide your hand down your leg as you let the weight of your arm gently stretch your side muscles. Hold for 15 to 30 seconds. 4. Repeat 2 to 4 times on each side. Press-up    1. Lie on your stomach, supporting your body with your forearms. 2. Press your elbows down into the floor to raise your upper back. As you do this, relax your stomach muscles and allow your back to arch without using your back muscles. As your press up, do not let your hips or pelvis come off the floor. 3. Hold for 15 to 30 seconds, then relax. 4. Repeat 2 to 4 times. Relax and rest    1. Lie on your back with a rolled towel under your neck and a pillow under your knees. Extend your arms comfortably to your sides. 2. Relax and breathe normally. 3. Remain in this position for about 10 minutes.   4. If you can, do this 2 or 3 times each day.    Follow-up care is a key part of your treatment and safety. Be sure to make and go to all appointments, and call your doctor if you are having problems. It's also a good idea to know your test results and keep a list of the medicines you take. Where can you learn more? Go to http://kendell-jonathan.info/. Enter O887 in the search box to learn more about \"Back Stretches: Exercises. \"  Current as of: June 26, 2019  Content Version: 12.2  © 8040-1254 [employer]. Care instructions adapted under license by [employer] (which disclaims liability or warranty for this information). If you have questions about a medical condition or this instruction, always ask your healthcare professional. [address] any warranty or liability for your use of this information. Patient Education        Learning About Degenerative Disc Disease  What is degenerative disc disease? Degenerative disc disease is not really a disease. It's a term used to describe the normal changes in your spinal discs as you age. Spinal discs are small, spongy discs that separate the bones (vertebrae) that make up the spine. The discs act as shock absorbers for the spine, so it can flex, bend, and twist.  Degenerative disc disease can take place in one or more places along the spine. It most often occurs in the discs in the lower back and the neck. What causes it? As we age, our spinal discs break down, or degenerate. This breakdown causes the symptoms of degenerative disc disease in some people. When the discs break down, they can lose fluid and dry out, and their outer layers can have tiny cracks or tears. This leads to less padding and less space between the bones in the spine. The body reacts to this by making bony growths on the spine called bone spurs. These spurs can press on the spinal nerve roots or spinal cord.  This can cause pain and can affect how well the nerves work.  What are the symptoms? Many people with degenerative disc disease have no pain. But others have severe pain or other symptoms that limit their activities. Some of the most common symptoms are:  · Pain in the back or neck. Where the pain occurs depends on which discs are affected. · Pain that gets worse when you move, such as bending over, reaching up, or twisting. · Pain that may occur in the rear end (buttocks), arm, or leg if a nerve is pinched. · Numbness or tingling in your arm or leg. How is it diagnosed? A doctor can often diagnose degenerative disc disease while doing a physical exam. If your exam shows no signs of a serious condition, imaging tests (such as an X-ray) aren't likely to help your doctor find the cause of your symptoms. Sometimes degenerative disc disease is found when an X-ray is taken for another reason, such as an injury or other health problem. But even if the doctor finds degenerative disc disease, that doesn't always mean that you will have symptoms. How is it treated? There are several things you can do at home to manage pain from this problem. · To relieve pain, use ice or heat (whichever feels better) on the affected area. ? Put ice or a cold pack on the area for 10 to 20 minutes at a time. Put a thin cloth between the ice and your skin. ? Put a warm water bottle, a heating pad set on low, or a warm cloth on your back. Put a thin cloth between the heating pad and your skin. Do not go to sleep with a heating pad on your skin. · Ask your doctor if you can take acetaminophen (such as Tylenol) or nonsteroidal anti-inflammatory drugs, such as ibuprofen or naproxen. Your doctor can prescribe stronger medicines if needed. Be safe with medicines. Read and follow all instructions on the label. · Get some exercise every day. Exercise is one of the best ways to help your back feel better and stay better. It's best to start each exercise slowly.  You may notice a little soreness, and that's okay. But if an exercise makes your pain worse, stop doing it. Here are things you can try:  ? Walking. It's the simplest and maybe the best activity for your back. It gets your blood moving and helps your muscles stay strong. ? Exercises that gently stretch and strengthen your stomach, back, and leg muscles. The stronger those muscles are, the better they're able to protect your back. If you have constant or severe pain in your back or spine, you may need other treatments, such as physical therapy. In some cases, your doctor may suggest surgery. Follow-up care is a key part of your treatment and safety. Be sure to make and go to all appointments, and call your doctor if you are having problems. It's also a good idea to know your test results and keep a list of the medicines you take. Where can you learn more? Go to http://kendell-jonathan.info/. Enter E565 in the search box to learn more about \"Learning About Degenerative Disc Disease. \"  Current as of: June 26, 2019  Content Version: 12.2  © 7590-0878 [employer]. Care instructions adapted under license by [employer] (which disclaims liability or warranty for this information). If you have questions about a medical condition or this instruction, always ask your healthcare professional. [address] any warranty or liability for your use of this information.

## 2019-10-07 NOTE — ROUTINE PROCESS
ANYA Mesa reviewed discharge instructions with the patient and spouse. The patient and spouse verbalized understanding. Alert and stable for discharge.

## 2019-10-11 RX ORDER — GLIMEPIRIDE 4 MG/1
TABLET ORAL
Qty: 180 TAB | Refills: 0 | Status: SHIPPED | OUTPATIENT
Start: 2019-10-11 | End: 2020-01-15

## 2019-11-18 DIAGNOSIS — F33.9 EPISODE OF RECURRENT MAJOR DEPRESSIVE DISORDER, UNSPECIFIED DEPRESSION EPISODE SEVERITY (HCC): ICD-10-CM

## 2019-11-18 RX ORDER — ESCITALOPRAM OXALATE 20 MG/1
TABLET ORAL
Qty: 90 TAB | Refills: 1 | Status: SHIPPED | OUTPATIENT
Start: 2019-11-18 | End: 2020-05-11

## 2020-02-27 ENCOUNTER — HOSPITAL ENCOUNTER (EMERGENCY)
Age: 60
Discharge: HOME | End: 2020-02-27
Attending: EMERGENCY MEDICINE | Admitting: EMERGENCY MEDICINE
Payer: COMMERCIAL

## 2020-02-27 ENCOUNTER — APPOINTMENT (OUTPATIENT)
Dept: GENERAL RADIOLOGY | Age: 60
End: 2020-02-27
Attending: EMERGENCY MEDICINE
Payer: COMMERCIAL

## 2020-02-27 VITALS
HEART RATE: 72 BPM | BODY MASS INDEX: 39.1 KG/M2 | SYSTOLIC BLOOD PRESSURE: 137 MMHG | TEMPERATURE: 97.8 F | DIASTOLIC BLOOD PRESSURE: 78 MMHG | WEIGHT: 264 LBS | RESPIRATION RATE: 18 BRPM | OXYGEN SATURATION: 95 % | HEIGHT: 69 IN

## 2020-02-27 DIAGNOSIS — M54.6 ACUTE BILATERAL THORACIC BACK PAIN: Primary | ICD-10-CM

## 2020-02-27 LAB
ANION GAP SERPL CALC-SCNC: 8 MMOL/L (ref 5–15)
BUN SERPL-MCNC: 15 MG/DL (ref 6–20)
BUN/CREAT SERPL: 18 (ref 12–20)
CALCIUM SERPL-MCNC: 9.6 MG/DL (ref 8.5–10.1)
CHLORIDE SERPL-SCNC: 100 MMOL/L (ref 97–108)
CK SERPL-CCNC: 162 U/L (ref 26–192)
CO2 SERPL-SCNC: 30 MMOL/L (ref 21–32)
COMMENT, HOLDF: NORMAL
CREAT SERPL-MCNC: 0.83 MG/DL (ref 0.55–1.02)
D DIMER PPP FEU-MCNC: 0.24 MG/L FEU (ref 0–0.65)
GLUCOSE SERPL-MCNC: 238 MG/DL (ref 65–100)
POTASSIUM SERPL-SCNC: 3.9 MMOL/L (ref 3.5–5.1)
SAMPLES BEING HELD,HOLD: NORMAL
SODIUM SERPL-SCNC: 138 MMOL/L (ref 136–145)
TROPONIN I SERPL-MCNC: <0.05 NG/ML

## 2020-02-27 PROCEDURE — 85379 FIBRIN DEGRADATION QUANT: CPT

## 2020-02-27 PROCEDURE — 96374 THER/PROPH/DIAG INJ IV PUSH: CPT

## 2020-02-27 PROCEDURE — 80048 BASIC METABOLIC PNL TOTAL CA: CPT

## 2020-02-27 PROCEDURE — 99283 EMERGENCY DEPT VISIT LOW MDM: CPT

## 2020-02-27 PROCEDURE — 93005 ELECTROCARDIOGRAM TRACING: CPT

## 2020-02-27 PROCEDURE — 74011250637 HC RX REV CODE- 250/637: Performed by: EMERGENCY MEDICINE

## 2020-02-27 PROCEDURE — 71046 X-RAY EXAM CHEST 2 VIEWS: CPT

## 2020-02-27 PROCEDURE — 74011250636 HC RX REV CODE- 250/636: Performed by: EMERGENCY MEDICINE

## 2020-02-27 PROCEDURE — 36415 COLL VENOUS BLD VENIPUNCTURE: CPT

## 2020-02-27 PROCEDURE — 82550 ASSAY OF CK (CPK): CPT

## 2020-02-27 PROCEDURE — 84484 ASSAY OF TROPONIN QUANT: CPT

## 2020-02-27 RX ORDER — METHOCARBAMOL 500 MG/1
500 TABLET, FILM COATED ORAL
Status: COMPLETED | OUTPATIENT
Start: 2020-02-27 | End: 2020-02-27

## 2020-02-27 RX ORDER — KETOROLAC TROMETHAMINE 30 MG/ML
30 INJECTION, SOLUTION INTRAMUSCULAR; INTRAVENOUS
Status: COMPLETED | OUTPATIENT
Start: 2020-02-27 | End: 2020-02-27

## 2020-02-27 RX ADMIN — METHOCARBAMOL TABLETS 500 MG: 500 TABLET, COATED ORAL at 16:36

## 2020-02-27 RX ADMIN — KETOROLAC TROMETHAMINE 30 MG: 30 INJECTION, SOLUTION INTRAMUSCULAR at 16:36

## 2020-02-27 NOTE — ED TRIAGE NOTES
Patient arrives c/o upper back pain/tightness going up into her neck that started today. She states it feels like she cant move and it hurts. Denies any injury or trauma.

## 2020-02-27 NOTE — ED PROVIDER NOTES
68-year-old female presents emergency room of upper back pain. Pain began approximately 2 hours ago. Pain is located in the upper back bilaterally around the shoulders and the trapezius. It does not radiate. Patient denies injury or trauma. No chest pain or shortness of breath. No fevers or chills. No nausea or vomiting. No numbness or tingling of the extremities. No headache. No neck pain. No dizziness or lightheadedness. Pain is worse with movement of her torso and lifting of her shoulders. Patient states she took a hydrocodone and this did not help. No relieving factors. Social hx  Nonsmoker  No alcohol    The history is provided by the patient. No  was used.         Past Medical History:   Diagnosis Date    Acute pancreatitis 8/9/2012    Acute pancreatitis 1/21/2011    Arthritis     both knees    Chronic pain     knees    Delivery normal     x1    Diabetes ([facility] [address]     DJD (degenerative joint disease) of knee     Fatty liver 1/22/2011    GERD (gastroesophageal reflux disease)     Hypertension     Hypothyroidism     Obesity     PUD (peptic ulcer disease)     UTI (urinary tract infection) 1/21/2011       Past Surgical History:   Procedure Laterality Date    HX BREAST BIOPSY Left     Surgical Bx 1990 - BENIGN    HX BREAST LUMPECTOMY      left breast    HX GYN      hysterectomy    HX ORTHOPAEDIC      left total knee replacement    HX ORTHOPAEDIC  6/16/15    RIGHT TOTAL KNEE ARTHROPLASTY     HX PARATHYROIDECTOMY  01/27/11         Family History:   Problem Relation Age of Onset    Cancer Mother     Hypertension Father     Diabetes Sister     Hypertension Sister     Diabetes Brother     Hypertension Brother        Social History     Socioeconomic History    Marital status:      Spouse name: Not on file    Number of children: Not on file    Years of education: Not on file    Highest education level: Not on file   Occupational History    Not on file Social Needs    Financial resource strain: Not on file    Food insecurity:     Worry: Not on file     Inability: Not on file    Transportation needs:     Medical: Not on file     Non-medical: Not on file   Tobacco Use    Smoking status: Former Smoker     Packs/day: 0.25     Years: 25.00     Pack years: 6.25     Types: Cigarettes     Last attempt to quit: 2008     Years since quittin.1    Smokeless tobacco: Never Used   Substance and Sexual Activity    Alcohol use: No     Alcohol/week: 0.0 standard drinks    Drug use: No    Sexual activity: Never   Lifestyle    Physical activity:     Days per week: Not on file     Minutes per session: Not on file    Stress: Not on file   Relationships    Social connections:     Talks on phone: Not on file     Gets together: Not on file     Attends Quaker service: Not on file     Active member of club or organization: Not on file     Attends meetings of clubs or organizations: Not on file     Relationship status: Not on file    Intimate partner violence:     Fear of current or ex partner: Not on file     Emotionally abused: Not on file     Physically abused: Not on file     Forced sexual activity: Not on file   Other Topics Concern    Not on file   Social History Narrative    Not on file         ALLERGIES: Influenza virus vaccine, specific and Aspirin    Review of Systems   Constitutional: Negative for chills and fatigue. Respiratory: Negative for cough and shortness of breath. Cardiovascular: Negative for chest pain. Gastrointestinal: Negative for abdominal pain, diarrhea, nausea and vomiting. Genitourinary: Negative for decreased urine volume, difficulty urinating, dysuria, frequency, hematuria and urgency. Musculoskeletal: Positive for back pain. Negative for myalgias, neck pain and neck stiffness. Skin: Negative for color change and rash. Neurological: Negative for dizziness, weakness, light-headedness, numbness and headaches. Vitals:    02/27/20 1504   BP: 137/78   Pulse: 72   Resp: 18   Temp: 97.8 °F (36.6 °C)   SpO2: 95%   Weight: 119.7 kg (264 lb)   Height: 5' 9\" (1.753 m)            Physical Exam  Vitals signs and nursing note reviewed. Constitutional:       Appearance: Normal appearance. HENT:      Head: Normocephalic and atraumatic. Nose: Nose normal.      Mouth/Throat:      Mouth: Mucous membranes are moist.   Eyes:      Conjunctiva/sclera: Conjunctivae normal.      Pupils: Pupils are equal, round, and reactive to light. Neck:      Musculoskeletal: Normal range of motion and neck supple. Cardiovascular:      Rate and Rhythm: Normal rate and regular rhythm. Pulmonary:      Effort: Pulmonary effort is normal.      Breath sounds: Normal breath sounds. Abdominal:      General: Abdomen is flat. There is no distension. Palpations: There is no mass. Tenderness: There is no abdominal tenderness. There is no right CVA tenderness, left CVA tenderness, guarding or rebound. Hernia: No hernia is present. Musculoskeletal: Normal range of motion. Right lower leg: No edema. Left lower leg: No edema. Skin:     General: Skin is warm and dry. Neurological:      General: No focal deficit present. Mental Status: She is alert and oriented to person, place, and time. Sensory: No sensory deficit. Motor: No weakness. Coordination: Coordination normal.      Gait: Gait normal.      Deep Tendon Reflexes: Reflexes normal.   Psychiatric:         Mood and Affect: Mood normal.         Behavior: Behavior normal.         Thought Content: Thought content normal.         Judgment: Judgment normal.          MDM  Number of Diagnoses or Management Options  Acute bilateral thoracic back pain:   Diagnosis management comments: 25-year-old female presenting for upper back pain without injury or trauma. She is afebrile. She is not hypertensive tachycardic or tachypneic. No hypoxia.   Lungs are clear.  She is in no acute distress. She is tender in the upper shoulders and the trapezius. Plan: EKG labs chest x-ray pain control    5:01 PM  Change of shift. Pt case including HPI, PE, and all available lab and radiology results has been discussed with attending physician, Dr. Catalina Mina. Donnell Shields NP  Care of patient signed over pending labs. Amount and/or Complexity of Data Reviewed  Discuss the patient with other providers: yes (ER attending-Leighton)    Patient Progress  Patient progress: stable         Procedures      ED EKG interpretation:  Rhythm: normal sinus rhythm; and regular . Rate (approx.): 72;  Axis: normal; P wave: normal; QRS interval: normal ; ST/T wave: elevated ; no acute st changes Bello Thompson PA-C,  Jericho Donald DO, ED MD.

## 2020-02-27 NOTE — DISCHARGE INSTRUCTIONS
Continue your norco. Be aware of sedating effects, no alcohol or driving with this medicine. Keep elevated for next 48 hours. Place ice in a bag and apply to  for 20 minutes 4-5 times a day for next 48 hours. Return to ER for any redness, warmth, fever, chills, swelling, chest pain, shortness of breath. Follow-up with your doctor in next 2 days. Musculoskeletal Pain: Care Instructions  Your Care Instructions  Different problems with the bones, muscles, nerves, ligaments, and tendons in the body can cause pain. One or more areas of your body may ache or burn. Or they may feel tired, stiff, or sore. The medical term for this type of pain is musculoskeletal pain. It can have many different causes. Sometimes the pain is caused by an injury such as a strain or sprain. Or you might have pain from using one part of your body in the same way over and over again. This is called overuse. In some cases, the cause of the pain is another health problem such as arthritis or fibromyalgia. The doctor will examine you and ask you questions about your health to help find the cause of your pain. Blood tests or imaging tests like an X-ray may also be helpful. But sometimes doctors can't find a cause of the pain. Treatment depends on your symptoms and the cause of the pain, if known. The doctor has checked you carefully, but problems can develop later. If you notice any problems or new symptoms, get medical treatment right away. Follow-up care is a key part of your treatment and safety. Be sure to make and go to all appointments, and call your doctor if you are having problems. It's also a good idea to know your test results and keep a list of the medicines you take. How can you care for yourself at home? · Rest until you feel better. · Do not do anything that makes the pain worse. Return to exercise gradually if you feel better and your doctor says it's okay. · Be safe with medicines.  Read and follow all instructions on the label. ¨ If the doctor gave you a prescription medicine for pain, take it as prescribed. ¨ If you are not taking a prescription pain medicine, ask your doctor if you can take an over-the-counter medicine. · Put ice or a cold pack on the area for 10 to 20 minutes at a time to ease pain. Put a thin cloth between the ice and your skin. When should you call for help? Call your doctor now or seek immediate medical care if:  · You have new pain, or your pain gets worse. · You have new symptoms such as a fever, a rash, or chills. Watch closely for changes in your health, and be sure to contact your doctor if:  · You do not get better as expected. Where can you learn more? Go to [employer]  Enter Q624 in the search box to learn more about \"Musculoskeletal Pain: Care Instructions. \"   © 7050-0779 [employer], Incorporated. Care instructions adapted under license by [employer] (which disclaims liability or warranty for this information). This care instruction is for use with your licensed healthcare professional. If you have questions about a medical condition or this instruction, always ask your healthcare professional. [place] [address] any warranty or liability for your use of this information.   Content Version: 40.5.207127; Current as of: November 20, 2015

## 2020-02-28 LAB
ATRIAL RATE: 72 BPM
CALCULATED R AXIS, ECG10: -22 DEGREES
CALCULATED T AXIS, ECG11: 25 DEGREES
DIAGNOSIS, 93000: NORMAL
P-R INTERVAL, ECG05: 176 MS
Q-T INTERVAL, ECG07: 408 MS
QRS DURATION, ECG06: 76 MS
QTC CALCULATION (BEZET), ECG08: 446 MS
VENTRICULAR RATE, ECG03: 72 BPM

## 2020-03-23 RX ORDER — METOPROLOL SUCCINATE 100 MG/1
TABLET, EXTENDED RELEASE ORAL
Qty: 90 TAB | Refills: 1 | Status: SHIPPED | OUTPATIENT
Start: 2020-03-23 | End: 2020-10-23 | Stop reason: SDUPTHER

## 2020-03-28 DIAGNOSIS — E78.00 PURE HYPERCHOLESTEROLEMIA: ICD-10-CM

## 2020-03-30 RX ORDER — ATORVASTATIN CALCIUM 20 MG/1
TABLET, FILM COATED ORAL
Qty: 90 TAB | Refills: 1 | Status: SHIPPED | OUTPATIENT
Start: 2020-03-30 | End: 2020-10-23 | Stop reason: SDUPTHER

## 2020-04-13 RX ORDER — AMLODIPINE BESYLATE 10 MG/1
TABLET ORAL
Qty: 90 TAB | Refills: 1 | Status: SHIPPED | OUTPATIENT
Start: 2020-04-13 | End: 2020-10-23 | Stop reason: SDUPTHER

## 2020-04-13 RX ORDER — METFORMIN HYDROCHLORIDE 1000 MG/1
TABLET ORAL
Qty: 180 TAB | Refills: 1 | Status: SHIPPED | OUTPATIENT
Start: 2020-04-13 | End: 2020-10-23 | Stop reason: SDUPTHER

## 2020-04-20 ENCOUNTER — VIRTUAL VISIT (OUTPATIENT)
Dept: INTERNAL MEDICINE CLINIC | Age: 60
End: 2020-04-20

## 2020-04-20 VITALS — HEIGHT: 69 IN | WEIGHT: 264 LBS | BODY MASS INDEX: 39.1 KG/M2

## 2020-04-20 DIAGNOSIS — E78.00 PURE HYPERCHOLESTEROLEMIA: ICD-10-CM

## 2020-04-20 DIAGNOSIS — F51.04 CHRONIC INSOMNIA: ICD-10-CM

## 2020-04-20 DIAGNOSIS — M47.22 OSTEOARTHRITIS OF SPINE WITH RADICULOPATHY, CERVICAL REGION: ICD-10-CM

## 2020-04-20 DIAGNOSIS — I10 ESSENTIAL HYPERTENSION: ICD-10-CM

## 2020-04-20 DIAGNOSIS — E11.00 TYPE 2 DIABETES MELLITUS WITH HYPEROSMOLARITY WITHOUT COMA, WITHOUT LONG-TERM CURRENT USE OF INSULIN (HCC): Primary | ICD-10-CM

## 2020-04-20 DIAGNOSIS — M17.0 PRIMARY OSTEOARTHRITIS OF BOTH KNEES: ICD-10-CM

## 2020-04-20 DIAGNOSIS — Z79.891 CHRONIC PRESCRIPTION OPIATE USE: ICD-10-CM

## 2020-04-20 DIAGNOSIS — E11.00 TYPE 2 DIABETES MELLITUS WITH HYPEROSMOLARITY WITHOUT COMA, UNSPECIFIED WHETHER LONG TERM INSULIN USE (HCC): Primary | ICD-10-CM

## 2020-04-20 DIAGNOSIS — F41.8 ANXIETY WITH DEPRESSION: ICD-10-CM

## 2020-04-20 RX ORDER — ZOLPIDEM TARTRATE 10 MG/1
TABLET ORAL
Qty: 30 TAB | Refills: 2 | Status: SHIPPED | OUTPATIENT
Start: 2020-04-20 | End: 2020-08-07

## 2020-04-20 NOTE — PROGRESS NOTES
Chief Complaint   Patient presents with    Diabetes     Glucose reading  range    Medication Refill     Ambien, Metformin, Amlodipine, Atorvastaton    Depression     1. Have you been to the ER, urgent care clinic since your last visit? Hospitalized since your last visit? No    2. Have you seen or consulted any other health care providers outside of the [address] [place] [address] since your last visit? Include any pap smears or colon screening.  No

## 2020-04-20 NOTE — PROGRESS NOTES
Consent: Alise Monson, who was seen by synchronous (real-time) audio-video technology, and/or her healthcare decision maker, is aware that this patient-initiated, Telehealth encounter on 4/20/2020 is a billable service, with coverage as determined by her insurance carrier. She is aware that she may receive a bill and has provided verbal consent to proceed: Yes. Assessment & Plan:   Diagnoses and all orders for this visit:    1. Type 2 diabetes mellitus with hyperosmolarity without coma, without long-term current use of insulin ([address]    2. Chronic insomnia  -     zolpidem (AMBIEN) 10 mg tablet; TAKE 1 TABLET BY MOUTH AT BEDTIME AS NEEDED FOR SLEEP    3. Pure hypercholesterolemia    4. Chronic prescription opiate use    5. Osteoarthritis of spine with radiculopathy, cervical region    6. Essential hypertension    7. Primary osteoarthritis of both knees    8. Anxiety with depression          Follow-up and Dispositions    · Return in about 3 months (around 7/20/2020) for DM f/u. I spent at least 25 minutes with this established patient, and >50% of the time was spent counseling and/or coordinating care regarding . diag  712  Subjective:   Alise Monson is a 56 y.o. female who was seen for Medication Refill    Pts  has been diagnosed w stage 4 liver cancer. I  He has mets to brain. He is currently comfort care only. Pt is requesting something \"for her nerves\" and a refill of her ambien. Pt was on xanax in the past for anxiety and was painfully, slowly weaned off and started on lexapro. Pt has a strong social support network. No SI/HI. She is currently on narcotic analgesics and ambien already. Hypertension Review:  The patient has essential hypertension  Diet and Lifestyle: generally follows a  low sodium diet, exercises never  Home BP Monitoring: is not measured at home.   Pertinent ROS: taking medications as instructed, no medication side effects noted, no TIA's, no chest pain on exertion, no dyspnea on exertion, no swelling of ankles. BP Readings from Last 3 Encounters:   09/19/19 134/65   04/08/19 134/72   02/05/19 120/60     Type 2 DM-on metformin and januvia   -pt admits to dietary indiscretions and elevated blood sugars   -  Lab Results   Component Value Date/Time    Hemoglobin A1c 11.4 (H) 08/14/2018 01:14 PM    Hemoglobin A1c (POC) 10.7 09/19/2019 03:35 PM     Hyperlipidemia-on atorvastatin 20mg   -  Lab Results   Component Value Date/Time    Cholesterol, total 97 (L) 08/14/2018 01:14 PM    Cholesterol (POC) <100 09/19/2019 03:36 PM    HDL Cholesterol 34 (L) 08/14/2018 01:14 PM    HDL Cholesterol (POC) 25 09/19/2019 03:36 PM    LDL Cholesterol (POC) N/A 09/19/2019 03:36 PM    LDL, calculated 26 08/14/2018 01:14 PM    VLDL, calculated 37 08/14/2018 01:14 PM    Triglyceride 184 (H) 08/14/2018 01:14 PM    Triglycerides (POC) 354 09/19/2019 03:36 PM    CHOL/HDL Ratio 4.6 05/08/2013 11:45 AM       Morbid obesity-  Wt Readings from Last 3 Encounters:   09/19/19 267 lb (121.1 kg)   04/08/19 259 lb (117.5 kg)   02/05/19 261 lb (118.4 kg)     Chronic Pain-pt is seeing Pain Mngmnt and is on a regimen of gabapentin, tramadol and oxycodone 9mg      Chronic insomnia-pt requests an increase in her ambien back to 10mg as she was getting from her Pain Mngmnt provider. The 5mg (as is recommended for women) does not work. Pts  checked, there is no evidence of misuse or abuse. Last filled 2/24/2020        Prior to Admission medications    Medication Sig Start Date End Date Taking?  Authorizing Provider   zolpidem (AMBIEN) 10 mg tablet TAKE 1 TABLET BY MOUTH AT BEDTIME AS NEEDED FOR SLEEP 4/20/20  Yes Chris Lucia MD   amLODIPine (NORVASC) 10 mg tablet TAKE 1 TABLET BY MOUTH DAILY 4/13/20   Chris Lucia MD   metFORMIN (GLUCOPHAGE) 1,000 mg tablet TAKE 1 TABLET BY MOUTH TWICE DAILY WITH MEALS 4/13/20   Chris Lucia MD   atorvastatin (LIPITOR) 20 mg tablet TAKE 1 TABLET BY MOUTH EVERY NIGHT FOR CHOLESTEROL 3/30/20   Elena Oquendo MD   metoprolol succinate (TOPROL-XL) 100 mg tablet TAKE 1 TABLET BY MOUTH DAILY FOR HIGH BLOOD PRESSURE 3/23/20   Elena Oquendo MD   valsartan-hydroCHLOROthiazide (DIOVAN-HCT) 320-25 mg per tablet TAKE 1 TABLET BY MOUTH DAILY 2/24/20   Montana Lincoln NP   zolpidem (AMBIEN) 10 mg tablet TAKE 1 TABLET BY MOUTH AT BEDTIME AS NEEDED FOR SLEEP 2/24/20 4/20/20  Montana Lincoln NP   glucose blood VI test strips (BLOOD GLUCOSE TEST) strip Use BID DxE11.9 2/17/20   Elena Oquendo MD   glimepiride (AMARYL) 4 mg tablet TAKE 1 TABLET BY MOUTH TWICE DAILY 1/15/20   Elena Oquendo MD   escitalopram oxalate (LEXAPRO) 20 mg tablet TAKE 1 TABLET BY MOUTH EVERY DAY 11/18/19   Elena Oquendo MD   XTAMPZA ER 9 mg capsule Take 9 mg by mouth every twelve (12) hours. 3/12/19   Provider, Historical   SITagliptin (JANUVIA) 100 mg tablet Take 1 Tab by mouth daily. 4/8/19   Elena Oqeundo MD   fluticasone (FLONASE) 50 mcg/actuation nasal spray 2 Sprays by Both Nostrils route daily. Provider, Historical     Allergies   Allergen Reactions    Influenza Virus Vaccine, Specific Other (comments)     States body got very hot    Aspirin Other (comments)     Pt reports having a h/o gastric ulcers. Pt was on IBU when she was dx'd. Review of systems (12) negative, except noted above. Objective:   Vital Signs: (As obtained by patient/caregiver at home)  There were no vitals taken for this visit. PE:  General appearance - alert, well appearing, and in no distress   Mental status - alert, oriented to person, place, and time  EYE-EOMI  Neck - supple,   Chest - symmetric air entry    Abdomen - obese  Ext-no clubbing or cyanosis  Skin-Warm and dry. no hyperpigmentation, vitiligo, or suspicious lesions  Neuro -alert, oriented, normal speech, no focal findings or movement disorder noted      A/P:    ICD-10-CM ICD-9-CM    1.  Type 2 diabetes mellitus with hyperosmolarity without coma, without long-term current use of insulin (HCC) E11.00 250.20    2. Chronic insomnia F51.04 780.52 zolpidem (AMBIEN) 10 mg tablet   3. Pure hypercholesterolemia E78.00 272.0    4. Chronic prescription opiate use Z79.891 V58.69    5. Osteoarthritis of spine with radiculopathy, cervical region M47.22 721.0    6. Essential hypertension I10 401.9    7. Primary osteoarthritis of both knees M17.0 715.16    8. Anxiety with depression F41.8 300.4      Cont current mngmnt  Refill ambien  pts request for bdz declined due to her chronic prescription opiate use and risk of dependency/complication  Psychotherapist info declined by patient      We discussed the expected course, resolution and complications of the diagnosis(es) in detail. Medication risks, benefits, costs, interactions, and alternatives were discussed as indicated. I advised her to contact the office if her condition worsens, changes or fails to improve as anticipated. She expressed understanding with the diagnosis(es) and plan. Kelley Medina is a 56 y.o. female being evaluated by a video visit encounter for concerns as above. A caregiver was present when appropriate. Due to this being a TeleHealth encounter (During [facility] public health emergency), evaluation of the following organ systems was limited: Vitals/Constitutional/EENT/Resp/CV/GI//MS/Neuro/Skin/Heme-Lymph-Imm. Pursuant to the emergency declaration under the [facility], [facility] waiver authority and the [employer] and [employer] Act, this Virtual  Visit was conducted, with patient's (and/or legal guardian's) consent, to reduce the patient's risk of exposure to COVID-19 and provide necessary medical care. Services were provided through a video synchronous discussion virtually to substitute for in-person clinic visit.    Patient and provider were located at their individual homes.         Ramsey Voss MD

## 2020-05-11 DIAGNOSIS — F33.9 EPISODE OF RECURRENT MAJOR DEPRESSIVE DISORDER, UNSPECIFIED DEPRESSION EPISODE SEVERITY (HCC): ICD-10-CM

## 2020-05-11 RX ORDER — ESCITALOPRAM OXALATE 20 MG/1
TABLET ORAL
Qty: 90 TAB | Refills: 1 | Status: SHIPPED | OUTPATIENT
Start: 2020-05-11 | End: 2020-10-23 | Stop reason: SDUPTHER

## 2020-05-28 DIAGNOSIS — I10 ESSENTIAL HYPERTENSION: Primary | ICD-10-CM

## 2020-05-28 RX ORDER — VALSARTAN AND HYDROCHLOROTHIAZIDE 320; 25 MG/1; MG/1
TABLET, FILM COATED ORAL
Qty: 90 TAB | Refills: 1 | Status: SHIPPED | OUTPATIENT
Start: 2020-05-28 | End: 2020-10-23 | Stop reason: SDUPTHER

## 2020-07-10 RX ORDER — GLIMEPIRIDE 4 MG/1
TABLET ORAL
Qty: 180 TAB | Refills: 1 | Status: SHIPPED | OUTPATIENT
Start: 2020-07-10 | End: 2021-01-20

## 2020-07-23 NOTE — MR AVS SNAPSHOT
[address] [place] [address] [address] [id number] 
[phone] Patient: Sudeep Bhatt MRN: [id number] GRZ:[id number] Visit Information Date & Time Provider Department Dept. Phone Encounter #  
 5/7/2018  3:00 PM Umberto Brunner,  Dae Bueno. [id number] Upcoming Health Maintenance Date Due  
 FOOT EXAM Q1 3/4/1968 DTaP/Tdap/Td series (1 - Tdap) 3/4/1979 PAP AKA CERVICAL CYTOLOGY 3/4/1979 EYE EXAM RETINAL OR DILATED Q1 3/31/2016 MICROALBUMIN Q1 1/8/2017 ZOSTER VACCINE AGE 60> 1/4/2018 LIPID PANEL Q1 5/12/2018 HEMOGLOBIN A1C Q6M 5/21/2018 Influenza Age 5 to Adult 8/1/2018 BREAST CANCER SCRN MAMMOGRAM 4/6/2020 COLONOSCOPY 6/14/2020 Allergies as of 5/7/2018  Review Complete On: 5/7/2018 By: Trever Bales LPN Severity Noted Reaction Type Reactions Influenza Virus Vaccine, Specific High 05/08/2013   Side Effect Other (comments) States body got very hot Aspirin Low 06/15/2010   Side Effect Other (comments) Pt reports having a h/o gastric ulcers. Pt was on IBU when she was dx'd. Current Immunizations  Reviewed on 5/9/2013 Name Date Influenza Vaccine Split  Deferred (Patient Refused) Not reviewed this visit You Were Diagnosed With   
  
 Codes Comments Type 2 diabetes mellitus with hyperosmolarity without coma, with long-term current use of insulin (HCC)    -  Primary ICD-10-CM: E11.00, Z79.4 ICD-9-CM: 250.20, V58.67 Essential hypertension     ICD-10-CM: I10 
ICD-9-CM: 401.9 Primary osteoarthritis of both knees     ICD-10-CM: M17.0 ICD-9-CM: 715.16 Pure hypercholesterolemia     ICD-10-CM: E78.00 ICD-9-CM: 272.0 Morbid obesity ([address]     ICD-10-CM: E66.01 
ICD-9-CM: 278.01 Screen for STD (sexually transmitted disease)     ICD-10-CM: Z11.3 ICD-9-CM: V74.5 Hyperparathyroidism, primary ([address]     ICD-10-CM: E21.0 ICD-9-CM: 252.01 Primary insomnia     ICD-10-CM: F51.01 
ICD-9-CM: 307.42 Episode of recurrent major depressive disorder, unspecified depression episode severity ([facility]     ICD-10-CM: F33.9 ICD-9-CM: 296.30 Vitals BP Pulse Temp Resp Height(growth percentile) Weight(growth percentile) 127/77 (BP 1 Location: Left arm, BP Patient Position: Sitting) 92 98.7 °F (37.1 °C) (Oral) 20 5' 11\" (1.803 m) 260 lb (117.9 kg) SpO2 BMI OB Status Smoking Status 97% 36.26 kg/m2 Hysterectomy Former Smoker Vitals History BMI and BSA Data Body Mass Index Body Surface Area  
 36.26 kg/m 2 2.43 m 2 Preferred Pharmacy Pharmacy Name Phone [facility] [phone] Your Updated Medication List  
  
   
This list is accurate as of 5/7/18  3:43 PM.  Always use your most recent med list. amLODIPine 10 mg tablet Commonly known as:  Keaton Matar TAKE 1 TABLET BY MOUTH DAILY  
  
 atorvastatin 20 mg tablet Commonly known as:  LIPITOR  
TAKE 1 TABLET BY MOUTH DAILY FOR CHOLESTEROL  
  
 diclofenac 1 % Gel Commonly known as:  VOLTAREN Apply  to affected area four (4) times daily. escitalopram oxalate 10 mg tablet Commonly known as:  Kristy Kramer TAKE 1 TABLET BY MOUTH EVERY DAY  
  
 FLONASE 50 mcg/actuation nasal spray Generic drug:  fluticasone 2 Sprays by Both Nostrils route daily. glimepiride 4 mg tablet Commonly known as:  AMARYL  
TAKE 1 TABLET BY MOUTH TWICE DAILY  
  
 glucose blood VI test strips strip Commonly known as:  ACCU-CHEK MEENU PLUS TEST STRP Check sugars 3 times a day Dx E11.65  
  
 metFORMIN 1,000 mg tablet Commonly known as:  GLUCOPHAGE  
TAKE 1 TABLET BY MOUTH TWICE DAILY WITH MEALS  
  
 metoprolol succinate 100 mg tablet Commonly known as:  TOPROL-XL  
TAKE 1 TABLET BY MOUTH DAILY FOR HIGH BLOOD PRESSURE  
  
 traMADol 50 mg tablet Commonly known as:  ULTRAM  
Take 1 Tab by mouth every eight (8) hours as needed for Pain. Max Daily Amount: 150 mg. Indications: Pain  
  
 valsartan-hydroCHLOROthiazide 320-25 mg per tablet Commonly known as:  DIOVAN-HCT Take 1 Tab by mouth daily. zolpidem 5 mg tablet Commonly known as:  AMBIEN Take 1 Tab by mouth nightly as needed for Sleep (NOT TO BE USED AS A STANDING DOSE. THIS IS A PRN MEDICATION. THIS WILL NOT BE REFILLED REGULARLY). Max Daily Amount: 5 mg. Prescriptions Printed Refills  
 zolpidem (AMBIEN) 5 mg tablet 1 Sig: Take 1 Tab by mouth nightly as needed for Sleep (NOT TO BE USED AS A STANDING DOSE. THIS IS A PRN MEDICATION. THIS WILL NOT BE REFILLED REGULARLY). Max Daily Amount: 5 mg. Class: Print Route: Oral  
  
Prescriptions Sent to Pharmacy Refills  
 escitalopram oxalate (LEXAPRO) 10 mg tablet 2 Sig: TAKE 1 TABLET BY MOUTH EVERY DAY Class: Normal  
 Pharmacy: [facility] Ph #: [phone] We Performed the Following CBC W/O DIFF [96585 CPT(R)] HEMOGLOBIN A1C WITH EAG [60071 CPT(R)] HEPATITIS C AB [07245 CPT(R)] HIV 1/2 AG/AB, 4TH GENERATION,W RFLX CONFIRM [id number] CPT(R)] LIPID PANEL [63193 CPT(R)] METABOLIC PANEL, COMPREHENSIVE [49722 CPT(R)] MICROALBUMIN, UR, RAND [id number] CPT(R)] PTH INTACT [74543 CPT(R)] T PALLIDUM SCREEN W/REFLEX [[id number] Custom] TSH REFLEX TO T4 [58053 CPT(R)] To-Do List   
 05/14/2018 Lab:  VITAMIN D, 25 HYDROXY Introducing [facility]! Dear Keith Dong: Thank you for requesting a App[employer]ault account. Our records indicate that you already have an active AppVault account. You can access your account anytime at [email or web address] Did you know that you can access your hospital and ER discharge instructions at any time in eSolar? You can also review all of your test results from your hospital stay or ER visit. Additional Information If you have questions, please visit the Frequently Asked Questions section of the eSolar website at [email or web address] Remember, eSolar is NOT to be used for urgent needs. For medical emergencies, dial 911. Now available from your iPhone and Android! Please provide this summary of care documentation to your next provider. Your primary care clinician is listed as Mike Eller. If you have any questions after today's visit, please call [phone]. Azathioprine Counseling:  I discussed with the patient the risks of azathioprine including but not limited to myelosuppression, immunosuppression, hepatotoxicity, lymphoma, and infections.  The patient understands that monitoring is required including baseline LFTs, Creatinine, possible TPMP genotyping and weekly CBCs for the first month and then every 2 weeks thereafter.  The patient verbalized understanding of the proper use and possible adverse effects of azathioprine.  All of the patient's questions and concerns were addressed.

## 2020-10-09 ENCOUNTER — VIRTUAL VISIT (OUTPATIENT)
Dept: INTERNAL MEDICINE CLINIC | Age: 60
End: 2020-10-09
Payer: COMMERCIAL

## 2020-10-09 DIAGNOSIS — Z91.119 DIETARY NONCOMPLIANCE: ICD-10-CM

## 2020-10-09 DIAGNOSIS — E66.01 MORBID OBESITY (HCC): ICD-10-CM

## 2020-10-09 DIAGNOSIS — M47.22 OSTEOARTHRITIS OF SPINE WITH RADICULOPATHY, CERVICAL REGION: ICD-10-CM

## 2020-10-09 DIAGNOSIS — Z79.891 CHRONIC PRESCRIPTION OPIATE USE: ICD-10-CM

## 2020-10-09 DIAGNOSIS — F41.8 ANXIETY WITH DEPRESSION: ICD-10-CM

## 2020-10-09 DIAGNOSIS — E11.00 TYPE 2 DIABETES MELLITUS WITH HYPEROSMOLARITY WITHOUT COMA, WITHOUT LONG-TERM CURRENT USE OF INSULIN (HCC): Primary | ICD-10-CM

## 2020-10-09 DIAGNOSIS — E78.00 PURE HYPERCHOLESTEROLEMIA: ICD-10-CM

## 2020-10-09 DIAGNOSIS — I10 ESSENTIAL HYPERTENSION: ICD-10-CM

## 2020-10-09 PROCEDURE — 99214 OFFICE O/P EST MOD 30 MIN: CPT | Performed by: INTERNAL MEDICINE

## 2020-10-09 NOTE — PROGRESS NOTES
Chief Complaint   Patient presents with    Labs     1. Have you been to the ER, urgent care clinic since your last visit? Hospitalized since your last visit? No    2. Have you seen or consulted any other health care providers outside of the [address] [place] [address] since your last visit? Include any pap smears or colon screening. No       Pt denies pain this time.

## 2020-10-09 NOTE — PROGRESS NOTES
Consent: Elaina Costa, who was seen by synchronous (real-time) audio-video technology, and/or her healthcare decision maker, is aware that this patient-initiated, Telehealth encounter on 10/9/2020 is a billable service, with coverage as determined by her insurance carrier. She is aware that she may receive a bill and has provided verbal consent to proceed: Yes. Assessment & Plan:       ICD-10-CM ICD-9-CM    1. Type 2 diabetes mellitus with hyperosmolarity without coma, without long-term current use of insulin (HCC)  E11.00 250.20    2. Essential hypertension  I10 401.9    3. Morbid obesity ([address]  E66.01 278.01    4. Pure hypercholesterolemia  E78.00 272.0    5. Osteoarthritis of spine with radiculopathy, cervical region  M47.22 721.0    6. Chronic prescription opiate use  Z79.891 V58.69    7. Anxiety with depression  F41.8 300.4    8. Dietary noncompliance  Z91.11 V15.81      -pt encouraged to complete labs  -pt most likely needs addition of insulin/GLP-1 to lower blood sugar    Follow-up and Dispositions    · Return in about 3 months (around 1/9/2021) for IN OFFICE f/u. I spent at least 25 minutes with this established patient, and >50% of the time was spent counseling and/or coordinating care regarding . diag  712  Subjective:   Elaina Costa is a 56 y.o. female who was seen for Labs and Medication Refill    Pts  has been diagnosed w stage 4 liver cancer. Pt did not do labs ordered back in April    Hypertension Review:  The patient has essential hypertension  Diet and Lifestyle: generally follows a  low sodium diet, exercises never  Home BP Monitoring: is not measured at home. Pertinent ROS: taking medications as instructed, no medication side effects noted, no TIA's, no chest pain on exertion, no dyspnea on exertion, no swelling of ankles.    BP Readings from Last 3 Encounters:   09/19/19 134/65   04/08/19 134/72   02/05/19 120/60     Type 2 DM-on metformin and [place] and [place]   -pt admits to dietary indiscretions and elevated blood sugars   -  Lab Results   Component Value Date/Time    Hemoglobin A1c 11.4 (H) 08/14/2018 01:14 PM    Hemoglobin A1c (POC) 10.7 09/19/2019 03:35 PM     Hyperlipidemia-on atorvastatin 20mg   -  Lab Results   Component Value Date/Time    Cholesterol, total 97 (L) 08/14/2018 01:14 PM    Cholesterol (POC) <100 09/19/2019 03:36 PM    HDL Cholesterol 34 (L) 08/14/2018 01:14 PM    HDL Cholesterol (POC) 25 09/19/2019 03:36 PM    LDL Cholesterol (POC) N/A 09/19/2019 03:36 PM    LDL, calculated 26 08/14/2018 01:14 PM    VLDL, calculated 37 08/14/2018 01:14 PM    Triglyceride 184 (H) 08/14/2018 01:14 PM    Triglycerides (POC) 354 09/19/2019 03:36 PM    CHOL/HDL Ratio 4.6 05/08/2013 11:45 AM       Morbid obesity-  Wt Readings from Last 3 Encounters:   09/19/19 267 lb (121.1 kg)   04/08/19 259 lb (117.5 kg)   02/05/19 261 lb (118.4 kg)     Chronic Pain-pt is seeing Pain Mngmnt and is on a regimen of gabapentin, tramadol and oxycodone 9mg      Chronic insomnia-pt requests an increase in her ambien back to 10mg as she was getting from her Pain Mngmnt provider. The 5mg (as is recommended for women) does not work. Pts  checked, there is no evidence of misuse or abuse. Last filled 2/24/2020        Prior to Admission medications    Medication Sig Start Date End Date Taking?  Authorizing Provider   glimepiride (AMARYL) 4 mg tablet TAKE 1 TABLET BY MOUTH TWICE DAILY 7/10/20  Yes Bon Haywood MD   valsartan-hydroCHLOROthiazide (DIOVAN-HCT) 320-25 mg per tablet TAKE 1 TABLET BY MOUTH DAILY 5/28/20  Yes Bon Haywood MD   escitalopram oxalate (LEXAPRO) 20 mg tablet TAKE 1 TABLET BY MOUTH EVERYDAY 5/11/20  Yes Bon Haywood MD   amLODIPine (NORVASC) 10 mg tablet TAKE 1 TABLET BY MOUTH DAILY 4/13/20  Yes Bon Haywood MD   metFORMIN (GLUCOPHAGE) 1,000 mg tablet TAKE 1 TABLET BY MOUTH TWICE DAILY WITH MEALS 4/13/20  Yes Bon Haywood MD   atorvastatin (LIPITOR) 20 mg tablet TAKE 1 TABLET BY MOUTH EVERY NIGHT FOR CHOLESTEROL 3/30/20  Yes Patricia San MD   metoprolol succinate (TOPROL-XL) 100 mg tablet TAKE 1 TABLET BY MOUTH DAILY FOR HIGH BLOOD PRESSURE 3/23/20  Yes Patricia San MD   glucose blood VI test strips (BLOOD GLUCOSE TEST) strip Use BID DxE11.9 2/17/20  Yes Patricia San MD   XTAMPZA ER 9 mg capsule Take 9 mg by mouth every twelve (12) hours. 3/12/19  Yes Provider, Historical   SITagliptin (JANUVIA) 100 mg tablet Take 1 Tab by mouth daily. 4/8/19  Yes Patricia San MD   fluticasone (FLONASE) 50 mcg/actuation nasal spray 2 Sprays by Both Nostrils route daily. Yes Provider, Historical   zolpidem (AMBIEN) 10 mg tablet TAKE 1 TABLET BY MOUTH AT BEDTIME AS NEEDED FOR SLEEP 8/7/20   Patricia San MD     Allergies   Allergen Reactions    Influenza Virus Vaccine, Specific Other (comments)     States body got very hot    Aspirin Other (comments)     Pt reports having a h/o gastric ulcers. Pt was on IBU when she was dx'd. Review of systems (12) negative, except noted above. Objective:   Vital Signs: (As obtained by patient/caregiver at home)  There were no vitals taken for this visit. PE:  General appearance - alert, well appearing, and in no distress   Mental status - alert, oriented to person, place, and time  EYE-EOMI  Neck - supple,   Chest - symmetric air entry    Abdomen - obese  Ext-no clubbing or cyanosis  Skin-Warm and dry. no hyperpigmentation, vitiligo, or suspicious lesions  Neuro -alert, oriented, normal speech, no focal findings or movement disorder noted      A/P:    ICD-10-CM ICD-9-CM    1. Type 2 diabetes mellitus with hyperosmolarity without coma, without long-term current use of insulin (HCC)  E11.00 250.20    2. Essential hypertension  I10 401.9    3. Morbid obesity ([facility]  E66.01 278.01    4. Pure hypercholesterolemia  E78.00 272.0    5.  Osteoarthritis of spine with radiculopathy, cervical region  M47.22 721.0    6. Chronic prescription opiate use  Z79.891 V58.69    7. Anxiety with depression  F41.8 300.4    8. Dietary noncompliance  Z91.11 V15.81      Cont current mngmnt  Refill ambien  pts request for bdz declined due to her chronic prescription opiate use and risk of dependency/complication  Psychotherapist info declined by patient      We discussed the expected course, resolution and complications of the diagnosis(es) in detail. Medication risks, benefits, costs, interactions, and alternatives were discussed as indicated. I advised her to contact the office if her condition worsens, changes or fails to improve as anticipated. She expressed understanding with the diagnosis(es) and plan. Nilesh Reyes is a 56 y.o. female being evaluated by a video visit encounter for concerns as above. A caregiver was present when appropriate. Due to this being a TeleHealth encounter (During FEXYM-47 public health emergency), evaluation of the following organ systems was limited: Vitals/Constitutional/EENT/Resp/CV/GI//MS/Neuro/Skin/Heme-Lymph-Imm. Pursuant to the emergency declaration under the [facility] waiver authority and the [employer] General Act, this Virtual  Visit was conducted, with patient's (and/or legal guardian's) consent, to reduce the patient's risk of exposure to COVID-19 and provide necessary medical care. Services were provided through a video synchronous discussion virtually to substitute for in-person clinic visit. Patient and provider were located at their individual homes.         Sneha Duarte MD

## 2020-10-23 DIAGNOSIS — F33.9 EPISODE OF RECURRENT MAJOR DEPRESSIVE DISORDER, UNSPECIFIED DEPRESSION EPISODE SEVERITY (HCC): ICD-10-CM

## 2020-10-23 DIAGNOSIS — E78.00 PURE HYPERCHOLESTEROLEMIA: ICD-10-CM

## 2020-10-23 DIAGNOSIS — I10 ESSENTIAL HYPERTENSION: ICD-10-CM

## 2020-10-23 RX ORDER — METOPROLOL SUCCINATE 100 MG/1
TABLET, EXTENDED RELEASE ORAL
Qty: 90 TAB | Refills: 1 | Status: SHIPPED | OUTPATIENT
Start: 2020-10-23 | End: 2021-02-12 | Stop reason: SDUPTHER

## 2020-10-23 RX ORDER — AMLODIPINE BESYLATE 10 MG/1
10 TABLET ORAL DAILY
Qty: 90 TAB | Refills: 1 | Status: SHIPPED | OUTPATIENT
Start: 2020-10-23 | End: 2021-02-12 | Stop reason: SDUPTHER

## 2020-10-23 RX ORDER — ATORVASTATIN CALCIUM 20 MG/1
TABLET, FILM COATED ORAL
Qty: 90 TAB | Refills: 1 | Status: SHIPPED | OUTPATIENT
Start: 2020-10-23 | End: 2021-02-12 | Stop reason: SDUPTHER

## 2020-10-23 RX ORDER — ESCITALOPRAM OXALATE 20 MG/1
20 TABLET ORAL DAILY
Qty: 90 TAB | Refills: 1 | Status: SHIPPED | OUTPATIENT
Start: 2020-10-23 | End: 2021-02-12 | Stop reason: SDUPTHER

## 2020-10-23 RX ORDER — VALSARTAN AND HYDROCHLOROTHIAZIDE 320; 25 MG/1; MG/1
1 TABLET, FILM COATED ORAL DAILY
Qty: 90 TAB | Refills: 1 | Status: SHIPPED | OUTPATIENT
Start: 2020-10-23 | End: 2021-02-12 | Stop reason: SDUPTHER

## 2020-10-23 RX ORDER — METFORMIN HYDROCHLORIDE 1000 MG/1
1000 TABLET ORAL 2 TIMES DAILY WITH MEALS
Qty: 180 TAB | Refills: 1 | Status: SHIPPED | OUTPATIENT
Start: 2020-10-23 | End: 2021-02-12 | Stop reason: SDUPTHER

## 2020-10-23 NOTE — TELEPHONE ENCOUNTER
Last visit 10/09/2020 Virtual visit MD Jayleen Juarez   Next appointment 01/11/2021 MD Jayleen Juarez  Previous refill encounter(s)   03/23/2020 Toprol XL #90 with 1 refill,  03/30/2020 Lipitor #90 with 1 refill,  04/13/2020:  - Glucophage #180 with 1 refill,  - Norvasc #90 with 1 refill,  05/11/2020 Lexapro #90 with 1 refill,  05/28/2020 Diovan-HCT #90 with 1 refill. Requested Prescriptions     Pending Prescriptions Disp Refills    amLODIPine (NORVASC) 10 mg tablet 90 Tab 1     Sig: Take 1 Tab by mouth daily.  atorvastatin (LIPITOR) 20 mg tablet 90 Tab 1     Sig: Take 1 tab by mouth every night for cholesterol.  metoprolol succinate (TOPROL-XL) 100 mg tablet 90 Tab 1     Sig: Take 1 tab by mouth daily for high blood pressure    metFORMIN (GLUCOPHAGE) 1,000 mg tablet 180 Tab 1     Sig: Take 1 Tab by mouth two (2) times daily (with meals).  valsartan-hydroCHLOROthiazide (DIOVAN-HCT) 320-25 mg per tablet 90 Tab 1     Sig: Take 1 Tab by mouth daily.  escitalopram oxalate (LEXAPRO) 20 mg tablet 90 Tab 1     Sig: Take 1 Tab by mouth daily.

## 2021-01-20 RX ORDER — GLIMEPIRIDE 4 MG/1
TABLET ORAL
Qty: 180 TAB | Refills: 1 | Status: SHIPPED | OUTPATIENT
Start: 2021-01-20 | End: 2021-02-12 | Stop reason: SDUPTHER

## 2021-02-04 ENCOUNTER — HOSPITAL ENCOUNTER (EMERGENCY)
Age: 61
Discharge: HOME | End: 2021-02-04
Attending: EMERGENCY MEDICINE
Payer: COMMERCIAL

## 2021-02-04 ENCOUNTER — APPOINTMENT (OUTPATIENT)
Dept: CT IMAGING | Age: 61
End: 2021-02-04
Attending: EMERGENCY MEDICINE
Payer: COMMERCIAL

## 2021-02-04 ENCOUNTER — APPOINTMENT (OUTPATIENT)
Dept: GENERAL RADIOLOGY | Age: 61
End: 2021-02-04
Attending: EMERGENCY MEDICINE
Payer: COMMERCIAL

## 2021-02-04 VITALS
WEIGHT: 262.13 LBS | SYSTOLIC BLOOD PRESSURE: 120 MMHG | BODY MASS INDEX: 42.13 KG/M2 | HEART RATE: 82 BPM | DIASTOLIC BLOOD PRESSURE: 72 MMHG | OXYGEN SATURATION: 95 % | TEMPERATURE: 97.8 F | HEIGHT: 66 IN | RESPIRATION RATE: 18 BRPM

## 2021-02-04 DIAGNOSIS — R10.9 ACUTE ABDOMINAL PAIN: ICD-10-CM

## 2021-02-04 DIAGNOSIS — R19.7 DIARRHEA, UNSPECIFIED TYPE: ICD-10-CM

## 2021-02-04 DIAGNOSIS — R06.02 SOB (SHORTNESS OF BREATH): Primary | ICD-10-CM

## 2021-02-04 DIAGNOSIS — R07.89 ATYPICAL CHEST PAIN: ICD-10-CM

## 2021-02-04 DIAGNOSIS — Z20.822 SUSPECTED COVID-19 VIRUS INFECTION: ICD-10-CM

## 2021-02-04 LAB
ALBUMIN SERPL-MCNC: 4.2 G/DL (ref 3.5–5)
ALBUMIN/GLOB SERPL: 1.2 {RATIO} (ref 1.1–2.2)
ALP SERPL-CCNC: 77 U/L (ref 45–117)
ALT SERPL-CCNC: 41 U/L (ref 12–78)
ANION GAP SERPL CALC-SCNC: 8 MMOL/L (ref 5–15)
APPEARANCE UR: CLEAR
AST SERPL-CCNC: 17 U/L (ref 15–37)
ATRIAL RATE: 76 BPM
BACTERIA URNS QL MICRO: NEGATIVE /HPF
BASOPHILS # BLD: 0 K/UL (ref 0–0.1)
BASOPHILS NFR BLD: 0 % (ref 0–1)
BILIRUB SERPL-MCNC: 0.5 MG/DL (ref 0.2–1)
BILIRUB UR QL: NEGATIVE
BUN SERPL-MCNC: 6 MG/DL (ref 6–20)
BUN/CREAT SERPL: 8 (ref 12–20)
CALCIUM SERPL-MCNC: 9.2 MG/DL (ref 8.5–10.1)
CALCULATED P AXIS, ECG09: 69 DEGREES
CALCULATED R AXIS, ECG10: 6 DEGREES
CALCULATED T AXIS, ECG11: 80 DEGREES
CHLORIDE SERPL-SCNC: 97 MMOL/L (ref 97–108)
CK SERPL-CCNC: 144 U/L (ref 26–192)
CO2 SERPL-SCNC: 32 MMOL/L (ref 21–32)
COLOR UR: ABNORMAL
CREAT SERPL-MCNC: 0.79 MG/DL (ref 0.55–1.02)
DIAGNOSIS, 93000: NORMAL
DIFFERENTIAL METHOD BLD: NORMAL
EOSINOPHIL # BLD: 0 K/UL (ref 0–0.4)
EOSINOPHIL NFR BLD: 1 % (ref 0–7)
EPITH CASTS URNS QL MICRO: ABNORMAL /LPF
ERYTHROCYTE [DISTWIDTH] IN BLOOD BY AUTOMATED COUNT: 12.1 % (ref 11.5–14.5)
GLOBULIN SER CALC-MCNC: 3.5 G/DL (ref 2–4)
GLUCOSE SERPL-MCNC: 223 MG/DL (ref 65–100)
GLUCOSE UR STRIP.AUTO-MCNC: 100 MG/DL
HCT VFR BLD AUTO: 43 % (ref 35–47)
HGB BLD-MCNC: 14.1 G/DL (ref 11.5–16)
HGB UR QL STRIP: NEGATIVE
HYALINE CASTS URNS QL MICRO: ABNORMAL /LPF (ref 0–5)
IMM GRANULOCYTES # BLD AUTO: 0 K/UL (ref 0–0.04)
IMM GRANULOCYTES NFR BLD AUTO: 0 % (ref 0–0.5)
KETONES UR QL STRIP.AUTO: NEGATIVE MG/DL
LEUKOCYTE ESTERASE UR QL STRIP.AUTO: NEGATIVE
LIPASE SERPL-CCNC: 62 U/L (ref 73–393)
LYMPHOCYTES # BLD: 1.6 K/UL (ref 0.8–3.5)
LYMPHOCYTES NFR BLD: 28 % (ref 12–49)
MCH RBC QN AUTO: 28.8 PG (ref 26–34)
MCHC RBC AUTO-ENTMCNC: 32.8 G/DL (ref 30–36.5)
MCV RBC AUTO: 87.9 FL (ref 80–99)
MONOCYTES # BLD: 0.4 K/UL (ref 0–1)
MONOCYTES NFR BLD: 7 % (ref 5–13)
NEUTS SEG # BLD: 3.6 K/UL (ref 1.8–8)
NEUTS SEG NFR BLD: 64 % (ref 32–75)
NITRITE UR QL STRIP.AUTO: NEGATIVE
NRBC # BLD: 0 K/UL (ref 0–0.01)
NRBC BLD-RTO: 0 PER 100 WBC
P-R INTERVAL, ECG05: 160 MS
PH UR STRIP: 7 [PH] (ref 5–8)
PLATELET # BLD AUTO: 341 K/UL (ref 150–400)
PMV BLD AUTO: 9.2 FL (ref 8.9–12.9)
POTASSIUM SERPL-SCNC: 3.7 MMOL/L (ref 3.5–5.1)
PROT SERPL-MCNC: 7.7 G/DL (ref 6.4–8.2)
PROT UR STRIP-MCNC: NEGATIVE MG/DL
Q-T INTERVAL, ECG07: 416 MS
QRS DURATION, ECG06: 90 MS
QTC CALCULATION (BEZET), ECG08: 468 MS
RBC # BLD AUTO: 4.89 M/UL (ref 3.8–5.2)
RBC #/AREA URNS HPF: ABNORMAL /HPF (ref 0–5)
SARS-COV-2, COV2: NORMAL
SODIUM SERPL-SCNC: 137 MMOL/L (ref 136–145)
SP GR UR REFRACTOMETRY: 1.01 (ref 1–1.03)
TROPONIN I SERPL-MCNC: <0.05 NG/ML
UA: UC IF INDICATED,UAUC: ABNORMAL
UROBILINOGEN UR QL STRIP.AUTO: 0.2 EU/DL (ref 0.2–1)
VENTRICULAR RATE, ECG03: 76 BPM
WBC # BLD AUTO: 5.7 K/UL (ref 3.6–11)
WBC URNS QL MICRO: ABNORMAL /HPF (ref 0–4)

## 2021-02-04 PROCEDURE — 71045 X-RAY EXAM CHEST 1 VIEW: CPT

## 2021-02-04 PROCEDURE — 81001 URINALYSIS AUTO W/SCOPE: CPT

## 2021-02-04 PROCEDURE — 80053 COMPREHEN METABOLIC PANEL: CPT

## 2021-02-04 PROCEDURE — U0005 INFEC AGEN DETEC AMPLI PROBE: HCPCS

## 2021-02-04 PROCEDURE — 83690 ASSAY OF LIPASE: CPT

## 2021-02-04 PROCEDURE — 74177 CT ABD & PELVIS W/CONTRAST: CPT

## 2021-02-04 PROCEDURE — 74011000636 HC RX REV CODE- 636: Performed by: EMERGENCY MEDICINE

## 2021-02-04 PROCEDURE — 85025 COMPLETE CBC W/AUTO DIFF WBC: CPT

## 2021-02-04 PROCEDURE — 36415 COLL VENOUS BLD VENIPUNCTURE: CPT

## 2021-02-04 PROCEDURE — 93005 ELECTROCARDIOGRAM TRACING: CPT

## 2021-02-04 PROCEDURE — 99285 EMERGENCY DEPT VISIT HI MDM: CPT

## 2021-02-04 PROCEDURE — U0003 INFECTIOUS AGENT DETECTION BY NUCLEIC ACID (DNA OR RNA); SEVERE ACUTE RESPIRATORY SYNDROME CORONAVIRUS 2 (SARS-COV-2) (CORONAVIRUS DISEASE [COVID-19]), AMPLIFIED PROBE TECHNIQUE, MAKING USE OF HIGH THROUGHPUT TECHNOLOGIES AS DESCRIBED BY CMS-2020-01-R: HCPCS

## 2021-02-04 PROCEDURE — 84484 ASSAY OF TROPONIN QUANT: CPT

## 2021-02-04 PROCEDURE — 82550 ASSAY OF CK (CPK): CPT

## 2021-02-04 RX ORDER — ALBUTEROL SULFATE 90 UG/1
2 AEROSOL, METERED RESPIRATORY (INHALATION)
Qty: 1 INHALER | Refills: 0 | Status: SHIPPED | OUTPATIENT
Start: 2021-02-04 | End: 2021-10-13

## 2021-02-04 RX ORDER — DICYCLOMINE HYDROCHLORIDE 20 MG/1
20 TABLET ORAL
Qty: 20 TAB | Refills: 0 | Status: SHIPPED | OUTPATIENT
Start: 2021-02-04 | End: 2021-02-12

## 2021-02-04 RX ORDER — CYCLOBENZAPRINE HCL 10 MG
10 TABLET ORAL
Qty: 20 TAB | Refills: 0 | Status: SHIPPED | OUTPATIENT
Start: 2021-02-04 | End: 2021-02-12

## 2021-02-04 RX ADMIN — IOPAMIDOL 100 ML: 755 INJECTION, SOLUTION INTRAVENOUS at 14:39

## 2021-02-04 NOTE — ED PROVIDER NOTES
EMERGENCY DEPARTMENT HISTORY AND PHYSICAL EXAM      Date: 2/4/2021  Patient Name: Alise Monson    History of Presenting Illness     Chief Complaint   Patient presents with    Concern For COVID-19 (Coronavirus)     pt reports SOB since last night, denies cough, reports chills    Diarrhea     begining last night       History Provided By: Patient    HPI: Alise Monson, 56 y.o. female presents to the ED with cc of shortness of breath, diarrhea and concern for COVID-19. 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 She developed shortness of breath intermittently yesterday. States that it lasts for seconds at a time and associated with mild to moderate chest discomfort. Chest discomfort also lasts for seconds at a time and is located in the midsternal region. He denies any radiation to neck, back, jaw or arms. She denies any new leg pain. She states she has chronic leg edema. She had diarrhea several times last night. She denies cough or fever but has had chills. She denies abdominal pain or lightheadedness. There are no other complaints, changes, or physical findings at this time. PCP: Mauro Joshi MD    No current facility-administered medications on file prior to encounter. Current Outpatient Medications on File Prior to Encounter   Medication Sig Dispense Refill    glimepiride (AMARYL) 4 mg tablet TAKE 1 TABLET BY MOUTH TWICE DAILY 180 Tab 1    zolpidem (AMBIEN) 10 mg tablet TAKE 1 TABLET BY MOUTH AT BEDTIME AS NEEDED FOR SLEEP 30 Tab 2    amLODIPine (NORVASC) 10 mg tablet Take 1 Tab by mouth daily. 90 Tab 1    atorvastatin (LIPITOR) 20 mg tablet Take 1 tab by mouth every night for cholesterol.  90 Tab 1    metoprolol succinate (TOPROL-XL) 100 mg tablet Take 1 tab by mouth daily for high blood pressure 90 Tab 1    metFORMIN (GLUCOPHAGE) 1,000 mg tablet Take 1 Tab by mouth two (2) times daily (with meals). 180 Tab 1   • valsartan-hydroCHLOROthiazide (DIOVAN-HCT) 320-25 mg per tablet Take 1 Tab by mouth daily. 90 Tab 1   • escitalopram oxalate (LEXAPRO) 20 mg tablet Take 1 Tab by mouth daily. 90 Tab 1   • glucose blood VI test strips (BLOOD GLUCOSE TEST) strip Use BID DxE11.9 200 Strip 11   • XTAMPZA ER 9 mg capsule Take 9 mg by mouth every twelve (12) hours.  0   • SITagliptin (JANUVIA) 100 mg tablet Take 1 Tab by mouth daily. 90 Tab 1   • fluticasone (FLONASE) 50 mcg/actuation nasal spray 2 Sprays by Both Nostrils route daily.         Past History     Past Medical History:  Past Medical History:   Diagnosis Date   • Acute pancreatitis 2012   • Acute pancreatitis 2011   • Arthritis     both knees   • Chronic pain     knees   • Delivery normal     x1   • Diabetes (HCC)    • DJD (degenerative joint disease) of knee    • Fatty liver 2011   • GERD (gastroesophageal reflux disease)    • Hypertension    • Hypothyroidism    • Obesity    • PUD (peptic ulcer disease)    • UTI (urinary tract infection) 2011       Past Surgical History:  Past Surgical History:   Procedure Laterality Date   • HX BREAST BIOPSY Left     Surgical Bx  - BENIGN   • HX BREAST LUMPECTOMY      left breast   • HX GYN      hysterectomy   • HX ORTHOPAEDIC      left total knee replacement   • HX ORTHOPAEDIC  6/16/15    RIGHT TOTAL KNEE ARTHROPLASTY    • HX PARATHYROIDECTOMY  11       Family History:  Family History   Problem Relation Age of Onset   • Cancer Mother    • Hypertension Father    • Diabetes Sister    • Hypertension Sister    • Diabetes Brother    • Hypertension Brother        Social History:  Social History     Tobacco Use   • Smoking status: Former Smoker     Packs/day: 0.25     Years: 25.00     Pack years: 6.25     Types: Cigarettes     Quit date: 2008     Years since quittin.0   • Smokeless tobacco: Never Used   Substance Use Topics   • Alcohol use: No     Alcohol/week: 0.0 standard drinks   •  Drug use: No       Allergies: Allergies   Allergen Reactions    Influenza Virus Vaccine, Specific Other (comments)     States body got very hot    Aspirin Other (comments)     Pt reports having a h/o gastric ulcers. Pt was on IBU when she was dx'd. Review of Systems   Review of Systems   Constitutional: Negative for fever. HENT: Negative for congestion. Eyes: Negative. Respiratory: Positive for shortness of breath. Cardiovascular: Positive for chest pain. Gastrointestinal: Positive for abdominal pain and diarrhea. Endocrine: Negative for heat intolerance. Genitourinary: Negative for dysuria. Musculoskeletal: Negative for back pain. Skin: Negative for rash. Allergic/Immunologic: Negative for immunocompromised state. Neurological: Negative for dizziness. Hematological: Does not bruise/bleed easily. Psychiatric/Behavioral: Negative. All other systems reviewed and are negative. Physical Exam   Physical Exam  Vitals signs and nursing note reviewed. Constitutional:       General: She is not in acute distress. Appearance: She is well-developed. HENT:      Head: Normocephalic and atraumatic. Eyes:      Pupils: Pupils are equal, round, and reactive to light. Neck:      Musculoskeletal: Normal range of motion. Cardiovascular:      Rate and Rhythm: Normal rate and regular rhythm. Heart sounds: Normal heart sounds. Pulmonary:      Effort: Pulmonary effort is normal. No respiratory distress. Breath sounds: Normal breath sounds. Abdominal:      General: Bowel sounds are normal.      Palpations: Abdomen is soft. There is no mass. Tenderness: There is abdominal tenderness. Musculoskeletal: Normal range of motion. Skin:     General: Skin is warm and dry. Neurological:      General: No focal deficit present. Mental Status: She is alert and oriented to person, place, and time.       Coordination: Coordination normal.   Psychiatric:         Mood and Affect: Mood normal.         Behavior: Behavior normal.         Diagnostic Study Results     Labs -     Recent Results (from the past 12 hour(s))   EKG, 12 LEAD, INITIAL    Collection Time: 02/04/21 11:34 AM   Result Value Ref Range    Ventricular Rate 76 BPM    Atrial Rate 76 BPM    P-R Interval 160 ms    QRS Duration 90 ms    Q-T Interval 416 ms    QTC Calculation (Bezet) 468 ms    Calculated P Axis 69 degrees    Calculated R Axis 6 degrees    Calculated T Axis 80 degrees    Diagnosis       Normal sinus rhythm  Normal ECG  When compared with ECG of 27-FEB-2020 15:58,  Non-specific change in ST segment in Lateral leads  Nonspecific T wave abnormality now evident in Lateral leads     CBC WITH AUTOMATED DIFF    Collection Time: 02/04/21 11:36 AM   Result Value Ref Range    WBC 5.7 3.6 - 11.0 K/uL    RBC 4.89 3.80 - 5.20 M/uL    HGB 14.1 11.5 - 16.0 g/dL    HCT 43.0 35.0 - 47.0 %    MCV 87.9 80.0 - 99.0 FL    MCH 28.8 26.0 - 34.0 PG    MCHC 32.8 30.0 - 36.5 g/dL    RDW 12.1 11.5 - 14.5 %    PLATELET 600 103 - 268 K/uL    MPV 9.2 8.9 - 12.9 FL    NRBC 0.0 0  WBC    ABSOLUTE NRBC 0.00 0.00 - 0.01 K/uL    NEUTROPHILS 64 32 - 75 %    LYMPHOCYTES 28 12 - 49 %    MONOCYTES 7 5 - 13 %    EOSINOPHILS 1 0 - 7 %    BASOPHILS 0 0 - 1 %    IMMATURE GRANULOCYTES 0 0.0 - 0.5 %    ABS. NEUTROPHILS 3.6 1.8 - 8.0 K/UL    ABS. LYMPHOCYTES 1.6 0.8 - 3.5 K/UL    ABS. MONOCYTES 0.4 0.0 - 1.0 K/UL    ABS. EOSINOPHILS 0.0 0.0 - 0.4 K/UL    ABS. BASOPHILS 0.0 0.0 - 0.1 K/UL    ABS. IMM.  GRANS. 0.0 0.00 - 0.04 K/UL    DF AUTOMATED     METABOLIC PANEL, COMPREHENSIVE    Collection Time: 02/04/21 11:36 AM   Result Value Ref Range    Sodium 137 136 - 145 mmol/L    Potassium 3.7 3.5 - 5.1 mmol/L    Chloride 97 97 - 108 mmol/L    CO2 32 21 - 32 mmol/L    Anion gap 8 5 - 15 mmol/L    Glucose 223 (H) 65 - 100 mg/dL    BUN 6 6 - 20 MG/DL    Creatinine 0.79 0.55 - 1.02 MG/DL    BUN/Creatinine ratio 8 (L) 12 - 20      GFR est AA >60 >60 ml/min/1.73m2    GFR est non-AA >60 >60 ml/min/1.73m2    Calcium 9.2 8.5 - 10.1 MG/DL    Bilirubin, total 0.5 0.2 - 1.0 MG/DL    ALT (SGPT) 41 12 - 78 U/L    AST (SGOT) 17 15 - 37 U/L    Alk. phosphatase 77 45 - 117 U/L    Protein, total 7.7 6.4 - 8.2 g/dL    Albumin 4.2 3.5 - 5.0 g/dL    Globulin 3.5 2.0 - 4.0 g/dL    A-G Ratio 1.2 1.1 - 2.2     CK W/ REFLX CKMB    Collection Time: 02/04/21 11:36 AM   Result Value Ref Range     26 - 192 U/L   TROPONIN I    Collection Time: 02/04/21 11:36 AM   Result Value Ref Range    Troponin-I, Qt. <0.05 <0.05 ng/mL   URINALYSIS W/ REFLEX CULTURE    Collection Time: 02/04/21 12:13 PM    Specimen: Urine   Result Value Ref Range    Color YELLOW/STRAW      Appearance CLEAR CLEAR      Specific gravity 1.010 1.003 - 1.030      pH (UA) 7.0 5.0 - 8.0      Protein Negative NEG mg/dL    Glucose 100 (A) NEG mg/dL    Ketone Negative NEG mg/dL    Bilirubin Negative NEG      Blood Negative NEG      Urobilinogen 0.2 0.2 - 1.0 EU/dL    Nitrites Negative NEG      Leukocyte Esterase Negative NEG      WBC 0-4 0 - 4 /hpf    RBC 0-5 0 - 5 /hpf    Epithelial cells FEW FEW /lpf    Bacteria Negative NEG /hpf    UA:UC IF INDICATED CULTURE NOT INDICATED BY UA RESULT CNI      Hyaline cast 0-2 0 - 5 /lpf       Radiologic Studies -   XR CHEST PORT   Final Result   1. No acute disease            CT Results  (Last 48 hours)               02/04/21 1440  CT ABD PELV W CONT Final result    Impression:  No evidence of acute process. Narrative:  EXAM: CT ABD PELV W CONT       INDICATION: Abdominal pain       COMPARISON: 1/21/2011        CONTRAST: 100 mL of Isovue-370. TECHNIQUE:    Following the uneventful intravenous administration of contrast, thin axial   images were obtained through the abdomen and pelvis. Coronal and sagittal   reconstructions were generated. Oral contrast was not administered.  CT dose   reduction was achieved through use of a standardized protocol tailored for this examination and automatic exposure control for dose modulation. FINDINGS:    LOWER THORAX: No significant abnormality in the incidentally imaged lower chest.   LIVER: The liver is hypodense related to hepatic steatosis. No mass. BILIARY TREE: Gallbladder is within normal limits. CBD is not dilated. SPLEEN: within normal limits. PANCREAS: No mass or ductal dilatation. ADRENALS: Unremarkable. KIDNEYS: Several subcentimeter hypodensities in the kidneys are too small to   fully characterize, but likely represent small cysts. No mass, calculus, or   hydronephrosis. STOMACH: Unremarkable. SMALL BOWEL: No dilatation or wall thickening. COLON: No dilatation or wall thickening. Several diverticula are present. APPENDIX: Unremarkable. PERITONEUM: No ascites or pneumoperitoneum. RETROPERITONEUM: No lymphadenopathy or aortic aneurysm. REPRODUCTIVE ORGANS: Status post hysterectomy   URINARY BLADDER: No mass or calculus. BONES: No destructive bone lesion. ABDOMINAL WALL: There is a small fat-containing umbilical hernia. ADDITIONAL COMMENTS: N/A               CXR Results  (Last 48 hours)               02/04/21 1158  XR CHEST PORT Final result    Impression:  1. No acute disease           Narrative:  INDICATION:  Shortness of breath        Exam: Portable chest 1147. Comparison: 2/27/2020. Findings: Cardiomediastinal silhouette is within normal limits. Pulmonary   vasculature is not engorged. There are no focal parenchymal opacities,   effusions, or pneumothorax. Medical Decision Making   I am the first provider for this patient. I reviewed the vital signs, available nursing notes, past medical history, past surgical history, family history and social history. Vital Signs-Reviewed the patient's vital signs.   Patient Vitals for the past 12 hrs:   Temp Pulse Resp BP SpO2   02/04/21 1210    (!) 143/90 95 %   02/04/21 1125 97.8 °F (36.6 °C) 76 14 (!) 172/90 100 %       EKG interpretation: (Preliminary)  Rhythm: normal sinus rhythm; and regular . Rate (approx.): 76; Axis: normal; IN interval: normal; QRS interval: normal ; ST/T wave: normal; Other findings: normal.    Records Reviewed: Nursing Notes and Old Medical Records    Provider Notes (Medical Decision Making):   Atypical chest pain, CAD, bronchitis, pneumonia, COVID-19, gastroenteritis    ED Course:   Initial assessment performed. The patients presenting problems have been discussed, and they are in agreement with the care plan formulated and outlined with them.  I have encouraged them to ask questions as they arise throughout their visit.      Progress note:    Prior to discharge, the patient's complaint of upper abdominal pain.  She states it is significant severity and has become constant today.  I will add on a CT abdomen pelvis to evaluate    Progress note:    Patient's results were reviewed.  The patient is feeling better.  She is advised to follow-up and return to ER if worse    ED Course as of Feb 05 1356   Thu Feb 04, 2021   1532 Reassessed.  Feeling better.  CT abdomen pelvis unremarkable.  Will discharge patient home at this time.    [HW]      ED Course User Index  [HW] Singh Brady MD           Critical Care Time:   0    Disposition:  home    DISCHARGE PLAN:  1.   Discharge Medication List as of 2/4/2021  3:33 PM        2.   Follow-up Information     Follow up With Specialties Details Why Contact Info    Tayler Abrams MD Internal Medicine  As needed [address] [place] [address] [address]  [facility] [address] [phone]      Kameron Jason MD Cardiology  As needed [address]  [id number]  [facility] [address] [phone]      [facility] [place] EM[facility] Emergency Medicine  If symptoms worsen [address]  [facility] [address] [phone]        3.  Return to ED if worse     Diagnosis     Clinical Impression:   1. SOB (shortness of breath)    2. Atypical chest pain    3. Suspected COVID-19  virus infection    4. Diarrhea, unspecified type    5. Acute abdominal pain        Attestations:    Christine Bruce MD    Please note that this dictation was completed with [employer], the computer voice recognition software. Quite often unanticipated grammatical, syntax, homophones, and other interpretive errors are inadvertently transcribed by the computer software. Please disregard these errors. Please excuse any errors that have escaped final proofreading. Thank you.

## 2021-02-05 ENCOUNTER — PATIENT OUTREACH (OUTPATIENT)
Dept: CASE MANAGEMENT | Age: 61
End: 2021-02-05

## 2021-02-05 LAB
SARS-COV-2, XPLCVT: NOT DETECTED
SOURCE, COVRS: NORMAL

## 2021-02-05 NOTE — PROGRESS NOTES
21 Patient contacted regarding [facility]. Discussed COVID-19 related testing which was available at this time. Test results were negative. Patient informed of results, if available? yes     Care Transition Nurse/ Ambulatory Care Manager contacted the patient by telephone to perform post discharge assessment. Call within 2 business days of discharge: Yes Verified name and  with patient as identifiers. Provided introduction to self, and explanation of the CTN/ACM role, and reason for call due to risk factors for infection and/or exposure to COVID-19. Symptoms reviewed with patient who verbalized the following symptoms: fatigue, pain or aching joints, chills or shaking, nausea, vomiting, diarrhea, no new symptoms and no worsening symptoms      Due to no new or worsening symptoms encounter was not routed to provider for escalation. Discussed follow-up appointments. If no appointment was previously scheduled, appointment scheduling offered:  no Patient has contacted PCP for follow up  [facility] follow up appointment(s): No future appointments. Non-[facility] follow up appointment(s): none reported     Advance Care Planning:   Does patient have an Advance Directive:  not on file. Patient has her , Som Man listed as medical agent. Patient has following risk factors of: diabetes. CTN/ACM reviewed discharge instructions, medical action plan and red flags such as increased shortness of breath, increasing fever and signs of decompensation with patient who verbalized understanding. Discussed exposure protocols and quarantine with CDC Guidelines What to do if you are sick with coronavirus disease 2019.  Patient was given an opportunity for questions and concerns. The patient agrees to contact the [facility] exposure line [phone], [facility] department [address]  ([phone] and PCP office for questions related to their healthcare.  CTN/ACM provided contact information for future needs.    Reviewed and educated patient on any new and changed medications related to discharge diagnosis -Patient stated she did not  discharge medications due to cost.     Patient/family/caregiver given information for GetWell Loop and agrees to enroll yes  Patient's preferred e-mail: n/a   Patient's preferred phone number: [phone]  Based on Loop alert triggers, patient will be contacted by nurse care manager for worsening symptoms. Pt will be further monitored by COVID Loop Team based on severity of symptoms and risk factors. [facility]    2/5/21 ACM attempted to contact patient at numbers provided- left message for patient call back.  DMB

## 2021-02-07 ENCOUNTER — APPOINTMENT (OUTPATIENT)
Dept: GENERAL RADIOLOGY | Age: 61
End: 2021-02-07
Attending: EMERGENCY MEDICINE
Payer: COMMERCIAL

## 2021-02-07 ENCOUNTER — HOSPITAL ENCOUNTER (EMERGENCY)
Age: 61
Discharge: HOME | End: 2021-02-07
Attending: EMERGENCY MEDICINE
Payer: COMMERCIAL

## 2021-02-07 ENCOUNTER — APPOINTMENT (OUTPATIENT)
Dept: CT IMAGING | Age: 61
End: 2021-02-07
Attending: EMERGENCY MEDICINE
Payer: COMMERCIAL

## 2021-02-07 VITALS
TEMPERATURE: 98.2 F | HEART RATE: 81 BPM | BODY MASS INDEX: 41.63 KG/M2 | OXYGEN SATURATION: 97 % | SYSTOLIC BLOOD PRESSURE: 132 MMHG | RESPIRATION RATE: 22 BRPM | WEIGHT: 259.04 LBS | HEIGHT: 66 IN | DIASTOLIC BLOOD PRESSURE: 75 MMHG

## 2021-02-07 DIAGNOSIS — R74.8 ELEVATED LIPASE: Primary | ICD-10-CM

## 2021-02-07 LAB
ALBUMIN SERPL-MCNC: 4.1 G/DL (ref 3.5–5)
ALBUMIN/GLOB SERPL: 1.3 {RATIO} (ref 1.1–2.2)
ALP SERPL-CCNC: 68 U/L (ref 45–117)
ALT SERPL-CCNC: 42 U/L (ref 12–78)
ANION GAP SERPL CALC-SCNC: 8 MMOL/L (ref 5–15)
APPEARANCE UR: ABNORMAL
AST SERPL-CCNC: 24 U/L (ref 15–37)
ATRIAL RATE: 83 BPM
BACTERIA URNS QL MICRO: ABNORMAL /HPF
BASOPHILS # BLD: 0 K/UL (ref 0–0.1)
BASOPHILS NFR BLD: 0 % (ref 0–1)
BILIRUB SERPL-MCNC: 0.3 MG/DL (ref 0.2–1)
BILIRUB UR QL: NEGATIVE
BUN SERPL-MCNC: 16 MG/DL (ref 6–20)
BUN/CREAT SERPL: 18 (ref 12–20)
CALCIUM SERPL-MCNC: 9.6 MG/DL (ref 8.5–10.1)
CALCULATED P AXIS, ECG09: 34 DEGREES
CALCULATED R AXIS, ECG10: -25 DEGREES
CALCULATED T AXIS, ECG11: 60 DEGREES
CHLORIDE SERPL-SCNC: 99 MMOL/L (ref 97–108)
CO2 SERPL-SCNC: 31 MMOL/L (ref 21–32)
COLOR UR: ABNORMAL
CREAT SERPL-MCNC: 0.9 MG/DL (ref 0.55–1.02)
DIAGNOSIS, 93000: NORMAL
DIFFERENTIAL METHOD BLD: NORMAL
EOSINOPHIL # BLD: 0.1 K/UL (ref 0–0.4)
EOSINOPHIL NFR BLD: 1 % (ref 0–7)
EPITH CASTS URNS QL MICRO: ABNORMAL /LPF
ERYTHROCYTE [DISTWIDTH] IN BLOOD BY AUTOMATED COUNT: 12.3 % (ref 11.5–14.5)
GLOBULIN SER CALC-MCNC: 3.1 G/DL (ref 2–4)
GLUCOSE SERPL-MCNC: 223 MG/DL (ref 65–100)
GLUCOSE UR STRIP.AUTO-MCNC: 100 MG/DL
HCT VFR BLD AUTO: 41.1 % (ref 35–47)
HGB BLD-MCNC: 13.5 G/DL (ref 11.5–16)
HGB UR QL STRIP: NEGATIVE
IMM GRANULOCYTES # BLD AUTO: 0 K/UL (ref 0–0.04)
IMM GRANULOCYTES NFR BLD AUTO: 0 % (ref 0–0.5)
KETONES UR QL STRIP.AUTO: NEGATIVE MG/DL
LEUKOCYTE ESTERASE UR QL STRIP.AUTO: ABNORMAL
LIPASE SERPL-CCNC: 508 U/L (ref 73–393)
LYMPHOCYTES # BLD: 1.4 K/UL (ref 0.8–3.5)
LYMPHOCYTES NFR BLD: 24 % (ref 12–49)
MCH RBC QN AUTO: 29 PG (ref 26–34)
MCHC RBC AUTO-ENTMCNC: 32.8 G/DL (ref 30–36.5)
MCV RBC AUTO: 88.4 FL (ref 80–99)
MONOCYTES # BLD: 0.4 K/UL (ref 0–1)
MONOCYTES NFR BLD: 6 % (ref 5–13)
NEUTS SEG # BLD: 3.8 K/UL (ref 1.8–8)
NEUTS SEG NFR BLD: 69 % (ref 32–75)
NITRITE UR QL STRIP.AUTO: NEGATIVE
NRBC # BLD: 0 K/UL (ref 0–0.01)
NRBC BLD-RTO: 0 PER 100 WBC
P-R INTERVAL, ECG05: 150 MS
PH UR STRIP: 5.5 [PH] (ref 5–8)
PLATELET # BLD AUTO: 292 K/UL (ref 150–400)
PMV BLD AUTO: 9.3 FL (ref 8.9–12.9)
POTASSIUM SERPL-SCNC: 3.5 MMOL/L (ref 3.5–5.1)
PROT SERPL-MCNC: 7.2 G/DL (ref 6.4–8.2)
PROT UR STRIP-MCNC: NEGATIVE MG/DL
Q-T INTERVAL, ECG07: 392 MS
QRS DURATION, ECG06: 76 MS
QTC CALCULATION (BEZET), ECG08: 460 MS
RBC # BLD AUTO: 4.65 M/UL (ref 3.8–5.2)
RBC #/AREA URNS HPF: ABNORMAL /HPF (ref 0–5)
SODIUM SERPL-SCNC: 138 MMOL/L (ref 136–145)
SP GR UR REFRACTOMETRY: 1.03 (ref 1–1.03)
TROPONIN I SERPL-MCNC: <0.05 NG/ML
UA: UC IF INDICATED,UAUC: ABNORMAL
UROBILINOGEN UR QL STRIP.AUTO: 0.2 EU/DL (ref 0.2–1)
VENTRICULAR RATE, ECG03: 83 BPM
WBC # BLD AUTO: 5.6 K/UL (ref 3.6–11)
WBC URNS QL MICRO: ABNORMAL /HPF (ref 0–4)
YEAST BUDDING URNS QL: PRESENT

## 2021-02-07 PROCEDURE — 71045 X-RAY EXAM CHEST 1 VIEW: CPT

## 2021-02-07 PROCEDURE — 74177 CT ABD & PELVIS W/CONTRAST: CPT

## 2021-02-07 PROCEDURE — 99284 EMERGENCY DEPT VISIT MOD MDM: CPT

## 2021-02-07 PROCEDURE — 84484 ASSAY OF TROPONIN QUANT: CPT

## 2021-02-07 PROCEDURE — 80053 COMPREHEN METABOLIC PANEL: CPT

## 2021-02-07 PROCEDURE — 93005 ELECTROCARDIOGRAM TRACING: CPT

## 2021-02-07 PROCEDURE — 85025 COMPLETE CBC W/AUTO DIFF WBC: CPT

## 2021-02-07 PROCEDURE — 81001 URINALYSIS AUTO W/SCOPE: CPT

## 2021-02-07 PROCEDURE — 74011000636 HC RX REV CODE- 636: Performed by: EMERGENCY MEDICINE

## 2021-02-07 PROCEDURE — 36415 COLL VENOUS BLD VENIPUNCTURE: CPT

## 2021-02-07 PROCEDURE — 83690 ASSAY OF LIPASE: CPT

## 2021-02-07 RX ADMIN — IOPAMIDOL 100 ML: 755 INJECTION, SOLUTION INTRAVENOUS at 20:08

## 2021-02-07 NOTE — ED PROVIDER NOTES
EMERGENCY DEPARTMENT HISTORY AND PHYSICAL EXAM      Date: 2/7/2021  Patient Name: Chaz Gill    History of Presenting Illness     Chief Complaint   Patient presents with    Medication Reaction     Pt has been trying to wean herself off of hydrocodone pills that she has been taking for over a year. Pt states since Feb 4th she has been feeling like bugs are crawling all over her, hot sweats, and constipation. Pt doesnt know if she has taken too many pills or if the symptoms are related to her weaning herself off the medication. HPI: Chaz Gill, 56 y.o. female presents to the ED with cc of generalized weakness, irritation, all over her body. States she has had some nausea but no vomiting or diarrhea. No specific pain noted anywhere. She is concerned that she is having a reaction to hydrocodone, which she has been trying to wean, but took 1 recently. Symptoms seem to associated with taking that medicine. However, she states she was here about a week ago with the same symptoms. At that time she was tested for Covid but no other testing was done. There are no other complaints, changes, or physical findings at this time. PCP: Alix Palacio MD    No current facility-administered medications on file prior to encounter. Current Outpatient Medications on File Prior to Encounter   Medication Sig Dispense Refill    albuterol (PROVENTIL HFA, VENTOLIN HFA, PROAIR HFA) 90 mcg/actuation inhaler Take 2 Puffs by inhalation every four (4) hours as needed for Wheezing. 1 Inhaler 0    dicyclomine (BENTYL) 20 mg tablet Take 1 Tab by mouth every six (6) hours as needed for Abdominal Cramps. 20 Tab 0    cyclobenzaprine (FLEXERIL) 10 mg tablet Take 1 Tab by mouth three (3) times daily as needed for Muscle Spasm(s).  20 Tab 0    glimepiride (AMARYL) 4 mg tablet TAKE 1 TABLET BY MOUTH TWICE DAILY 180 Tab 1    zolpidem (AMBIEN) 10 mg tablet TAKE 1 TABLET BY MOUTH AT BEDTIME AS NEEDED FOR SLEEP 30 Tab 2    amLODIPine (NORVASC) 10 mg tablet Take 1 Tab by mouth daily. 90 Tab 1    atorvastatin (LIPITOR) 20 mg tablet Take 1 tab by mouth every night for cholesterol. 90 Tab 1    metoprolol succinate (TOPROL-XL) 100 mg tablet Take 1 tab by mouth daily for high blood pressure 90 Tab 1    metFORMIN (GLUCOPHAGE) 1,000 mg tablet Take 1 Tab by mouth two (2) times daily (with meals). 180 Tab 1    valsartan-hydroCHLOROthiazide (DIOVAN-HCT) 320-25 mg per tablet Take 1 Tab by mouth daily. 90 Tab 1    escitalopram oxalate (LEXAPRO) 20 mg tablet Take 1 Tab by mouth daily. 90 Tab 1    glucose blood VI test strips (BLOOD GLUCOSE TEST) strip Use BID DxE11.9 200 Strip 11    XTAMPZA ER 9 mg capsule Take 9 mg by mouth every twelve (12) hours. 0    SITagliptin (JANUVIA) 100 mg tablet Take 1 Tab by mouth daily. 90 Tab 1    fluticasone (FLONASE) 50 mcg/actuation nasal spray 2 Sprays by Both Nostrils route daily.          Past History     Past Medical History:  Past Medical History:   Diagnosis Date    Acute pancreatitis 8/9/2012    Acute pancreatitis 1/21/2011    Arthritis     both knees    Chronic pain     knees    Delivery normal     x1    Diabetes ([facility] [address]     DJD (degenerative joint disease) of knee     Fatty liver 1/22/2011    GERD (gastroesophageal reflux disease)     Hypertension     Hypothyroidism     Obesity     PUD (peptic ulcer disease)     UTI (urinary tract infection) 1/21/2011       Past Surgical History:  Past Surgical History:   Procedure Laterality Date    HX BREAST BIOPSY Left     Surgical Bx 1990 - BENIGN    HX BREAST LUMPECTOMY      left breast    HX GYN      hysterectomy    HX ORTHOPAEDIC      left total knee replacement    HX ORTHOPAEDIC  6/16/15    RIGHT TOTAL KNEE ARTHROPLASTY     HX PARATHYROIDECTOMY  01/27/11       Family History:  Family History   Problem Relation Age of Onset    Cancer Mother     Hypertension Father     Diabetes Sister     Hypertension Sister     Diabetes Brother     Hypertension Brother        Social History:  Social History     Tobacco Use    Smoking status: Former Smoker     Packs/day: 0.25     Years: 25.00     Pack years: 6.25     Types: Cigarettes     Quit date: 2008     Years since quittin.0    Smokeless tobacco: Never Used   Substance Use Topics    Alcohol use: No     Alcohol/week: 0.0 standard drinks    Drug use: No       Allergies: Allergies   Allergen Reactions    Influenza Virus Vaccine, Specific Other (comments)     States body got very hot    Aspirin Other (comments)     Pt reports having a h/o gastric ulcers. Pt was on IBU when she was dx'd. Review of Systems   Review of Systems   Constitutional: Negative for chills and fever. HENT: Negative for rhinorrhea. Eyes: Negative for redness. Respiratory: Negative for shortness of breath. Cardiovascular: Negative for chest pain. Gastrointestinal: Positive for abdominal pain and nausea. Genitourinary: Negative for dysuria. Musculoskeletal: Negative for back pain. Neurological: Negative for syncope. Psychiatric/Behavioral: The patient is not nervous/anxious. All other systems reviewed and are negative. Physical Exam   Physical Exam  Vitals signs and nursing note reviewed. Constitutional:       Appearance: Normal appearance. HENT:      Head: Normocephalic and atraumatic. Mouth/Throat:      Mouth: Mucous membranes are moist.   Eyes:      Extraocular Movements: Extraocular movements intact. Neck:      Musculoskeletal: Neck supple. Cardiovascular:      Rate and Rhythm: Normal rate and regular rhythm. Pulses: Normal pulses. Pulmonary:      Effort: Pulmonary effort is normal.      Breath sounds: Normal breath sounds. Abdominal:      Palpations: Abdomen is soft. Tenderness: There is no abdominal tenderness. Musculoskeletal:         General: No deformity. Skin:     General: Skin is warm and dry.    Neurological:      General: No focal deficit present. Mental Status: She is alert.    Psychiatric:         Mood and Affect: Mood normal.         Behavior: Behavior normal.         Diagnostic Study Results     Labs -     Recent Results (from the past 24 hour(s))   EKG, 12 LEAD, INITIAL    Collection Time: 02/07/21  5:43 PM   Result Value Ref Range    Ventricular Rate 83 BPM    Atrial Rate 83 BPM    P-R Interval 150 ms    QRS Duration 76 ms    Q-T Interval 392 ms    QTC Calculation (Bezet) 460 ms    Calculated P Axis 34 degrees    Calculated R Axis -25 degrees    Calculated T Axis 60 degrees    Diagnosis       Normal sinus rhythm  Nonspecific ST and T wave abnormality  When compared with ECG of 04-FEB-2021 11:34,  ST now depressed in Inferior leads     URINALYSIS W/ REFLEX CULTURE    Collection Time: 02/07/21  5:48 PM    Specimen: Urine   Result Value Ref Range    Color YELLOW/STRAW      Appearance CLOUDY (A) CLEAR      Specific gravity 1.026 1.003 - 1.030      pH (UA) 5.5 5.0 - 8.0      Protein Negative NEG mg/dL    Glucose 100 (A) NEG mg/dL    Ketone Negative NEG mg/dL    Bilirubin Negative NEG      Blood Negative NEG      Urobilinogen 0.2 0.2 - 1.0 EU/dL    Nitrites Negative NEG      Leukocyte Esterase TRACE (A) NEG      WBC 0-4 0 - 4 /hpf    RBC 0-5 0 - 5 /hpf    Epithelial cells MANY (A) FEW /lpf    Bacteria 1+ (A) NEG /hpf    UA:UC IF INDICATED CULTURE NOT INDICATED BY UA RESULT CNI      Budding yeast PRESENT (A) NEG     CBC WITH AUTOMATED DIFF    Collection Time: 02/07/21  5:48 PM   Result Value Ref Range    WBC 5.6 3.6 - 11.0 K/uL    RBC 4.65 3.80 - 5.20 M/uL    HGB 13.5 11.5 - 16.0 g/dL    HCT 41.1 35.0 - 47.0 %    MCV 88.4 80.0 - 99.0 FL    MCH 29.0 26.0 - 34.0 PG    MCHC 32.8 30.0 - 36.5 g/dL    RDW 12.3 11.5 - 14.5 %    PLATELET 042 357 - 776 K/uL    MPV 9.3 8.9 - 12.9 FL    NRBC 0.0 0  WBC    ABSOLUTE NRBC 0.00 0.00 - 0.01 K/uL    NEUTROPHILS 69 32 - 75 %    LYMPHOCYTES 24 12 - 49 %    MONOCYTES 6 5 - 13 %    EOSINOPHILS 1 0 - 7 % BASOPHILS 0 0 - 1 %    IMMATURE GRANULOCYTES 0 0.0 - 0.5 %    ABS. NEUTROPHILS 3.8 1.8 - 8.0 K/UL    ABS. LYMPHOCYTES 1.4 0.8 - 3.5 K/UL    ABS. MONOCYTES 0.4 0.0 - 1.0 K/UL    ABS. EOSINOPHILS 0.1 0.0 - 0.4 K/UL    ABS. BASOPHILS 0.0 0.0 - 0.1 K/UL    ABS. IMM. GRANS. 0.0 0.00 - 0.04 K/UL    DF AUTOMATED     METABOLIC PANEL, COMPREHENSIVE    Collection Time: 02/07/21  5:48 PM   Result Value Ref Range    Sodium 138 136 - 145 mmol/L    Potassium 3.5 3.5 - 5.1 mmol/L    Chloride 99 97 - 108 mmol/L    CO2 31 21 - 32 mmol/L    Anion gap 8 5 - 15 mmol/L    Glucose 223 (H) 65 - 100 mg/dL    BUN 16 6 - 20 MG/DL    Creatinine 0.90 0.55 - 1.02 MG/DL    BUN/Creatinine ratio 18 12 - 20      GFR est AA >60 >60 ml/min/1.73m2    GFR est non-AA >60 >60 ml/min/1.73m2    Calcium 9.6 8.5 - 10.1 MG/DL    Bilirubin, total 0.3 0.2 - 1.0 MG/DL    ALT (SGPT) 42 12 - 78 U/L    AST (SGOT) 24 15 - 37 U/L    Alk. phosphatase 68 45 - 117 U/L    Protein, total 7.2 6.4 - 8.2 g/dL    Albumin 4.1 3.5 - 5.0 g/dL    Globulin 3.1 2.0 - 4.0 g/dL    A-G Ratio 1.3 1.1 - 2.2     TROPONIN I    Collection Time: 02/07/21  5:48 PM   Result Value Ref Range    Troponin-I, Qt. <0.05 <0.05 ng/mL   LIPASE    Collection Time: 02/07/21  5:48 PM   Result Value Ref Range    Lipase 508 (H) 73 - 393 U/L       Radiologic Studies -   CT ABD PELV W CONT   Final Result   Hepatic steatosis. No acute finding. XR CHEST PORT   Final Result   No Acute Disease. CT Results  (Last 48 hours)               02/07/21 2008  CT ABD PELV W CONT Final result    Impression:  Hepatic steatosis. No acute finding. Narrative:  INDICATION: abd pain, elevated lipase        EXAM: CT Abdomen and Pelvis is performed with 100 mL Isovue 370 contrast IV   without oral contrast. CT dose reduction was achieved through use of a   standardized protocol tailored for this examination and automatic exposure   control for dose modulation.        FINDINGS:    There is no inflammation, ascites, pneumoperitoneum or significant adenopathy. Liver shows steatosis. Bile ducts are not enlarged. Pancreas shows no mass or   inflammation. Spleen is unremarkable. Adrenal glands are normal in size. Kidneys   show no mass or hydronephrosis. There are a few small renal cysts. Aorta is   without aneurysm. The appendix is normal. Bowels are not dilated. The bladder is unremarkable. The   distal ureters are not dilated. There is no apparent pelvic mass. There is a   fat-containing umbilical hernia. CXR Results  (Last 48 hours)               02/07/21 1846  XR CHEST PORT Final result    Impression:  No Acute Disease. Narrative:  EXAM: Portable CXR. 1840 hours. INDICATION: chest tightness       FINDINGS:   The lungs appear clear. Heart is normal in size. There is no pulmonary edema. There is no evident pneumothorax or pleural effusion. No change since 2/4/2021. Medical Decision Making   I am the first provider for this patient. I reviewed the vital signs, available nursing notes, past medical history, past surgical history, family history and social history. Vital Signs-Reviewed the patient's vital signs. Patient Vitals for the past 24 hrs:   Temp Pulse Resp BP SpO2   02/07/21 1900 98.2 °F (36.8 °C) 81 22 132/75 97 %   02/07/21 1815  83 16 132/71 98 %   02/07/21 1800  82 15 (!) 141/67 97 %   02/07/21 1718 97.9 °F (36.6 °C) 89 18 (!) 163/79 98 %         Provider Notes (Medical Decision Making):   Very pleasant, well-appearing 38-year-old presenting to ED with very nonspecific symptoms. ,  Sounds like an electrolyte abnormality but electrolytes are all within normal limits. Her lipase is slightly elevated from previous visit, CT pursued for this reason but is normal.  Does not sound like pancreatitis and she has very limited abdominal pain. Otherwise, work-up in emergency department is reassuring with no signs of infection.   EKG is no ischemia or arrhythmia, troponin negative, does not sound like ACS. Her vital signs are within normal limits and she continues to look well throughout her stay. I think she is safe for discharge at this time with plan to follow-up with her primary care physician for further evaluation. Return precautions were given. EKG sinus, raet 83, no AMBROCIO, no STD, normal QT    ED Course:     Initial assessment performed. The patients presenting problems have been discussed, and they are in agreement with the care plan formulated and outlined with them. I have encouraged them to ask questions as they arise throughout their visit. Disposition:  dc    PLAN:  1. Current Discharge Medication List        2.    Follow-up Information     Follow up With Specialties Details Why Edgar Zapien., MD Gastroenterology   [id number] [facility]  [phone]          Return to ED if worse     Diagnosis     Clinical Impression: elevated lipase

## 2021-02-08 NOTE — ACP (ADVANCE CARE PLANNING)
Advance Care Planning:   Does patient have an Advance Directive:  not on file. Patient has her , Thad Schwab listed as medical agent.

## 2021-02-08 NOTE — ACP (ADVANCE CARE PLANNING)
Does patient have an Advance Directive:  not on file. Patient has her , Brittnee Valles listed as medical agent.

## 2021-02-08 NOTE — ED NOTES
MD Ho Brown reviewed discharge instructions with the patient and spouse. The patient and spouse verbalized understanding. Patient discharged home with stable vitals. Patient out of ED via wheelchair with discharge instructions in hand. Opportunity for questions and clarification provided. No further complaints noted at this time.

## 2021-02-08 NOTE — ED NOTES
Bedside and Verbal shift change report given to Adia (oncoming nurse) by Kerri Connolly (offgoing nurse). Report included the following information SBAR, Kardex, Intake/Output and Recent Results.

## 2021-02-08 NOTE — ACP (ADVANCE CARE PLANNING)
Advance Care Planning:   Does patient have an Advance Directive:  not on file. Patient has her , Marcello Leach listed as medical agent.

## 2021-02-12 ENCOUNTER — VIRTUAL VISIT (OUTPATIENT)
Dept: INTERNAL MEDICINE CLINIC | Age: 61
End: 2021-02-12
Payer: COMMERCIAL

## 2021-02-12 DIAGNOSIS — E11.40 TYPE 2 DIABETES MELLITUS WITH DIABETIC NEUROPATHY, WITHOUT LONG-TERM CURRENT USE OF INSULIN (HCC): ICD-10-CM

## 2021-02-12 DIAGNOSIS — E78.00 PURE HYPERCHOLESTEROLEMIA: ICD-10-CM

## 2021-02-12 DIAGNOSIS — I10 ESSENTIAL HYPERTENSION: ICD-10-CM

## 2021-02-12 DIAGNOSIS — F33.9 EPISODE OF RECURRENT MAJOR DEPRESSIVE DISORDER, UNSPECIFIED DEPRESSION EPISODE SEVERITY (HCC): ICD-10-CM

## 2021-02-12 DIAGNOSIS — R10.13 ABDOMINAL PAIN, EPIGASTRIC: Primary | ICD-10-CM

## 2021-02-12 DIAGNOSIS — F51.04 CHRONIC INSOMNIA: ICD-10-CM

## 2021-02-12 PROCEDURE — 99214 OFFICE O/P EST MOD 30 MIN: CPT | Performed by: INTERNAL MEDICINE

## 2021-02-12 RX ORDER — ZOLPIDEM TARTRATE 5 MG/1
5 TABLET ORAL
Qty: 30 TAB | Refills: 2 | Status: SHIPPED | OUTPATIENT
Start: 2021-02-12 | End: 2021-06-18

## 2021-02-12 RX ORDER — GLIMEPIRIDE 4 MG/1
8 TABLET ORAL DAILY
Qty: 180 TAB | Refills: 1 | Status: SHIPPED | OUTPATIENT
Start: 2021-02-12 | End: 2021-10-13 | Stop reason: SDUPTHER

## 2021-02-12 RX ORDER — FLUTICASONE PROPIONATE 50 MCG
2 SPRAY, SUSPENSION (ML) NASAL
Qty: 1 BOTTLE | Refills: 3 | Status: SHIPPED | OUTPATIENT
Start: 2021-02-12 | End: 2021-10-13 | Stop reason: SDUPTHER

## 2021-02-12 RX ORDER — VALSARTAN AND HYDROCHLOROTHIAZIDE 320; 25 MG/1; MG/1
1 TABLET, FILM COATED ORAL DAILY
Qty: 90 TAB | Refills: 1 | Status: SHIPPED | OUTPATIENT
Start: 2021-02-12 | End: 2021-08-16

## 2021-02-12 RX ORDER — ATORVASTATIN CALCIUM 20 MG/1
TABLET, FILM COATED ORAL
Qty: 90 TAB | Refills: 1 | Status: SHIPPED | OUTPATIENT
Start: 2021-02-12 | End: 2021-08-16

## 2021-02-12 RX ORDER — AMLODIPINE BESYLATE 10 MG/1
10 TABLET ORAL DAILY
Qty: 90 TAB | Refills: 1 | Status: SHIPPED | OUTPATIENT
Start: 2021-02-12 | End: 2021-08-16

## 2021-02-12 RX ORDER — METOPROLOL SUCCINATE 100 MG/1
TABLET, EXTENDED RELEASE ORAL
Qty: 90 TAB | Refills: 1 | Status: SHIPPED | OUTPATIENT
Start: 2021-02-12 | End: 2021-08-16

## 2021-02-12 RX ORDER — ESCITALOPRAM OXALATE 20 MG/1
20 TABLET ORAL DAILY
Qty: 90 TAB | Refills: 1 | Status: SHIPPED | OUTPATIENT
Start: 2021-02-12 | End: 2021-08-16

## 2021-02-12 RX ORDER — METFORMIN HYDROCHLORIDE 1000 MG/1
1000 TABLET ORAL 2 TIMES DAILY WITH MEALS
Qty: 180 TAB | Refills: 1 | Status: SHIPPED | OUTPATIENT
Start: 2021-02-12 | End: 2021-08-16

## 2021-02-12 NOTE — PROGRESS NOTES
Chief Complaint   Patient presents with    ED Follow-up     [facility]    Other     DOXY. -[id number]     Pt rates abdominal pain 8/10      1. Have you been to the ER, urgent care clinic since your last visit? Hospitalized since your last visit? Yes When: 02/07/21 Where: [facility] Reason for visit: abdominal pain     2. Have you seen or consulted any other health care providers outside of the [address] [place] [address] since your last visit? Include any pap smears or colon screening.  No

## 2021-02-12 NOTE — PROGRESS NOTES
Alyssa Elder is a 56 y.o. female who was seen by synchronous (real-time) audio-video technology on 2/12/2021 for ED Follow-up (LORELEI) and Other (DOXY. -[id number])        Assessment & Plan:   1. Abdominal pain, epigastric  HIGHLY DOUBT pancreatitis given nml ct scan and MODESTLY elevated lipase    - REFERRAL TO GASTROENTEROLOGY    2. Type 2 diabetes mellitus with diabetic neuropathy, without long-term current use of insulin (HCC)    - glimepiride (AMARYL) 4 mg tablet; Take 2 Tabs by mouth daily. Dispense: 180 Tab; Refill: 1  - metFORMIN (GLUCOPHAGE) 1,000 mg tablet; Take 1 Tab by mouth two (2) times daily (with meals). Dispense: 180 Tab; Refill: 1  - SITagliptin (JANUVIA) 100 mg tablet; Take 1 Tab by mouth daily. Dispense: 90 Tab; Refill: 1    3. Pure hypercholesterolemia  Refill   - atorvastatin (LIPITOR) 20 mg tablet; Take 1 tab by mouth every night for cholesterol. Dispense: 90 Tab; Refill: 1    4. Essential hypertension  Refill meds  - amLODIPine (NORVASC) 10 mg tablet; Take 1 Tab by mouth daily. Dispense: 90 Tab; Refill: 1  - metoprolol succinate (TOPROL-XL) 100 mg tablet; Take 1 tab by mouth daily for high blood pressure  Dispense: 90 Tab; Refill: 1  - valsartan-hydroCHLOROthiazide (DIOVAN-HCT) 320-25 mg per tablet; Take 1 Tab by mouth daily. Dispense: 90 Tab; Refill: 1    5. Episode of recurrent major depressive disorder, unspecified depression episode severity ([facility] [address]  Refill men  - escitalopram oxalate (LEXAPRO) 20 mg tablet; Take 1 Tab by mouth daily. Dispense: 90 Tab; Refill: 1    6. Chronic insomnia  Decrease!  - zolpidem (AMBIEN) 5 mg tablet; Take 1 Tab by mouth nightly as needed for Sleep. Max Daily Amount: 5 mg. Dispense: 30 Tab; Refill: 2    712  Subjective:     Pt was sen in ED 2/4/2021 for body ache and diarrhea, r/o covid. Covid test negative.   Pt returned to ED 2/7/2021 to r/o pain med withdrawal as she was attempting to wean herself off her narcotic analgesics and she felt as though \"bugs were crawling on her\". Abd ct scan done due to modestly elevated lipase. Ct neg except for severe diverticulae and hepatic steatosis. Pt relays she was instructed to consult w GI    Pt requests that I decrease her ambien dose back to the 5mg I recommended she have. PMH:  Hypertension Review:  The patient has essential hypertension  Diet and Lifestyle: generally follows a  low sodium diet, exercises never  Home BP Monitoring: is not measured at home. Pertinent ROS: taking medications as instructed, no medication side effects noted, no TIA's, no chest pain on exertion, no dyspnea on exertion, no swelling of ankles. BP Readings from Last 3 Encounters:   09/19/19 134/65   04/08/19 134/72   02/05/19 120/60     Type 2 DM-on metformin and januvia   -pt admits to dietary indiscretions and elevated blood sugars   -  Lab Results   Component Value Date/Time    Hemoglobin A1c 11.4 (H) 08/14/2018 01:14 PM    Hemoglobin A1c (POC) 10.7 09/19/2019 03:35 PM     Hyperlipidemia-on atorvastatin 20mg   -  Lab Results   Component Value Date/Time    Cholesterol, total 97 (L) 08/14/2018 01:14 PM    Cholesterol (POC) <100 09/19/2019 03:36 PM    HDL Cholesterol 34 (L) 08/14/2018 01:14 PM    HDL Cholesterol (POC) 25 09/19/2019 03:36 PM    LDL Cholesterol (POC) N/A 09/19/2019 03:36 PM    LDL, calculated 26 08/14/2018 01:14 PM    VLDL, calculated 37 08/14/2018 01:14 PM    Triglyceride 184 (H) 08/14/2018 01:14 PM    Triglycerides (POC) 354 09/19/2019 03:36 PM    CHOL/HDL Ratio 4.6 05/08/2013 11:45 AM       Morbid obesity-  Wt Readings from Last 3 Encounters:   09/19/19 267 lb (121.1 kg)   04/08/19 259 lb (117.5 kg)   02/05/19 261 lb (118.4 kg)     Chronic Pain-pt is seeing Pain Mngmnt and is on a regimen of gabapentin, tramadol and oxycodone 9mg      Chronic insomnia-pt had requests an increase in her ambien back to 10mg as she was getting from her Pain Mngmnt provider. The 5mg (as is recommended for women) does not work.  Pts  checked, there is no evidence of misuse or abuse. Last filled 8/26/19    Prior to Admission medications    Medication Sig Start Date End Date Taking? Authorizing Provider   amLODIPine (NORVASC) 10 mg tablet Take 1 Tab by mouth daily. 2/12/21  Yes Aurelio Lincoln MD   atorvastatin (LIPITOR) 20 mg tablet Take 1 tab by mouth every night for cholesterol. 2/12/21  Yes Aurelio Lincoln MD   escitalopram oxalate (LEXAPRO) 20 mg tablet Take 1 Tab by mouth daily. 2/12/21  Yes Aurelio Lincoln MD   fluticasone propionate (Flonase) 50 mcg/actuation nasal spray 2 Sprays by Both Nostrils route daily as needed for Rhinitis. 2/12/21  Yes Aurelio Lincoln MD   glimepiride (AMARYL) 4 mg tablet Take 2 Tabs by mouth daily. 2/12/21  Yes Aurelio Lincoln MD   metFORMIN (GLUCOPHAGE) 1,000 mg tablet Take 1 Tab by mouth two (2) times daily (with meals). 2/12/21  Yes Aurelio Lincoln MD   metoprolol succinate (TOPROL-XL) 100 mg tablet Take 1 tab by mouth daily for high blood pressure 2/12/21  Yes Aurelio Lincoln MD   SITagliptin (JANUVIA) 100 mg tablet Take 1 Tab by mouth daily. 2/12/21  Yes Aurelio Lincoln MD   valsartan-hydroCHLOROthiazide (DIOVAN-HCT) 320-25 mg per tablet Take 1 Tab by mouth daily. 2/12/21  Yes Aurelio Lincoln MD   zolpidem (AMBIEN) 5 mg tablet Take 1 Tab by mouth nightly as needed for Sleep. Max Daily Amount: 5 mg. 2/12/21  Yes Aurelio Lincoln MD   XTAMPZA ER 9 mg capsule Take 9 mg by mouth every twelve (12) hours. 3/12/19  Yes Provider, Historical   albuterol (PROVENTIL HFA, VENTOLIN HFA, PROAIR HFA) 90 mcg/actuation inhaler Take 2 Puffs by inhalation every four (4) hours as needed for Wheezing. 2/4/21   Darylene Brodie, MD   dicyclomine (BENTYL) 20 mg tablet Take 1 Tab by mouth every six (6) hours as needed for Abdominal Cramps.  2/4/21   Darylene Brodie, MD   cyclobenzaprine (FLEXERIL) 10 mg tablet Take 1 Tab by mouth three (3) times daily as needed for Muscle Spasm(s). 2/4/21   Henrry Samayoa MD   glimepiride (AMARYL) 4 mg tablet TAKE 1 TABLET BY MOUTH TWICE DAILY 1/20/21 2/12/21  Silva Traylor MD   zolpidem (AMBIEN) 10 mg tablet TAKE 1 TABLET BY MOUTH AT BEDTIME AS NEEDED FOR SLEEP 11/20/20 2/12/21  Sliva Traylor MD   amLODIPine (NORVASC) 10 mg tablet Take 1 Tab by mouth daily. 10/23/20 2/12/21  Silva Traylor MD   atorvastatin (LIPITOR) 20 mg tablet Take 1 tab by mouth every night for cholesterol. 10/23/20 2/12/21  Silva Traylor MD   metoprolol succinate (TOPROL-XL) 100 mg tablet Take 1 tab by mouth daily for high blood pressure 10/23/20 2/12/21  Silva Traylor MD   metFORMIN (GLUCOPHAGE) 1,000 mg tablet Take 1 Tab by mouth two (2) times daily (with meals). 10/23/20 2/12/21  Silva Traylor MD   valsartan-hydroCHLOROthiazide (DIOVAN-HCT) 320-25 mg per tablet Take 1 Tab by mouth daily. 10/23/20 2/12/21  Silva Traylor MD   escitalopram oxalate (LEXAPRO) 20 mg tablet Take 1 Tab by mouth daily. 10/23/20 2/12/21  Silva Traylor MD   glucose blood VI test strips (BLOOD GLUCOSE TEST) strip Use BID DxE11.9 2/17/20   Silva Traylor MD   SITagliptin (JANUVIA) 100 mg tablet Take 1 Tab by mouth daily. 4/8/19 2/12/21  Silva Traylor MD   fluticasone (FLONASE) 50 mcg/actuation nasal spray 2 Sprays by Both Nostrils route daily. 2/12/21  Provider, Historical     Review of systems (12) negative, except noted above. Objective:   No flowsheet data found. General: alert, cooperative, no distress. Obese.  Pleasant   Mental  status: normal mood, behavior, speech, dress, motor activity, and thought processes, able to follow commands   HENT: NCAT   Neck: no visualized mass   Resp: no respiratory distress   Neuro: no gross deficits   Skin: no discoloration or lesions of concern on visible areas   Psychiatric: normal affect, consistent with stated mood, no evidence of hallucinations         We discussed the expected course, resolution and complications of the diagnosis(es) in detail. Medication risks, benefits, costs, interactions, and alternatives were discussed as indicated. I advised her to contact the office if her condition worsens, changes or fails to improve as anticipated. She expressed understanding with the diagnosis(es) and plan. Chaz Gill, who was evaluated through a patient-initiated, synchronous (real-time) audio-video encounter, and/or her healthcare decision maker, is aware that it is a billable service, with coverage as determined by her insurance carrier. She provided verbal consent to proceed: Yes, and patient identification was verified. It was conducted pursuant to the emergency declaration under the [address] [place] [address] authority and the [employer] Act. A caregiver was present when appropriate. Ability to conduct physical exam was limited. I was at home. The patient was at home.       Bill Oquendo MD

## 2021-02-15 DIAGNOSIS — E11.00 TYPE 2 DIABETES MELLITUS WITH HYPEROSMOLARITY WITHOUT COMA, WITHOUT LONG-TERM CURRENT USE OF INSULIN (HCC): Primary | ICD-10-CM

## 2021-02-15 DIAGNOSIS — E11.40 TYPE 2 DIABETES MELLITUS WITH DIABETIC NEUROPATHY, WITHOUT LONG-TERM CURRENT USE OF INSULIN (HCC): ICD-10-CM

## 2021-02-15 NOTE — TELEPHONE ENCOUNTER
Dr. Taina Robb is stating that the Januvia 100 mg is not covered by patients currant insurance. No alternatives given. Please review and obtain a PA or prescribe alternate therapy. Thank you. Requested Prescriptions     Pending Prescriptions Disp Refills    SITagliptin (JANUVIA) 100 mg tablet 90 Tab 1     Sig: Take 1 Tab by mouth daily.

## 2021-02-19 DIAGNOSIS — F51.04 CHRONIC INSOMNIA: ICD-10-CM

## 2021-02-19 RX ORDER — ZOLPIDEM TARTRATE 5 MG/1
5 TABLET ORAL
Qty: 30 TAB | Refills: 2 | OUTPATIENT
Start: 2021-02-19

## 2021-02-19 NOTE — TELEPHONE ENCOUNTER
Devyn Gee @ [address] called stating pt wants an early refill for the Zolpidem which is due tomorrow but she wants to pick it up today   Pharmacy # [phone]

## 2021-02-25 ENCOUNTER — APPOINTMENT (OUTPATIENT)
Dept: ULTRASOUND IMAGING | Age: 61
End: 2021-02-25
Attending: EMERGENCY MEDICINE
Payer: COMMERCIAL

## 2021-02-25 ENCOUNTER — HOSPITAL ENCOUNTER (EMERGENCY)
Age: 61
Discharge: HOME | End: 2021-02-25
Attending: EMERGENCY MEDICINE
Payer: COMMERCIAL

## 2021-02-25 ENCOUNTER — APPOINTMENT (OUTPATIENT)
Dept: GENERAL RADIOLOGY | Age: 61
End: 2021-02-25
Attending: EMERGENCY MEDICINE
Payer: COMMERCIAL

## 2021-02-25 VITALS
BODY MASS INDEX: 42.38 KG/M2 | HEART RATE: 72 BPM | SYSTOLIC BLOOD PRESSURE: 131 MMHG | HEIGHT: 66 IN | DIASTOLIC BLOOD PRESSURE: 69 MMHG | TEMPERATURE: 98.2 F | OXYGEN SATURATION: 96 % | WEIGHT: 263.67 LBS | RESPIRATION RATE: 18 BRPM

## 2021-02-25 DIAGNOSIS — R14.0 ABDOMINAL DISTENTION: Primary | ICD-10-CM

## 2021-02-25 DIAGNOSIS — R73.9 HYPERGLYCEMIA: ICD-10-CM

## 2021-02-25 DIAGNOSIS — E87.6 HYPOKALEMIA: ICD-10-CM

## 2021-02-25 LAB
ALBUMIN SERPL-MCNC: 4.3 G/DL (ref 3.5–5)
ALBUMIN/GLOB SERPL: 1.5 {RATIO} (ref 1.1–2.2)
ALP SERPL-CCNC: 70 U/L (ref 45–117)
ALT SERPL-CCNC: 48 U/L (ref 12–78)
ANION GAP SERPL CALC-SCNC: 8 MMOL/L (ref 5–15)
AST SERPL-CCNC: 31 U/L (ref 15–37)
ATRIAL RATE: 70 BPM
BASOPHILS # BLD: 0 K/UL (ref 0–0.1)
BASOPHILS NFR BLD: 0 % (ref 0–1)
BILIRUB SERPL-MCNC: 0.3 MG/DL (ref 0.2–1)
BUN SERPL-MCNC: 11 MG/DL (ref 6–20)
BUN/CREAT SERPL: 12 (ref 12–20)
CALCIUM SERPL-MCNC: 8.8 MG/DL (ref 8.5–10.1)
CALCULATED P AXIS, ECG09: 55 DEGREES
CALCULATED R AXIS, ECG10: 2 DEGREES
CALCULATED T AXIS, ECG11: 77 DEGREES
CHLORIDE SERPL-SCNC: 94 MMOL/L (ref 97–108)
CK MB CFR SERPL CALC: 0.8 % (ref 0–2.5)
CK MB SERPL-MCNC: 1.1 NG/ML (ref 5–25)
CK SERPL-CCNC: 143 U/L (ref 26–192)
CO2 SERPL-SCNC: 32 MMOL/L (ref 21–32)
CREAT SERPL-MCNC: 0.95 MG/DL (ref 0.55–1.02)
DIAGNOSIS, 93000: NORMAL
DIFFERENTIAL METHOD BLD: NORMAL
EOSINOPHIL # BLD: 0.1 K/UL (ref 0–0.4)
EOSINOPHIL NFR BLD: 2 % (ref 0–7)
ERYTHROCYTE [DISTWIDTH] IN BLOOD BY AUTOMATED COUNT: 12 % (ref 11.5–14.5)
GLOBULIN SER CALC-MCNC: 2.8 G/DL (ref 2–4)
GLUCOSE SERPL-MCNC: 298 MG/DL (ref 65–100)
HCT VFR BLD AUTO: 40.6 % (ref 35–47)
HGB BLD-MCNC: 13.3 G/DL (ref 11.5–16)
IMM GRANULOCYTES # BLD AUTO: 0 K/UL (ref 0–0.04)
IMM GRANULOCYTES NFR BLD AUTO: 0 % (ref 0–0.5)
LIPASE SERPL-CCNC: 82 U/L (ref 73–393)
LYMPHOCYTES # BLD: 1.7 K/UL (ref 0.8–3.5)
LYMPHOCYTES NFR BLD: 29 % (ref 12–49)
MCH RBC QN AUTO: 29 PG (ref 26–34)
MCHC RBC AUTO-ENTMCNC: 32.8 G/DL (ref 30–36.5)
MCV RBC AUTO: 88.6 FL (ref 80–99)
MONOCYTES # BLD: 0.5 K/UL (ref 0–1)
MONOCYTES NFR BLD: 8 % (ref 5–13)
NEUTS SEG # BLD: 3.6 K/UL (ref 1.8–8)
NEUTS SEG NFR BLD: 61 % (ref 32–75)
NRBC # BLD: 0 K/UL (ref 0–0.01)
NRBC BLD-RTO: 0 PER 100 WBC
P-R INTERVAL, ECG05: 180 MS
PLATELET # BLD AUTO: 295 K/UL (ref 150–400)
PMV BLD AUTO: 9.4 FL (ref 8.9–12.9)
POTASSIUM SERPL-SCNC: 3.1 MMOL/L (ref 3.5–5.1)
PROT SERPL-MCNC: 7.1 G/DL (ref 6.4–8.2)
Q-T INTERVAL, ECG07: 432 MS
QRS DURATION, ECG06: 92 MS
QTC CALCULATION (BEZET), ECG08: 466 MS
RBC # BLD AUTO: 4.58 M/UL (ref 3.8–5.2)
SODIUM SERPL-SCNC: 134 MMOL/L (ref 136–145)
TROPONIN I SERPL-MCNC: <0.05 NG/ML
TROPONIN I SERPL-MCNC: <0.05 NG/ML
VENTRICULAR RATE, ECG03: 70 BPM
WBC # BLD AUTO: 5.9 K/UL (ref 3.6–11)

## 2021-02-25 PROCEDURE — 84484 ASSAY OF TROPONIN QUANT: CPT

## 2021-02-25 PROCEDURE — 82550 ASSAY OF CK (CPK): CPT

## 2021-02-25 PROCEDURE — 85025 COMPLETE CBC W/AUTO DIFF WBC: CPT

## 2021-02-25 PROCEDURE — 71046 X-RAY EXAM CHEST 2 VIEWS: CPT

## 2021-02-25 PROCEDURE — C9113 INJ PANTOPRAZOLE SODIUM, VIA: HCPCS | Performed by: EMERGENCY MEDICINE

## 2021-02-25 PROCEDURE — 74011250637 HC RX REV CODE- 250/637: Performed by: EMERGENCY MEDICINE

## 2021-02-25 PROCEDURE — 93005 ELECTROCARDIOGRAM TRACING: CPT

## 2021-02-25 PROCEDURE — 80053 COMPREHEN METABOLIC PANEL: CPT

## 2021-02-25 PROCEDURE — 96374 THER/PROPH/DIAG INJ IV PUSH: CPT

## 2021-02-25 PROCEDURE — 99285 EMERGENCY DEPT VISIT HI MDM: CPT

## 2021-02-25 PROCEDURE — 74011250636 HC RX REV CODE- 250/636: Performed by: EMERGENCY MEDICINE

## 2021-02-25 PROCEDURE — 74011000250 HC RX REV CODE- 250: Performed by: EMERGENCY MEDICINE

## 2021-02-25 PROCEDURE — 76700 US EXAM ABDOM COMPLETE: CPT

## 2021-02-25 PROCEDURE — 83690 ASSAY OF LIPASE: CPT

## 2021-02-25 PROCEDURE — 36415 COLL VENOUS BLD VENIPUNCTURE: CPT

## 2021-02-25 RX ORDER — POTASSIUM CHLORIDE 20 MEQ/1
40 TABLET, EXTENDED RELEASE ORAL
Status: COMPLETED | OUTPATIENT
Start: 2021-02-25 | End: 2021-02-25

## 2021-02-25 RX ORDER — HYDROCODONE BITARTRATE AND ACETAMINOPHEN 5; 325 MG/1; MG/1
1 TABLET ORAL
Status: COMPLETED | OUTPATIENT
Start: 2021-02-25 | End: 2021-02-25

## 2021-02-25 RX ADMIN — HYDROCODONE BITARTRATE AND ACETAMINOPHEN 1 TABLET: 5; 325 TABLET ORAL at 19:59

## 2021-02-25 RX ADMIN — POTASSIUM CHLORIDE 40 MEQ: 1500 TABLET, EXTENDED RELEASE ORAL at 22:23

## 2021-02-25 RX ADMIN — SODIUM CHLORIDE 40 MG: 9 INJECTION, SOLUTION INTRAMUSCULAR; INTRAVENOUS; SUBCUTANEOUS at 19:59

## 2021-02-26 NOTE — DISCHARGE INSTRUCTIONS
It was a pleasure taking care of you in our Emergency Department today. We know that when you come to [facility], you are entrusting us with your health, comfort, and safety. Our physicians and nurses honor that trust, and truly appreciate the opportunity to care for you and your loved ones. We also value your feedback. If you receive a survey about your Emergency Department experience today, please fill it out. We care about our patients' feedback, and we listen to what you have to say. Thank you!

## 2021-02-26 NOTE — ED NOTES
Pt discharged by Diana Topete MD  . Pt provided with discharge instructions Rx and instructions on follow up care. Pt out of ED in stable condition accompanied by self.

## 2021-02-26 NOTE — ED NOTES
Bedside and Verbal shift change report given to [address]  (oncoming nurse) by Torsten Fernando (offgoing nurse). Report included the following information SBAR, ED Summary and Recent Results.

## 2021-02-27 NOTE — ED PROVIDER NOTES
EMERGENCY DEPARTMENT HISTORY AND PHYSICAL EXAM    Please note that this dictation was completed with [employer], the computer voice recognition software. Quite often unanticipated grammatical, syntax, homophones, and other interpretive errors are inadvertently transcribed by the computer software. Please disregard these errors. Please excuse any errors that have escaped final proofreading. Date: 2/25/2021  Patient Name: Mery Landon  Patient Age and Sex: 56 y.o. female    History of Presenting Illness     Chief Complaint   Patient presents with    Epigastric Pain     epigastric pain that started PTA; +nausea without vomiting; pt reports hx of similar sx's, for which she is unsure if there was any diagnosis    Chest Pain       History Provided By: Patient    HPI: Mery Landon, is a 56 y.o. female whose medical history is noted below and includes pancreatitis, fatty liver, gerd, dm, presents to the ED with abdominal pain, distention, bloating, nausea without vomiting. Symptoms started about a month ago, but got acutely worse over last 48 hours. Increased flatulence. Reports having BM every 1-3 days, which is her baseline. Last BM was this am.  Abdominal pain is pressure-like constant, 8/10. Generalized. She feels like her abdomen is pushing up on her lungs now and she sometimes has a more difficult time breathing. She has been seen in the ED, by her pmd and by GI for this. Colonoscopy scheduled on march 9th. She is on miralax, metamucil and stool softeners, has been taking them all but feels that the metamucil is making her worse. No fevers, chills, chest pain. No blood in her stool or melena. No urinary symptoms. Patient also has history of anxiety and feels that her progressive abdominal distention has caused her anxiety to worsen as well because she is not sure what is going on. Pt denies any other alleviating or exacerbating factors. No other associated signs or symptoms.  There are no other complaints, changes or physical findings at this time. PCP: Estrellita Noland MD    Past History   All documented elements of the [address] reviewed and verified by me. -Berna Walters MD    Past Medical History:  Past Medical History:   Diagnosis Date    Acute pancreatitis 2012    Acute pancreatitis 2011    Arthritis     both knees    Chronic pain     knees    Delivery normal     x1    Diabetes ([address]     DJD (degenerative joint disease) of knee     Fatty liver 2011    GERD (gastroesophageal reflux disease)     Hypertension     Hypothyroidism     Obesity     PUD (peptic ulcer disease)     UTI (urinary tract infection) 2011       Past Surgical History:  Past Surgical History:   Procedure Laterality Date    HX BREAST BIOPSY Left     Surgical Bx  - BENIGN    HX BREAST LUMPECTOMY      left breast    HX GYN      hysterectomy    HX ORTHOPAEDIC      left total knee replacement    HX ORTHOPAEDIC  6/16/15    RIGHT TOTAL KNEE ARTHROPLASTY     HX PARATHYROIDECTOMY  11       Family History:  Family History   Problem Relation Age of Onset    Cancer Mother     Hypertension Father     Diabetes Sister     Hypertension Sister     Diabetes Brother     Hypertension Brother        Social History:  Social History     Tobacco Use    Smoking status: Former Smoker     Packs/day: 0.25     Years: 25.00     Pack years: 6.25     Types: Cigarettes     Quit date: 2008     Years since quittin.1    Smokeless tobacco: Never Used   Substance Use Topics    Alcohol use: No     Alcohol/week: 0.0 standard drinks    Drug use: No       Allergies: Allergies   Allergen Reactions    Influenza Virus Vaccine, Specific Other (comments)     States body got very hot    Aspirin Other (comments)     Pt reports having a h/o gastric ulcers. Pt was on IBU when she was dx'd.        Review of Systems   All other systems reviewed and negative    Review of Systems   Constitutional: Negative for appetite change and fever. HENT: Negative. Eyes: Negative. Respiratory: Negative for cough and shortness of breath. Cardiovascular: Negative for chest pain and palpitations. Gastrointestinal: Positive for abdominal distention, abdominal pain, constipation and nausea. Negative for blood in stool, diarrhea and vomiting. Endocrine: Negative. Genitourinary: Negative for dysuria and flank pain. Musculoskeletal: Negative for back pain. Skin: Negative. Neurological: Negative for headaches. Hematological: Negative. All other systems reviewed and are negative. Physical Exam   Reviewed patients vital signs and nursing note    Physical Exam  Vitals signs and nursing note reviewed. HENT:      Head: Atraumatic. Mouth/Throat:      Mouth: Mucous membranes are moist.   Eyes:      General: No scleral icterus. Extraocular Movements: Extraocular movements intact. Conjunctiva/sclera: Conjunctivae normal.      Pupils: Pupils are equal, round, and reactive to light. Neck:      Musculoskeletal: Normal range of motion and neck supple. Cardiovascular:      Rate and Rhythm: Normal rate and regular rhythm. Pulses: Normal pulses. Heart sounds: Normal heart sounds. Pulmonary:      Effort: Pulmonary effort is normal.      Breath sounds: Normal breath sounds. Abdominal:      General: Bowel sounds are normal.      Palpations: Abdomen is soft. There is no fluid wave, hepatomegaly or splenomegaly. Tenderness: There is abdominal tenderness (generalized to deep palpation only). Musculoskeletal: Normal range of motion. Skin:     General: Skin is warm and dry. Capillary Refill: Capillary refill takes less than 2 seconds. Neurological:      General: No focal deficit present. Mental Status: She is alert.    Psychiatric:         Mood and Affect: Mood normal.         Behavior: Behavior normal.         Diagnostic Study Results     Labs - I have personally reviewed and interpreted all laboratory results. Maricruz Rodriguez MD, MSc  Recent Results (from the past 100 hour(s))   CBC WITH AUTOMATED DIFF    Collection Time: 02/25/21  3:50 PM   Result Value Ref Range    WBC 5.9 3.6 - 11.0 K/uL    RBC 4.58 3.80 - 5.20 M/uL    HGB 13.3 11.5 - 16.0 g/dL    HCT 40.6 35.0 - 47.0 %    MCV 88.6 80.0 - 99.0 FL    MCH 29.0 26.0 - 34.0 PG    MCHC 32.8 30.0 - 36.5 g/dL    RDW 12.0 11.5 - 14.5 %    PLATELET 675 886 - 350 K/uL    MPV 9.4 8.9 - 12.9 FL    NRBC 0.0 0  WBC    ABSOLUTE NRBC 0.00 0.00 - 0.01 K/uL    NEUTROPHILS 61 32 - 75 %    LYMPHOCYTES 29 12 - 49 %    MONOCYTES 8 5 - 13 %    EOSINOPHILS 2 0 - 7 %    BASOPHILS 0 0 - 1 %    IMMATURE GRANULOCYTES 0 0.0 - 0.5 %    ABS. NEUTROPHILS 3.6 1.8 - 8.0 K/UL    ABS. LYMPHOCYTES 1.7 0.8 - 3.5 K/UL    ABS. MONOCYTES 0.5 0.0 - 1.0 K/UL    ABS. EOSINOPHILS 0.1 0.0 - 0.4 K/UL    ABS. BASOPHILS 0.0 0.0 - 0.1 K/UL    ABS. IMM. GRANS. 0.0 0.00 - 0.04 K/UL    DF AUTOMATED     METABOLIC PANEL, COMPREHENSIVE    Collection Time: 02/25/21  3:50 PM   Result Value Ref Range    Sodium 134 (L) 136 - 145 mmol/L    Potassium 3.1 (L) 3.5 - 5.1 mmol/L    Chloride 94 (L) 97 - 108 mmol/L    CO2 32 21 - 32 mmol/L    Anion gap 8 5 - 15 mmol/L    Glucose 298 (H) 65 - 100 mg/dL    BUN 11 6 - 20 MG/DL    Creatinine 0.95 0.55 - 1.02 MG/DL    BUN/Creatinine ratio 12 12 - 20      GFR est AA >60 >60 ml/min/1.73m2    GFR est non-AA 60 (L) >60 ml/min/1.73m2    Calcium 8.8 8.5 - 10.1 MG/DL    Bilirubin, total 0.3 0.2 - 1.0 MG/DL    ALT (SGPT) 48 12 - 78 U/L    AST (SGOT) 31 15 - 37 U/L    Alk.  phosphatase 70 45 - 117 U/L    Protein, total 7.1 6.4 - 8.2 g/dL    Albumin 4.3 3.5 - 5.0 g/dL    Globulin 2.8 2.0 - 4.0 g/dL    A-G Ratio 1.5 1.1 - 2.2     CK W/ CKMB & INDEX    Collection Time: 02/25/21  3:50 PM   Result Value Ref Range    CK - MB 1.1 <3.6 NG/ML    CK-MB Index 0.8 0.0 - 2.5       26 - 192 U/L   TROPONIN I    Collection Time: 02/25/21  3:50 PM   Result Value Ref Range Troponin-I, Qt. <0.05 <0.05 ng/mL   EKG, 12 LEAD, INITIAL    Collection Time: 02/25/21  3:53 PM   Result Value Ref Range    Ventricular Rate 70 BPM    Atrial Rate 70 BPM    P-R Interval 180 ms    QRS Duration 92 ms    Q-T Interval 432 ms    QTC Calculation (Bezet) 466 ms    Calculated P Axis 55 degrees    Calculated R Axis 2 degrees    Calculated T Axis 77 degrees    Diagnosis       Normal sinus rhythm  Incomplete right bundle branch block  When compared with ECG of 07-FEB-2021 17:43,  ST no longer depressed in Inferior leads  Confirmed by Osmany Partida ([id number]) on 2/25/2021 5:31:52 PM     LIPASE    Collection Time: 02/25/21  8:03 PM   Result Value Ref Range    Lipase 82 73 - 393 U/L   TROPONIN I    Collection Time: 02/25/21  8:03 PM   Result Value Ref Range    Troponin-I, Qt. <0.05 <0.05 ng/mL       Radiologic Studies - I have personally reviewed and interpreted all imaging studies and agree with radiology interpretation and report. - Nelida Gregory MD, MSc  US ABD COMP   Final Result   1. No demonstration of ascites. 2. Enlarged echogenic liver corresponding with hepatic steatosis and recent CT. 3. 1.3 cm hyperechoic lesion of interpolar region of the right kidney. Finding   does not represent a cyst. Consider further evaluation with MRI. XR CHEST PA LAT   Final Result   No acute findings. Medical Decision Making   I am the first provider for this patient. Records Reviewed: I reviewed our electronic medical record system for any past medical records that were available that may contribute to the patient's current condition, including their PMH, surgical history, social and family history. Reviewed the nursing notes and vital signs from today's visit. Nursing notes will be reviewed as they become available in realtime while the pt has been in the ED.  In addition, I read most recent discharge summaries, if available and reviewed prior ECGs or imaging studies for comparison purposes. Hemant Garcia MD Msc    Vital Signs-Reviewed the patient's vital signs. ECG interpretation: Normal sinus rhythm with rate 70, normal intervals, no ST elevations, depressions or other changes concerning for acute ischemia. This ECG has been viewed and interpreted by me personally. Hemant Garcia MD, Msc    Provider Notes (Medical Decision Making):   Patient presents with longer standing abdominal pain but more recently has gotten much more distended. Abdomen now having mass effect on her lower lungs, making it hard to breathe in certain positions. Her pain is moderate and currently she does not appear to be in significant distress, her abdomen is soft without focal tenderness and with nml bowel sounds. Ddx: ascites due to liver failure, renal failure, bowel gas accumulation. sbo, volvulus, ileus, perforation all considered, however, all less likely given chronicity and description of symptoms and her exam    Plan: basic labs incl LFTs and lipase. Not sure if repeat ct will be all that beneficial as she had a normal CT very recently and for these very symptoms. I will get a formal ultrasound, however, which should establish if she has ascites, can assess kidneys, liver, biliary tree. ED Course:   Initial assessment performed. The patients presenting problems have been discussed, and they are in agreement with the care plan formulated and outlined with them. I have encouraged them to ask questions as they arise throughout their visit. Reassessment:  On my interpretation of the patient's laboratory studies , there are currently no acute abnormalities taht require immediate intervention  On my interpretation of the patient's imaging studies , no evidence of acute abnormalities    Progress note:  Patient has been reassessed and reports feeling considerably better, has normal vital signs and feels comfortable going home.  I think this is reasonable as no findings today suggest a life-threatening condition. Follow up with gi on 9th as scheduled and with pmd next week. DISPOSITION: DISCHARGE  The patient's results have been reviewed with patient and available family and/or caregiver. They verbally convey their understanding and agreement of the patient's signs, symptoms, diagnosis, treatment and prognosis and additionally agree to follow up as recommended in the discharge instructions or to return to the Emergency Department should the patient's condition change prior to their follow-up appointment. The patient and available family and/or caregiver verbally agree with the care plan and all of their questions have been answered. The discharge instructions have also been provided to the them with educational information regarding the patient's diagnosis as well a list of reasons why the patient would want to return to the ER prior to their follow-up appointment should any concerns arise, the patient's condition change or symptoms worsen. Balta Clement MD, Msc    PLAN:  Discharge Medication List as of 2/25/2021 10:01 PM      1.   2.     Follow-up Information     Follow up With Specialties Details Why Daryl  Call in 1 day schedule an appointment to be seen as soon as possible [address] [facility] [address]    Chuck Steele MD Internal Medicine Schedule an appointment as soon as possible for a visit   [address]  14 [facility] [address]      [address] [facility] DEPT Emergency Medicine  As needed, If symptoms worsen [address] [facility] [address]  [address] [facility]  [phone]        3. Return to ED if worse       IKelly MD, am the attending of record for this patient encounter. Diagnosis     Clinical Impression:   1. Abdominal distention    2. Hyperglycemia    3.  Hypokalemia        Attestation:  I personally performed the services described in this documentation on this date 2/25/2021 for patient Sonia Killian.   Berna Walters MD

## 2021-03-19 DIAGNOSIS — F51.04 CHRONIC INSOMNIA: ICD-10-CM

## 2021-04-02 RX ORDER — ZOLPIDEM TARTRATE 5 MG/1
5 TABLET ORAL
Qty: 30 TAB | Refills: 2 | OUTPATIENT
Start: 2021-04-02

## 2021-07-13 ENCOUNTER — OFFICE VISIT (OUTPATIENT)
Dept: INTERNAL MEDICINE CLINIC | Age: 61
End: 2021-07-13
Payer: COMMERCIAL

## 2021-07-13 VITALS
OXYGEN SATURATION: 95 % | HEART RATE: 77 BPM | HEIGHT: 66 IN | TEMPERATURE: 97.6 F | RESPIRATION RATE: 19 BRPM | WEIGHT: 262 LBS | SYSTOLIC BLOOD PRESSURE: 145 MMHG | BODY MASS INDEX: 42.11 KG/M2 | DIASTOLIC BLOOD PRESSURE: 74 MMHG

## 2021-07-13 DIAGNOSIS — E11.00 TYPE 2 DIABETES MELLITUS WITH HYPEROSMOLARITY WITHOUT COMA, WITHOUT LONG-TERM CURRENT USE OF INSULIN (HCC): ICD-10-CM

## 2021-07-13 DIAGNOSIS — F43.81 GRIEF REACTION WITH PROLONGED BEREAVEMENT: Primary | ICD-10-CM

## 2021-07-13 DIAGNOSIS — Z76.5 DRUG-SEEKING BEHAVIOR: ICD-10-CM

## 2021-07-13 DIAGNOSIS — F41.8 ANXIETY WITH DEPRESSION: ICD-10-CM

## 2021-07-13 DIAGNOSIS — F51.04 CHRONIC INSOMNIA: ICD-10-CM

## 2021-07-13 DIAGNOSIS — Z79.891 CHRONIC PRESCRIPTION OPIATE USE: ICD-10-CM

## 2021-07-13 PROCEDURE — 99214 OFFICE O/P EST MOD 30 MIN: CPT | Performed by: INTERNAL MEDICINE

## 2021-07-13 RX ORDER — ALPRAZOLAM 0.5 MG/1
0.5 TABLET ORAL
Qty: 10 TABLET | Refills: 0 | Status: SHIPPED | OUTPATIENT
Start: 2021-07-13 | End: 2022-02-09

## 2021-07-13 NOTE — PROGRESS NOTES
Chief Complaint   Patient presents with    Dizziness    Depression      past 6/16/21-Not able to sleep     1. Have you been to the ER, urgent care clinic since your last visit? Hospitalized since your last visit? No    2. Have you seen or consulted any other health care providers outside of the [address] [place] [address] since your last visit? Include any pap smears or colon screening.  No

## 2021-07-13 NOTE — PROGRESS NOTES
Della Lewis is a 59 y.o. female and presents with Dizziness and Depression ( past 21-Not able to sleep)  . Subjective:    Pts   3 weeks ago of pancreatic ca. Pt has been having difficulty w sleep. She is already on an antidepressant, a sleeping pill  AND narcotic pain meds (from her pain mngmnt provider) currently. Pt declines therapist referral. Pt prefers to go to work to distract her from her current situation      PMH:  Hypertension Review:  The patient has essential hypertension  Diet and Lifestyle: generally follows a  low sodium diet, exercises never  Home BP Monitoring: is not measured at home. Pertinent ROS: taking medications as instructed, no medication side effects noted, no TIA's, no chest pain on exertion, no dyspnea on exertion, no swelling of ankles.    BP Readings from Last 3 Encounters:   21 (!) 145/74   21 131/69   21 132/75     Type 2 DM-on metformin and januvia   -pt admits to dietary indiscretions and elevated blood sugars   -  Lab Results   Component Value Date/Time    Hemoglobin A1c 9.6 (H) 2021 04:20 PM    Hemoglobin A1c (POC) 10.7 2019 03:35 PM     Hyperlipidemia-on atorvastatin 20mg   -  Lab Results   Component Value Date/Time    Cholesterol, total 128 2021 04:20 PM    Cholesterol (POC) <100 2019 03:36 PM    HDL Cholesterol 37 2021 04:20 PM    HDL Cholesterol (POC) 25 2019 03:36 PM    LDL Cholesterol (POC) N/A 2019 03:36 PM    LDL, calculated 33.4 2021 04:20 PM    VLDL, calculated 57.6 2021 04:20 PM    Triglyceride 288 (H) 2021 04:20 PM    Triglycerides (POC) 354 2019 03:36 PM    CHOL/HDL Ratio 3.5 2021 04:20 PM       Morbid obesity-  Wt Readings from Last 3 Encounters:   21 262 lb (118.8 kg)   21 263 lb 10.7 oz (119.6 kg)   21 259 lb 0.7 oz (117.5 kg)     Chronic Pain-pt is seeing Pain Mngmnt and is on a regimen of gabapentin, tramadol and oxycodone 9mg      Chronic insomnia-pt requests an increase in her ambien back to 10mg as she was getting from her Pain Mngmnt provider. The 5mg (as is recommended for women) does not work. Pts  checked, there is no evidence of misuse or abuse. Last filled 8/26/19      Pt w c/o urinary frequency x weeks. No hematuria/fever/abd pain.  Not currently sex active  Results for orders placed or performed in visit on 09/19/19   AMB POC URINALYSIS DIP STICK AUTO W/O MICRO     Status: None   Result Value Ref Range Status    Color (UA POC) Yellow  Final    Clarity (UA POC) Cloudy  Final    Glucose (UA POC) 3+ Negative Final    Bilirubin (UA POC) Negative Negative Final    Ketones (UA POC) Negative Negative Final    Specific gravity (UA POC) 1.020 1.001 - 1.035 Final    Blood (UA POC) Trace Negative Final    pH (UA POC) 5.5 4.6 - 8.0 Final    Protein (UA POC) Trace Negative Final    Urobilinogen (UA POC) 0.2 mg/dL 0.2 - 1 Final    Nitrites (UA POC) Negative Negative Final    Leukocyte esterase (UA POC) Trace Negative Final               Review of Systems  Constitutional: negative for fevers, chills, anorexia and weight loss  Respiratory:  negative for cough, hemoptysis, dyspnea,wheezing  CV:   negative for chest pain, palpitations, lower extremity edema  GI:   negative for nausea, vomiting, diarrhea, abdominal pain,melena  Musculoskel: positive for myalgias, arthralgias, back pain, joint pain  Neurological:  negative for headaches, dizziness, vertigo, memory problems and gait   Behavl/Psych: negative for feelings of anxiety, depression, mood changes    Past Medical History:   Diagnosis Date    Acute pancreatitis 8/9/2012    Acute pancreatitis 1/21/2011    Arthritis     both knees    Chronic pain     knees    Delivery normal     x1    Diabetes ([facility] [address]     DJD (degenerative joint disease) of knee     Fatty liver 1/22/2011    GERD (gastroesophageal reflux disease)     Hypertension     Hypothyroidism     Obesity     PUD (peptic ulcer disease)     UTI (urinary tract infection) 2011     Past Surgical History:   Procedure Laterality Date    HX BREAST BIOPSY Left     Surgical Bx  - BENIGN    HX BREAST LUMPECTOMY      left breast    HX GYN      hysterectomy    HX ORTHOPAEDIC      left total knee replacement    HX ORTHOPAEDIC  6/16/15    RIGHT TOTAL KNEE ARTHROPLASTY     HX PARATHYROIDECTOMY  11     Social History     Socioeconomic History    Marital status:      Spouse name: Not on file    Number of children: Not on file    Years of education: Not on file    Highest education level: Not on file   Tobacco Use    Smoking status: Former Smoker     Packs/day: 0.25     Years: 25.00     Pack years: 6.25     Types: Cigarettes     Quit date: 2008     Years since quittin.4    Smokeless tobacco: Never Used   Substance and Sexual Activity    Alcohol use: No     Alcohol/week: 0.0 standard drinks    Drug use: No    Sexual activity: Never     Social Determinants of Health     Financial Resource Strain:     Difficulty of Paying Living Expenses:    Food Insecurity:     Worried About Running Out of Food in the Last Year:     Ran Out of Food in the Last Year:    Transportation Needs:     Lack of Transportation (Medical):      Lack of Transportation (Non-Medical):    Physical Activity:     Days of Exercise per Week:     Minutes of Exercise per Session:    Stress:     Feeling of Stress :    Social Connections:     Frequency of Communication with Friends and Family:     Frequency of Social Gatherings with Friends and Family:     Attends Cheondo[employer] Services:     Active Member of Clubs or Organizations:     Attends Club or Organization Meetings:     Marital Status:      Family History   Problem Relation Age of Onset    Cancer Mother     Hypertension Father     Diabetes Sister     Hypertension Sister     Diabetes Brother     Hypertension Brother      Current Outpatient Medications   Medication Sig Dispense Refill    ALPRAZolam (XANAX) 0.5 mg tablet Take 1 Tablet by mouth nightly as needed for Anxiety. Max Daily Amount: 0.5 mg. 10 Tablet 0    zolpidem (AMBIEN) 5 mg tablet TAKE 1 TABLET BY MOUTH EVERY NIGHT AS NEEDED FOR SLEEP. MAX DAILY AMOUNT: 5 MG 30 Tablet 2    dapagliflozin (Farxiga) 10 mg tab tablet Take 1 Tab by mouth daily. 90 Tab 1    amLODIPine (NORVASC) 10 mg tablet Take 1 Tab by mouth daily. 90 Tab 1    atorvastatin (LIPITOR) 20 mg tablet Take 1 tab by mouth every night for cholesterol. 90 Tab 1    escitalopram oxalate (LEXAPRO) 20 mg tablet Take 1 Tab by mouth daily. 90 Tab 1    glimepiride (AMARYL) 4 mg tablet Take 2 Tabs by mouth daily. 180 Tab 1    metFORMIN (GLUCOPHAGE) 1,000 mg tablet Take 1 Tab by mouth two (2) times daily (with meals). 180 Tab 1    metoprolol succinate (TOPROL-XL) 100 mg tablet Take 1 tab by mouth daily for high blood pressure 90 Tab 1    valsartan-hydroCHLOROthiazide (DIOVAN-HCT) 320-25 mg per tablet Take 1 Tab by mouth daily. 90 Tab 1    albuterol (PROVENTIL HFA, VENTOLIN HFA, PROAIR HFA) 90 mcg/actuation inhaler Take 2 Puffs by inhalation every four (4) hours as needed for Wheezing. 1 Inhaler 0    glucose blood VI test strips (BLOOD GLUCOSE TEST) strip Use BID DxE11.9 200 Strip 11    XTAMPZA ER 9 mg capsule Take 9 mg by mouth every twelve (12) hours. 0    fluticasone propionate (Flonase) 50 mcg/actuation nasal spray 2 Sprays by Both Nostrils route daily as needed for Rhinitis. (Patient not taking: Reported on 7/13/2021) 1 Bottle 3    SITagliptin (JANUVIA) 100 mg tablet Take 1 Tab by mouth daily. (Patient not taking: Reported on 7/13/2021) 90 Tab 1     Allergies   Allergen Reactions    Influenza Virus Vaccine, Specific Other (comments)     States body got very hot    Aspirin Other (comments)     Pt reports having a h/o gastric ulcers. Pt was on IBU when she was dx'd.        Objective:  Visit Vitals  BP (!) 145/74 (BP 1 Location: Left upper arm, BP Patient Position: Sitting, BP Cuff Size: Large adult)   Pulse 77   Temp 97.6 °F (36.4 °C) (Temporal)   Resp 19   Ht 5' 6\" (1.676 m)   Wt 262 lb (118.8 kg)   SpO2 95%   BMI 42.29 kg/m²     Physical Exam:   General appearance - alert, obese  Mental status - alert, oriented to person, place, and time  EYE-EOMI  Neck - supple,   Chest - symmetric air entry    CVS-reg + S4 + 2/6 systolic murmur  Abdomen - obese  Ext-no pedal edema, no clubbing or cyanosis  Skin-Warm and dry. no hyperpigmentation, vitiligo, or suspicious lesions  Neuro -alert, oriented, normal speech, no focal findings or movement disorder noted        Results for orders placed or performed during the hospital encounter of 02/25/21   CBC WITH AUTOMATED DIFF   Result Value Ref Range    WBC 5.9 3.6 - 11.0 K/uL    RBC 4.58 3.80 - 5.20 M/uL    HGB 13.3 11.5 - 16.0 g/dL    HCT 40.6 35.0 - 47.0 %    MCV 88.6 80.0 - 99.0 FL    MCH 29.0 26.0 - 34.0 PG    MCHC 32.8 30.0 - 36.5 g/dL    RDW 12.0 11.5 - 14.5 %    PLATELET 734 638 - 843 K/uL    MPV 9.4 8.9 - 12.9 FL    NRBC 0.0 0  WBC    ABSOLUTE NRBC 0.00 0.00 - 0.01 K/uL    NEUTROPHILS 61 32 - 75 %    LYMPHOCYTES 29 12 - 49 %    MONOCYTES 8 5 - 13 %    EOSINOPHILS 2 0 - 7 %    BASOPHILS 0 0 - 1 %    IMMATURE GRANULOCYTES 0 0.0 - 0.5 %    ABS. NEUTROPHILS 3.6 1.8 - 8.0 K/UL    ABS. LYMPHOCYTES 1.7 0.8 - 3.5 K/UL    ABS. MONOCYTES 0.5 0.0 - 1.0 K/UL    ABS. EOSINOPHILS 0.1 0.0 - 0.4 K/UL    ABS. BASOPHILS 0.0 0.0 - 0.1 K/UL    ABS. IMM.  GRANS. 0.0 0.00 - 0.04 K/UL    DF AUTOMATED     METABOLIC PANEL, COMPREHENSIVE   Result Value Ref Range    Sodium 134 (L) 136 - 145 mmol/L    Potassium 3.1 (L) 3.5 - 5.1 mmol/L    Chloride 94 (L) 97 - 108 mmol/L    CO2 32 21 - 32 mmol/L    Anion gap 8 5 - 15 mmol/L    Glucose 298 (H) 65 - 100 mg/dL    BUN 11 6 - 20 MG/DL    Creatinine 0.95 0.55 - 1.02 MG/DL    BUN/Creatinine ratio 12 12 - 20      GFR est AA >60 >60 ml/min/1.73m2    GFR est non-AA 60 (L) >60 ml/min/1.73m2    Calcium 8.8 8.5 - 10.1 MG/DL    Bilirubin, total 0.3 0.2 - 1.0 MG/DL    ALT (SGPT) 48 12 - 78 U/L    AST (SGOT) 31 15 - 37 U/L    Alk. phosphatase 70 45 - 117 U/L    Protein, total 7.1 6.4 - 8.2 g/dL    Albumin 4.3 3.5 - 5.0 g/dL    Globulin 2.8 2.0 - 4.0 g/dL    A-G Ratio 1.5 1.1 - 2.2     CK W/ CKMB & INDEX   Result Value Ref Range    CK - MB 1.1 <3.6 NG/ML    CK-MB Index 0.8 0.0 - 2.5       26 - 192 U/L   TROPONIN I   Result Value Ref Range    Troponin-I, Qt. <0.05 <0.05 ng/mL   LIPASE   Result Value Ref Range    Lipase 82 73 - 393 U/L   TROPONIN I   Result Value Ref Range    Troponin-I, Qt. <0.05 <0.05 ng/mL   EKG, 12 LEAD, INITIAL   Result Value Ref Range    Ventricular Rate 70 BPM    Atrial Rate 70 BPM    P-R Interval 180 ms    QRS Duration 92 ms    Q-T Interval 432 ms    QTC Calculation (Bezet) 466 ms    Calculated P Axis 55 degrees    Calculated R Axis 2 degrees    Calculated T Axis 77 degrees    Diagnosis       Normal sinus rhythm  Incomplete right bundle branch block  When compared with ECG of 07-FEB-2021 17:43,  ST no longer depressed in Inferior leads  Confirmed by Peyton Leach ([id number]) on 2/25/2021 5:31:52 PM         Assessment/Plan:    ICD-10-CM ICD-9-CM    1. Grief reaction with prolonged bereavement  F43.21 309.0 ALPRAZolam (XANAX) 0.5 mg tablet   2. Anxiety with depression  F41.8 300.4    3. Chronic insomnia  F51.04 780.52    4. Type 2 diabetes mellitus with hyperosmolarity without coma, without long-term current use of insulin (HCC)  E11.00 250.20 LIPID PANEL      HEMOGLOBIN A1C WITH EAG      MICROALBUMIN, UR, RAND W/ MICROALB/CREAT RATIO      CANCELED: LIPID PANEL      CANCELED: HEMOGLOBIN A1C WITH EAG      CANCELED: MICROALBUMIN, UR, RAND W/ MICROALB/CREAT RATIO      CANCELED: MICROALBUMIN, UR, RAND W/ MICROALB/CREAT RATIO      CANCELED: HEMOGLOBIN A1C WITH EAG      CANCELED: LIPID PANEL   5. Chronic prescription opiate use  Z79.891 V58.69    6.  Drug-seeking behavior  Z76.5 305.90      Orders Placed This Encounter    LIPID PANEL     Standing Status:   Future     Number of Occurrences:   1     Standing Expiration Date:   7/13/2022    HEMOGLOBIN A1C WITH EAG     Standing Status:   Future     Number of Occurrences:   1     Standing Expiration Date:   7/13/2022    MICROALBUMIN, UR, RAND W/ MICROALB/CREAT RATIO     Standing Status:   Future     Number of Occurrences:   1     Standing Expiration Date:   7/13/2022    ALPRAZolam (XANAX) 0.5 mg tablet     Sig: Take 1 Tablet by mouth nightly as needed for Anxiety. Max Daily Amount: 0.5 mg.     Dispense:  10 Tablet     Refill:  0   1. Grief reaction with prolonged bereavement  Short term bdz  Hold ambien  - ALPRAZolam (XANAX) 0.5 mg tablet; Take 1 Tablet by mouth nightly as needed for Anxiety. Max Daily Amount: 0.5 mg.  Dispense: 10 Tablet; Refill: 0    2. Anxiety with depression  Cont SSRI    3. Chronic insomnia  At baseline  Hold ambien while taking bdz    4. Type 2 diabetes mellitus with hyperosmolarity without coma, without long-term current use of insulin ([facility])  Check labs  - LIPID PANEL; Future  - HEMOGLOBIN A1C WITH EAG; Future  - MICROALBUMIN, UR, RAND W/ MICROALB/CREAT RATIO; Future    5. Chronic prescription opiate use  Noted  On percocet? ? There are no Patient Instructions on file for this visit. Follow-up and Dispositions    · Return in about 3 months (around 10/13/2021). I have reviewed with the patient details of the assessment and plan and all questions were answered. Relevent patient education was performed. The most recent lab findings were reviewed with the patient. An After Visit Summary was printed and given to the patient.

## 2021-08-16 DIAGNOSIS — F33.9 EPISODE OF RECURRENT MAJOR DEPRESSIVE DISORDER, UNSPECIFIED DEPRESSION EPISODE SEVERITY (HCC): ICD-10-CM

## 2021-08-16 DIAGNOSIS — E11.40 TYPE 2 DIABETES MELLITUS WITH DIABETIC NEUROPATHY, WITHOUT LONG-TERM CURRENT USE OF INSULIN (HCC): ICD-10-CM

## 2021-08-16 DIAGNOSIS — I10 ESSENTIAL HYPERTENSION: ICD-10-CM

## 2021-08-16 DIAGNOSIS — E78.00 PURE HYPERCHOLESTEROLEMIA: ICD-10-CM

## 2021-08-16 RX ORDER — METOPROLOL SUCCINATE 100 MG/1
TABLET, EXTENDED RELEASE ORAL
Qty: 90 TABLET | Refills: 1 | Status: SHIPPED | OUTPATIENT
Start: 2021-08-16 | End: 2021-11-03

## 2021-08-16 RX ORDER — ATORVASTATIN CALCIUM 20 MG/1
TABLET, FILM COATED ORAL
Qty: 90 TABLET | Refills: 1 | Status: SHIPPED | OUTPATIENT
Start: 2021-08-16 | End: 2022-02-11 | Stop reason: SDUPTHER

## 2021-08-16 RX ORDER — VALSARTAN AND HYDROCHLOROTHIAZIDE 320; 25 MG/1; MG/1
TABLET, FILM COATED ORAL
Qty: 90 TABLET | Refills: 1 | Status: SHIPPED | OUTPATIENT
Start: 2021-08-16 | End: 2022-02-11 | Stop reason: SDUPTHER

## 2021-08-16 RX ORDER — ESCITALOPRAM OXALATE 20 MG/1
TABLET ORAL
Qty: 90 TABLET | Refills: 1 | Status: SHIPPED | OUTPATIENT
Start: 2021-08-16 | End: 2022-02-11 | Stop reason: SDUPTHER

## 2021-08-16 RX ORDER — AMLODIPINE BESYLATE 10 MG/1
TABLET ORAL
Qty: 90 TABLET | Refills: 1 | Status: SHIPPED | OUTPATIENT
Start: 2021-08-16 | End: 2022-02-11 | Stop reason: SDUPTHER

## 2021-08-16 RX ORDER — METFORMIN HYDROCHLORIDE 1000 MG/1
TABLET ORAL
Qty: 180 TABLET | Refills: 1 | Status: SHIPPED | OUTPATIENT
Start: 2021-08-16 | End: 2022-02-11 | Stop reason: SDUPTHER

## 2021-10-13 ENCOUNTER — OFFICE VISIT (OUTPATIENT)
Dept: INTERNAL MEDICINE CLINIC | Age: 61
End: 2021-10-13
Payer: COMMERCIAL

## 2021-10-13 VITALS
HEART RATE: 67 BPM | BODY MASS INDEX: 41.62 KG/M2 | OXYGEN SATURATION: 94 % | RESPIRATION RATE: 19 BRPM | WEIGHT: 259 LBS | HEIGHT: 66 IN | TEMPERATURE: 97.3 F | DIASTOLIC BLOOD PRESSURE: 76 MMHG | SYSTOLIC BLOOD PRESSURE: 141 MMHG

## 2021-10-13 DIAGNOSIS — I15.2 HYPERTENSION ASSOCIATED WITH DIABETES (HCC): ICD-10-CM

## 2021-10-13 DIAGNOSIS — F41.8 ANXIETY WITH DEPRESSION: ICD-10-CM

## 2021-10-13 DIAGNOSIS — Z71.89 GRIEF COUNSELING: ICD-10-CM

## 2021-10-13 DIAGNOSIS — E11.40 TYPE 2 DIABETES MELLITUS WITH DIABETIC NEUROPATHY, WITHOUT LONG-TERM CURRENT USE OF INSULIN (HCC): Primary | ICD-10-CM

## 2021-10-13 DIAGNOSIS — F51.04 CHRONIC INSOMNIA: ICD-10-CM

## 2021-10-13 DIAGNOSIS — E66.01 MORBID OBESITY (HCC): ICD-10-CM

## 2021-10-13 DIAGNOSIS — E11.59 HYPERTENSION ASSOCIATED WITH DIABETES (HCC): ICD-10-CM

## 2021-10-13 DIAGNOSIS — Z12.11 COLON CANCER SCREENING: ICD-10-CM

## 2021-10-13 DIAGNOSIS — Z12.31 ENCOUNTER FOR SCREENING MAMMOGRAM FOR MALIGNANT NEOPLASM OF BREAST: ICD-10-CM

## 2021-10-13 PROCEDURE — 83036 HEMOGLOBIN GLYCOSYLATED A1C: CPT | Performed by: INTERNAL MEDICINE

## 2021-10-13 PROCEDURE — 99215 OFFICE O/P EST HI 40 MIN: CPT | Performed by: INTERNAL MEDICINE

## 2021-10-13 PROCEDURE — 82962 GLUCOSE BLOOD TEST: CPT | Performed by: INTERNAL MEDICINE

## 2021-10-13 RX ORDER — ZOLPIDEM TARTRATE 5 MG/1
TABLET ORAL
Qty: 30 TABLET | Refills: 2 | Status: SHIPPED | OUTPATIENT
Start: 2021-10-13 | End: 2022-01-07

## 2021-10-13 RX ORDER — GLIMEPIRIDE 4 MG/1
8 TABLET ORAL DAILY
Qty: 180 TABLET | Refills: 1 | Status: SHIPPED | OUTPATIENT
Start: 2021-10-13 | End: 2022-02-11 | Stop reason: SDUPTHER

## 2021-10-13 RX ORDER — FLUTICASONE PROPIONATE 50 MCG
2 SPRAY, SUSPENSION (ML) NASAL
Qty: 1 EACH | Refills: 2 | Status: SHIPPED | OUTPATIENT
Start: 2021-10-13 | End: 2022-01-11

## 2021-10-13 NOTE — PROGRESS NOTES
Chief Complaint   Patient presents with    Diabetes    Nasal Congestion     1. Have you been to the ER, urgent care clinic since your last visit? Hospitalized since your last visit? No    2. Have you seen or consulted any other health care providers outside of the [address] [place] [address] since your last visit? Include any pap smears or colon screening.  No

## 2021-10-13 NOTE — PROGRESS NOTES
Angelica Jiménez is a 59 y.o. female and presents with Diabetes and Nasal Congestion  . Subjective:    Pts   3 mths ago of pancreatic ca. Pt has been having difficulty w sleep. She is already on an antidepressant, a sleeping pill  AND narcotic pain meds (from her pain mngmnt provider) currently. Pt declines therapist referral. Pt prefers to go to work to distract her from her current situation      PMH:  Hypertension Review:  The patient has essential hypertension  Diet and Lifestyle: generally follows a  low sodium diet, exercises never  Home BP Monitoring: is not measured at home. Pertinent ROS: taking medications as instructed, no medication side effects noted, no TIA's, no chest pain on exertion, no dyspnea on exertion, no swelling of ankles.    BP Readings from Last 3 Encounters:   10/13/21 (!) 141/76   21 (!) 145/74   21 131/69     Type 2 DM-on metformin and [place]   -pt admits to dietary indiscretions and elevated blood sugars   -  Lab Results   Component Value Date/Time    Hemoglobin A1c 9.6 (H) 2021 04:20 PM    Hemoglobin A1c (POC) 11.2 10/13/2021 03:51 PM     Hyperlipidemia-on atorvastatin 20mg   -  Lab Results   Component Value Date/Time    Cholesterol, total 128 2021 04:20 PM    Cholesterol (POC) <100 2019 03:36 PM    HDL Cholesterol 37 2021 04:20 PM    HDL Cholesterol (POC) 25 2019 03:36 PM    LDL Cholesterol (POC) N/A 2019 03:36 PM    LDL, calculated 33.4 2021 04:20 PM    VLDL, calculated 57.6 2021 04:20 PM    Triglyceride 288 (H) 2021 04:20 PM    Triglycerides (POC) 354 2019 03:36 PM    CHOL/HDL Ratio 3.5 2021 04:20 PM       Morbid obesity-  Wt Readings from Last 3 Encounters:   10/13/21 259 lb (117.5 kg)   21 262 lb (118.8 kg)   21 263 lb 10.7 oz (119.6 kg)     Chronic Pain-pt is seeing Pain Mngmnt and is on a regimen of gabapentin, tramadol and oxycodone 9mg      Chronic insomnia-pt requests an increase in her ambien back to 10mg as she was getting from her Pain Mngmnt provider. The 5mg (as is recommended for women) does not work. Pts  checked, there is no evidence of misuse or abuse.   Last filled 2021                Review of Systems  Constitutional: negative for fevers, chills, anorexia and weight loss  Respiratory:  negative for cough, hemoptysis, dyspnea,wheezing  CV:   negative for chest pain, palpitations, lower extremity edema  GI:   negative for nausea, vomiting, diarrhea, abdominal pain,melena  Musculoskel: positive for myalgias, arthralgias, back pain, joint pain  Neurological:  negative for headaches, dizziness, vertigo, memory problems and gait   Behavl/Psych: negative for feelings of anxiety, depression, mood changes    Past Medical History:   Diagnosis Date    Acute pancreatitis 2012    Acute pancreatitis 2011    Arthritis     both knees    Chronic pain     knees    Delivery normal     x1    Diabetes ([facility] [address]     DJD (degenerative joint disease) of knee     Fatty liver 2011    GERD (gastroesophageal reflux disease)     Hypertension     Hypothyroidism     Obesity     PUD (peptic ulcer disease)     UTI (urinary tract infection) 2011     Past Surgical History:   Procedure Laterality Date    HX BREAST BIOPSY Left     Surgical Bx  - BENIGN    HX BREAST LUMPECTOMY      left breast    HX GYN      hysterectomy    HX ORTHOPAEDIC      left total knee replacement    HX ORTHOPAEDIC  6/16/15    RIGHT TOTAL KNEE ARTHROPLASTY     HX PARATHYROIDECTOMY  11     Social History     Socioeconomic History    Marital status:    Tobacco Use    Smoking status: Former Smoker     Packs/day: 0.25     Years: 25.00     Pack years: 6.25     Types: Cigarettes     Quit date: 2008     Years since quittin.8    Smokeless tobacco: Never Used   Vaping Use    Vaping Use: Never used   Substance and Sexual Activity    Alcohol use: No     Alcohol/week: 0.0 standard drinks    Drug use: No    Sexual activity: Never     Family History   Problem Relation Age of Onset    Cancer Mother     Hypertension Father     Diabetes Sister     Hypertension Sister     Diabetes Brother     Hypertension Brother      Current Outpatient Medications   Medication Sig Dispense Refill    zolpidem (AMBIEN) 5 mg tablet TAKE 1 TABLET BY MOUTH EVERY NIGHT AS NEEDED FOR SLEEP. MAX DAILY AMOUNT: 5 MG 30 Tablet 2    glimepiride (AMARYL) 4 mg tablet Take 2 Tablets by mouth daily. 180 Tablet 1    dapagliflozin (Farxiga) 10 mg tab tablet Take 1 Tablet by mouth daily. 90 Tablet 1    fluticasone propionate (Flonase) 50 mcg/actuation nasal spray 2 Sprays by Both Nostrils route daily as needed for Rhinitis. 1 Each 2    amLODIPine (NORVASC) 10 mg tablet TAKE 1 TABLET BY MOUTH DAILY 90 Tablet 1    atorvastatin (LIPITOR) 20 mg tablet TAKE 1 TABLET BY MOUTH EVERY NIGHT FOR CHOLESTEROL 90 Tablet 1    metFORMIN (GLUCOPHAGE) 1,000 mg tablet TAKE 1 TABLET BY MOUTH TWICE DAILY WITH MEALS 180 Tablet 1    valsartan-hydroCHLOROthiazide (DIOVAN-HCT) 320-25 mg per tablet TAKE 1 TABLET BY MOUTH DAILY 90 Tablet 1    escitalopram oxalate (LEXAPRO) 20 mg tablet TAKE 1 TABLET BY MOUTH DAILY 90 Tablet 1    ALPRAZolam (XANAX) 0.5 mg tablet Take 1 Tablet by mouth nightly as needed for Anxiety. Max Daily Amount: 0.5 mg. 10 Tablet 0    XTAMPZA ER 9 mg capsule Take 9 mg by mouth every twelve (12) hours. 0    metoprolol succinate (TOPROL-XL) 100 mg tablet TAKE 1 TABLET BY MOUTH DAILY FOR HIGH BLOOD PRESSURE 90 Tablet 1    glucose blood VI test strips (BLOOD GLUCOSE TEST) strip Use BID DxE11.9 200 Strip 11     Allergies   Allergen Reactions    Influenza Virus Vaccine, Specific Other (comments)     States body got very hot    Aspirin Other (comments)     Pt reports having a h/o gastric ulcers. Pt was on IBU when she was dx'd.        Objective:  Visit Vitals  BP (!) 141/76 (BP 1 Location: Left upper arm, BP Patient Position: Sitting, BP Cuff Size: Adult)   Pulse 67   Temp 97.3 °F (36.3 °C) (Temporal)   Resp 19   Ht 5' 6\" (1.676 m)   Wt 259 lb (117.5 kg)   SpO2 94%   BMI 41.80 kg/m²     Physical Exam:   General appearance - alert, obese  Mental status - alert, oriented to person, place, and time  EYE-EOMI  Neck - supple,   Chest - symmetric air entry    CVS-reg + S4 + 2/6 systolic murmur  Abdomen - obese  Ext-no pedal edema, no clubbing or cyanosis  Skin-Warm and dry. no hyperpigmentation, vitiligo, or suspicious lesions  Neuro -alert, oriented, normal speech, no focal findings or movement disorder noted        Results for orders placed or performed in visit on 10/13/21   AMB POC GLUCOSE BLOOD, BY GLUCOSE MONITORING DEVICE   Result Value Ref Range    Glucose  MG/DL   AMB POC HEMOGLOBIN A1C   Result Value Ref Range    Hemoglobin A1c (POC) 11.2 %       Assessment/Plan:    ICD-10-CM ICD-9-CM    1. Type 2 diabetes mellitus with hyperosmolarity without coma, without long-term current use of insulin (HCC)  E11.00 250.20 AMB POC GLUCOSE BLOOD, BY GLUCOSE MONITORING DEVICE      AMB POC HEMOGLOBIN A1C      dapagliflozin (Farxiga) 10 mg tab tablet   2. Chronic insomnia  F51.04 780.52 zolpidem (AMBIEN) 5 mg tablet   3. Type 2 diabetes mellitus with diabetic neuropathy, without long-term current use of insulin (HCC)  E11.40 250.60 glimepiride (AMARYL) 4 mg tablet     357.2    4. Encounter for screening mammogram for malignant neoplasm of breast  Z12.31 V76.12 [facility] BI SCREENING INCL CAD   5. Colon cancer screening  Z12.11 V76.51    6.  Grief counseling  Z71.89 V65.49 REFERRAL TO PSYCHIATRY     Orders Placed This Encounter    [facility] MAMMO BI SCREENING INCL CAD     Standing Status:   Future     Standing Expiration Date:   1/13/2022    REFERRAL TO PSYCHIATRY     Referral Priority:   Routine     Referral Type:   Behavioral Health     Referral Reason:   Specialty Services Required     Referred to Provider:   Cristobal Valenzuela LCSW     Number of Visits Requested:   1    AMB POC GLUCOSE BLOOD, BY GLUCOSE MONITORING DEVICE    AMB POC HEMOGLOBIN A1C    zolpidem (AMBIEN) 5 mg tablet     Sig: TAKE 1 TABLET BY MOUTH EVERY NIGHT AS NEEDED FOR SLEEP. MAX DAILY AMOUNT: 5 MG     Dispense:  30 Tablet     Refill:  2    glimepiride (AMARYL) 4 mg tablet     Sig: Take 2 Tablets by mouth daily. Dispense:  180 Tablet     Refill:  1    dapagliflozin (Farxiga) 10 mg tab tablet     Sig: Take 1 Tablet by mouth daily. Dispense:  90 Tablet     Refill:  1    fluticasone propionate (Flonase) 50 mcg/actuation nasal spray     Si Sprays by Both Nostrils route daily as needed for Rhinitis. Dispense:  1 Each     Refill:  2         1. Type 2 diabetes mellitus with diabetic neuropathy, without long-term current use of insulin (HCC)  Needs insulin-pt declines  - AMB POC GLUCOSE BLOOD, BY GLUCOSE MONITORING DEVICE  - AMB POC HEMOGLOBIN A1C  - glimepiride (AMARYL) 4 mg tablet; Take 2 Tablets by mouth daily. Dispense: 180 Tablet; Refill: 1  - dapagliflozin (Farxiga) 10 mg tab tablet; Take 1 Tablet by mouth daily. Dispense: 90 Tablet; Refill: 1    2. Hypertension associated with diabetes ([facility] [address]  Relatively controlled    3. Anxiety with depression  On SSRI    4. Chronic insomnia    - zolpidem (AMBIEN) 5 mg tablet; TAKE 1 TABLET BY MOUTH EVERY NIGHT AS NEEDED FOR SLEEP. MAX DAILY AMOUNT: 5 MG  Dispense: 30 Tablet; Refill: 2    5. Encounter for screening mammogram for malignant neoplasm of breast    - [facility] MAMMO BI SCREENING INCL CAD; Future    6. Colon cancer screening  Referral regiven    7. Grief counseling    - REFERRAL TO PSYCHIATRY    8. Morbid obesity ([facility] [address]  D/w pt daily walking (at least 20 minutes), healthy diet w fruits and/or veggies w each meal, portion sizes and weight loss        There are no Patient Instructions on file for this visit. Follow-up and Dispositions    · Return in about 3 months (around 2022).            I have reviewed with the patient details of the assessment and plan and all questions were answered. Relevent patient education was performed. The most recent lab findings were reviewed with the patient. An After Visit Summary was printed and given to the patient.

## 2021-10-21 LAB
GLUCOSE POC: 272 MG/DL
HBA1C MFR BLD HPLC: 11.2 %

## 2021-11-03 DIAGNOSIS — I10 ESSENTIAL HYPERTENSION: ICD-10-CM

## 2021-11-03 RX ORDER — METOPROLOL SUCCINATE 100 MG/1
TABLET, EXTENDED RELEASE ORAL
Qty: 90 TABLET | Refills: 1 | Status: SHIPPED | OUTPATIENT
Start: 2021-11-03 | End: 2022-02-11 | Stop reason: SDUPTHER

## 2022-02-08 ENCOUNTER — NURSE TRIAGE (OUTPATIENT)
Dept: OTHER | Facility: CLINIC | Age: 62
End: 2022-02-08

## 2022-02-09 ENCOUNTER — VIRTUAL VISIT (OUTPATIENT)
Dept: INTERNAL MEDICINE CLINIC | Age: 62
End: 2022-02-09

## 2022-02-09 ENCOUNTER — TELEPHONE (OUTPATIENT)
Dept: INTERNAL MEDICINE CLINIC | Age: 62
End: 2022-02-09

## 2022-02-09 DIAGNOSIS — F41.8 ANXIETY WITH DEPRESSION: Primary | ICD-10-CM

## 2022-02-09 PROCEDURE — 99213 OFFICE O/P EST LOW 20 MIN: CPT | Performed by: INTERNAL MEDICINE

## 2022-02-09 RX ORDER — HYDROXYZINE PAMOATE 50 MG/1
50 CAPSULE ORAL
Qty: 30 CAPSULE | Refills: 0 | Status: SHIPPED | OUTPATIENT
Start: 2022-02-09 | End: 2022-03-11

## 2022-02-09 NOTE — PROGRESS NOTES
Lay Newman is a 59 y.o. female, evaluated via audio-only technology on 2022 for No chief complaint on file. .    Assessment & Plan:   1. Anxiety with depression    - hydrOXYzine pamoate (VISTARIL) 50 mg capsule; Take 1 Capsule by mouth three (3) times daily as needed for Anxiety for up to 30 days. Dispense: 30 Capsule; Refill: 0    12  Subjective:     Pt reports her father  recently and she has been having headaches and abd pain and difficulty sleeping    Prior to Admission medications    Medication Sig Start Date End Date Taking? Authorizing Provider   zolpidem (AMBIEN) 5 mg tablet TAKE 1 TABLET BY MOUTH EVERY NIGHT AS NEEDED FOR SLEEP. MAX DAILY AMOUNT: 5 MG 22   Ismael Abrams MD   fluticasone propionate (FLONASE) 50 mcg/actuation nasal spray SHAKE LIQUID AND USE 2 SPRAYS IN EACH NOSTRIL DAILY AS NEEDED FOR RHINITIS 22   Stephani Treviño MD   metoprolol succinate (TOPROL-XL) 100 mg tablet TAKE 1 TABLET BY MOUTH DAILY FOR HIGH BLOOD PRESSURE 11/3/21   Stephani Treviño MD   glimepiride (AMARYL) 4 mg tablet Take 2 Tablets by mouth daily. 10/13/21   Stephani Treviño MD   dapagliflozin Eilleen Apley) 10 mg tab tablet Take 1 Tablet by mouth daily. 10/13/21   Stephani Treviño MD   amLODIPine (NORVASC) 10 mg tablet TAKE 1 TABLET BY MOUTH DAILY 21   Stephani Treviño MD   atorvastatin (LIPITOR) 20 mg tablet TAKE 1 TABLET BY MOUTH EVERY NIGHT FOR CHOLESTEROL 21   Stephani Treviño MD   metFORMIN (GLUCOPHAGE) 1,000 mg tablet TAKE 1 TABLET BY MOUTH TWICE DAILY WITH MEALS 21   Stephani Treviño MD   valsartan-hydroCHLOROthiazide (DIOVAN-HCT) 320-25 mg per tablet TAKE 1 TABLET BY MOUTH DAILY 21   Stephani Treviño MD   escitalopram oxalate (LEXAPRO) 20 mg tablet TAKE 1 TABLET BY MOUTH DAILY 21   Stephani Treviño MD   ALPRAZolam [place] [address]) 0.5 mg tablet Take 1 Tablet by mouth nightly as needed for Anxiety.  Max Daily Amount: 0.5 mg. 21 Stephani Treviño MD   glucose blood VI test strips (BLOOD GLUCOSE TEST) strip Use BID DxE11.9 2/17/20   Stephani Treviño MD   XTAMPZA ER 9 mg capsule Take 9 mg by mouth every twelve (12) hours. Patient not taking: Reported on 2/9/2022 3/12/19   Provider, Historical       No data recorded     Lay Newman, who was evaluated through a patient-initiated, synchronous (real-time) audio only encounter, and/or her healthcare decision maker, is aware that it is a billable service, which includes applicable co-pays, with coverage as determined by her insurance carrier. She provided verbal consent to proceed. She has not had a related appointment within my department in the past 7 days or scheduled within the next 24 hours. The patient was located at home in a state where the provider was licensed to provide care.         Jamel Hsieh MD

## 2022-02-09 NOTE — PROGRESS NOTES
Mikey Hendrickson is a 59 y.o. female, evaluated via audio-only technology on 2022 for No chief complaint on file. .    Assessment & Plan:   1. Anxiety with depression    - hydrOXYzine pamoate (VISTARIL) 50 mg capsule; Take 1 Capsule by mouth three (3) times daily as needed for Anxiety for up to 30 days. Dispense: 30 Capsule; Refill: 0    12  Subjective:     Pt reports her father  recently and she has been having headaches and abd pain and difficulty sleeping    Prior to Admission medications    Medication Sig Start Date End Date Taking? Authorizing Provider   zolpidem (AMBIEN) 5 mg tablet TAKE 1 TABLET BY MOUTH EVERY NIGHT AS NEEDED FOR SLEEP. MAX DAILY AMOUNT: 5 MG 22   Sofia Abrams MD   fluticasone propionate (FLONASE) 50 mcg/actuation nasal spray SHAKE LIQUID AND USE 2 SPRAYS IN EACH NOSTRIL DAILY AS NEEDED FOR RHINITIS 22   Angelita Godinez MD   metoprolol succinate (TOPROL-XL) 100 mg tablet TAKE 1 TABLET BY MOUTH DAILY FOR HIGH BLOOD PRESSURE 11/3/21   Angelita Godinez MD   glimepiride (AMARYL) 4 mg tablet Take 2 Tablets by mouth daily. 10/13/21   Angelita Godinez MD   dapagliflozin Lisa Yolanda) 10 mg tab tablet Take 1 Tablet by mouth daily. 10/13/21   Angelita Godinez MD   amLODIPine (NORVASC) 10 mg tablet TAKE 1 TABLET BY MOUTH DAILY 21   Angelita Godinez MD   atorvastatin (LIPITOR) 20 mg tablet TAKE 1 TABLET BY MOUTH EVERY NIGHT FOR CHOLESTEROL 21   Angelita Godinez MD   metFORMIN (GLUCOPHAGE) 1,000 mg tablet TAKE 1 TABLET BY MOUTH TWICE DAILY WITH MEALS 21   Angelita Godinez MD   valsartan-hydroCHLOROthiazide (DIOVAN-HCT) 320-25 mg per tablet TAKE 1 TABLET BY MOUTH DAILY 21   Angelita Godinez MD   escitalopram oxalate (LEXAPRO) 20 mg tablet TAKE 1 TABLET BY MOUTH DAILY 21   Angelita Godinez MD   ALPRAZolam Robbin Patience) 0.5 mg tablet Take 1 Tablet by mouth nightly as needed for Anxiety.  Max Daily Amount: 0.5 mg. 21 Regan Johnson MD   glucose blood VI test strips (BLOOD GLUCOSE TEST) strip Use BID DxE11.9 2/17/20   Regan Johnson MD   XTAMPZA ER 9 mg capsule Take 9 mg by mouth every twelve (12) hours. Patient not taking: Reported on 2/9/2022 3/12/19   Provider, Historical       No data recorded     Lincoln Snell, who was evaluated through a patient-initiated, synchronous (real-time) audio only encounter, and/or her healthcare decision maker, is aware that it is a billable service, which includes applicable co-pays, with coverage as determined by her insurance carrier. She provided verbal consent to proceed. She has not had a related appointment within my department in the past 7 days or scheduled within the next 24 hours. The patient was located at home in a state where the provider was licensed to provide care.         Cynthia Russ MD

## 2022-02-09 NOTE — PROGRESS NOTES
Chief Complaint   Patient presents with    Headache     Negative Covid test on 2/8/2022    Nausea    Other     Doxy [phone]     1. Have you been to the ER, urgent care clinic since your last visit? Hospitalized since your last visit? No    2. Have you seen or consulted any other health care providers outside of the [address] [place] [address] since your last visit? Include any pap smears or colon screening. No       Pt denies pain at this time.

## 2022-02-11 ENCOUNTER — OFFICE VISIT (OUTPATIENT)
Dept: INTERNAL MEDICINE CLINIC | Age: 62
End: 2022-02-11
Payer: COMMERCIAL

## 2022-02-11 VITALS
HEIGHT: 66 IN | WEIGHT: 252 LBS | TEMPERATURE: 98.2 F | BODY MASS INDEX: 40.5 KG/M2 | SYSTOLIC BLOOD PRESSURE: 136 MMHG | HEART RATE: 80 BPM | DIASTOLIC BLOOD PRESSURE: 69 MMHG | OXYGEN SATURATION: 97 % | RESPIRATION RATE: 18 BRPM

## 2022-02-11 DIAGNOSIS — E11.40 TYPE 2 DIABETES MELLITUS WITH DIABETIC NEUROPATHY, WITHOUT LONG-TERM CURRENT USE OF INSULIN (HCC): ICD-10-CM

## 2022-02-11 DIAGNOSIS — E66.01 MORBID OBESITY (HCC): ICD-10-CM

## 2022-02-11 DIAGNOSIS — F33.9 EPISODE OF RECURRENT MAJOR DEPRESSIVE DISORDER, UNSPECIFIED DEPRESSION EPISODE SEVERITY (HCC): ICD-10-CM

## 2022-02-11 DIAGNOSIS — E78.00 PURE HYPERCHOLESTEROLEMIA: ICD-10-CM

## 2022-02-11 DIAGNOSIS — E11.59 HYPERTENSION COMPLICATING DIABETES (HCC): ICD-10-CM

## 2022-02-11 DIAGNOSIS — I15.2 HYPERTENSION COMPLICATING DIABETES (HCC): ICD-10-CM

## 2022-02-11 DIAGNOSIS — Z79.891 CHRONIC PRESCRIPTION OPIATE USE: ICD-10-CM

## 2022-02-11 DIAGNOSIS — F51.04 CHRONIC INSOMNIA: ICD-10-CM

## 2022-02-11 DIAGNOSIS — Z91.119 DIETARY NONCOMPLIANCE: ICD-10-CM

## 2022-02-11 DIAGNOSIS — F43.21 GRIEF REACTION: Primary | ICD-10-CM

## 2022-02-11 DIAGNOSIS — M47.22 OSTEOARTHRITIS OF SPINE WITH RADICULOPATHY, CERVICAL REGION: ICD-10-CM

## 2022-02-11 LAB
GLUCOSE POC: 373 MG/DL
HBA1C MFR BLD HPLC: 10.3 %

## 2022-02-11 PROCEDURE — 83036 HEMOGLOBIN GLYCOSYLATED A1C: CPT | Performed by: INTERNAL MEDICINE

## 2022-02-11 PROCEDURE — 99215 OFFICE O/P EST HI 40 MIN: CPT | Performed by: INTERNAL MEDICINE

## 2022-02-11 PROCEDURE — 82962 GLUCOSE BLOOD TEST: CPT | Performed by: INTERNAL MEDICINE

## 2022-02-11 RX ORDER — ZOLPIDEM TARTRATE 5 MG/1
5 TABLET ORAL
Qty: 30 TABLET | Refills: 2 | Status: SHIPPED | OUTPATIENT
Start: 2022-02-11 | End: 2022-07-19 | Stop reason: SDUPTHER

## 2022-02-11 RX ORDER — IBUPROFEN 200 MG
CAPSULE ORAL
Qty: 100 STRIP | Refills: 11 | Status: SHIPPED | OUTPATIENT
Start: 2022-02-11 | End: 2022-10-26

## 2022-02-11 RX ORDER — GLIMEPIRIDE 4 MG/1
8 TABLET ORAL DAILY
Qty: 180 TABLET | Refills: 1 | Status: SHIPPED | OUTPATIENT
Start: 2022-02-11 | End: 2022-07-19 | Stop reason: SDUPTHER

## 2022-02-11 RX ORDER — ATORVASTATIN CALCIUM 20 MG/1
TABLET, FILM COATED ORAL
Qty: 90 TABLET | Refills: 1 | Status: SHIPPED | OUTPATIENT
Start: 2022-02-11 | End: 2022-02-23

## 2022-02-11 RX ORDER — INSULIN PUMP SYRINGE, 3 ML
EACH MISCELLANEOUS
Qty: 1 KIT | Refills: 0 | Status: SHIPPED | OUTPATIENT
Start: 2022-02-11 | End: 2022-07-19

## 2022-02-11 RX ORDER — VALSARTAN AND HYDROCHLOROTHIAZIDE 320; 25 MG/1; MG/1
1 TABLET, FILM COATED ORAL DAILY
Qty: 90 TABLET | Refills: 1 | Status: SHIPPED | OUTPATIENT
Start: 2022-02-11 | End: 2022-07-19 | Stop reason: SDUPTHER

## 2022-02-11 RX ORDER — METFORMIN HYDROCHLORIDE 1000 MG/1
1000 TABLET ORAL 2 TIMES DAILY WITH MEALS
Qty: 180 TABLET | Refills: 1 | Status: SHIPPED | OUTPATIENT
Start: 2022-02-11 | End: 2022-07-19 | Stop reason: SDUPTHER

## 2022-02-11 RX ORDER — METOPROLOL SUCCINATE 100 MG/1
100 TABLET, EXTENDED RELEASE ORAL DAILY
Qty: 90 TABLET | Refills: 1 | Status: SHIPPED | OUTPATIENT
Start: 2022-02-11 | End: 2022-05-23

## 2022-02-11 RX ORDER — LANCETS
EACH MISCELLANEOUS
Qty: 100 EACH | Refills: 11 | Status: SHIPPED | OUTPATIENT
Start: 2022-02-11 | End: 2022-10-26

## 2022-02-11 RX ORDER — ESCITALOPRAM OXALATE 20 MG/1
20 TABLET ORAL DAILY
Qty: 90 TABLET | Refills: 1 | Status: SHIPPED | OUTPATIENT
Start: 2022-02-11 | End: 2022-02-23

## 2022-02-11 RX ORDER — AMLODIPINE BESYLATE 10 MG/1
10 TABLET ORAL DAILY
Qty: 90 TABLET | Refills: 1 | Status: SHIPPED | OUTPATIENT
Start: 2022-02-11 | End: 2022-07-19 | Stop reason: SDUPTHER

## 2022-02-11 NOTE — PROGRESS NOTES
Chief Complaint   Patient presents with    Follow-up     pt reports occ nausea and headaches, happens at the same time, pt thinks stress from losing  and dad     New Order     glucometer and supplies, pt states she had one but it stopped working was told to get one from PCP        1. Have you been to the ER, urgent care clinic since your last visit? Hospitalized since your last visit? No    2. Have you seen or consulted any other health care providers outside of the [address] [place] [address] since your last visit? Include any pap smears or colon screening.  No

## 2022-02-11 NOTE — PROGRESS NOTES
Jay Berrios is a 59 y.o. female and presents with Follow-up (pt reports occ nausea and headaches, happens at the same time, pt thinks stress from losing  and dad ) and New Order (glucometer and supplies, pt states she had one but it stopped working was told to get one from PCP )  . Subjective:    Pts father  recently. She requests a note for work and soething for her nerves. She is already on an antidepressant, a sleeping pill  AND narcotic pain meds (from her pain mngmnt provider) currently. Pt declines therapist referral.       PMH:  Hypertension Review:  The patient has essential hypertension  Diet and Lifestyle: generally follows a  low sodium diet, exercises never  Home BP Monitoring: is not measured at home. Pertinent ROS: taking medications as instructed, no medication side effects noted, no TIA's, no chest pain on exertion, no dyspnea on exertion, no swelling of ankles.    BP Readings from Last 3 Encounters:   22 136/69   10/13/21 (!) 141/76   21 (!) 145/74     Type 2 DM-on metformin and Clora Frank   -pt admits to dietary indiscretions and elevated blood sugars   -PT REFUSES INSULIN INITIATION  Lab Results   Component Value Date/Time    Hemoglobin A1c 9.6 (H) 2021 04:20 PM    Hemoglobin A1c (POC) 11.2 10/13/2021 03:51 PM     Hyperlipidemia-on atorvastatin 20mg   -  Lab Results   Component Value Date/Time    Cholesterol, total 128 2021 04:20 PM    Cholesterol (POC) <100 2019 03:36 PM    HDL Cholesterol 37 2021 04:20 PM    HDL Cholesterol (POC) 25 2019 03:36 PM    LDL Cholesterol (POC) N/A 2019 03:36 PM    LDL, calculated 33.4 2021 04:20 PM    VLDL, calculated 57.6 2021 04:20 PM    Triglyceride 288 (H) 2021 04:20 PM    Triglycerides (POC) 354 2019 03:36 PM    CHOL/HDL Ratio 3.5 2021 04:20 PM       Morbid obesity-  Wt Readings from Last 3 Encounters:   22 252 lb (114.3 kg)   10/13/21 259 lb (117.5 kg)   21 262 lb (118.8 kg)     Chronic Pain-pt is seeing Pain Mngmnt and is on a regimen of gabapentin, tramadol and oxycodone 9mg      Chronic insomnia-pt requests an increase in her ambien back to 10mg as she was getting from her Pain Mngmnt provider. The 5mg (as is recommended for women) does not work. Pts  checked, there is no evidence of misuse or abuse.   Last filled 9/14/2021                Review of Systems  Constitutional: negative for fevers, chills, anorexia and weight loss  Respiratory:  negative for cough, hemoptysis, dyspnea,wheezing  CV:   negative for chest pain, palpitations, lower extremity edema  GI:   negative for nausea, vomiting, diarrhea, abdominal pain,melena  Musculoskel: positive for myalgias, arthralgias, back pain, joint pain  Neurological:  negative for headaches, dizziness, vertigo, memory problems and gait   Behavl/Psych: negative for feelings of anxiety, depression, mood changes    Past Medical History:   Diagnosis Date    Acute pancreatitis 8/9/2012    Acute pancreatitis 1/21/2011    Arthritis     both knees    Chronic pain     knees    Delivery normal     x1    Diabetes ([facility] [facility]     DJD (degenerative joint disease) of knee     Fatty liver 1/22/2011    GERD (gastroesophageal reflux disease)     Hypertension     Hypothyroidism     Obesity     PUD (peptic ulcer disease)     UTI (urinary tract infection) 1/21/2011     Past Surgical History:   Procedure Laterality Date    HX BREAST BIOPSY Left     Surgical Bx 1990 - BENIGN    HX BREAST LUMPECTOMY      left breast    HX GYN      hysterectomy    HX ORTHOPAEDIC      left total knee replacement    HX ORTHOPAEDIC  6/16/15    RIGHT TOTAL KNEE ARTHROPLASTY     HX PARATHYROIDECTOMY  01/27/11     Social History     Socioeconomic History    Marital status:    Tobacco Use    Smoking status: Former Smoker     Packs/day: 0.25     Years: 25.00     Pack years: 6.25     Types: Cigarettes     Quit date: 1/24/2008     Years since quitting: 14.0    Smokeless tobacco: Never Used   Vaping Use    Vaping Use: Never used   Substance and Sexual Activity    Alcohol use: No     Alcohol/week: 0.0 standard drinks    Drug use: No    Sexual activity: Never     Family History   Problem Relation Age of Onset    Cancer Mother     Hypertension Father     Diabetes Sister     Hypertension Sister     Diabetes Brother     Hypertension Brother      Current Outpatient Medications   Medication Sig Dispense Refill    hydrOXYzine pamoate (VISTARIL) 50 mg capsule Take 1 Capsule by mouth three (3) times daily as needed for Anxiety for up to 30 days. 30 Capsule 0    zolpidem (AMBIEN) 5 mg tablet TAKE 1 TABLET BY MOUTH EVERY NIGHT AS NEEDED FOR SLEEP. MAX DAILY AMOUNT: 5 MG 30 Tablet 2    fluticasone propionate (FLONASE) 50 mcg/actuation nasal spray SHAKE LIQUID AND USE 2 SPRAYS IN EACH NOSTRIL DAILY AS NEEDED FOR RHINITIS 16 g 2    metoprolol succinate (TOPROL-XL) 100 mg tablet TAKE 1 TABLET BY MOUTH DAILY FOR HIGH BLOOD PRESSURE 90 Tablet 1    glimepiride (AMARYL) 4 mg tablet Take 2 Tablets by mouth daily. 180 Tablet 1    dapagliflozin (Farxiga) 10 mg tab tablet Take 1 Tablet by mouth daily. 90 Tablet 1    amLODIPine (NORVASC) 10 mg tablet TAKE 1 TABLET BY MOUTH DAILY 90 Tablet 1    atorvastatin (LIPITOR) 20 mg tablet TAKE 1 TABLET BY MOUTH EVERY NIGHT FOR CHOLESTEROL 90 Tablet 1    metFORMIN (GLUCOPHAGE) 1,000 mg tablet TAKE 1 TABLET BY MOUTH TWICE DAILY WITH MEALS 180 Tablet 1    valsartan-hydroCHLOROthiazide (DIOVAN-HCT) 320-25 mg per tablet TAKE 1 TABLET BY MOUTH DAILY 90 Tablet 1    escitalopram oxalate (LEXAPRO) 20 mg tablet TAKE 1 TABLET BY MOUTH DAILY 90 Tablet 1    glucose blood VI test strips (BLOOD GLUCOSE TEST) strip Use BID DxE11.9 200 Strip 11    XTAMPZA ER 9 mg capsule Take 9 mg by mouth every twelve (12) hours.  (Patient not taking: Reported on 2/9/2022)  0     Allergies   Allergen Reactions    Influenza Virus Vaccine, Specific Other (comments)     States body got very hot    Aspirin Other (comments)     Pt reports having a h/o gastric ulcers. Pt was on IBU when she was dx'd. Objective:  Visit Vitals  /69 (BP 1 Location: Right arm, BP Patient Position: Sitting, BP Cuff Size: Large adult)   Pulse 80   Temp 98.2 °F (36.8 °C) (Temporal)   Resp 18   Ht 5' 6\" (1.676 m)   Wt 252 lb (114.3 kg)   SpO2 97%   BMI 40.67 kg/m²     Physical Exam:   General appearance - alert, obese  Mental status - alert, oriented to person, place, and time  EYE-EOMI  Neck - supple,   Chest - symmetric air entry    CVS-reg + S4 + 2/6 systolic murmur  Abdomen - obese  Ext-no pedal edema, no clubbing or cyanosis  Skin-Warm and dry. no hyperpigmentation, vitiligo, or suspicious lesions  Neuro -alert, oriented, normal speech, no focal findings or movement disorder noted        Results for orders placed or performed in visit on 10/13/21   AMB POC GLUCOSE BLOOD, BY GLUCOSE MONITORING DEVICE   Result Value Ref Range    Glucose  MG/DL   AMB POC HEMOGLOBIN A1C   Result Value Ref Range    Hemoglobin A1c (POC) 11.2 %       Assessment/Plan:    ICD-10-CM ICD-9-CM    1. Essential hypertension  I10 401.9    2. Pure hypercholesterolemia  E78.00 272.0    3. Type 2 diabetes mellitus with diabetic neuropathy, without long-term current use of insulin (HCC)  E11.40 250.60      357.2    4. Episode of recurrent major depressive disorder, unspecified depression episode severity ([facility]  F33.9 296.30    5. Chronic insomnia  F51.04 780.52      No orders of the defined types were placed in this encounter. 1. Grief reaction  Declines therapy referral    2. Type 2 diabetes mellitus with diabetic neuropathy, without long-term current use of insulin (HCC)  REFUSES INSULIN  Will readdress in 1 mth    - dapagliflozin (Farxiga) 10 mg tab tablet; Take 1 Tablet by mouth daily. Dispense: 90 Tablet; Refill: 1  - glimepiride (AMARYL) 4 mg tablet; Take 2 Tablets by mouth daily.   Dispense: 180 Tablet; Refill: 1  - metFORMIN (GLUCOPHAGE) 1,000 mg tablet; Take 1 Tablet by mouth two (2) times daily (with meals). Dispense: 180 Tablet; Refill: 1  - AMB POC GLUCOSE BLOOD, BY GLUCOSE MONITORING DEVICE  - AMB POC HEMOGLOBIN A1C    3. Hypertension complicating diabetes (HCC)    - amLODIPine (NORVASC) 10 mg tablet; Take 1 Tablet by mouth daily. Dispense: 90 Tablet; Refill: 1  - metoprolol succinate (TOPROL-XL) 100 mg tablet; Take 1 Tablet by mouth daily. FOR HIGH BLOOD PRESSURE  Dispense: 90 Tablet; Refill: 1  - valsartan-hydroCHLOROthiazide (DIOVAN-HCT) 320-25 mg per tablet; Take 1 Tablet by mouth daily. Dispense: 90 Tablet; Refill: 1    4. Pure hypercholesterolemia    - atorvastatin (LIPITOR) 20 mg tablet; TAKE 1 TABLET BY MOUTH EVERY NIGHT FOR CHOLESTEROL  Dispense: 90 Tablet; Refill: 1    5. Episode of recurrent major depressive disorder, unspecified depression episode severity ([address]    - escitalopram oxalate (LEXAPRO) 20 mg tablet; Take 1 Tablet by mouth daily. Dispense: 90 Tablet; Refill: 1    6. Chronic insomnia    - zolpidem (AMBIEN) 5 mg tablet; Take 1 Tablet by mouth nightly as needed for Sleep. Max Daily Amount: 5 mg. Dispense: 30 Tablet; Refill: 2    7. Morbid obesity ([address]  D/w pt daily walking (at least 20 minutes), healthy diet w fruits and/or veggies w each meal, portion sizes and weight loss      8. Dietary noncompliance  Declines DM education    9. Chronic prescription opiate use  Noted    10. Osteoarthritis of spine with radiculopathy, cervical region  F/u pain mngmnt      There are no Patient Instructions on file for this visit. I have reviewed with the patient details of the assessment and plan and all questions were answered. Relevent patient education was performed. The most recent lab findings were reviewed with the patient. An After Visit Summary was printed and given to the patient.

## 2022-02-15 DIAGNOSIS — E11.00 TYPE 2 DIABETES MELLITUS WITH HYPEROSMOLARITY WITHOUT COMA, WITHOUT LONG-TERM CURRENT USE OF INSULIN (HCC): Primary | ICD-10-CM

## 2022-02-23 DIAGNOSIS — F33.9 EPISODE OF RECURRENT MAJOR DEPRESSIVE DISORDER, UNSPECIFIED DEPRESSION EPISODE SEVERITY (HCC): ICD-10-CM

## 2022-02-23 DIAGNOSIS — E78.00 PURE HYPERCHOLESTEROLEMIA: ICD-10-CM

## 2022-02-23 RX ORDER — ESCITALOPRAM OXALATE 20 MG/1
20 TABLET ORAL DAILY
Qty: 90 TABLET | Refills: 1 | Status: SHIPPED | OUTPATIENT
Start: 2022-02-23 | End: 2022-07-19 | Stop reason: SDUPTHER

## 2022-02-23 RX ORDER — ATORVASTATIN CALCIUM 20 MG/1
TABLET, FILM COATED ORAL
Qty: 90 TABLET | Refills: 1 | Status: SHIPPED | OUTPATIENT
Start: 2022-02-23 | End: 2022-07-19 | Stop reason: SDUPTHER

## 2022-03-18 PROBLEM — E66.9 OBESITY (BMI 30-39.9): Status: ACTIVE | Noted: 2017-10-23

## 2022-03-18 PROBLEM — F41.8 ANXIETY WITH DEPRESSION: Status: ACTIVE | Noted: 2020-04-20

## 2022-03-18 PROBLEM — Z79.891 CHRONIC PRESCRIPTION OPIATE USE: Status: ACTIVE | Noted: 2017-11-21

## 2022-03-19 PROBLEM — E66.01 MORBID OBESITY (HCC): Status: ACTIVE | Noted: 2019-04-09

## 2022-03-19 PROBLEM — M47.22 OSTEOARTHRITIS OF SPINE WITH RADICULOPATHY, CERVICAL REGION: Status: ACTIVE | Noted: 2017-10-23

## 2022-03-19 PROBLEM — F51.04 CHRONIC INSOMNIA: Status: ACTIVE | Noted: 2019-09-20

## 2022-03-19 PROBLEM — Z91.119 DIETARY NONCOMPLIANCE: Status: ACTIVE | Noted: 2020-10-09

## 2022-03-19 PROBLEM — M54.16 LUMBAR RADICULOPATHY: Status: ACTIVE | Noted: 2017-10-23

## 2022-05-10 ENCOUNTER — HOSPITAL ENCOUNTER (EMERGENCY)
Age: 62
Discharge: HOME | End: 2022-05-10
Attending: EMERGENCY MEDICINE
Payer: COMMERCIAL

## 2022-05-10 VITALS
SYSTOLIC BLOOD PRESSURE: 149 MMHG | TEMPERATURE: 98.4 F | DIASTOLIC BLOOD PRESSURE: 88 MMHG | OXYGEN SATURATION: 100 % | WEIGHT: 246.69 LBS | HEART RATE: 91 BPM | RESPIRATION RATE: 16 BRPM | HEIGHT: 66 IN | BODY MASS INDEX: 39.65 KG/M2

## 2022-05-10 DIAGNOSIS — E86.0 MILD DEHYDRATION: ICD-10-CM

## 2022-05-10 DIAGNOSIS — R19.7 DIARRHEA OF PRESUMED INFECTIOUS ORIGIN: Primary | ICD-10-CM

## 2022-05-10 LAB
ALBUMIN SERPL-MCNC: 4.1 G/DL (ref 3.5–5)
ALBUMIN/GLOB SERPL: 1.2 {RATIO} (ref 1.1–2.2)
ALP SERPL-CCNC: 86 U/L (ref 45–117)
ALT SERPL-CCNC: 30 U/L (ref 12–78)
ANION GAP SERPL CALC-SCNC: 4 MMOL/L (ref 5–15)
AST SERPL-CCNC: 18 U/L (ref 15–37)
ATRIAL RATE: 85 BPM
BASOPHILS # BLD: 0 K/UL (ref 0–0.1)
BASOPHILS NFR BLD: 0 % (ref 0–1)
BILIRUB SERPL-MCNC: 0.8 MG/DL (ref 0.2–1)
BUN SERPL-MCNC: 9 MG/DL (ref 6–20)
BUN/CREAT SERPL: 11 (ref 12–20)
CALCIUM SERPL-MCNC: 9.7 MG/DL (ref 8.5–10.1)
CALCULATED P AXIS, ECG09: 48 DEGREES
CALCULATED R AXIS, ECG10: -21 DEGREES
CALCULATED T AXIS, ECG11: 86 DEGREES
CHLORIDE SERPL-SCNC: 97 MMOL/L (ref 97–108)
CO2 SERPL-SCNC: 34 MMOL/L (ref 21–32)
CREAT SERPL-MCNC: 0.85 MG/DL (ref 0.55–1.02)
DIAGNOSIS, 93000: NORMAL
DIFFERENTIAL METHOD BLD: ABNORMAL
EOSINOPHIL # BLD: 0 K/UL (ref 0–0.4)
EOSINOPHIL NFR BLD: 1 % (ref 0–7)
ERYTHROCYTE [DISTWIDTH] IN BLOOD BY AUTOMATED COUNT: 12 % (ref 11.5–14.5)
GLOBULIN SER CALC-MCNC: 3.3 G/DL (ref 2–4)
GLUCOSE BLD STRIP.AUTO-MCNC: 261 MG/DL (ref 65–117)
GLUCOSE BLD STRIP.AUTO-MCNC: 263 MG/DL (ref 65–117)
GLUCOSE SERPL-MCNC: 260 MG/DL (ref 65–100)
HCT VFR BLD AUTO: 44.2 % (ref 35–47)
HGB BLD-MCNC: 14.8 G/DL (ref 11.5–16)
IMM GRANULOCYTES # BLD AUTO: 0 K/UL (ref 0–0.04)
IMM GRANULOCYTES NFR BLD AUTO: 1 % (ref 0–0.5)
LYMPHOCYTES # BLD: 1 K/UL (ref 0.8–3.5)
LYMPHOCYTES NFR BLD: 20 % (ref 12–49)
MCH RBC QN AUTO: 29.5 PG (ref 26–34)
MCHC RBC AUTO-ENTMCNC: 33.5 G/DL (ref 30–36.5)
MCV RBC AUTO: 88.2 FL (ref 80–99)
MONOCYTES # BLD: 0.3 K/UL (ref 0–1)
MONOCYTES NFR BLD: 6 % (ref 5–13)
NEUTS SEG # BLD: 3.6 K/UL (ref 1.8–8)
NEUTS SEG NFR BLD: 72 % (ref 32–75)
NRBC # BLD: 0 K/UL (ref 0–0.01)
NRBC BLD-RTO: 0 PER 100 WBC
P-R INTERVAL, ECG05: 162 MS
PLATELET # BLD AUTO: 329 K/UL (ref 150–400)
PMV BLD AUTO: 9.1 FL (ref 8.9–12.9)
POTASSIUM SERPL-SCNC: 3.6 MMOL/L (ref 3.5–5.1)
PROT SERPL-MCNC: 7.4 G/DL (ref 6.4–8.2)
Q-T INTERVAL, ECG07: 384 MS
QRS DURATION, ECG06: 84 MS
QTC CALCULATION (BEZET), ECG08: 456 MS
RBC # BLD AUTO: 5.01 M/UL (ref 3.8–5.2)
SERVICE CMNT-IMP: ABNORMAL
SERVICE CMNT-IMP: ABNORMAL
SODIUM SERPL-SCNC: 135 MMOL/L (ref 136–145)
VENTRICULAR RATE, ECG03: 85 BPM
WBC # BLD AUTO: 5 K/UL (ref 3.6–11)

## 2022-05-10 PROCEDURE — 80053 COMPREHEN METABOLIC PANEL: CPT

## 2022-05-10 PROCEDURE — 96360 HYDRATION IV INFUSION INIT: CPT

## 2022-05-10 PROCEDURE — 99284 EMERGENCY DEPT VISIT MOD MDM: CPT

## 2022-05-10 PROCEDURE — 36415 COLL VENOUS BLD VENIPUNCTURE: CPT

## 2022-05-10 PROCEDURE — 93005 ELECTROCARDIOGRAM TRACING: CPT

## 2022-05-10 PROCEDURE — 96361 HYDRATE IV INFUSION ADD-ON: CPT

## 2022-05-10 PROCEDURE — 85025 COMPLETE CBC W/AUTO DIFF WBC: CPT

## 2022-05-10 PROCEDURE — 74011250636 HC RX REV CODE- 250/636: Performed by: EMERGENCY MEDICINE

## 2022-05-10 PROCEDURE — 82962 GLUCOSE BLOOD TEST: CPT

## 2022-05-10 RX ORDER — DICYCLOMINE HYDROCHLORIDE 20 MG/1
20 TABLET ORAL
Qty: 24 TABLET | Refills: 0 | Status: SHIPPED | OUTPATIENT
Start: 2022-05-10 | End: 2022-10-26

## 2022-05-10 RX ADMIN — SODIUM CHLORIDE 1000 ML: 9 INJECTION, SOLUTION INTRAVENOUS at 11:37

## 2022-05-10 NOTE — ED PROVIDER NOTES
EMERGENCY DEPARTMENT HISTORY AND PHYSICAL EXAM      Date: 5/10/2022  Patient Name: Lionel Liriano    History of Presenting Illness     Chief Complaint   Patient presents with    Diarrhea     Pt ambulatory into triage with a cc of diarrhea and dizziness x 1 day       History Provided By: Patient    HPI: Lionel Liriano, 57 y.o. female presents to the ED with cc of diarrhea. 77-year-old female with a history of diabetes and hypertension and chronic pain presents emergency department with a chief plaint of diarrhea. Patient reports she was at work and upon returning home reports she felt lightheaded with standing. Had 2 episodes of this. Subsequently patient developed multiple episodes of diarrhea. Reports diarrhea started at 4 AM and went on all night. Last diarrhea just prior to my assessment. Denies any blood in stool. Did report some belching occasionally with bile, but no vomiting. Decreased p.o. intake today. No fevers or chills, sick contacts or recent antibiotics measures. No chest pain or shortness of breath. Currently denies any abdominal pain. There are no other complaints, changes, or physical findings at this time. PCP: Pina Aguirre MD    No current facility-administered medications on file prior to encounter. Current Outpatient Medications on File Prior to Encounter   Medication Sig Dispense Refill    escitalopram oxalate (LEXAPRO) 20 mg tablet TAKE 1 TABLET BY MOUTH DAILY 90 Tablet 1    atorvastatin (LIPITOR) 20 mg tablet TAKE 1 TABLET BY MOUTH EVERY NIGHT FOR CHOLESTEROL 90 Tablet 1    empagliflozin (Jardiance) 25 mg tablet Take 1 Tablet by mouth daily. 30 Tablet 2    amLODIPine (NORVASC) 10 mg tablet Take 1 Tablet by mouth daily. 90 Tablet 1    glimepiride (AMARYL) 4 mg tablet Take 2 Tablets by mouth daily. 180 Tablet 1    metFORMIN (GLUCOPHAGE) 1,000 mg tablet Take 1 Tablet by mouth two (2) times daily (with meals).  180 Tablet 1    metoprolol succinate (TOPROL-XL) 100 mg tablet Take 1 Tablet by mouth daily. FOR HIGH BLOOD PRESSURE 90 Tablet 1    valsartan-hydroCHLOROthiazide (DIOVAN-HCT) 320-25 mg per tablet Take 1 Tablet by mouth daily. 90 Tablet 1    zolpidem (AMBIEN) 5 mg tablet Take 1 Tablet by mouth nightly as needed for Sleep. Max Daily Amount: 5 mg. 30 Tablet 2    Blood-Glucose Meter monitoring kit Use BID Dx E11.9 1 Kit 0    glucose blood VI test strips (blood glucose test) strip Use BID Dx11.9 100 Strip 11    lancets misc Use BID Dx E11.9 100 Each 11    fluticasone propionate (FLONASE) 50 mcg/actuation nasal spray SHAKE LIQUID AND USE 2 SPRAYS IN EACH NOSTRIL DAILY AS NEEDED FOR RHINITIS 16 g 2    XTAMPZA ER 9 mg capsule Take 9 mg by mouth every twelve (12) hours.  (Patient not taking: Reported on 2/9/2022)  0       Past History     Past Medical History:  Past Medical History:   Diagnosis Date    Acute pancreatitis 8/9/2012    Acute pancreatitis 1/21/2011    Arthritis     both knees    Chronic pain     knees    Delivery normal     x1    Diabetes ([address]     DJD (degenerative joint disease) of knee     Fatty liver 1/22/2011    GERD (gastroesophageal reflux disease)     Hypertension     Hypothyroidism     Obesity     PUD (peptic ulcer disease)     UTI (urinary tract infection) 1/21/2011       Past Surgical History:  Past Surgical History:   Procedure Laterality Date    HX BREAST BIOPSY Left     Surgical Bx 1990 - BENIGN    HX BREAST LUMPECTOMY      left breast    HX GYN      hysterectomy    HX ORTHOPAEDIC      left total knee replacement    HX ORTHOPAEDIC  6/16/15    RIGHT TOTAL KNEE ARTHROPLASTY     HX PARATHYROIDECTOMY  01/27/11       Family History:  Family History   Problem Relation Age of Onset    Cancer Mother     Hypertension Father     Diabetes Sister     Hypertension Sister     Diabetes Brother     Hypertension Brother        Social History:  Social History     Tobacco Use    Smoking status: Former Smoker Packs/day: 0.25     Years: 25.00     Pack years: 6.25     Types: Cigarettes     Quit date: 2008     Years since quittin.3    Smokeless tobacco: Never Used   Vaping Use    Vaping Use: Never used   Substance Use Topics    Alcohol use: No     Alcohol/week: 0.0 standard drinks    Drug use: No       Allergies: Allergies   Allergen Reactions    Influenza Virus Vaccine, Specific Other (comments)     States body got very hot    Aspirin Other (comments)     Pt reports having a h/o gastric ulcers. Pt was on IBU when she was dx'd. Review of Systems   Review of Systems   Constitutional: Negative for activity change, chills and fever. HENT: Negative for facial swelling and voice change. Eyes: Negative for redness. Respiratory: Negative for cough, shortness of breath and wheezing. Cardiovascular: Negative for chest pain and leg swelling. Gastrointestinal: Positive for diarrhea. Negative for abdominal pain, nausea and vomiting. Genitourinary: Negative for decreased urine volume. Musculoskeletal: Negative for gait problem. Skin: Negative for pallor and rash. Neurological: Positive for light-headedness. Negative for tremors, facial asymmetry and headaches. Psychiatric/Behavioral: Negative for agitation. All other systems reviewed and are negative. Physical Exam   Physical Exam  Vitals and nursing note reviewed. Constitutional:       Comments: 64-year-old female, resting in bed, no acute distress   HENT:      Head: Normocephalic and atraumatic. Cardiovascular:      Rate and Rhythm: Normal rate and regular rhythm. Heart sounds: No murmur heard. No friction rub. No gallop. Pulmonary:      Effort: Pulmonary effort is normal.      Breath sounds: Normal breath sounds. Abdominal:      Palpations: Abdomen is soft. Tenderness: There is no abdominal tenderness. There is no guarding or rebound. Musculoskeletal:         General: No swelling. Normal range of motion. Cervical back: Normal range of motion. Skin:     General: Skin is warm. Capillary Refill: Capillary refill takes less than 2 seconds. Neurological:      General: No focal deficit present. Mental Status: She is alert. Coordination: Coordination normal.   Psychiatric:         Mood and Affect: Mood normal.         Diagnostic Study Results     Labs -     Recent Results (from the past 12 hour(s))   GLUCOSE, POC    Collection Time: 05/10/22  9:18 AM   Result Value Ref Range    Glucose (POC) 263 (H) 65 - 117 mg/dL    Performed by Bishnu Burrell RN    EKG, 12 LEAD, INITIAL    Collection Time: 05/10/22  9:25 AM   Result Value Ref Range    Ventricular Rate 85 BPM    Atrial Rate 85 BPM    P-R Interval 162 ms    QRS Duration 84 ms    Q-T Interval 384 ms    QTC Calculation (Bezet) 456 ms    Calculated P Axis 48 degrees    Calculated R Axis -21 degrees    Calculated T Axis 86 degrees    Diagnosis       Normal sinus rhythm  Nonspecific T wave abnormality  When compared with ECG of 25-FEB-2021 15:53,  Incomplete right bundle branch block is no longer present  Nonspecific T wave abnormality, worse in Anterior leads  Confirmed by Sydnie Kelly ([id number]) on 5/10/2022 4:26:32 PM     CBC WITH AUTOMATED DIFF    Collection Time: 05/10/22  9:39 AM   Result Value Ref Range    WBC 5.0 3.6 - 11.0 K/uL    RBC 5.01 3.80 - 5.20 M/uL    HGB 14.8 11.5 - 16.0 g/dL    HCT 44.2 35.0 - 47.0 %    MCV 88.2 80.0 - 99.0 FL    MCH 29.5 26.0 - 34.0 PG    MCHC 33.5 30.0 - 36.5 g/dL    RDW 12.0 11.5 - 14.5 %    PLATELET 862 285 - 046 K/uL    MPV 9.1 8.9 - 12.9 FL    NRBC 0.0 0  WBC    ABSOLUTE NRBC 0.00 0.00 - 0.01 K/uL    NEUTROPHILS 72 32 - 75 %    LYMPHOCYTES 20 12 - 49 %    MONOCYTES 6 5 - 13 %    EOSINOPHILS 1 0 - 7 %    BASOPHILS 0 0 - 1 %    IMMATURE GRANULOCYTES 1 (H) 0.0 - 0.5 %    ABS. NEUTROPHILS 3.6 1.8 - 8.0 K/UL    ABS. LYMPHOCYTES 1.0 0.8 - 3.5 K/UL    ABS. MONOCYTES 0.3 0.0 - 1.0 K/UL    ABS.  EOSINOPHILS 0.0 0.0 - 0.4 K/UL    ABS. BASOPHILS 0.0 0.0 - 0.1 K/UL    ABS. IMM. GRANS. 0.0 0.00 - 0.04 K/UL    DF AUTOMATED     METABOLIC PANEL, COMPREHENSIVE    Collection Time: 05/10/22  9:39 AM   Result Value Ref Range    Sodium 135 (L) 136 - 145 mmol/L    Potassium 3.6 3.5 - 5.1 mmol/L    Chloride 97 97 - 108 mmol/L    CO2 34 (H) 21 - 32 mmol/L    Anion gap 4 (L) 5 - 15 mmol/L    Glucose 260 (H) 65 - 100 mg/dL    BUN 9 6 - 20 MG/DL    Creatinine 0.85 0.55 - 1.02 MG/DL    BUN/Creatinine ratio 11 (L) 12 - 20      GFR est AA >60 >60 ml/min/1.73m2    GFR est non-AA >60 >60 ml/min/1.73m2    Calcium 9.7 8.5 - 10.1 MG/DL    Bilirubin, total 0.8 0.2 - 1.0 MG/DL    ALT (SGPT) 30 12 - 78 U/L    AST (SGOT) 18 15 - 37 U/L    Alk. phosphatase 86 45 - 117 U/L    Protein, total 7.4 6.4 - 8.2 g/dL    Albumin 4.1 3.5 - 5.0 g/dL    Globulin 3.3 2.0 - 4.0 g/dL    A-G Ratio 1.2 1.1 - 2.2     GLUCOSE, POC    Collection Time: 05/10/22 12:59 PM   Result Value Ref Range    Glucose (POC) 261 (H) 65 - 117 mg/dL    Performed by Althea Lu LPN        Radiologic Studies -   No orders to display     CT Results  (Last 48 hours)    None        CXR Results  (Last 48 hours)    None          Medical Decision Making   I am the first provider for this patient. I reviewed the vital signs, available nursing notes, past medical history, past surgical history, family history and social history. Vital Signs-Reviewed the patient's vital signs. Patient Vitals for the past 12 hrs:   Temp Pulse Resp BP SpO2   05/10/22 1307 -- -- -- (!) 149/88 --   05/10/22 0915 98.4 °F (36.9 °C) 91 16 (!) 148/85 100 %     Records Reviewed: Nursing Notes and Old Medical Records    Provider Notes (Medical Decision Making):     25-year-old female presents emergency department with diarrhea. Denies chest pain. EKG unremarkable. Dizziness appears to be more likely near syncope. No syncopal episodes.   Doubt central cause of vertigo, patient has no ataxia on exam and history not suspicious for this clinically. Basic labs unremarkable. We will bolus fluids. Consider mild dehydration from patient's diarrhea. No abdominal tenderness, do not believe CT of abdomen is necessary. ED Course:   Initial assessment performed. The patients presenting problems have been discussed, and they are in agreement with the care plan formulated and outlined with them. I have encouraged them to ask questions as they arise throughout their visit. ED Course as of 05/10/22 1922   Tue May 10, 2022   1138 Preliminary EKG interpreted by me. Shows normal sinus rhythm with a HR of 85. No ST elevations or depressions concerning for ischemia. Normal intervals. [MB]      ED Course User Index  [MB] Kirstin Oconnor MD     Updates as above, labs are unremarkable. Repeat abdominal exam is benign. Patient reports improvement after fluids. Discussed dietary changes and PCP follow-up. Return precautions. Lisbeth Sy MD      Disposition:    Discharged    DISCHARGE PLAN:  1. Discharge Medication List as of 5/10/2022 12:58 PM      START taking these medications    Details   dicyclomine (BENTYL) 20 mg tablet Take 1 Tablet by mouth every eight (8) hours as needed for Abdominal Cramps., Normal, Disp-24 Tablet, R-0         CONTINUE these medications which have NOT CHANGED    Details   escitalopram oxalate (LEXAPRO) 20 mg tablet TAKE 1 TABLET BY MOUTH DAILY, Normal, Disp-90 Tablet, R-1      atorvastatin (LIPITOR) 20 mg tablet TAKE 1 TABLET BY MOUTH EVERY NIGHT FOR CHOLESTEROL, Normal, Disp-90 Tablet, R-1      empagliflozin (Jardiance) 25 mg tablet Take 1 Tablet by mouth daily. , Normal, Disp-30 Tablet, R-2      amLODIPine (NORVASC) 10 mg tablet Take 1 Tablet by mouth daily. , Normal, Disp-90 Tablet, R-1      glimepiride (AMARYL) 4 mg tablet Take 2 Tablets by mouth daily. , Normal, Disp-180 Tablet, R-1      metFORMIN (GLUCOPHAGE) 1,000 mg tablet Take 1 Tablet by mouth two (2) times daily (with meals). , Normal, Disp-180 Tablet, R-1      metoprolol succinate (TOPROL-XL) 100 mg tablet Take 1 Tablet by mouth daily. FOR HIGH BLOOD PRESSURE, Normal, Disp-90 Tablet, R-1      valsartan-hydroCHLOROthiazide (DIOVAN-HCT) 320-25 mg per tablet Take 1 Tablet by mouth daily. , Normal, Disp-90 Tablet, R-1      zolpidem (AMBIEN) 5 mg tablet Take 1 Tablet by mouth nightly as needed for Sleep. Max Daily Amount: 5 mg., Normal, Disp-30 Tablet, R-2      Blood-Glucose Meter monitoring kit Use BID Dx E11.9, Normal, Disp-1 Kit, R-0      glucose blood VI test strips (blood glucose test) strip Use BID Dx11.9, Normal, Disp-100 Strip, R-11      lancets misc Use BID Dx E11.9, Normal, Disp-100 Each, R-11      fluticasone propionate (FLONASE) 50 mcg/actuation nasal spray SHAKE LIQUID AND USE 2 SPRAYS IN EACH NOSTRIL DAILY AS NEEDED FOR RHINITIS, Normal, Disp-16 g, R-2      XTAMPZA ER 9 mg capsule Take 9 mg by mouth every twelve (12) hours. , Historical Med, R-0, CLIFF           2. Follow-up Information     Follow up With Specialties Details Why Contact Info    Ethan Russell MD Internal Medicine Physician In 3 days  [address]  [address]  [phone]      [facility] EMERGENCY DEPT Emergency Medicine  If symptoms worsen [address]  [address] [facility]  [phone]        3. Return to ED if worse     Diagnosis     Clinical Impression:   1. Diarrhea of presumed infectious origin    2. Mild dehydration        Attestations:    Samantha Mcclendon MD    Please note that this dictation was completed with [employer] App, the [employer] voice recognition software. Quite often unanticipated grammatical, syntax, homophones, and other interpretive errors are inadvertently transcribed by the computer software. Please disregard these errors. Please excuse any errors that have escaped final proofreading. Thank you.

## 2022-05-10 NOTE — ED NOTES
I have reviewed discharge instructions with the patient. The patient verbalized understanding. Pt walked out with steady gait.

## 2022-05-10 NOTE — DISCHARGE INSTRUCTIONS
You are seen in the ER for your symptoms. Please drink plenty of fluids to stay hydrated. Please follow-up with your primary care doctor. Please use the medicine Bentyl to help with cramping.     To help with diarrhea, you can eat foods high in fiber including bananas, rice, applesauce and toast.

## 2022-05-22 DIAGNOSIS — E11.59 HYPERTENSION COMPLICATING DIABETES (HCC): ICD-10-CM

## 2022-05-22 DIAGNOSIS — I15.2 HYPERTENSION COMPLICATING DIABETES (HCC): ICD-10-CM

## 2022-05-23 RX ORDER — METOPROLOL SUCCINATE 100 MG/1
100 TABLET, EXTENDED RELEASE ORAL DAILY
Qty: 90 TABLET | Refills: 1 | Status: SHIPPED | OUTPATIENT
Start: 2022-05-23 | End: 2022-07-19 | Stop reason: SDUPTHER

## 2022-07-19 ENCOUNTER — OFFICE VISIT (OUTPATIENT)
Dept: INTERNAL MEDICINE CLINIC | Age: 62
End: 2022-07-19
Payer: COMMERCIAL

## 2022-07-19 VITALS
RESPIRATION RATE: 18 BRPM | OXYGEN SATURATION: 96 % | HEART RATE: 69 BPM | WEIGHT: 251.7 LBS | TEMPERATURE: 97.3 F | BODY MASS INDEX: 40.45 KG/M2 | DIASTOLIC BLOOD PRESSURE: 75 MMHG | SYSTOLIC BLOOD PRESSURE: 131 MMHG | HEIGHT: 66 IN

## 2022-07-19 DIAGNOSIS — Z12.11 COLON CANCER SCREENING: ICD-10-CM

## 2022-07-19 DIAGNOSIS — E11.59 HYPERTENSION COMPLICATING DIABETES (HCC): ICD-10-CM

## 2022-07-19 DIAGNOSIS — Z12.31 ENCOUNTER FOR SCREENING MAMMOGRAM FOR MALIGNANT NEOPLASM OF BREAST: ICD-10-CM

## 2022-07-19 DIAGNOSIS — E11.40 TYPE 2 DIABETES MELLITUS WITH DIABETIC NEUROPATHY, WITHOUT LONG-TERM CURRENT USE OF INSULIN (HCC): Primary | ICD-10-CM

## 2022-07-19 DIAGNOSIS — F51.04 CHRONIC INSOMNIA: ICD-10-CM

## 2022-07-19 DIAGNOSIS — I15.2 HYPERTENSION COMPLICATING DIABETES (HCC): ICD-10-CM

## 2022-07-19 DIAGNOSIS — E78.00 PURE HYPERCHOLESTEROLEMIA: ICD-10-CM

## 2022-07-19 DIAGNOSIS — F33.9 EPISODE OF RECURRENT MAJOR DEPRESSIVE DISORDER, UNSPECIFIED DEPRESSION EPISODE SEVERITY (HCC): ICD-10-CM

## 2022-07-19 LAB
GLUCOSE POC: 197 MG/DL
HBA1C MFR BLD HPLC: 9.3 %

## 2022-07-19 PROCEDURE — 82962 GLUCOSE BLOOD TEST: CPT | Performed by: INTERNAL MEDICINE

## 2022-07-19 PROCEDURE — 83036 HEMOGLOBIN GLYCOSYLATED A1C: CPT | Performed by: INTERNAL MEDICINE

## 2022-07-19 PROCEDURE — 99214 OFFICE O/P EST MOD 30 MIN: CPT | Performed by: INTERNAL MEDICINE

## 2022-07-19 RX ORDER — VALSARTAN AND HYDROCHLOROTHIAZIDE 320; 25 MG/1; MG/1
1 TABLET, FILM COATED ORAL DAILY
Qty: 90 TABLET | Refills: 1 | Status: SHIPPED | OUTPATIENT
Start: 2022-07-19

## 2022-07-19 RX ORDER — METFORMIN HYDROCHLORIDE 1000 MG/1
1000 TABLET ORAL 2 TIMES DAILY WITH MEALS
Qty: 180 TABLET | Refills: 1 | Status: SHIPPED | OUTPATIENT
Start: 2022-07-19

## 2022-07-19 RX ORDER — ESCITALOPRAM OXALATE 20 MG/1
20 TABLET ORAL DAILY
Qty: 90 TABLET | Refills: 1 | Status: SHIPPED | OUTPATIENT
Start: 2022-07-19

## 2022-07-19 RX ORDER — ATORVASTATIN CALCIUM 20 MG/1
20 TABLET, FILM COATED ORAL DAILY
Qty: 90 TABLET | Refills: 1 | Status: SHIPPED | OUTPATIENT
Start: 2022-07-19

## 2022-07-19 RX ORDER — IBUPROFEN 800 MG/1
800 TABLET ORAL
Qty: 60 TABLET | Refills: 2 | Status: SHIPPED | OUTPATIENT
Start: 2022-07-19

## 2022-07-19 RX ORDER — AMLODIPINE BESYLATE 10 MG/1
10 TABLET ORAL DAILY
Qty: 90 TABLET | Refills: 1 | Status: SHIPPED | OUTPATIENT
Start: 2022-07-19

## 2022-07-19 RX ORDER — ZOLPIDEM TARTRATE 5 MG/1
5 TABLET ORAL
Qty: 30 TABLET | Refills: 2 | Status: SHIPPED | OUTPATIENT
Start: 2022-07-19 | End: 2022-10-13

## 2022-07-19 RX ORDER — METOPROLOL SUCCINATE 100 MG/1
100 TABLET, EXTENDED RELEASE ORAL DAILY
Qty: 90 TABLET | Refills: 1 | Status: SHIPPED | OUTPATIENT
Start: 2022-07-19

## 2022-07-19 RX ORDER — GLIMEPIRIDE 4 MG/1
8 TABLET ORAL DAILY
Qty: 180 TABLET | Refills: 1 | Status: SHIPPED | OUTPATIENT
Start: 2022-07-19

## 2022-07-19 NOTE — PROGRESS NOTES
Laya Ibarra is a 57 y.o. female    Chief Complaint   Patient presents with    Diabetes     follow up     Health Maintenance Due   Topic Date Due    Pneumococcal 0-64 years (1 - PCV) Never done    Foot Exam Q1  Never done    DTaP/Tdap/Td series (1 - Tdap) Never done    Shingrix Vaccine Age 50> (1 of 2) Never done    Eye Exam Retinal or Dilated  03/31/2017    Breast Cancer Screen Mammogram  04/06/2020    Colorectal Cancer Screening Combo  06/14/2020    COVID-19 Vaccine (3 - Booster for Pfizer series) 02/13/2022    A1C test (Diabetic or Prediabetic)  05/11/2022    MICROALBUMIN Q1  07/13/2022    Lipid Screen  07/13/2022         Visit Vitals  Temp 97.3 °F (36.3 °C)   Ht 5' 6\" (1.676 m)   Wt 251 lb 11.2 oz (114.2 kg)   BMI 40.63 kg/m²           1. Have you been to the ER, urgent care clinic since your last visit? Hospitalized since your last visit? NO    2. Have you seen or consulted any other health care providers outside of the [address] [place] [address] since your last visit? Include any pap smears or colon screening.  NO

## 2022-07-19 NOTE — PROGRESS NOTES
Chaz Gill is a 57 y.o. female and presents with Diabetes (follow up)  . Subjective:    Pts last appt 2/2022      PMH:  Hypertension Review:  The patient has essential hypertension  Diet and Lifestyle: generally follows a  low sodium diet, exercises never  Home BP Monitoring: is not measured at home. Pertinent ROS: taking medications as instructed, no medication side effects noted, no TIA's, no chest pain on exertion, no dyspnea on exertion, no swelling of ankles. BP Readings from Last 3 Encounters:   07/19/22 131/75   05/10/22 (!) 149/88   02/11/22 136/69     Type 2 DM-on metformin and januvia   -pt admits to dietary indiscretions and elevated blood sugars   -PT REFUSES INSULIN INITIATION.  She is well aware of risks of end organ damage  Lab Results   Component Value Date/Time    Hemoglobin A1c 9.6 (H) 07/13/2021 04:20 PM    Hemoglobin A1c (POC) 9.3 07/19/2022 11:10 AM     Lab Results   Component Value Date/Time    Glucose 260 (H) 05/10/2022 09:39 AM    Glucose (POC) 261 (H) 05/10/2022 12:59 PM    Glucose  07/19/2022 11:11 AM         Hyperlipidemia-on atorvastatin 20mg   -  Lab Results   Component Value Date/Time    Cholesterol, total 128 07/13/2021 04:20 PM    Cholesterol (POC) <100 09/19/2019 03:36 PM    HDL Cholesterol 37 07/13/2021 04:20 PM    HDL Cholesterol (POC) 25 09/19/2019 03:36 PM    LDL Cholesterol (POC) N/A 09/19/2019 03:36 PM    LDL, calculated 33.4 07/13/2021 04:20 PM    VLDL, calculated 57.6 07/13/2021 04:20 PM    Triglyceride 288 (H) 07/13/2021 04:20 PM    Triglycerides (POC) 354 09/19/2019 03:36 PM    CHOL/HDL Ratio 3.5 07/13/2021 04:20 PM       Morbid obesity-pt reports she is thin enough and does not want to lose much more weight  Wt Readings from Last 3 Encounters:   07/19/22 251 lb 11.2 oz (114.2 kg)   05/10/22 246 lb 11.1 oz (111.9 kg)   02/11/22 252 lb (114.3 kg)     Chronic Pain-pt is seeing Pain Mngmnt and is on a regimen of gabapentin, tramadol and oxycodone 9mg      Chronic insomnia- on zolpidem 5mg          Review of Systems  Constitutional: negative for fevers, chills, anorexia and weight loss  Respiratory:  negative for cough, hemoptysis, dyspnea,wheezing  CV:   negative for chest pain, palpitations, lower extremity edema  GI:   negative for nausea, vomiting, diarrhea, abdominal pain,melena  Musculoskel: positive for myalgias, arthralgias, back pain, joint pain  Neurological:  negative for headaches, dizziness, vertigo, memory problems and gait   Behavl/Psych: negative for feelings of anxiety, depression, mood changes    Past Medical History:   Diagnosis Date    Acute pancreatitis 2012    Acute pancreatitis 2011    Arthritis     both knees    Chronic pain     knees    Delivery normal     x1    Diabetes ([facility] [address]     DJD (degenerative joint disease) of knee     Fatty liver 2011    GERD (gastroesophageal reflux disease)     Hypertension     Hypothyroidism     Obesity     PUD (peptic ulcer disease)     UTI (urinary tract infection) 2011     Past Surgical History:   Procedure Laterality Date    HX BREAST BIOPSY Left     Surgical Bx  - BENIGN    HX BREAST LUMPECTOMY      left breast    HX GYN      hysterectomy    HX ORTHOPAEDIC      left total knee replacement    HX ORTHOPAEDIC  6/16/15    RIGHT TOTAL KNEE ARTHROPLASTY     HX PARATHYROIDECTOMY  11     Social History     Socioeconomic History    Marital status:    Tobacco Use    Smoking status: Former Smoker     Packs/day: 0.25     Years: 25.00     Pack years: 6.25     Types: Cigarettes     Quit date: 2008     Years since quittin.4    Smokeless tobacco: Never Used   Vaping Use    Vaping Use: Never used   Substance and Sexual Activity    Alcohol use: No     Alcohol/week: 0.0 standard drinks    Drug use: No    Sexual activity: Never     Family History   Problem Relation Age of Onset    Cancer Mother     Hypertension Father     Diabetes Sister     Hypertension Sister  Diabetes Brother     Hypertension Brother      Current Outpatient Medications   Medication Sig Dispense Refill    metoprolol succinate (TOPROL-XL) 100 mg tablet TAKE 1 TABLET BY MOUTH DAILY FOR HIGH BLOOD PRESSURE 90 Tablet 1    dicyclomine (BENTYL) 20 mg tablet Take 1 Tablet by mouth every eight (8) hours as needed for Abdominal Cramps. 24 Tablet 0    escitalopram oxalate (LEXAPRO) 20 mg tablet TAKE 1 TABLET BY MOUTH DAILY 90 Tablet 1    atorvastatin (LIPITOR) 20 mg tablet TAKE 1 TABLET BY MOUTH EVERY NIGHT FOR CHOLESTEROL 90 Tablet 1    empagliflozin (Jardiance) 25 mg tablet Take 1 Tablet by mouth daily. 30 Tablet 2    amLODIPine (NORVASC) 10 mg tablet Take 1 Tablet by mouth daily. 90 Tablet 1    glimepiride (AMARYL) 4 mg tablet Take 2 Tablets by mouth daily. 180 Tablet 1    metFORMIN (GLUCOPHAGE) 1,000 mg tablet Take 1 Tablet by mouth two (2) times daily (with meals). 180 Tablet 1    valsartan-hydroCHLOROthiazide (DIOVAN-HCT) 320-25 mg per tablet Take 1 Tablet by mouth daily. 90 Tablet 1    zolpidem (AMBIEN) 5 mg tablet Take 1 Tablet by mouth nightly as needed for Sleep. Max Daily Amount: 5 mg. 30 Tablet 2    Blood-Glucose Meter monitoring kit Use BID Dx E11.9 1 Kit 0    glucose blood VI test strips (blood glucose test) strip Use BID Dx11.9 100 Strip 11    lancets misc Use BID Dx E11.9 100 Each 11    fluticasone propionate (FLONASE) 50 mcg/actuation nasal spray SHAKE LIQUID AND USE 2 SPRAYS IN EACH NOSTRIL DAILY AS NEEDED FOR RHINITIS 16 g 2    XTAMPZA ER 9 mg capsule Take 9 mg by mouth every twelve (12) hours. (Patient not taking: Reported on 2/9/2022)  0     Allergies   Allergen Reactions    Influenza Virus Vaccine, Specific Other (comments)     States body got very hot    Aspirin Other (comments)     Pt reports having a h/o gastric ulcers. Pt was on IBU when she was dx'd.        Objective:  Visit Vitals  /75   Pulse 69   Temp 97.3 °F (36.3 °C)   Resp 18   Ht 5' 6\" (1.676 m)   Wt 251 lb 11.2 oz (114.2 kg)   SpO2 96%   BMI 40.63 kg/m²     Physical Exam:   General appearance - alert, obese  Mental status - alert, oriented to person, place, and time  EYE-EOMI  Neck - supple,   Chest - symmetric air entry    CVS-reg + S4 + 2/6 systolic murmur  Abdomen - obese  Ext-no pedal edema, no clubbing or cyanosis  Skin-Warm and dry. no hyperpigmentation, vitiligo, or suspicious lesions  Neuro -alert, oriented, normal speech, no focal findings or movement disorder noted        Results for orders placed or performed in visit on 07/19/22   AMB POC GLUCOSE BLOOD, BY GLUCOSE MONITORING DEVICE   Result Value Ref Range    Glucose  MG/DL   AMB POC HEMOGLOBIN A1C   Result Value Ref Range    Hemoglobin A1c (POC) 9.3 %       Assessment/Plan:    ICD-10-CM ICD-9-CM    1. Type 2 diabetes mellitus with diabetic neuropathy, without long-term current use of insulin ([facility])  E11.40 250.60 AMB POC GLUCOSE BLOOD, BY GLUCOSE MONITORING DEVICE     357.2 AMB POC HEMOGLOBIN A1C     Orders Placed This Encounter    AMB POC GLUCOSE BLOOD, BY GLUCOSE MONITORING DEVICE    AMB POC HEMOGLOBIN A1C       1. Type 2 diabetes mellitus with diabetic neuropathy, without long-term current use of insulin ([facility])  PT REFUSES INJECTABLE    - AMB POC GLUCOSE BLOOD, BY GLUCOSE MONITORING DEVICE  - AMB POC HEMOGLOBIN A1C  - metFORMIN (GLUCOPHAGE) 1,000 mg tablet; Take 1 Tablet by mouth two (2) times daily (with meals). Dispense: 180 Tablet; Refill: 1  - glimepiride (AMARYL) 4 mg tablet; Take 2 Tablets by mouth daily. Dispense: 180 Tablet; Refill: 1  - empagliflozin (Jardiance) 25 mg tablet; Take 1 Tablet by mouth daily. Dispense: 30 Tablet; Refill: 2    2. Hypertension complicating diabetes ([facility])    - valsartan-hydroCHLOROthiazide (DIOVAN-HCT) 320-25 mg per tablet; Take 1 Tablet by mouth daily. Dispense: 90 Tablet; Refill: 1  - metoprolol succinate (TOPROL-XL) 100 mg tablet; Take 1 Tablet by mouth daily.  FOR HIGH BLOOD PRESSURE  Dispense: 80 Tablet; Refill: 1  - amLODIPine (NORVASC) 10 mg tablet; Take 1 Tablet by mouth daily. Dispense: 90 Tablet; Refill: 1    3. Pure hypercholesterolemia    - atorvastatin (LIPITOR) 20 mg tablet; Take 1 Tablet by mouth daily. Indications: increased triglycerides and cholesterol  Dispense: 90 Tablet; Refill: 1    4. Encounter for screening mammogram for malignant neoplasm of breast    - [facility] 3D NATALIO W MAMMO BI SCREENING INCL CAD; Future    5. Colon cancer screening    - COLOGUARD TEST (FECAL DNA COLORECTAL CANCER SCREENING)    6. Episode of recurrent major depressive disorder, unspecified depression episode severity (HCC)    - escitalopram oxalate (LEXAPRO) 20 mg tablet; Take 1 Tablet by mouth daily. Dispense: 90 Tablet; Refill: 1    7. Chronic insomnia    - zolpidem (AMBIEN) 5 mg tablet; Take 1 Tablet by mouth nightly as needed for Sleep. Max Daily Amount: 5 mg. Dispense: 30 Tablet; Refill: 2      There are no Patient Instructions on file for this visit. I have reviewed with the patient details of the assessment and plan and all questions were answered. Relevent patient education was performed. The most recent lab findings were reviewed with the patient. An After Visit Summary was printed and given to the patient.

## 2022-07-26 ENCOUNTER — APPOINTMENT (OUTPATIENT)
Dept: CT IMAGING | Age: 62
End: 2022-07-26
Attending: EMERGENCY MEDICINE
Payer: COMMERCIAL

## 2022-07-26 ENCOUNTER — HOSPITAL ENCOUNTER (EMERGENCY)
Age: 62
Discharge: HOME | End: 2022-07-26
Attending: EMERGENCY MEDICINE
Payer: COMMERCIAL

## 2022-07-26 VITALS
HEART RATE: 73 BPM | WEIGHT: 254.8 LBS | SYSTOLIC BLOOD PRESSURE: 152 MMHG | DIASTOLIC BLOOD PRESSURE: 94 MMHG | RESPIRATION RATE: 16 BRPM | TEMPERATURE: 98.6 F | BODY MASS INDEX: 41.13 KG/M2 | OXYGEN SATURATION: 94 %

## 2022-07-26 DIAGNOSIS — R42 VERTIGO: Primary | ICD-10-CM

## 2022-07-26 DIAGNOSIS — R73.9 HYPERGLYCEMIA: ICD-10-CM

## 2022-07-26 LAB
ALBUMIN SERPL-MCNC: 4.4 G/DL (ref 3.5–5)
ALBUMIN/GLOB SERPL: 1.4 {RATIO} (ref 1.1–2.2)
ALP SERPL-CCNC: 80 U/L (ref 45–117)
ALT SERPL-CCNC: 29 U/L (ref 12–78)
ANION GAP SERPL CALC-SCNC: 8 MMOL/L (ref 5–15)
AST SERPL-CCNC: 11 U/L (ref 15–37)
BASOPHILS # BLD: 0 K/UL (ref 0–0.1)
BASOPHILS NFR BLD: 0 % (ref 0–1)
BILIRUB SERPL-MCNC: 0.5 MG/DL (ref 0.2–1)
BUN SERPL-MCNC: 11 MG/DL (ref 6–20)
BUN/CREAT SERPL: 14 (ref 12–20)
CALCIUM SERPL-MCNC: 9.5 MG/DL (ref 8.5–10.1)
CHLORIDE SERPL-SCNC: 98 MMOL/L (ref 97–108)
CO2 SERPL-SCNC: 31 MMOL/L (ref 21–32)
CREAT SERPL-MCNC: 0.78 MG/DL (ref 0.55–1.02)
DIFFERENTIAL METHOD BLD: NORMAL
EOSINOPHIL # BLD: 0.1 K/UL (ref 0–0.4)
EOSINOPHIL NFR BLD: 1 % (ref 0–7)
ERYTHROCYTE [DISTWIDTH] IN BLOOD BY AUTOMATED COUNT: 12.3 % (ref 11.5–14.5)
GLOBULIN SER CALC-MCNC: 3.2 G/DL (ref 2–4)
GLUCOSE BLD STRIP.AUTO-MCNC: 295 MG/DL (ref 65–117)
GLUCOSE SERPL-MCNC: 295 MG/DL (ref 65–100)
HCT VFR BLD AUTO: 45.2 % (ref 35–47)
HGB BLD-MCNC: 14.5 G/DL (ref 11.5–16)
IMM GRANULOCYTES # BLD AUTO: 0 K/UL (ref 0–0.04)
IMM GRANULOCYTES NFR BLD AUTO: 0 % (ref 0–0.5)
INR PPP: 0.9 (ref 0.9–1.1)
LYMPHOCYTES # BLD: 1.6 K/UL (ref 0.8–3.5)
LYMPHOCYTES NFR BLD: 28 % (ref 12–49)
MCH RBC QN AUTO: 28.3 PG (ref 26–34)
MCHC RBC AUTO-ENTMCNC: 32.1 G/DL (ref 30–36.5)
MCV RBC AUTO: 88.3 FL (ref 80–99)
MONOCYTES # BLD: 0.5 K/UL (ref 0–1)
MONOCYTES NFR BLD: 8 % (ref 5–13)
NEUTS SEG # BLD: 3.6 K/UL (ref 1.8–8)
NEUTS SEG NFR BLD: 63 % (ref 32–75)
NRBC # BLD: 0 K/UL (ref 0–0.01)
NRBC BLD-RTO: 0 PER 100 WBC
PLATELET # BLD AUTO: 362 K/UL (ref 150–400)
PMV BLD AUTO: 9.1 FL (ref 8.9–12.9)
POTASSIUM SERPL-SCNC: 3.6 MMOL/L (ref 3.5–5.1)
PROT SERPL-MCNC: 7.6 G/DL (ref 6.4–8.2)
PROTHROMBIN TIME: 9.3 SEC (ref 9–11.1)
RBC # BLD AUTO: 5.12 M/UL (ref 3.8–5.2)
SERVICE CMNT-IMP: ABNORMAL
SODIUM SERPL-SCNC: 137 MMOL/L (ref 136–145)
WBC # BLD AUTO: 5.7 K/UL (ref 3.6–11)

## 2022-07-26 PROCEDURE — 74011000636 HC RX REV CODE- 636: Performed by: EMERGENCY MEDICINE

## 2022-07-26 PROCEDURE — 80053 COMPREHEN METABOLIC PANEL: CPT

## 2022-07-26 PROCEDURE — 36415 COLL VENOUS BLD VENIPUNCTURE: CPT

## 2022-07-26 PROCEDURE — 70450 CT HEAD/BRAIN W/O DYE: CPT

## 2022-07-26 PROCEDURE — 99285 EMERGENCY DEPT VISIT HI MDM: CPT

## 2022-07-26 PROCEDURE — 93005 ELECTROCARDIOGRAM TRACING: CPT

## 2022-07-26 PROCEDURE — 85610 PROTHROMBIN TIME: CPT

## 2022-07-26 PROCEDURE — 85025 COMPLETE CBC W/AUTO DIFF WBC: CPT

## 2022-07-26 PROCEDURE — 74011250636 HC RX REV CODE- 250/636: Performed by: EMERGENCY MEDICINE

## 2022-07-26 PROCEDURE — 70496 CT ANGIOGRAPHY HEAD: CPT

## 2022-07-26 PROCEDURE — 82962 GLUCOSE BLOOD TEST: CPT

## 2022-07-26 RX ORDER — MECLIZINE HYDROCHLORIDE 25 MG/1
25 TABLET ORAL
Qty: 20 TABLET | Refills: 0 | Status: SHIPPED | OUTPATIENT
Start: 2022-07-26 | End: 2022-10-26 | Stop reason: SDUPTHER

## 2022-07-26 RX ORDER — MECLIZINE HCL 12.5 MG 12.5 MG/1
25 TABLET ORAL
Status: COMPLETED | OUTPATIENT
Start: 2022-07-26 | End: 2022-07-26

## 2022-07-26 RX ADMIN — MECLIZINE 25 MG: 12.5 TABLET ORAL at 19:54

## 2022-07-26 RX ADMIN — IOPAMIDOL 100 ML: 755 INJECTION, SOLUTION INTRAVENOUS at 19:10

## 2022-07-26 NOTE — ED PROVIDER NOTES
EMERGENCY DEPARTMENT HISTORY AND PHYSICAL EXAM      Date: 7/26/2022  Patient Name: Baltazar Piedra    History of Presenting Illness     Chief Complaint   Patient presents with    Dizziness     Pt reports dizziness x 1 month. Reported in registration that she was having slurred speech today with dizziness. Pt was shaking/tremors while talking to this nurse. History Provided By: Patient    HPI: Baltazar Piedra, 57 y.o. female with PMHx significant for diabetes, GERD, hypertension, hypothyroidism, who presents with a chief complaint of dizziness. Patient reports her symptoms of been ongoing for a month. She describes it as lightheadedness and room spinning. Symptoms worsen when she goes from sitting to standing. Improve when she stays still. She saw her PCP 1 week ago but did not discuss the symptoms. She denies any chest pain, shortness of breath, abdominal pain, nausea, vomiting. Denies any one-sided numbness or weakness. PCP: Michael Rodríguez MD    There are no other complaints, changes, or physical findings at this time. Current Outpatient Medications   Medication Sig Dispense Refill    meclizine (ANTIVERT) 25 mg tablet Take 1 Tablet by mouth three (3) times daily as needed for Dizziness. 20 Tablet 0    metFORMIN (GLUCOPHAGE) 1,000 mg tablet Take 1 Tablet by mouth two (2) times daily (with meals). 180 Tablet 1    glimepiride (AMARYL) 4 mg tablet Take 2 Tablets by mouth daily. 180 Tablet 1    atorvastatin (LIPITOR) 20 mg tablet Take 1 Tablet by mouth daily. Indications: increased triglycerides and cholesterol 90 Tablet 1    valsartan-hydroCHLOROthiazide (DIOVAN-HCT) 320-25 mg per tablet Take 1 Tablet by mouth daily. 90 Tablet 1    metoprolol succinate (TOPROL-XL) 100 mg tablet Take 1 Tablet by mouth daily. FOR HIGH BLOOD PRESSURE 90 Tablet 1    amLODIPine (NORVASC) 10 mg tablet Take 1 Tablet by mouth daily. 90 Tablet 1    escitalopram oxalate (LEXAPRO) 20 mg tablet Take 1 Tablet by mouth daily. 90 Tablet 1    zolpidem (AMBIEN) 5 mg tablet Take 1 Tablet by mouth nightly as needed for Sleep. Max Daily Amount: 5 mg. 30 Tablet 2    empagliflozin (Jardiance) 25 mg tablet Take 1 Tablet by mouth daily. 30 Tablet 2    ibuprofen (MOTRIN) 800 mg tablet Take 1 Tablet by mouth every eight (8) hours as needed for Pain. Take with full meal 60 Tablet 2    dicyclomine (BENTYL) 20 mg tablet Take 1 Tablet by mouth every eight (8) hours as needed for Abdominal Cramps.  24 Tablet 0    glucose blood VI test strips (blood glucose test) strip Use BID Dx11.9 100 Strip 11    lancets misc Use BID Dx E11.9 100 Each 11    fluticasone propionate (FLONASE) 50 mcg/actuation nasal spray SHAKE LIQUID AND USE 2 SPRAYS IN EACH NOSTRIL DAILY AS NEEDED FOR RHINITIS 16 g 2     Past History     Past Medical History:  Past Medical History:   Diagnosis Date    Acute pancreatitis 8/9/2012    Acute pancreatitis 1/21/2011    Arthritis     both knees    Chronic pain     knees    Delivery normal     x1    Diabetes (HCC)     DJD (degenerative joint disease) of knee     Fatty liver 1/22/2011    GERD (gastroesophageal reflux disease)     Hypertension     Hypothyroidism     Obesity     PUD (peptic ulcer disease)     UTI (urinary tract infection) 1/21/2011     Past Surgical History:  Past Surgical History:   Procedure Laterality Date    HX BREAST BIOPSY Left     Surgical Bx 1990 - BENIGN    HX BREAST LUMPECTOMY      left breast    HX GYN      hysterectomy    HX ORTHOPAEDIC      left total knee replacement    HX ORTHOPAEDIC  6/16/15    RIGHT TOTAL KNEE ARTHROPLASTY     HX PARATHYROIDECTOMY  01/27/11     Family History:  Family History   Problem Relation Age of Onset    Cancer Mother     Hypertension Father     Diabetes Sister     Hypertension Sister     Diabetes Brother     Hypertension Brother      Social History:  Social History     Tobacco Use    Smoking status: Former     Packs/day: 0.25     Years: 25.00     Pack years: 6.25     Types: Cigarettes Quit date: 2008     Years since quittin.5    Smokeless tobacco: Never   Vaping Use    Vaping Use: Never used   Substance Use Topics    Alcohol use: No     Alcohol/week: 0.0 standard drinks    Drug use: No     Allergies: Allergies   Allergen Reactions    Influenza Virus Vaccine, Specific Other (comments)     States body got very hot    Aspirin Other (comments)     Pt reports having a h/o gastric ulcers. Pt was on IBU when she was dx'd. Review of Systems   Review of Systems   Constitutional:  Negative for chills and fever. HENT:  Negative for congestion, rhinorrhea and sore throat. Respiratory:  Negative for cough and shortness of breath. Cardiovascular:  Negative for chest pain. Gastrointestinal:  Negative for abdominal pain, nausea and vomiting. Genitourinary:  Negative for dysuria and urgency. Skin:  Negative for rash. Neurological:  Positive for dizziness and light-headedness. Negative for headaches. All other systems reviewed and are negative. Physical Exam   Physical Exam  Vitals and nursing note reviewed. Constitutional:       General: She is not in acute distress. Appearance: She is well-developed. HENT:      Head: Normocephalic and atraumatic. Eyes:      Conjunctiva/sclera: Conjunctivae normal.      Pupils: Pupils are equal, round, and reactive to light. Cardiovascular:      Rate and Rhythm: Normal rate and regular rhythm. Pulmonary:      Effort: Pulmonary effort is normal. No respiratory distress. Breath sounds: Normal breath sounds. No stridor. Abdominal:      General: There is no distension. Palpations: Abdomen is soft. Tenderness: There is no abdominal tenderness. Musculoskeletal:         General: Normal range of motion. Cervical back: Normal range of motion. Skin:     General: Skin is warm and dry. Neurological:      Mental Status: She is alert and oriented to person, place, and time. GCS: GCS eye subscore is 4.  GCS verbal subscore is 5. GCS motor subscore is 6. Cranial Nerves: Cranial nerves are intact. No dysarthria. Sensory: Sensation is intact. Motor: Motor function is intact. Coordination: Coordination is intact. Finger-Nose-Finger Test normal.      Comments: Nystagmus with leftward gaze   Psychiatric:         Mood and Affect: Mood normal.         Thought Content: Thought content normal.     Diagnostic Study Results   Labs -     Recent Results (from the past 12 hour(s))   GLUCOSE, POC    Collection Time: 07/26/22  6:02 PM   Result Value Ref Range    Glucose (POC) 295 (H) 65 - 117 mg/dL    Performed by Rony Lincoln    CBC WITH AUTOMATED DIFF    Collection Time: 07/26/22  6:07 PM   Result Value Ref Range    WBC 5.7 3.6 - 11.0 K/uL    RBC 5.12 3.80 - 5.20 M/uL    HGB 14.5 11.5 - 16.0 g/dL    HCT 45.2 35.0 - 47.0 %    MCV 88.3 80.0 - 99.0 FL    MCH 28.3 26.0 - 34.0 PG    MCHC 32.1 30.0 - 36.5 g/dL    RDW 12.3 11.5 - 14.5 %    PLATELET 627 116 - 395 K/uL    MPV 9.1 8.9 - 12.9 FL    NRBC 0.0 0  WBC    ABSOLUTE NRBC 0.00 0.00 - 0.01 K/uL    NEUTROPHILS 63 32 - 75 %    LYMPHOCYTES 28 12 - 49 %    MONOCYTES 8 5 - 13 %    EOSINOPHILS 1 0 - 7 %    BASOPHILS 0 0 - 1 %    IMMATURE GRANULOCYTES 0 0.0 - 0.5 %    ABS. NEUTROPHILS 3.6 1.8 - 8.0 K/UL    ABS. LYMPHOCYTES 1.6 0.8 - 3.5 K/UL    ABS. MONOCYTES 0.5 0.0 - 1.0 K/UL    ABS. EOSINOPHILS 0.1 0.0 - 0.4 K/UL    ABS. BASOPHILS 0.0 0.0 - 0.1 K/UL    ABS. IMM.  GRANS. 0.0 0.00 - 0.04 K/UL    DF AUTOMATED     METABOLIC PANEL, COMPREHENSIVE    Collection Time: 07/26/22  6:07 PM   Result Value Ref Range    Sodium 137 136 - 145 mmol/L    Potassium 3.6 3.5 - 5.1 mmol/L    Chloride 98 97 - 108 mmol/L    CO2 31 21 - 32 mmol/L    Anion gap 8 5 - 15 mmol/L    Glucose 295 (H) 65 - 100 mg/dL    BUN 11 6 - 20 MG/DL    Creatinine 0.78 0.55 - 1.02 MG/DL    BUN/Creatinine ratio 14 12 - 20      GFR est AA >60 >60 ml/min/1.73m2    GFR est non-AA >60 >60 ml/min/1.73m2    Calcium 9.5 8.5 - 10.1 MG/DL    Bilirubin, total 0.5 0.2 - 1.0 MG/DL    ALT (SGPT) 29 12 - 78 U/L    AST (SGOT) 11 (L) 15 - 37 U/L    Alk. phosphatase 80 45 - 117 U/L    Protein, total 7.6 6.4 - 8.2 g/dL    Albumin 4.4 3.5 - 5.0 g/dL    Globulin 3.2 2.0 - 4.0 g/dL    A-G Ratio 1.4 1.1 - 2.2     PROTHROMBIN TIME + INR    Collection Time: 07/26/22  6:07 PM   Result Value Ref Range    INR 0.9 0.9 - 1.1      Prothrombin time 9.3 9.0 - 11.1 sec   EKG, 12 LEAD, INITIAL    Collection Time: 07/26/22  6:50 PM   Result Value Ref Range    Ventricular Rate 75 BPM    Atrial Rate 75 BPM    P-R Interval 176 ms    QRS Duration 92 ms    Q-T Interval 392 ms    QTC Calculation (Bezet) 437 ms    Calculated P Axis 57 degrees    Calculated R Axis 2 degrees    Calculated T Axis 53 degrees    Diagnosis       Normal sinus rhythm  Incomplete right bundle branch block  Septal infarct , age undetermined  Abnormal ECG  When compared with ECG of 10-MAY-2022 09:25,  Incomplete right bundle branch block is now present  Septal infarct is now present         Radiologic Studies -   CTA HEAD NECK W CONT   Final Result   There is no evidence of aneurysm, dissection or hemodynamically significant   stenosis. .      There is no intracranial mass, hemorrhage or evidence of acute infarction. No acute intracranial process is identified. .       CT HEAD WO CONT   Final Result   No acute abnormality. CT HEAD WO CONT    Result Date: 7/26/2022  No acute abnormality. CTA HEAD NECK W CONT    Result Date: 7/26/2022  There is no evidence of aneurysm, dissection or hemodynamically significant stenosis. . There is no intracranial mass, hemorrhage or evidence of acute infarction. No acute intracranial process is identified. .    Medical Decision Making   I am the first provider for this patient. I reviewed the vital signs, available nursing notes, past medical history, past surgical history, family history and social history.     Vital Signs-Reviewed the patient's vital signs. Patient Vitals for the past 12 hrs:   Temp Pulse Resp BP SpO2   07/26/22 1955 -- 73 16 (!) 152/94 94 %   07/26/22 1905 -- 85 -- (!) 147/81 96 %   07/26/22 1900 -- 88 -- (!) 153/83 96 %   07/26/22 1855 -- 80 -- (!) 162/80 100 %   07/26/22 1841 -- -- -- -- 97 %   07/26/22 1806 98.6 °F (37 °C) -- -- -- --   07/26/22 1801 -- 86 18 (!) 169/86 97 %       Pulse Oximetry Analysis - 94% on ra    Cardiac Monitor:   Rate: 73 bpm  Rhythm: Normal Sinus Rhythm      ED EKG interpretation:  Rhythm: normal sinus rhythm; and regular . Rate (approx.): 75; Axis: normal; P wave: normal; QRS interval: normal ; ST/T wave: non-specific changes; Other findings: borderline ekg. This EKG was interpreted by TEJ Moralez MD,ED Provider. Records Reviewed: Nursing Notes and Old Medical Records    Provider Notes (Medical Decision Making):   Patient presents with a chief complaint of 1 month of dizziness and lightheadedness. On presentation she is mildly hypertensive with otherwise stable vital signs. She has mild nystagmus with leftward gaze. No focal neurologic deficits noted. Differential includes peripheral vertigo, electrolyte imbalance. Less likely central vertigo. Will check CT imaging of the head as well as CTA of the head and neck. Will check basic lab work, troponin, EKG. ED Course:   Initial assessment performed. The patients presenting problems have been discussed, and they are in agreement with the care plan formulated and outlined with them. I have encouraged them to ask questions as they arise throughout their visit. ED Course as of 07/26/22 2243 Tue Jul 26, 2022 1949 Patient reports feeling improved, was able to ambulate with a steady gait to where I was seated to ask about results. Informed her I am still waiting on CTA results [MIGUEL]      ED Course User Index  Leana Shah MD     CTA without acute findings.   Patient asking to be discharged as she needs to  a family member from work. I advised that she follow-up with her primary care doctor regarding her symptoms. She expressed understanding. Will discharge with meclizine and strict ED return precautions. Procedures:  Procedures    Critical Care:  none    Disposition:  Discharge Note:  The patient has been re-evaluated and is ready for discharge. Reviewed available results with patient. Counseled patient on diagnosis and care plan. Patient has expressed understanding, and all questions have been answered. Patient agrees with plan and agrees to follow up as recommended, or to return to the ED if their symptoms worsen. Discharge instructions have been provided and explained to the patient, along with reasons to return to the ED. PLAN:  1. Discharge Medication List as of 7/26/2022  8:05 PM        START taking these medications    Details   meclizine (ANTIVERT) 25 mg tablet Take 1 Tablet by mouth three (3) times daily as needed for Dizziness., Normal, Disp-20 Tablet, R-0           CONTINUE these medications which have NOT CHANGED    Details   metFORMIN (GLUCOPHAGE) 1,000 mg tablet Take 1 Tablet by mouth two (2) times daily (with meals). , Normal, Disp-180 Tablet, R-1      glimepiride (AMARYL) 4 mg tablet Take 2 Tablets by mouth daily. , Normal, Disp-180 Tablet, R-1      atorvastatin (LIPITOR) 20 mg tablet Take 1 Tablet by mouth daily. Indications: increased triglycerides and cholesterol, Normal, Disp-90 Tablet, R-1      valsartan-hydroCHLOROthiazide (DIOVAN-HCT) 320-25 mg per tablet Take 1 Tablet by mouth daily. , Normal, Disp-90 Tablet, R-1      metoprolol succinate (TOPROL-XL) 100 mg tablet Take 1 Tablet by mouth daily. FOR HIGH BLOOD PRESSURE, Normal, Disp-90 Tablet, R-1      amLODIPine (NORVASC) 10 mg tablet Take 1 Tablet by mouth daily. , Normal, Disp-90 Tablet, R-1      escitalopram oxalate (LEXAPRO) 20 mg tablet Take 1 Tablet by mouth daily. , Normal, Disp-90 Tablet, R-1      zolpidem (AMBIEN) 5 mg tablet Take 1 Tablet by mouth nightly as needed for Sleep. Max Daily Amount: 5 mg., Normal, Disp-30 Tablet, R-2      empagliflozin (Jardiance) 25 mg tablet Take 1 Tablet by mouth daily. , Normal, Disp-30 Tablet, R-2      ibuprofen (MOTRIN) 800 mg tablet Take 1 Tablet by mouth every eight (8) hours as needed for Pain. Take with full meal, Normal, Disp-60 Tablet, R-2      dicyclomine (BENTYL) 20 mg tablet Take 1 Tablet by mouth every eight (8) hours as needed for Abdominal Cramps., Normal, Disp-24 Tablet, R-0      glucose blood VI test strips (blood glucose test) strip Use BID Dx11.9, Normal, Disp-100 Strip, R-11      lancets misc Use BID Dx E11.9, Normal, Disp-100 Each, R-11      fluticasone propionate (FLONASE) 50 mcg/actuation nasal spray SHAKE LIQUID AND USE 2 SPRAYS IN EACH NOSTRIL DAILY AS NEEDED FOR RHINITIS, Normal, Disp-16 g, R-2           2. Follow-up Information       Follow up With Specialties Details Why Contact Benjamín Steele MD Internal Medicine Physician Schedule an appointment as soon as possible for a visit in 2 days  [address] [place] [address] [address] [facility] [address]      [address] DEPT Emergency Medicine  As needed, If symptoms worsen [place]  [phone]          Return to ED if worse     Diagnosis     Clinical Impression:   1. Vertigo    2. Hyperglycemia            Please note that this dictation was completed with [employer], the computer voice recognition software. Quite often unanticipated grammatical, syntax, homophones, and other interpretive errors are inadvertently transcribed by the computer software. Please disregard these errors.   Please excuse any errors that have escaped final proofreading

## 2022-07-27 NOTE — ED NOTES
2000: Patient reports improvement in symptoms and states that she is ready to go home at this time. MD in to see patient. 2011: Patient (s) given copy of dc instructions and script(s). Patient (s) verbalized understanding of instructions and script (s). Patient given a current medication reconciliation form and verbalized understanding of their medications. Patient (s) verbalized understanding of the importance of discussing medications with  his or her physician or clinic they will be following up with. Patient alert and oriented and in no acute distress. Patient discharged home ambulatory.

## 2022-07-28 LAB
ATRIAL RATE: 75 BPM
CALCULATED P AXIS, ECG09: 57 DEGREES
CALCULATED R AXIS, ECG10: 2 DEGREES
CALCULATED T AXIS, ECG11: 53 DEGREES
DIAGNOSIS, 93000: NORMAL
P-R INTERVAL, ECG05: 176 MS
Q-T INTERVAL, ECG07: 392 MS
QRS DURATION, ECG06: 92 MS
QTC CALCULATION (BEZET), ECG08: 437 MS
VENTRICULAR RATE, ECG03: 75 BPM

## 2022-09-04 DIAGNOSIS — E11.00 TYPE 2 DIABETES MELLITUS WITH HYPEROSMOLARITY WITHOUT COMA, WITHOUT LONG-TERM CURRENT USE OF INSULIN (HCC): ICD-10-CM

## 2022-09-06 RX ORDER — DAPAGLIFLOZIN 10 MG/1
TABLET, FILM COATED ORAL
Qty: 90 TABLET | Refills: 1 | OUTPATIENT
Start: 2022-09-06

## 2022-09-27 NOTE — MR AVS SNAPSHOT
Visit Information Date & Time Provider Department Dept. Phone Encounter #  
 8/1/2017  1:00 PM Megan Ag, [id number]  [id number] Follow-up Instructions Return in about 3 months (around 11/1/2017) for pap and reg f/u. Upcoming Health Maintenance Date Due  
 FOOT EXAM Q1 3/4/1968 Pneumococcal 19-64 Medium Risk (1 of 1 - PPSV23) 3/4/1977 DTaP/Tdap/Td series (1 - Tdap) 3/4/1979 PAP AKA CERVICAL CYTOLOGY 3/4/1979 EYE EXAM RETINAL OR DILATED Q1 3/31/2016 MICROALBUMIN Q1 1/8/2017 INFLUENZA AGE 9 TO ADULT 8/1/2017 BREAST CANCER SCRN MAMMOGRAM 10/13/2017 HEMOGLOBIN A1C Q6M 11/12/2017 LIPID PANEL Q1 5/12/2018 COLONOSCOPY 6/14/2020 Allergies as of 8/1/2017  Review Complete On: 8/1/2017 By: Megan Ag MD  
  
 Severity Noted Reaction Type Reactions Influenza Virus Vaccine, Specific High 05/08/2013   Side Effect Other (comments) States body got very hot Tramadol  05/20/2014    Seizures Aspirin Low 06/15/2010   Side Effect Other (comments) Pt reports having a h/o gastric ulcers. Pt was on IBU when she was dx'd. Current Immunizations  Reviewed on 5/9/2013 Name Date Influenza Vaccine Split  Deferred (Patient Refused) Not reviewed this visit You Were Diagnosed With   
  
 Codes Comments Long term prescription opiate use    -  Primary ICD-10-CM: I74.983 ICD-9-CM: V58.69 Essential hypertension     ICD-10-CM: I10 
ICD-9-CM: 401.9 Type 2 diabetes mellitus with hyperosmolarity without coma, with long-term current use of insulin (HCC)     ICD-10-CM: E11.00, Z79.4 ICD-9-CM: 250.20, V58.67 Primary osteoarthritis of both knees     ICD-10-CM: M17.0 ICD-9-CM: 715.16 Hyperlipidemia, unspecified hyperlipidemia type     ICD-10-CM: E78.5 ICD-9-CM: 272.4  Osteoarthritis of left knee, unspecified osteoarthritis type     ICD-10-CM: M17.12 
ICD-9-CM: 715.96   
 Screening for breast cancer     ICD-10-CM: Z12.39 
ICD-9-CM: V76.10 Vitals BP Pulse Temp Resp Height(growth percentile) Weight(growth percentile) 150/88 (BP 1 Location: Right arm, BP Patient Position: Sitting) 77 98.2 °F (36.8 °C) (Oral) 18 5' 7\" (1.702 m) 261 lb 14.4 oz (118.8 kg) SpO2 BMI OB Status Smoking Status 96% 41.02 kg/m2 Hysterectomy Former Smoker Vitals History BMI and BSA Data Body Mass Index Body Surface Area 41.02 kg/m 2 2.37 m 2 Preferred Pharmacy Pharmacy Name Phone [phone] Your Updated Medication List  
  
   
This list is accurate as of: 8/1/17  2:05 PM.  Always use your most recent med list. amLODIPine 10 mg tablet Commonly known as:  Orval Anna TAKE 1 TABLET BY MOUTH DAILY  
  
 atorvastatin 20 mg tablet Commonly known as:  LIPITOR  
TAKE 1 TABLET BY MOUTH DAILY FOR CHOLESTEROL * escitalopram oxalate 10 mg tablet Commonly known as:  Maureen Roof TAKE 1 TABLET BY MOUTH EVERY DAY * LEXAPRO 10 mg tablet Generic drug:  escitalopram oxalate TAKE 1 TABLET BY MOUTH EVERY DAY  
  
 FLONASE 50 mcg/actuation nasal spray Generic drug:  fluticasone 2 Sprays by Both Nostrils route daily. * glimepiride 4 mg tablet Commonly known as:  AMARYL  
TAKE 1 TABLET BY MOUTH TWICE DAILY * glimepiride 4 mg tablet Commonly known as:  AMARYL  
TAKE 1 TABLET BY MOUTH TWICE DAILY * glucose blood VI test strips strip Commonly known as:  ACCU-CHEK MEENU PLUS TEST STRP Check sugars 3 times a day Dx E11.65  
  
 * ACCU-CHEK MEENU PLUS TEST STRP strip Generic drug:  glucose blood VI test strips CHECK SUGAR THREE TIMES DAILY HYDROcodone-acetaminophen 7.5-325 mg per tablet Commonly known as:  Gleda Ridgel Take 1 Tab by mouth every six (6) hours as needed for Pain.  Max Daily Amount: 4 Tabs. 1 tab every 6 hours as needed for pain. Max of 4 pills per day  Indications: Pain  
  
 ibuprofen 800 mg tablet Commonly known as:  MOTRIN  
TAKE 1 TABLET BY MOUTH EVERY 6 HOURS AS NEEDED FOR PAIN WITH FOOD  
  
 metFORMIN 1,000 mg tablet Commonly known as:  GLUCOPHAGE  
TAKE 1 TABLET BY MOUTH TWICE DAILY WITH MEALS  
  
 metoprolol succinate 100 mg tablet Commonly known as:  TOPROL-XL  
TAKE 1 TABLET BY MOUTH DAILY FOR HIGH BLOOD PRESSURE  
  
 ondansetron 4 mg disintegrating tablet Commonly known as:  ZOFRAN ODT Take 1 Tab by mouth every eight (8) hours as needed for Nausea. pregabalin 75 mg capsule Commonly known as:  [place] Take 1 Cap by mouth two (2) times a day. Max Daily Amount: 150 mg. Indications: DIABETIC PERIPHERAL NEUROPATHY  
  
 valsartan-hydroCHLOROthiazide 160-12.5 mg per tablet Commonly known as:  DIOVAN-HCT Take 1 Tab by mouth daily. zolpidem 5 mg tablet Commonly known as:  AMBIEN Take 1 Tab by mouth nightly as needed for Sleep. Max Daily Amount: 5 mg.  
  
 * Notice: This list has 6 medication(s) that are the same as other medications prescribed for you. Read the directions carefully, and ask your doctor or other care provider to review them with you. Prescriptions Printed Refills HYDROcodone-acetaminophen (NORCO) 7.5-325 mg per tablet 0 Sig: Take 1 Tab by mouth every six (6) hours as needed for Pain. Max Daily Amount: 4 Tabs. 1 tab every 6 hours as needed for pain. Max of 4 pills per day  Indications: Pain Class: Print Route: Oral  
 pregabalin (LYRICA) 75 mg capsule 3 Sig: Take 1 Cap by mouth two (2) times a day. Max Daily Amount: 150 mg. Indications: DIABETIC PERIPHERAL NEUROPATHY Class: Print Route: Oral  
 zolpidem (AMBIEN) 5 mg tablet 2 Sig: Take 1 Tab by mouth nightly as needed for Sleep. Max Daily Amount: 5 mg. Class: Print Route: Oral  
  
Prescriptions Sent to Pharmacy Refills valsartan-hydroCHLOROthiazide (DIOVAN-HCT) 160-12.5 mg per tablet 3 Sig: Take 1 Tab by mouth daily. Class: Normal  
 Pharmacy: [facility] [address] Ph #: [phone] Route: Oral  
  
We Performed the Following [address] MONITORING [[id number] Custom] Follow-up Instructions Return in about 3 months (around 11/1/2017) for pap and reg f/u. To-Do List   
 08/01/2017 Imaging:  [facility] BI SCREENING INCL CAD Patient Instructions Insomnia: Care Instructions Your Care Instructions Insomnia is the inability to sleep well. It is a common problem for most people at some time. Insomnia may make it hard for you to get to sleep, stay asleep, or sleep as long as you need to. This can make you tired and grouchy during the day. It can also make you forgetful, less effective at work, and unhappy. Insomnia can be caused by conditions such as depression or anxiety. Pain can also affect your ability to sleep. When these problems are solved, the insomnia usually clears up. But sometimes bad sleep habits can cause insomnia. If insomnia is affecting your work or your enjoyment of life, you can take steps to improve your sleep. Follow-up care is a key part of your treatment and safety. Be sure to make and go to all appointments, and call your doctor if you are having problems. It's also a good idea to know your test results and keep a list of the medicines you take. How can you care for yourself at home? What to avoid · Do not have drinks with caffeine, such as coffee or black tea, for 8 hours before bed. · Do not smoke or use other types of tobacco near bedtime. Nicotine is a stimulant and can keep you awake. · Avoid drinking alcohol late in the evening, because it can cause you to wake in the middle of the night. · Do not eat a big meal close to bedtime. If you are hungry, eat a light snack. · Do not drink a lot of water close to bedtime, because the need to urinate may wake you up during the night. · Do not read or watch TV in bed. Use the bed only for sleeping and sexual activity. What to try · Go to bed at the same time every night, and wake up at the same time every morning. Do not take naps during the day. · Keep your bedroom quiet, dark, and cool. · Sleep on a comfortable pillow and mattress. · If watching the clock makes you anxious, turn it facing away from you so you cannot see the time. · If you worry when you lie down, start a worry book. Well before bedtime, write down your worries, and then set the book and your concerns aside. · Try meditation or other relaxation techniques before you go to bed. · If you cannot fall asleep, get up and go to another room until you feel sleepy. Do something relaxing. Repeat your bedtime routine before you go to bed again. · Make your house quiet and calm about an hour before bedtime. Turn down the lights, turn off the TV, log off the computer, and turn down the volume on music. This can help you relax after a busy day. When should you call for help? Watch closely for changes in your health, and be sure to contact your doctor if: 
· Your efforts to improve your sleep do not work. · Your insomnia gets worse. · You have been feeling down, depressed, or hopeless or have lost interest in things that you usually enjoy. Where can you learn more? Go to http://kendell-jonathan.info/. Enter P513 in the search box to learn more about \"Insomnia: Care Instructions. \" Current as of: July 26, 2016 Content Version: 11.3 © 3761-7871 [employer]. Care instructions adapted under license by "[employer]" (which disclaims liability or warranty for this information).  If you have questions about a medical condition or this instruction, always ask your healthcare professional. [place] [address] any warranty or liability for your use of this information. Learning About Diabetes Food Guidelines Your Care Instructions Meal planning is important to manage diabetes. It helps keep your blood sugar at a target level (which you set with your doctor). You don't have to eat special foods. You can eat what your family eats, including sweets once in a while. But you do have to pay attention to how often you eat and how much you eat of certain foods. You may want to work with a dietitian or a certified diabetes educator (CDE) to help you plan meals and snacks. A dietitian or CDE can also help you lose weight if that is one of your goals. What should you know about eating carbs? Managing the amount of carbohydrate (carbs) you eat is an important part of healthy meals when you have diabetes. Carbohydrate is found in many foods. · Learn which foods have carbs. And learn the amounts of carbs in different foods. ¨ Bread, cereal, pasta, and rice have about 15 grams of carbs in a serving. A serving is 1 slice of bread (1 ounce), ½ cup of cooked cereal, or 1/3 cup of cooked pasta or rice. ¨ Fruits have 15 grams of carbs in a serving. A serving is 1 small fresh fruit, such as an apple or orange; ½ of a banana; ½ cup of cooked or canned fruit; ½ cup of fruit juice; 1 cup of melon or raspberries; or 2 tablespoons of dried fruit. ¨ Milk and no-sugar-added yogurt have 15 grams of carbs in a serving. A serving is 1 cup of milk or 2/3 cup of no-sugar-added yogurt. ¨ Starchy vegetables have 15 grams of carbs in a serving. A serving is ½ cup of mashed potatoes or sweet potato; 1 cup winter squash; ½ of a small baked potato; ½ cup of cooked beans; or ½ cup cooked corn or green peas. · Learn how much carbs to eat each day and at each meal. A dietitian or CDE can teach you how to keep track of the amount of carbs you eat.  This is called carbohydrate counting. · If you are not sure how to count carbohydrate grams, use the Plate Method to plan meals. It is a good, quick way to make sure that you have a balanced meal. It also helps you spread carbs throughout the day. ¨ Divide your plate by types of foods. Put non-starchy vegetables on half the plate, meat or other protein food on one-quarter of the plate, and a grain or starchy vegetable in the final quarter of the plate. To this you can add a small piece of fruit and 1 cup of milk or yogurt, depending on how many carbs you are supposed to eat at a meal. 
· Try to eat about the same amount of carbs at each meal. Do not \"save up\" your daily allowance of carbs to eat at one meal. 
· Proteins have very little or no carbs per serving. Examples of proteins are beef, chicken, turkey, fish, eggs, tofu, cheese, cottage cheese, and peanut butter. A serving size of meat is 3 ounces, which is about the size of a deck of cards. Examples of meat substitute serving sizes (equal to 1 ounce of meat) are 1/4 cup of cottage cheese, 1 egg, 1 tablespoon of peanut butter, and ½ cup of tofu. How can you eat out and still eat healthy? · Learn to estimate the serving sizes of foods that have carbohydrate. If you measure food at home, it will be easier to estimate the amount in a serving of restaurant food. · If the meal you order has too much carbohydrate (such as potatoes, corn, or baked beans), ask to have a low-carbohydrate food instead. Ask for a salad or green vegetables. · If you use insulin, check your blood sugar before and after eating out to help you plan how much to eat in the future. · If you eat more carbohydrate at a meal than you had planned, take a walk or do other exercise. This will help lower your blood sugar. What else should you know? · Limit saturated fat, such as the fat from meat and dairy products.  This is a healthy choice because people who have diabetes are at higher risk of heart disease. So choose lean cuts of meat and nonfat or low-fat dairy products. Use olive or canola oil instead of butter or shortening when cooking. · Don't skip meals. Your blood sugar may drop too low if you skip meals and take insulin or certain medicines for diabetes. · Check with your doctor before you drink alcohol. Alcohol can cause your blood sugar to drop too low. Alcohol can also cause a bad reaction if you take certain diabetes medicines. Follow-up care is a key part of your treatment and safety. Be sure to make and go to all appointments, and call your doctor if you are having problems. It's also a good idea to know your test results and keep a list of the medicines you take. Where can you learn more? Go to http://kendell-jonathan.info/. Enter L071 in the search box to learn more about \"Learning About Diabetes Food Guidelines. \" Current as of: March 13, 2017 Content Version: 11.3 © 7988-7629 [employer]. Care instructions adapted under license by [employer] (which disclaims liability or warranty for this information). If you have questions about a medical condition or this instruction, always ask your healthcare professional. [place] [address] any warranty or liability for your use of this information. Introducing [facility]! Dear Shirin Melchor: Thank you for requesting a Ev[employer] account. Our records indicate that you already have an active Ev[employer] account. You can access your account anytime at [email or web address] Did you know that you can access your hospital and ER discharge instructions at any time in Evocha? You can also review all of your test results from your hospital stay or ER visit. Additional Information If you have questions, please visit the Frequently Asked Questions section of the [employer] website at [email or web address] Remember, [employer] is NOT to be used for urgent needs. For medical emergencies, dial 911. Now available from your iPhone and Android! Please provide this summary of care documentation to your next provider. Your primary care clinician is listed as Nimisha Mcgee. If you have any questions after today's visit, please call [phone]. Yes

## 2022-10-06 DIAGNOSIS — F51.04 CHRONIC INSOMNIA: ICD-10-CM

## 2022-10-09 ENCOUNTER — APPOINTMENT (OUTPATIENT)
Dept: GENERAL RADIOLOGY | Age: 62
End: 2022-10-09
Attending: EMERGENCY MEDICINE
Payer: COMMERCIAL

## 2022-10-09 ENCOUNTER — HOSPITAL ENCOUNTER (EMERGENCY)
Age: 62
Discharge: HOME | End: 2022-10-09
Attending: EMERGENCY MEDICINE
Payer: COMMERCIAL

## 2022-10-09 VITALS
OXYGEN SATURATION: 96 % | SYSTOLIC BLOOD PRESSURE: 125 MMHG | BODY MASS INDEX: 34.53 KG/M2 | DIASTOLIC BLOOD PRESSURE: 67 MMHG | HEIGHT: 67 IN | TEMPERATURE: 98.9 F | HEART RATE: 99 BPM | WEIGHT: 220 LBS | RESPIRATION RATE: 19 BRPM

## 2022-10-09 DIAGNOSIS — J20.9 ACUTE BRONCHITIS, UNSPECIFIED ORGANISM: Primary | ICD-10-CM

## 2022-10-09 LAB
FLUAV RNA SPEC QL NAA+PROBE: NOT DETECTED
FLUBV RNA SPEC QL NAA+PROBE: NOT DETECTED
SARS-COV-2, COV2: NOT DETECTED

## 2022-10-09 PROCEDURE — 99283 EMERGENCY DEPT VISIT LOW MDM: CPT

## 2022-10-09 PROCEDURE — 87636 SARSCOV2 & INF A&B AMP PRB: CPT

## 2022-10-09 PROCEDURE — 71045 X-RAY EXAM CHEST 1 VIEW: CPT

## 2022-10-09 PROCEDURE — 74011250637 HC RX REV CODE- 250/637: Performed by: EMERGENCY MEDICINE

## 2022-10-09 RX ORDER — PSEUDOEPHEDRINE HCL 30 MG
1 TABLET ORAL 2 TIMES DAILY
Qty: 20 TABLET | Refills: 0 | Status: SHIPPED | OUTPATIENT
Start: 2022-10-09

## 2022-10-09 RX ORDER — ACETAMINOPHEN 500 MG
1000 TABLET ORAL ONCE
Status: COMPLETED | OUTPATIENT
Start: 2022-10-09 | End: 2022-10-09

## 2022-10-09 RX ORDER — AZITHROMYCIN 250 MG/1
TABLET, FILM COATED ORAL
Qty: 6 TABLET | Refills: 0 | Status: SHIPPED | OUTPATIENT
Start: 2022-10-09 | End: 2022-10-14

## 2022-10-09 RX ADMIN — ACETAMINOPHEN 1000 MG: 500 TABLET, FILM COATED ORAL at 07:34

## 2022-10-09 NOTE — ED TRIAGE NOTES
TRIAGE NOTE:  Patient here with c/o cough and congestion. Patient states symptoms since Thursday. Patient states she has been coughing up thick grey sputum. Patient denies contact with anyone with known illness.

## 2022-10-09 NOTE — ED NOTES
Discharge instructions were given to the patient by Pamela Hernandez RN. The patient left the Emergency Department ambulatory, alert and oriented and in no acute distress with 2 prescriptions. The patient was encouraged to call or return to the ED for worsening issues or problems and was encouraged to schedule a follow up appointment for continuing care. The patient verbalized understanding of discharge instructions and prescriptions, all questions were answered. The patient has no further concerns at this time.

## 2022-10-09 NOTE — ED PROVIDER NOTES
EMERGENCY DEPARTMENT HISTORY AND PHYSICAL EXAM      Date: 10/9/2022  Patient Name: Asad Moraes    History of Presenting Illness     Chief Complaint   Patient presents with    Sinus Infection     History Provided By: Patient    HPI: Asad Moraes, 57 y.o. female with past medical history significant for pancreatitis, arthritis, chronic knee pain, diabetes, hypertension, and hypothyroidism who presents via private vehicle to the ED with cc of cough, nasal congestion, and sinus pressure for the past 3 days. Patient describes her cough as a productive cough of thick gray/yellow sputum. She denies any fevers, chills, nausea, vomiting, or diarrhea. She tells me she works at [employer] and is constantly exposed to sick children. She has tried taking Mucinex without relief. she is fully vaccinated for COVID-19 but has not gotten her flu shot. PMHx: Pancreatitis, arthritis, chronic knee pain, diabetes, hypertension, and hypothyroidism  Social Hx: Denies alcohol, tobacco, or illegal drug use    PCP: Tessa Justice MD    There are no other complaints, changes, or physical findings at this time. No current facility-administered medications on file prior to encounter. Current Outpatient Medications on File Prior to Encounter   Medication Sig Dispense Refill    metFORMIN (GLUCOPHAGE) 1,000 mg tablet Take 1 Tablet by mouth two (2) times daily (with meals). 180 Tablet 1    glimepiride (AMARYL) 4 mg tablet Take 2 Tablets by mouth daily. 180 Tablet 1    atorvastatin (LIPITOR) 20 mg tablet Take 1 Tablet by mouth daily. Indications: increased triglycerides and cholesterol 90 Tablet 1    valsartan-hydroCHLOROthiazide (DIOVAN-HCT) 320-25 mg per tablet Take 1 Tablet by mouth daily. 90 Tablet 1    metoprolol succinate (TOPROL-XL) 100 mg tablet Take 1 Tablet by mouth daily. FOR HIGH BLOOD PRESSURE 90 Tablet 1    amLODIPine (NORVASC) 10 mg tablet Take 1 Tablet by mouth daily.  90 Tablet 1    escitalopram oxalate (LEXAPRO) 20 mg tablet Take 1 Tablet by mouth daily. 90 Tablet 1    zolpidem (AMBIEN) 5 mg tablet Take 1 Tablet by mouth nightly as needed for Sleep. Max Daily Amount: 5 mg. 30 Tablet 2    empagliflozin (Jardiance) 25 mg tablet Take 1 Tablet by mouth daily. 30 Tablet 2    fluticasone propionate (FLONASE) 50 mcg/actuation nasal spray SHAKE LIQUID AND USE 2 SPRAYS IN EACH NOSTRIL DAILY AS NEEDED FOR RHINITIS 16 g 2    meclizine (ANTIVERT) 25 mg tablet Take 1 Tablet by mouth three (3) times daily as needed for Dizziness. (Patient not taking: Reported on 10/9/2022) 20 Tablet 0    ibuprofen (MOTRIN) 800 mg tablet Take 1 Tablet by mouth every eight (8) hours as needed for Pain. Take with full meal (Patient not taking: Reported on 10/9/2022) 60 Tablet 2    dicyclomine (BENTYL) 20 mg tablet Take 1 Tablet by mouth every eight (8) hours as needed for Abdominal Cramps.  (Patient not taking: Reported on 10/9/2022) 24 Tablet 0    glucose blood VI test strips (blood glucose test) strip Use BID Dx11.9 (Patient not taking: Reported on 10/9/2022) 100 Strip 11    lancets misc Use BID Dx E11.9 (Patient not taking: Reported on 10/9/2022) 100 Each 11     Past History     Past Medical History:  Past Medical History:   Diagnosis Date    Acute pancreatitis 8/9/2012    Acute pancreatitis 1/21/2011    Arthritis     both knees    Chronic pain     knees    Delivery normal     x1    Diabetes (HCC)     DJD (degenerative joint disease) of knee     Fatty liver 1/22/2011    GERD (gastroesophageal reflux disease)     Hypertension     Hypothyroidism     Obesity     PUD (peptic ulcer disease)     UTI (urinary tract infection) 1/21/2011     Past Surgical History:  Past Surgical History:   Procedure Laterality Date    HX BREAST BIOPSY Left     Surgical Bx 1990 - BENIGN    HX BREAST LUMPECTOMY      left breast    HX GYN      hysterectomy    HX ORTHOPAEDIC      left total knee replacement    HX ORTHOPAEDIC  6/16/15    RIGHT TOTAL KNEE ARTHROPLASTY     HX PARATHYROIDECTOMY  9     Family History:  Family History   Problem Relation Age of Onset    Cancer Mother     Hypertension Father     Diabetes Sister     Hypertension Sister     Diabetes Brother     Hypertension Brother      Social History:  Social History     Tobacco Use    Smoking status: Former     Packs/day: 0.25     Years: 25.00     Pack years: 6.25     Types: Cigarettes     Quit date: 2008     Years since quittin.7    Smokeless tobacco: Never   Vaping Use    Vaping Use: Never used   Substance Use Topics    Alcohol use: No     Alcohol/week: 0.0 standard drinks    Drug use: No     Allergies: Allergies   Allergen Reactions    Aspirin Other (comments)     Pt reports having a h/o gastric ulcers. Pt was on IBU when she was dx'd. Review of Systems   Review of Systems   Constitutional:  Negative for chills and fever. HENT:  Positive for congestion and sinus pressure. Negative for rhinorrhea, sneezing and sore throat. Eyes:  Negative for redness and visual disturbance. Respiratory:  Positive for cough. Negative for shortness of breath. Cardiovascular:  Negative for leg swelling. Gastrointestinal:  Negative for abdominal pain, nausea and vomiting. Genitourinary:  Negative for difficulty urinating and frequency. Musculoskeletal:  Negative for back pain, myalgias and neck stiffness. Skin:  Negative for rash. Neurological:  Negative for dizziness, syncope, weakness and headaches. Hematological:  Negative for adenopathy. All other systems reviewed and are negative. Physical Exam   Physical Exam  Vitals and nursing note reviewed. Constitutional:       Appearance: Normal appearance. She is well-developed. HENT:      Head: Normocephalic and atraumatic. Nose: Mucosal edema present. Right Turbinates: Swollen. Left Turbinates: Swollen.       Comments: Tender to palpation over the maxillary sinuses bilaterally  Eyes:      Conjunctiva/sclera: Conjunctivae normal. Cardiovascular:      Rate and Rhythm: Normal rate and regular rhythm. Pulses: Normal pulses. Heart sounds: Normal heart sounds, S1 normal and S2 normal.   Pulmonary:      Effort: Pulmonary effort is normal. No respiratory distress. Breath sounds: Normal breath sounds. No wheezing. Abdominal:      General: Bowel sounds are normal. There is no distension. Palpations: Abdomen is soft. Tenderness: There is no abdominal tenderness. There is no rebound. Musculoskeletal:         General: Normal range of motion. Cervical back: Full passive range of motion without pain, normal range of motion and neck supple. Skin:     General: Skin is warm and dry. Findings: No rash. Neurological:      Mental Status: She is alert and oriented to person, place, and time. Psychiatric:         Speech: Speech normal.         Behavior: Behavior normal.         Thought Content: Thought content normal.         Judgment: Judgment normal.     Diagnostic Study Results   Labs -     Recent Results (from the past 12 hour(s))   COVID-19 WITH INFLUENZA A/B    Collection Time: 10/09/22  7:35 AM   Result Value Ref Range    SARS-CoV-2 by PCR Not detected NOTD      Influenza A by PCR Not detected      Influenza B by PCR Not detected         Radiologic Studies -   XR CHEST PORT   Final Result   No acute cardiopulmonary abnormality. XR CHEST PORT    Result Date: 10/9/2022  No acute cardiopulmonary abnormality. Medical Decision Making   I am the first provider for this patient. I reviewed the vital signs, available nursing notes, past medical history, past surgical history, family history and social history. Vital Signs-Reviewed the patient's vital signs.   Patient Vitals for the past 24 hrs:   Temp Pulse Resp BP SpO2   10/09/22 0718 (!) 100.7 °F (38.2 °C) 99 19 (!) 155/78 95 %     Pulse Oximetry Analysis - 95% on RA (normal)    Records Reviewed: Nursing Notes and Old Medical Records    Provider Notes (Medical Decision Making):   75-year-old female presents with nasal congestion, cough, fevers, and chills for the past 3 days. Differential includes viral URI, sinusitis, pneumonia, COVID, and influenza. Will swab for flu and COVID and obtain a chest x-ray and reassess. ED Course:   Initial assessment performed. The patients presenting problems have been discussed, and they are in agreement with the care plan formulated and outlined with them. I have encouraged them to ask questions as they arise throughout their visit. Flu and COVID swabs are negative. Her chest x-ray is negative as well. Discussed with patient that this still could be a viral URI versus sinusitis. Will discharge with a prescription for loratadine D and a Z-Samy and have her follow-up with outpatient primary care. Progress Note:   Updated pt on all returned results and findings. Discussed the importance of proper follow up as referred below along with return precautions. Pt in agreement with the care plan and expresses agreement with and understanding of all items discussed. Disposition:  Discharge Note:  The pt is ready for discharge. The pt's signs, symptoms, diagnosis, and discharge instructions have been discussed and pt has conveyed their understanding. The pt is to follow up as recommended or return to ER should their symptoms worsen. Plan has been discussed and pt is in agreement. PLAN:  1. Current Discharge Medication List        START taking these medications    Details   azithromycin (Zithromax Z-Samy) 250 mg tablet Take 2 tabs on day 1, then 1 tab daily for the next 4 days  Qty: 6 Tablet, Refills: 0  Start date: 10/9/2022, End date: 10/14/2022      loratadine-pseudoephedrine (Loratadine-D) 5-120 mg per tablet Take 1 Tablet by mouth two (2) times a day. Qty: 20 Tablet, Refills: 0  Start date: 10/9/2022           2.    Follow-up Information       Follow up With Specialties Details Why [facility], Vijaya West MD Internal Medicine Physician Schedule an appointment as soon as possible for a visit   [address] [id number] [facility] EMERGENCY DEPT Emergency Medicine  As needed, If symptoms worsen Froylan Colleen  [phone]          Return to ED if worse     Diagnosis     Clinical Impression:   1. Acute bronchitis, unspecified organism            Please note that this dictation was completed with Dragon, computer voice recognition software. Quite often unanticipated grammatical, syntax, homophones, and other interpretive errors are inadvertently transcribed by the computer software. Please disregard these errors. Additionally, please excuse any errors that have escaped final proofreading.

## 2022-10-09 NOTE — ED NOTES

## 2022-10-12 ENCOUNTER — OFFICE VISIT (OUTPATIENT)
Dept: INTERNAL MEDICINE CLINIC | Age: 62
End: 2022-10-12
Payer: COMMERCIAL

## 2022-10-12 VITALS
RESPIRATION RATE: 18 BRPM | OXYGEN SATURATION: 97 % | HEART RATE: 90 BPM | DIASTOLIC BLOOD PRESSURE: 67 MMHG | TEMPERATURE: 97.2 F | BODY MASS INDEX: 38.45 KG/M2 | SYSTOLIC BLOOD PRESSURE: 125 MMHG | HEIGHT: 67 IN | WEIGHT: 245 LBS

## 2022-10-12 DIAGNOSIS — E11.40 TYPE 2 DIABETES MELLITUS WITH DIABETIC NEUROPATHY, WITHOUT LONG-TERM CURRENT USE OF INSULIN (HCC): ICD-10-CM

## 2022-10-12 DIAGNOSIS — F51.04 CHRONIC INSOMNIA: ICD-10-CM

## 2022-10-12 PROCEDURE — 99213 OFFICE O/P EST LOW 20 MIN: CPT | Performed by: INTERNAL MEDICINE

## 2022-10-12 RX ORDER — ZOLPIDEM TARTRATE 5 MG/1
5 TABLET ORAL
Qty: 30 TABLET | Refills: 2 | Status: CANCELLED | OUTPATIENT
Start: 2022-10-12

## 2022-10-12 RX ORDER — MECLIZINE HYDROCHLORIDE 25 MG/1
25 TABLET ORAL
Qty: 20 TABLET | Refills: 0 | Status: CANCELLED | OUTPATIENT
Start: 2022-10-12

## 2022-10-12 NOTE — PROGRESS NOTES
Chief Complaint   Patient presents with    ED Follow-up       1. \"Have you been to the ER, urgent care clinic since your last visit? Hospitalized since your last visit? \" Yes When: 10/9/22 Where: [facility] Reason for visit: bronchitis    2. \"Have you seen or consulted any other health care providers outside of the [address] [place] [address] since your last visit? \" No     3. For patients aged 39-70: Has the patient had a colonoscopy / FIT/ Cologuard? No      If the patient is female:    4. For patients aged 41-77: Has the patient had a mammogram within the past 2 years? No      5. For patients aged 21-65: Has the patient had a pap smear?  No

## 2022-10-12 NOTE — PROGRESS NOTES
Cassy Whiting is a 57 y.o. female and presents with ED Follow-up  . Subjective:    Pts last appt 2/2022  Pt was eval in ED for URI sxs and tx for sinus infection 3 days ago  Ptis still on abx    PMH:  Hypertension Review:  The patient has essential hypertension  Diet and Lifestyle: generally follows a  low sodium diet, exercises never  Home BP Monitoring: is not measured at home. Pertinent ROS: taking medications as instructed, no medication side effects noted, no TIA's, no chest pain on exertion, no dyspnea on exertion, no swelling of ankles. BP Readings from Last 3 Encounters:   10/12/22 125/67   10/09/22 125/67   07/26/22 (!) 152/94     Type 2 DM-on metformin and januvia   -pt admits to dietary indiscretions and elevated blood sugars   -PT REFUSES INSULIN INITIATION.  She is well aware of risks of end organ damage  Lab Results   Component Value Date/Time    Hemoglobin A1c 9.6 (H) 07/13/2021 04:20 PM    Hemoglobin A1c (POC) 9.3 07/19/2022 11:10 AM     Lab Results   Component Value Date/Time    Glucose 295 (H) 07/26/2022 06:07 PM    Glucose (POC) 295 (H) 07/26/2022 06:02 PM         Hyperlipidemia-on atorvastatin 20mg   -  Lab Results   Component Value Date/Time    Cholesterol, total 128 07/13/2021 04:20 PM    Cholesterol (POC) <100 09/19/2019 03:36 PM    HDL Cholesterol 37 07/13/2021 04:20 PM    HDL Cholesterol (POC) 25 09/19/2019 03:36 PM    LDL Cholesterol (POC) N/A 09/19/2019 03:36 PM    LDL, calculated 33.4 07/13/2021 04:20 PM    VLDL, calculated 57.6 07/13/2021 04:20 PM    Triglyceride 288 (H) 07/13/2021 04:20 PM    Triglycerides (POC) 354 09/19/2019 03:36 PM    CHOL/HDL Ratio 3.5 07/13/2021 04:20 PM       Morbid obesity-pt reports she is thin enough and does not want to lose much more weight  Wt Readings from Last 3 Encounters:   10/12/22 245 lb (111.1 kg)   10/09/22 220 lb (99.8 kg)   07/26/22 254 lb 12.8 oz (115.6 kg)     Chronic Pain-pt is seeing Pain Mngmnt and is on a regimen of gabapentin, tramadol and oxycodone 9mg      Chronic insomnia- on zolpidem 5mg          Review of Systems  Constitutional: negative for fevers, chills, anorexia and weight loss  Respiratory:  negative for cough, hemoptysis, dyspnea,wheezing  CV:   negative for chest pain, palpitations, lower extremity edema  GI:   negative for nausea, vomiting, diarrhea, abdominal pain,melena  Musculoskel: positive for myalgias, arthralgias, back pain, joint pain  Neurological:  negative for headaches, dizziness, vertigo, memory problems and gait   Behavl/Psych: negative for feelings of anxiety, depression, mood changes    Past Medical History:   Diagnosis Date    Acute pancreatitis 2012    Acute pancreatitis 2011    Arthritis     both knees    Chronic pain     knees    Delivery normal     x1    Diabetes (HCC)     DJD (degenerative joint disease) of knee     Fatty liver 2011    GERD (gastroesophageal reflux disease)     Hypertension     Hypothyroidism     Obesity     PUD (peptic ulcer disease)     UTI (urinary tract infection) 2011     Past Surgical History:   Procedure Laterality Date    HX BREAST BIOPSY Left     Surgical Bx  - BENIGN    HX BREAST LUMPECTOMY      left breast    HX GYN      hysterectomy    HX ORTHOPAEDIC      left total knee replacement    HX ORTHOPAEDIC  6/16/15    RIGHT TOTAL KNEE ARTHROPLASTY     HX PARATHYROIDECTOMY  11     Social History     Socioeconomic History    Marital status:    Tobacco Use    Smoking status: Former     Packs/day: 0.25     Years: 25.00     Pack years: 6.25     Types: Cigarettes     Quit date: 2008     Years since quittin.7    Smokeless tobacco: Never   Vaping Use    Vaping Use: Never used   Substance and Sexual Activity    Alcohol use: No     Alcohol/week: 0.0 standard drinks    Drug use: No    Sexual activity: Never     Family History   Problem Relation Age of Onset    Cancer Mother     Hypertension Father     Diabetes Sister     Hypertension Sister     Diabetes Brother     Hypertension Brother      Current Outpatient Medications   Medication Sig Dispense Refill    metFORMIN (GLUCOPHAGE) 1,000 mg tablet Take 1 Tablet by mouth two (2) times daily (with meals). 180 Tablet 1    glimepiride (AMARYL) 4 mg tablet Take 2 Tablets by mouth daily. 180 Tablet 1    atorvastatin (LIPITOR) 20 mg tablet Take 1 Tablet by mouth daily. Indications: increased triglycerides and cholesterol 90 Tablet 1    valsartan-hydroCHLOROthiazide (DIOVAN-HCT) 320-25 mg per tablet Take 1 Tablet by mouth daily. 90 Tablet 1    metoprolol succinate (TOPROL-XL) 100 mg tablet Take 1 Tablet by mouth daily. FOR HIGH BLOOD PRESSURE 90 Tablet 1    amLODIPine (NORVASC) 10 mg tablet Take 1 Tablet by mouth daily. 90 Tablet 1    zolpidem (AMBIEN) 5 mg tablet Take 1 Tablet by mouth nightly as needed for Sleep. Max Daily Amount: 5 mg. 30 Tablet 2    empagliflozin (Jardiance) 25 mg tablet Take 1 Tablet by mouth daily. 30 Tablet 2    fluticasone propionate (FLONASE) 50 mcg/actuation nasal spray SHAKE LIQUID AND USE 2 SPRAYS IN EACH NOSTRIL DAILY AS NEEDED FOR RHINITIS 16 g 2    azithromycin (Zithromax Z-Samy) 250 mg tablet Take 2 tabs on day 1, then 1 tab daily for the next 4 days 6 Tablet 0    loratadine-pseudoephedrine (Loratadine-D) 5-120 mg per tablet Take 1 Tablet by mouth two (2) times a day. 20 Tablet 0    meclizine (ANTIVERT) 25 mg tablet Take 1 Tablet by mouth three (3) times daily as needed for Dizziness. (Patient not taking: Reported on 10/9/2022) 20 Tablet 0    escitalopram oxalate (LEXAPRO) 20 mg tablet Take 1 Tablet by mouth daily. (Patient not taking: Reported on 10/12/2022) 90 Tablet 1    ibuprofen (MOTRIN) 800 mg tablet Take 1 Tablet by mouth every eight (8) hours as needed for Pain. Take with full meal (Patient not taking: Reported on 10/9/2022) 60 Tablet 2    dicyclomine (BENTYL) 20 mg tablet Take 1 Tablet by mouth every eight (8) hours as needed for Abdominal Cramps.  (Patient not taking: Reported on 10/9/2022) 24 Tablet 0    glucose blood VI test strips (blood glucose test) strip Use BID Dx11.9 (Patient not taking: Reported on 10/9/2022) 100 Strip 11    lancets misc Use BID Dx E11.9 (Patient not taking: Reported on 10/9/2022) 100 Each 11     Allergies   Allergen Reactions    Aspirin Other (comments)     Pt reports having a h/o gastric ulcers. Pt was on IBU when she was dx'd. Objective:  Visit Vitals  /67 (BP 1 Location: Left upper arm, BP Patient Position: Sitting, BP Cuff Size: Adult)   Pulse 90   Temp 97.2 °F (36.2 °C) (Temporal)   Resp 18   Ht 5' 7\" (1.702 m)   Wt 245 lb (111.1 kg)   SpO2 97%   BMI 38.37 kg/m²     Physical Exam:   General appearance - alert, obese  Mental status - alert, oriented to person, place, and time  EYE-EOMI  Neck - supple,   Chest - symmetric air entry    CVS-reg + S4 + 2/6 systolic murmur  Abdomen - obese  Ext-no pedal edema, no clubbing or cyanosis  Skin-Warm and dry. no hyperpigmentation, vitiligo, or suspicious lesions  Neuro -alert, oriented, normal speech, no focal findings or movement disorder noted        Results for orders placed or performed during the hospital encounter of 10/09/22   COVID-19 WITH INFLUENZA A/B   Result Value Ref Range    SARS-CoV-2 by PCR Not detected NOTD      Influenza A by PCR Not detected      Influenza B by PCR Not detected         Assessment/Plan:    ICD-10-CM ICD-9-CM    1. Chronic insomnia  F51.04 780.52       2. Type 2 diabetes mellitus with diabetic neuropathy, without long-term current use of insulin ([facility])  E11.40 250.60      357.2         No orders of the defined types were placed in this encounter. 1. Type 2 diabetes mellitus with diabetic neuropathy, without long-term current use of insulin ([facility])    - empagliflozin (Jardiance) 25 mg tablet; Take 1 Tablet by mouth daily. Dispense: 90 Tablet; Refill: 3    2. Chronic insomnia  Noted      There are no Patient Instructions on file for this visit.          I have reviewed with the patient details of the assessment and plan and all questions were answered. Relevent patient education was performed. The most recent lab findings were reviewed with the patient. An After Visit Summary was printed and given to the patient.

## 2022-10-13 RX ORDER — ZOLPIDEM TARTRATE 5 MG/1
TABLET ORAL
Qty: 30 TABLET | OUTPATIENT
Start: 2022-10-13

## 2022-10-23 ENCOUNTER — HOSPITAL ENCOUNTER (EMERGENCY)
Age: 62
Discharge: HOME | End: 2022-10-23
Attending: EMERGENCY MEDICINE
Payer: COMMERCIAL

## 2022-10-23 ENCOUNTER — APPOINTMENT (OUTPATIENT)
Dept: GENERAL RADIOLOGY | Age: 62
End: 2022-10-23
Attending: EMERGENCY MEDICINE
Payer: COMMERCIAL

## 2022-10-23 VITALS
HEIGHT: 68 IN | SYSTOLIC BLOOD PRESSURE: 134 MMHG | DIASTOLIC BLOOD PRESSURE: 64 MMHG | TEMPERATURE: 98.6 F | OXYGEN SATURATION: 98 % | HEART RATE: 61 BPM | RESPIRATION RATE: 16 BRPM | BODY MASS INDEX: 36.83 KG/M2 | WEIGHT: 243 LBS

## 2022-10-23 DIAGNOSIS — S80.02XA CONTUSION OF LEFT KNEE, INITIAL ENCOUNTER: Primary | ICD-10-CM

## 2022-10-23 PROCEDURE — 74011250637 HC RX REV CODE- 250/637: Performed by: EMERGENCY MEDICINE

## 2022-10-23 PROCEDURE — 73562 X-RAY EXAM OF KNEE 3: CPT

## 2022-10-23 PROCEDURE — 99283 EMERGENCY DEPT VISIT LOW MDM: CPT

## 2022-10-23 RX ORDER — HYDROCODONE BITARTRATE AND ACETAMINOPHEN 5; 325 MG/1; MG/1
1 TABLET ORAL
Status: COMPLETED | OUTPATIENT
Start: 2022-10-23 | End: 2022-10-23

## 2022-10-23 RX ORDER — HYDROCODONE BITARTRATE AND ACETAMINOPHEN 5; 325 MG/1; MG/1
1 TABLET ORAL
Qty: 10 TABLET | Refills: 0 | Status: SHIPPED | OUTPATIENT
Start: 2022-10-23 | End: 2022-10-26

## 2022-10-23 RX ORDER — TRAMADOL HYDROCHLORIDE 50 MG/1
50 TABLET ORAL
Status: DISCONTINUED | OUTPATIENT
Start: 2022-10-23 | End: 2022-10-23

## 2022-10-23 RX ADMIN — HYDROCODONE BITARTRATE AND ACETAMINOPHEN 1 TABLET: 5; 325 TABLET ORAL at 17:42

## 2022-10-23 NOTE — ED TRIAGE NOTES
TRIAGE NOTE:  Patient here with c/o fall and pain to left knee. Patient states fell down stairs 15-20min prior to arrival.  Patient states hx of surgery in her knee, states hardware was installed.

## 2022-10-23 NOTE — LETTER
[address] EMERGENCY DEPT  [address] [facility] [address] [phone]    Work/School Note    Date: 10/23/2022    To Whom It May concern:    Claudine Barney was seen and treated today in the emergency room by the following provider(s):  Attending Provider: Shailesh Reese MD.      Claudine Barney is excused from work/school on 10/23/2022 through 10/25/2022. She is medically clear to return to work/school on 10/26/2022.          Sincerely,          Sandip Vargas RN

## 2022-10-23 NOTE — ED NOTES
Emergency Department Nursing Plan of Care       The Nursing Plan of Care is developed from the Nursing assessment and Emergency Department Attending provider initial evaluation. The plan of care may be reviewed in the ED Provider note.     The Plan of Care was developed with the following considerations:   Patient / Family readiness to learn indicated by:verbalized understanding and appropriate questions asked  Persons(s) to be included in education: patient  Barriers to Learning/Limitations:No    Signed     Lisa Love RN    10/23/2022   6:02 PM

## 2022-10-23 NOTE — ED PROVIDER NOTES
EMERGENCY DEPARTMENT HISTORY AND PHYSICAL EXAM      Date: 10/23/2022  Patient Name: Rere Raymundo    History of Presenting Illness     Chief Complaint   Patient presents with    Leg Pain       History Provided By: Patient    HPI: Rere Raymundo, 57 y.o. female presents to the ED with cc of left knee pain due to a fall prior to arrival.  Patient had a ground-level fall at home, patient slipped and fell, no LOC. Denies any other complaints. There are no other associated symptoms, patient concerns, or physical findings at this time. I reviewed the vital signs, available nursing notes, past medical history, past surgical history, family history and social history. Vital Signs:  Patient Vitals for the past 12 hrs:   Temp Pulse Resp BP SpO2   10/23/22 1650 98.6 °F (37 °C) 61 16 134/64 98 %     Vital signs reviewed. Current Medications:  No current facility-administered medications on file prior to encounter. Current Outpatient Medications on File Prior to Encounter   Medication Sig Dispense Refill    zolpidem (AMBIEN) 5 mg tablet TAKE 1 TABLET BY MOUTH EVERY NIGHT AS NEEDED FOR SLEEP. MAX DAILY AMOUNT: 5 MG 30 Tablet 2    loratadine-pseudoephedrine (Loratadine-D) 5-120 mg per tablet Take 1 Tablet by mouth two (2) times a day. 20 Tablet 0    meclizine (ANTIVERT) 25 mg tablet Take 1 Tablet by mouth three (3) times daily as needed for Dizziness. (Patient not taking: Reported on 10/9/2022) 20 Tablet 0    metFORMIN (GLUCOPHAGE) 1,000 mg tablet Take 1 Tablet by mouth two (2) times daily (with meals). 180 Tablet 1    glimepiride (AMARYL) 4 mg tablet Take 2 Tablets by mouth daily. 180 Tablet 1    atorvastatin (LIPITOR) 20 mg tablet Take 1 Tablet by mouth daily. Indications: increased triglycerides and cholesterol 90 Tablet 1    valsartan-hydroCHLOROthiazide (DIOVAN-HCT) 320-25 mg per tablet Take 1 Tablet by mouth daily. 90 Tablet 1    metoprolol succinate (TOPROL-XL) 100 mg tablet Take 1 Tablet by mouth daily. FOR HIGH BLOOD PRESSURE 90 Tablet 1    amLODIPine (NORVASC) 10 mg tablet Take 1 Tablet by mouth daily. 90 Tablet 1    escitalopram oxalate (LEXAPRO) 20 mg tablet Take 1 Tablet by mouth daily. (Patient not taking: Reported on 10/12/2022) 90 Tablet 1    empagliflozin (Jardiance) 25 mg tablet Take 1 Tablet by mouth daily. 30 Tablet 2    ibuprofen (MOTRIN) 800 mg tablet Take 1 Tablet by mouth every eight (8) hours as needed for Pain. Take with full meal (Patient not taking: Reported on 10/9/2022) 60 Tablet 2    dicyclomine (BENTYL) 20 mg tablet Take 1 Tablet by mouth every eight (8) hours as needed for Abdominal Cramps.  (Patient not taking: Reported on 10/9/2022) 24 Tablet 0    glucose blood VI test strips (blood glucose test) strip Use BID Dx11.9 (Patient not taking: Reported on 10/9/2022) 100 Strip 11    lancets misc Use BID Dx E11.9 (Patient not taking: Reported on 10/9/2022) 100 Each 11    fluticasone propionate (FLONASE) 50 mcg/actuation nasal spray SHAKE LIQUID AND USE 2 SPRAYS IN EACH NOSTRIL DAILY AS NEEDED FOR RHINITIS 16 g 2       Past History     Past Medical History:  Past Medical History:   Diagnosis Date    Acute pancreatitis 8/9/2012    Acute pancreatitis 1/21/2011    Arthritis     both knees    Chronic pain     knees    Delivery normal     x1    Diabetes (HCC)     DJD (degenerative joint disease) of knee     Fatty liver 1/22/2011    GERD (gastroesophageal reflux disease)     Hypertension     Hypothyroidism     Obesity     PUD (peptic ulcer disease)     UTI (urinary tract infection) 1/21/2011       Past Surgical History:  Past Surgical History:   Procedure Laterality Date    HX BREAST BIOPSY Left     Surgical Bx 1990 - BENIGN    HX BREAST LUMPECTOMY      left breast    HX GYN      hysterectomy    HX ORTHOPAEDIC      left total knee replacement    HX ORTHOPAEDIC  6/16/15    RIGHT TOTAL KNEE ARTHROPLASTY     HX PARATHYROIDECTOMY  01/27/11       Family History:  Family History   Problem Relation Age of Onset Cancer Mother     Hypertension Father     Diabetes Sister     Hypertension Sister     Diabetes Brother     Hypertension Brother        Social History:  Social History     Tobacco Use    Smoking status: Former     Packs/day: 0.25     Years: 25.00     Pack years: 6.25     Types: Cigarettes     Quit date: 2008     Years since quittin.7    Smokeless tobacco: Never   Vaping Use    Vaping Use: Never used   Substance Use Topics    Alcohol use: No     Alcohol/week: 0.0 standard drinks    Drug use: No       Allergies: Allergies   Allergen Reactions    Aspirin Other (comments)     Pt reports having a h/o gastric ulcers. Pt was on IBU when she was dx'd. Review of Systems   Review of Systems   Constitutional:  Negative for fever. HENT:  Negative for sore throat. Eyes:  Negative for photophobia and redness. Respiratory:  Negative for shortness of breath and wheezing. Cardiovascular:  Negative for chest pain and leg swelling. Gastrointestinal:  Negative for abdominal pain, blood in stool, nausea and vomiting. Genitourinary:  Negative for difficulty urinating, dysuria, hematuria, menstrual problem and vaginal bleeding. Musculoskeletal:  Positive for arthralgias, gait problem and joint swelling. Negative for back pain. Neurological:  Negative for dizziness, seizures, syncope, speech difficulty, weakness, numbness and headaches. Hematological:  Negative for adenopathy. Psychiatric/Behavioral:  Negative for agitation, confusion and suicidal ideas. The patient is not nervous/anxious. All other systems reviewed and are negative. Physical Exam   Physical Exam  Vitals and nursing note reviewed. Exam conducted with a chaperone present. Constitutional:       General: She is not in acute distress. Appearance: Normal appearance. She is well-developed. She is obese. HENT:      Head: Normocephalic and atraumatic. Mouth/Throat:      Pharynx: No oropharyngeal exudate.    Eyes: General:         Right eye: No discharge. Left eye: No discharge. Extraocular Movements: Extraocular movements intact. Conjunctiva/sclera: Conjunctivae normal.      Pupils: Pupils are equal, round, and reactive to light. Neck:      Vascular: No JVD. Cardiovascular:      Rate and Rhythm: Normal rate and regular rhythm. Heart sounds: Normal heart sounds. Pulmonary:      Effort: Pulmonary effort is normal. No respiratory distress. Breath sounds: Normal breath sounds. No wheezing. Abdominal:      General: Bowel sounds are normal. There is no distension. Palpations: Abdomen is soft. Tenderness: There is no abdominal tenderness. There is no guarding or rebound. Musculoskeletal:         General: Swelling and tenderness present. No deformity. Cervical back: Normal range of motion and neck supple. Comments: Tenderness to palpation on the left knee area, limited range of motion due to pain. Lymphadenopathy:      Cervical: No cervical adenopathy. Skin:     General: Skin is warm and dry. Findings: No rash. Neurological:      Mental Status: She is alert and oriented to person, place, and time. Cranial Nerves: No cranial nerve deficit. Deep Tendon Reflexes: Reflexes are normal and symmetric. Psychiatric:         Behavior: Behavior normal.       Emergency Department Course   ED Course:  Initial assessment performed. The patient's complaints have been discussed, and they are in agreement with the care plan formulated and outlined with them. I have encouraged them to ask questions as they arise throughout their visit. EKG interpretation: (Preliminary)    Medical Decision Making:  Knee contusion, fall, knee fracture, musculoskeletal pain. Critical Care Time:      Procedure:      Progress note:   Time:    Disposition:  DISCHARGED at 6:10 pm,  I reviewed exam findings, diagnostic results, and clinical impression with patient.  Counseled patient on diagnosis and care plan. Encouraged patient to ask questions and discussed need for follow up with primary care and to return to ED precautions. Patient expresses understanding at this time. I have reviewed discharge instructions with the patient and/or family/caregiver who verbalized understanding. The patient has been re-evaluated and is ready for discharge. Discharge instructions have been provided and explained to the patient. Ready for discharge. DISCHARGE PLAN:  1. Current Discharge Medication List        2. Follow-up Information    None       3. Return to ED if current symptoms worsen or new symptoms arise. 4. Follow up with Colin Guerrero MD in 3-5 days. Diagnosis     Clinical Impression: No diagnosis found.

## 2022-10-23 NOTE — ED NOTES
I have reviewed discharge instructions with the patient. The patient verbalized understanding. Discharge medications reviewed with patient and appropriate educational materials and side effects teaching were provided. Patient left ED ambulatory. Ace wrap applied to left knee. Cane provided.

## 2022-10-26 ENCOUNTER — OFFICE VISIT (OUTPATIENT)
Dept: INTERNAL MEDICINE CLINIC | Age: 62
End: 2022-10-26
Payer: COMMERCIAL

## 2022-10-26 VITALS
RESPIRATION RATE: 18 BRPM | DIASTOLIC BLOOD PRESSURE: 78 MMHG | OXYGEN SATURATION: 95 % | SYSTOLIC BLOOD PRESSURE: 133 MMHG | TEMPERATURE: 97.8 F | HEART RATE: 77 BPM | WEIGHT: 248 LBS | HEIGHT: 68 IN | BODY MASS INDEX: 37.59 KG/M2

## 2022-10-26 DIAGNOSIS — I15.2 HYPERTENSION COMPLICATING DIABETES (HCC): ICD-10-CM

## 2022-10-26 DIAGNOSIS — E11.59 HYPERTENSION COMPLICATING DIABETES (HCC): ICD-10-CM

## 2022-10-26 DIAGNOSIS — M25.562 ACUTE PAIN OF LEFT KNEE: Primary | ICD-10-CM
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
## 2022-10-26 DIAGNOSIS — M17.0 PRIMARY OSTEOARTHRITIS OF BOTH KNEES: ICD-10-CM

## 2022-10-26 DIAGNOSIS — E66.01 MORBID OBESITY (HCC): ICD-10-CM

## 2022-10-26 DIAGNOSIS — Z96.652 HISTORY OF TOTAL LEFT KNEE REPLACEMENT (TKR): ICD-10-CM

## 2022-10-26 PROCEDURE — 99214 OFFICE O/P EST MOD 30 MIN: CPT | Performed by: INTERNAL MEDICINE

## 2022-10-26 RX ORDER — MECLIZINE HYDROCHLORIDE 25 MG/1
25 TABLET ORAL
Qty: 30 TABLET | Refills: 0 | Status: SHIPPED | OUTPATIENT
Start: 2022-10-26

## 2022-10-26 NOTE — PROGRESS NOTES
Chief Complaint   Patient presents with    ED Follow-up       1. \"Have you been to the ER, urgent care clinic since your last visit? Hospitalized since your last visit? \" Yes When: 10/23 Where: [facility] Reason for visit: Fall and leg pain    2. \"Have you seen or consulted any other health care providers outside of the [address] [place] [address] since your last visit? \" No     3. For patients aged 39-70: Has the patient had a colonoscopy / FIT/ Cologuard? No      If the patient is female:    4. For patients aged 41-77: Has the patient had a mammogram within the past 2 years? Yes - no Care Gap present      5. For patients aged 21-65: Has the patient had a pap smear?  No

## 2022-10-26 NOTE — PROGRESS NOTES
Ilene Power is a 57 y.o. female and presents with ED Follow-up  . Subjective:    Pts last appt 2/2022    Pt slipped and fell down some stairs 3 days ago. She injured her left knee. X-rays negative. Pt was prescribed narcotic analgesics      PMH:  Hypertension Review:  The patient has essential hypertension  Diet and Lifestyle: generally follows a  low sodium diet, exercises never  Home BP Monitoring: is not measured at home. Pertinent ROS: taking medications as instructed, no medication side effects noted, no TIA's, no chest pain on exertion, no dyspnea on exertion, no swelling of ankles. BP Readings from Last 3 Encounters:   10/26/22 133/78   10/23/22 134/64   10/12/22 125/67     Type 2 DM-on metformin and januvia   -pt admits to dietary indiscretions and elevated blood sugars   -PT REFUSES INSULIN INITIATION.  She is well aware of risks of end organ damage  Lab Results   Component Value Date/Time    Hemoglobin A1c 9.6 (H) 07/13/2021 04:20 PM    Hemoglobin A1c (POC) 9.3 07/19/2022 11:10 AM     Lab Results   Component Value Date/Time    Glucose 295 (H) 07/26/2022 06:07 PM    Glucose (POC) 295 (H) 07/26/2022 06:02 PM         Hyperlipidemia-on atorvastatin 20mg   -  Lab Results   Component Value Date/Time    Cholesterol, total 128 07/13/2021 04:20 PM    Cholesterol (POC) <100 09/19/2019 03:36 PM    HDL Cholesterol 37 07/13/2021 04:20 PM    HDL Cholesterol (POC) 25 09/19/2019 03:36 PM    LDL Cholesterol (POC) N/A 09/19/2019 03:36 PM    LDL, calculated 33.4 07/13/2021 04:20 PM    VLDL, calculated 57.6 07/13/2021 04:20 PM    Triglyceride 288 (H) 07/13/2021 04:20 PM    Triglycerides (POC) 354 09/19/2019 03:36 PM    CHOL/HDL Ratio 3.5 07/13/2021 04:20 PM       Morbid obesity-pt reports she is thin enough and does not want to lose much more weight  Wt Readings from Last 3 Encounters:   10/26/22 248 lb (112.5 kg)   10/23/22 243 lb (110.2 kg)   10/12/22 245 lb (111.1 kg)     Chronic Pain-pt is seeing Pain Mngmnt and is on a regimen of gabapentin, tramadol and oxycodone 9mg      Chronic insomnia- on zolpidem 5mg          Review of Systems  Constitutional: negative for fevers, chills, anorexia and weight loss  Respiratory:  negative for cough, hemoptysis, dyspnea,wheezing  CV:   negative for chest pain, palpitations, lower extremity edema  GI:   negative for nausea, vomiting, diarrhea, abdominal pain,melena  Musculoskel: positive for myalgias, arthralgias, back pain, joint pain  Neurological:  negative for headaches, dizziness, vertigo, memory problems and gait   Behavl/Psych: negative for feelings of anxiety, depression, mood changes    Past Medical History:   Diagnosis Date    Acute pancreatitis 2012    Acute pancreatitis 2011    Arthritis     both knees    Chronic pain     knees    Delivery normal     x1    Diabetes (HCC)     DJD (degenerative joint disease) of knee     Fatty liver 2011    GERD (gastroesophageal reflux disease)     Hypertension     Hypothyroidism     Obesity     PUD (peptic ulcer disease)     UTI (urinary tract infection) 2011     Past Surgical History:   Procedure Laterality Date    HX BREAST BIOPSY Left     Surgical Bx  - BENIGN    HX BREAST LUMPECTOMY      left breast    HX GYN      hysterectomy    HX ORTHOPAEDIC      left total knee replacement    HX ORTHOPAEDIC  6/16/15    RIGHT TOTAL KNEE ARTHROPLASTY     HX PARATHYROIDECTOMY  11     Social History     Socioeconomic History    Marital status:    Tobacco Use    Smoking status: Former     Packs/day: 0.25     Years: 25.00     Pack years: 6.25     Types: Cigarettes     Quit date: 2008     Years since quittin.7    Smokeless tobacco: Never   Vaping Use    Vaping Use: Never used   Substance and Sexual Activity    Alcohol use: No     Alcohol/week: 0.0 standard drinks    Drug use: No    Sexual activity: Never     Family History   Problem Relation Age of Onset    Cancer Mother     Hypertension Father     Diabetes Sister Hypertension Sister     Diabetes Brother     Hypertension Brother      Current Outpatient Medications   Medication Sig Dispense Refill    meclizine (ANTIVERT) 25 mg tablet Take 1 Tablet by mouth three (3) times daily as needed for Dizziness. 30 Tablet 0    HYDROcodone-acetaminophen (Norco) 5-325 mg per tablet Take 1 Tablet by mouth every six (6) hours as needed for Pain for up to 3 days. Max Daily Amount: 4 Tablets. 10 Tablet 0    loratadine-pseudoephedrine (Loratadine-D) 5-120 mg per tablet Take 1 Tablet by mouth two (2) times a day. 20 Tablet 0    metFORMIN (GLUCOPHAGE) 1,000 mg tablet Take 1 Tablet by mouth two (2) times daily (with meals). 180 Tablet 1    glimepiride (AMARYL) 4 mg tablet Take 2 Tablets by mouth daily. 180 Tablet 1    atorvastatin (LIPITOR) 20 mg tablet Take 1 Tablet by mouth daily. Indications: increased triglycerides and cholesterol 90 Tablet 1    valsartan-hydroCHLOROthiazide (DIOVAN-HCT) 320-25 mg per tablet Take 1 Tablet by mouth daily. 90 Tablet 1    metoprolol succinate (TOPROL-XL) 100 mg tablet Take 1 Tablet by mouth daily. FOR HIGH BLOOD PRESSURE 90 Tablet 1    amLODIPine (NORVASC) 10 mg tablet Take 1 Tablet by mouth daily. 90 Tablet 1    fluticasone propionate (FLONASE) 50 mcg/actuation nasal spray SHAKE LIQUID AND USE 2 SPRAYS IN EACH NOSTRIL DAILY AS NEEDED FOR RHINITIS 16 g 2    zolpidem (AMBIEN) 5 mg tablet TAKE 1 TABLET BY MOUTH EVERY NIGHT AS NEEDED FOR SLEEP. MAX DAILY AMOUNT: 5 MG 30 Tablet 2    escitalopram oxalate (LEXAPRO) 20 mg tablet Take 1 Tablet by mouth daily. (Patient not taking: Reported on 10/12/2022) 90 Tablet 1    empagliflozin (Jardiance) 25 mg tablet Take 1 Tablet by mouth daily. 30 Tablet 2    ibuprofen (MOTRIN) 800 mg tablet Take 1 Tablet by mouth every eight (8) hours as needed for Pain.  Take with full meal (Patient not taking: Reported on 10/9/2022) 60 Tablet 2     Allergies   Allergen Reactions    Aspirin Other (comments)     Pt reports having a h/o gastric ulcers. Pt was on IBU when she was dx'd. Objective:  Visit Vitals  /78 (BP 1 Location: Right upper arm, BP Patient Position: Sitting, BP Cuff Size: Large adult)   Pulse 77   Temp 97.8 °F (36.6 °C) (Temporal)   Resp 18   Ht 5' 8\" (1.727 m)   Wt 248 lb (112.5 kg)   SpO2 95%   BMI 37.71 kg/m²     Physical Exam:   General appearance - alert, obese  Mental status - alert, oriented to person, place, and time  EYE-EOMI  Neck - supple,   Chest - symmetric air entry    CVS-reg + S4 + 2/6 systolic murmur  Abdomen - obese  Ext-no pedal edema, no clubbing or cyanosis  Skin-Warm and dry. no hyperpigmentation, vitiligo, or suspicious lesions  Neuro -alert, oriented, normal speech, no focal findings or movement disorder noted        Results for orders placed or performed during the hospital encounter of 10/09/22   COVID-19 WITH INFLUENZA A/B   Result Value Ref Range    SARS-CoV-2 by PCR Not detected NOTD      Influenza A by PCR Not detected      Influenza B by PCR Not detected         Assessment/Plan:    ICD-10-CM ICD-9-CM    1. Acute pain of left knee  M25.562 719.46 REFERRAL TO ORTHOPEDICS      2. Fall (on) (from) other stairs and steps, sequela  W10. 8XXS E880.9       3. Hypertension complicating diabetes ([facility] [address]  E11.59 250.80     I15.2 401.9       4. Morbid obesity ([facility] [address]  E66.01 278.01       5. Primary osteoarthritis of both knees  M17.0 715.16       6. History of total left knee replacement (TKR)  [id number] V43.65           Orders Placed This Encounter    [facility] EMPL     Referral Priority:   Routine     Referral Type:   Consultation     Referral Reason:   Specialty Services Required     Referred to Provider:   Lorenzo Portillo DO     Number of Visits Requested:   1    meclizine (ANTIVERT) 25 mg tablet     Sig: Take 1 Tablet by mouth three (3) times daily as needed for Dizziness. Dispense:  30 Tablet     Refill:  0         1. Acute pain of left knee    - REFERRAL TO ORTHOPEDICS    2.  Fall (on) (from) other stairs and steps, sequela  -referred to ortho    3. Hypertension complicating diabetes ([address]  Controlled    4. Morbid obesity (HCC)  Unchanged    5. Primary osteoarthritis of both knees  Noted    6. History of total left knee replacement (TKR)  Noted      There are no Patient Instructions on file for this visit. Follow-up and Dispositions    Return if symptoms worsen or fail to improve. I have reviewed with the patient details of the assessment and plan and all questions were answered. Relevent patient education was performed. The most recent lab findings were reviewed with the patient. An After Visit Summary was printed and given to the patient.

## 2022-11-01 ENCOUNTER — OFFICE VISIT (OUTPATIENT)
Dept: ORTHOPEDIC SURGERY | Age: 62
End: 2022-11-01
Payer: COMMERCIAL

## 2022-11-01 VITALS
SYSTOLIC BLOOD PRESSURE: 136 MMHG | HEIGHT: 68 IN | WEIGHT: 250 LBS | BODY MASS INDEX: 37.89 KG/M2 | TEMPERATURE: 96.3 F | HEART RATE: 74 BPM | DIASTOLIC BLOOD PRESSURE: 73 MMHG | OXYGEN SATURATION: 98 %

## 2022-11-01 DIAGNOSIS — S83.92XA SPRAIN OF LEFT KNEE, UNSPECIFIED LIGAMENT, INITIAL ENCOUNTER: Primary | ICD-10-CM

## 2022-11-01 PROCEDURE — 99203 OFFICE O/P NEW LOW 30 MIN: CPT | Performed by: ORTHOPAEDIC SURGERY

## 2022-11-01 RX ORDER — DICLOFENAC SODIUM 50 MG/1
50 TABLET, DELAYED RELEASE ORAL 3 TIMES DAILY
Qty: 60 TABLET | Refills: 1 | Status: SHIPPED | OUTPATIENT
Start: 2022-11-01

## 2022-11-01 RX ORDER — HYDROCODONE BITARTRATE AND ACETAMINOPHEN 10; 325 MG/1; MG/1
TABLET ORAL
COMMUNITY
Start: 2022-10-26 | End: 2022-11-18 | Stop reason: SDUPTHER

## 2022-11-01 NOTE — LETTER
11/1/2022 8:50 AM    Ms. Elise Davenport  [address] [facility] [address]      To whom it may concern,         Elise Davenport is under the care of [address] [place] [address] Specialists. Elise Davenport will be unable to work beginning today for medical reasons. Anticipated return to work will be two weeks. If you have any questions or concerns, please feel free to contact my office.                           Sincerely,      Linda Castellon, DO

## 2022-11-01 NOTE — PROGRESS NOTES
11/1/2022      CC: Left knee pain    HPI:      This is a 57y.o. year old female who has a 3-day history of left knee pain, she fell down her stairs, she hyperflexed her knee. She has a history of knee replacement on that side. She denies any numbness, however she has been able to ambulate only with a walker. No other abnormalities, she wears a soft knee brace. PMH:  Past Medical History:   Diagnosis Date    Acute pancreatitis 8/9/2012    Acute pancreatitis 1/21/2011    Arthritis     both knees    Chronic pain     knees    Delivery normal     x1    Diabetes (HCC)     DJD (degenerative joint disease) of knee     Fatty liver 1/22/2011    GERD (gastroesophageal reflux disease)     Hypertension     Hypothyroidism     Obesity     PUD (peptic ulcer disease)     UTI (urinary tract infection) 1/21/2011       PSxHx:  Past Surgical History:   Procedure Laterality Date    HX BREAST BIOPSY Left     Surgical Bx 1990 - BENIGN    HX BREAST LUMPECTOMY      left breast    HX GYN      hysterectomy    HX ORTHOPAEDIC      left total knee replacement    HX ORTHOPAEDIC  6/16/15    RIGHT TOTAL KNEE ARTHROPLASTY     HX PARATHYROIDECTOMY  01/27/11       Meds:    Current Outpatient Medications:     HYDROcodone-acetaminophen (NORCO)  mg tablet, TAKE 1 TABLET BY MOUTH FOUR TIMES DAILY, Disp: , Rfl:     diclofenac EC (VOLTAREN) 50 mg EC tablet, Take 1 Tablet by mouth three (3) times daily. , Disp: 60 Tablet, Rfl: 1    meclizine (ANTIVERT) 25 mg tablet, Take 1 Tablet by mouth three (3) times daily as needed for Dizziness. , Disp: 30 Tablet, Rfl: 0    zolpidem (AMBIEN) 5 mg tablet, TAKE 1 TABLET BY MOUTH EVERY NIGHT AS NEEDED FOR SLEEP. MAX DAILY AMOUNT: 5 MG, Disp: 30 Tablet, Rfl: 2    loratadine-pseudoephedrine (Loratadine-D) 5-120 mg per tablet, Take 1 Tablet by mouth two (2) times a day., Disp: 20 Tablet, Rfl: 0    metFORMIN (GLUCOPHAGE) 1,000 mg tablet, Take 1 Tablet by mouth two (2) times daily (with meals). , Disp: 180 Tablet, Rfl: 1    glimepiride (AMARYL) 4 mg tablet, Take 2 Tablets by mouth daily. , Disp: 180 Tablet, Rfl: 1    atorvastatin (LIPITOR) 20 mg tablet, Take 1 Tablet by mouth daily. Indications: increased triglycerides and cholesterol, Disp: 90 Tablet, Rfl: 1    valsartan-hydroCHLOROthiazide (DIOVAN-HCT) 320-25 mg per tablet, Take 1 Tablet by mouth daily. , Disp: 90 Tablet, Rfl: 1    metoprolol succinate (TOPROL-XL) 100 mg tablet, Take 1 Tablet by mouth daily. FOR HIGH BLOOD PRESSURE, Disp: 90 Tablet, Rfl: 1    amLODIPine (NORVASC) 10 mg tablet, Take 1 Tablet by mouth daily. , Disp: 90 Tablet, Rfl: 1    empagliflozin (Jardiance) 25 mg tablet, Take 1 Tablet by mouth daily. , Disp: 30 Tablet, Rfl: 2    fluticasone propionate (FLONASE) 50 mcg/actuation nasal spray, SHAKE LIQUID AND USE 2 SPRAYS IN EACH NOSTRIL DAILY AS NEEDED FOR RHINITIS, Disp: 16 g, Rfl: 2    escitalopram oxalate (LEXAPRO) 20 mg tablet, Take 1 Tablet by mouth daily. (Patient not taking: No sig reported), Disp: 90 Tablet, Rfl: 1    ibuprofen (MOTRIN) 800 mg tablet, Take 1 Tablet by mouth every eight (8) hours as needed for Pain. Take with full meal (Patient not taking: No sig reported), Disp: 60 Tablet, Rfl: 2    All: Allergies   Allergen Reactions    Aspirin Other (comments)     Pt reports having a h/o gastric ulcers. Pt was on IBU when she was dx'd.        Social Hx:  Social History     Socioeconomic History    Marital status:    Tobacco Use    Smoking status: Former     Packs/day: 0.25     Years: 25.00     Pack years: 6.25     Types: Cigarettes     Quit date: 2008     Years since quittin.7    Smokeless tobacco: Never   Vaping Use    Vaping Use: Never used   Substance and Sexual Activity    Alcohol use: No     Alcohol/week: 0.0 standard drinks    Drug use: No    Sexual activity: Never       Family Hx:  Family History   Problem Relation Age of Onset    Cancer Mother     Hypertension Father     Diabetes Sister     Hypertension Sister     Diabetes Brother     Hypertension Brother          Review of Systems:       General: Denies headache, lethargy, fever, weight loss  Ears/Nose/Throat: Denies ear discharge, drainage, nosebleeds, hoarse voice, dental problems  Cardiovascular: Denies chest pain, shortness of breath  Lungs: Denies chest pain, breathing problems, wheezing, pneumonia  Stomach: Denies stomach pain, heartburn, constipation, irritable bowel  Skin: Denies rash, sores, open wounds  Musculoskeletal: Left knee pain  Genitourinary: Denies dysuria, hematuria, polyuria  Gastrointestinal: Denies constipation, obstipation, diarrhea  Neurological: Denies changes in sight, smell, hearing, taste, seizures. Denies loss of consciousness. Psychiatric: Denies depression, sleep pattern changes, anxiety, change in personality  Endocrine: Denies mood swings, heat or cold intolerance  Hematologic/Lymphatic: Denies anemia, purpura, petechia  Allergic/Immunologic: Denies swelling of throat, pain or swelling at lymph nodes      Physical Examination:    Visit Vitals  /73 (BP 1 Location: Right arm, BP Patient Position: Sitting, BP Cuff Size: Large adult)   Pulse 74   Temp (!) 96.3 °F (35.7 °C) (Tympanic)   Ht 5' 8\" (1.727 m)   Wt 250 lb (113.4 kg)   SpO2 98%   BMI 38.01 kg/m²        General: AOX3, no apparent distress  Psychiatric: mood and affect appropriate  Lungs: breathing is symmetric and unlabored bilaterally  Heart: regular rate and rhythm  Abdomen: no guarding  Head: normocephalic, atraumatic  Skin: No significant abnormalities, good turgor  Sensation intact to light touch: C5-T1 dermatomes  Muscular exam: 5/5 strength in all major muscle groups unless noted in specialty exam.    Extremities          Right lower extremity: Extremity is examined, no evidence of gross deformity, no gross motor or sensory deficit. Full active and passive range of motion is noted. Muscle tone and bulk is within normal expected limits.   Capillary refill is less than 2 seconds distally. Left lower extremity: Well-healed surgical scar. No effusion. Tenderness palpation is noted medially at the level of the patella. She does have some gapping at the [facility] for a varus stress test, 6 mm of gapping. Sensation intact light touch in the L1-S1 dermatomes. She can do straight leg raise. No joint line tenderness to palpation. Diagnostics:    Pertinent Diagnostics: X-rays reviewed by myself from the emergency department of the left knee indicate a cemented total knee arthroplasty in good position with no evidence of loosening or failure. Assessment: Left knee sprain with contusion  Plan: This patient has above-mentioned issue, she will continue to weight-bear to tolerance, she will take 2 weeks off of work, she will be given diclofenac for pain medications, she will also be given a brace for her instability, physical therapy, she will follow-up with me in 2 to 3 weeks for repeat clinical check, no x-rays. Ms. Alphonse Larson has a reminder for a \"due or due soon\" health maintenance. I have asked that she contact her primary care provider for follow-up on this health maintenance.

## 2022-11-01 NOTE — PROGRESS NOTES
Identified pt with two pt identifiers (name and ). Reviewed chart in preparation for visit and have obtained necessary documentation. Mikey Hendrickson is a 57 y.o. female  Chief Complaint   Patient presents with    Knee Pain     LT knee, pt fell down her stairs on . Visit Vitals  /73 (BP 1 Location: Right arm, BP Patient Position: Sitting, BP Cuff Size: Large adult)   Pulse 74   Temp (!) 96.3 °F (35.7 °C) (Tympanic)   Ht 5' 8\" (1.727 m)   Wt 250 lb (113.4 kg)   SpO2 98%   BMI 38.01 kg/m²     1. Have you been to the ER, urgent care clinic since your last visit? Hospitalized since your last visit? No    2. Have you seen or consulted any other health care providers outside of the [address] [place] [address] since your last visit? Include any pap smears or colon screening.  No

## 2022-11-01 NOTE — LETTER
11/1/2022    Patient: Trevon Rankin   YOB: 1960   Date of Visit: 11/1/2022     Abelardo Biggs MD  [address] [address] [address] [facility]  [place]    Dear Abelardo Biggs MD,      Thank you for referring Ms. Katelyn Catherine to [facility] for evaluation. My notes for this consultation are attached. If you have questions, please do not hesitate to call me. I look forward to following your patient along with you.       Sincerely,    Ivet Loredo, DO

## 2022-11-08 ENCOUNTER — HOSPITAL ENCOUNTER (OUTPATIENT)
Dept: PHYSICAL THERAPY | Age: 62
Discharge: HOME | End: 2022-11-08
Payer: COMMERCIAL

## 2022-11-08 PROCEDURE — 97110 THERAPEUTIC EXERCISES: CPT

## 2022-11-08 PROCEDURE — 97016 VASOPNEUMATIC DEVICE THERAPY: CPT

## 2022-11-08 PROCEDURE — 97162 PT EVAL MOD COMPLEX 30 MIN: CPT

## 2022-11-08 NOTE — THERAPY EVALUATION
[address] Physical Therapy  [address] [facility] [address] [place]  Phone: [phone] Fax: [phone]    Plan of Care/Statement of Necessity for Physical Therapy Services  2-15    Patient name: Belinda Gutierrez  : 1960  Provider#: [id number]  Referral source: Samy Ledbetter DO      Medical/Treatment Diagnosis: Left knee pain [M25.562]     Prior Hospitalization: see medical history     Comorbidities: allergies, arthritis, BMI >30, depression, DM type I or II, High blood pressure, sleep dysfunction  Prior Level of Function: independent  Medications: Verified on Patient Summary List    Start of Care: 2022      Onset Date: 10/23/2022       The Plan of Care and following information is based on the information from the initial evaluation. Assessment/ key information: Patient is a 57year old female who presents to physical therapy services due to left knee pain s/p fall down a flight of stairs on 10/23/2022. Patient presents today with functional mobility, muscle endurance, muscle power, and movement impairments. Patient demonstrated decreased lower extremity strength during double limb heel raise assessment (unable to perform single leg), limited functional mobility via 5xSTS assessment (56.16 sec), impaired static postural control and proprioceptive awareness during single limb stance exercise ( R=2sec, L = <1sec), tenderness to palpation along knee joint medially and laterally, posterior aspect of knee, HS proximal insertion, patellar tendon and quad tendon, and pain limiting daily functional tasks. Patient pain responded favorable to manual intervention and strengthening exercises in todays session, with report of diminished symptoms post-treatment.  Patient would benefit from continued skilled physical therapy services to address the impairments listed above to allow for ambulation with efficient gait pattern and LRAD and full participation in daily functional tasks safely and with minimal to no pain. Evaluation Complexity History HIGH Complexity :3+ comorbidities / personal factors will impact the outcome/ POC ; Examination MEDIUM Complexity : 3 Standardized tests and measures addressing body structure, function, activity limitation and / or participation in recreation  ;Presentation MEDIUM Complexity : Evolving with changing characteristics  ; Clinical Decision Making MEDIUM Complexity : FOTO score of 26-74  Overall Complexity Rating: MEDIUM    Problem List: pain affecting function, decrease ROM, decrease strength, edema affecting function, impaired gait/ balance, decrease ADL/ functional abilitiies, decrease activity tolerance, decrease flexibility/ joint mobility, and decrease transfer abilities   Treatment Plan may include any combination of the following: Therapeutic exercise, Neuromuscular reeducation, Manual therapy, Therapeutic activity, Self care/home management, Electric stim unattended , Vasopneumatic device, Gait training, Ultrasound, Mechanical traction, Electric stim attended, Needle insertion w/o injection (1 or 2 muscles), Needle insertion w/o injection (3+ muscles), and Canalith repositioning  Patient / Family readiness to learn indicated by: asking questions, trying to perform skills, and interest  Persons(s) to be included in education: patient (P)  Barriers to Learning/Limitations: None  Patient Goal (s):  \"I want to get back to where I was so I can get back to work, stop the buckling to get back to work\"  Patient Self Reported Health Status: n/a  Rehabilitation Potential: fair    Short Term Goals: To be accomplished in 10-12 treatments:   Patient will demonstrate understanding of and compliance with HEP showing full participation in physical therapy. Patient will improve 5xSTS test to </50 seconds showing improving functional mobility.   Patient will complete >/= 5 single limb heel raise repetitions on each leg showing improving LE strength and functional mobility. Patient will report reduced familiar symptoms by >/= 25% allowing for improving participation in daily tasks. Patient will demonstrate understanding/application of postural principles/recommendations to daily activities toward improved symptom management. Long Term Goals: To be accomplished in 18-20 treatments:   Patient will report minimal to no pain during work-related activities to show improving functional mobility and participation in daily tasks. Patient will improve FOTO score by the [facility] of [facility] to >/= 37 showing significant improvement in their self-reported level of function. Patient will demonstrate improved single limb postural control >/= 5 sec  toward improved stability with prolonged walking tasks. Patient will ambulate 200' with efficient gait pattern and LRAD toward improving functional mobility required for daily tasks and work tasks. Frequency / Duration: Patient to be seen 2 times per week for 20 treatments. Patient/ Caregiver education and instruction: self care, activity modification, brace/ splint application, and exercises    [x]  Plan of care has been reviewed with CASSIDY Reid, PT 11/8/2022     ________________________________________________________________________    I certify that the above Therapy Services are being furnished while the patient is under my care. I agree with the treatment plan and certify that this therapy is necessary.     [phone] Signature:____________________  Date:____________Time: _________      Jessica Pugh,

## 2022-11-08 NOTE — PROGRESS NOTES
PT INITIAL EVALUATION NOTE 2-15    Patient Name: Reed Lovett  Date:2022  : 1960  [x]  Patient  Verified  Payor: Faisal Nikk[employer] / Plan: [employer] [address] HMO/CHOICE PLUS/POS / Product Type: HMO /    In time: 8:15  Out time:9:25  Total Treatment Time (min): 79  Visit #: 1     Treatment Area: Left knee pain [M25.562]    SUBJECTIVE  Pain Level (0-10 scale): 8/10  Any medication changes, allergies to medications, adverse drug reactions, diagnosis change, or new procedure performed?: [] No    [x] Yes (see summary sheet for update)  Subjective:   Pt reports she fell down a flight of stairs at her house while carrying laundry basket, fell down 13-14 stairs. She thinks she tripped over some clothes that fell out of basket, but is unsure. Reports when she landed at the bottom of the stairs, her left leg was in figure-4 shape behind her right leg and body. Once she was finally able to get up, she went to ER. She received xray that showed no fx or abnormality. Pt has hx of TKA on left in  and right TKA in . Xray showed no abnormality in the hardware. Patient reports since the fall, she has been in a lot of pain and has noticed a lot of swelling. She has been using ice, which helps some. Reports she has also been having a lot of buckling of left leg when walking. The pain, swelling, and buckling are what concern her. She went to ortho last Tuesday () and was given a hinged knee brace. She reports it has still been buckling, but has noticed improvement since wearing the brace. No other falls since the original incident. Pt is currently ambulating with SPC and a rollator. Pt ambulates into clinic using SPC today, but reports she uses rollator around her house and during community ambulation if she has to go somewhere. She reports no use of AD prior to this.   Been seeing pain and spine specialist for rheumatid arthritis so takes pain medication through them and did not want any additional medication from ortho besides anti-inflammatory. Pain is located on both sides of knee, anteriorly, and posteriorly. Feels pain has gotten a little better since the fall. Patient is scared she is going to fall again due to her knee buckling. Current functional limitations include: community ambulation, stairs, transfers, bending/lifting. Pt goals are \"I want to get back to where I was so I can get back to work, stop the buckling to get back to work\"    PLOF: independent  Mechanism of Injury: fall down the stairs, 10/23  Previous Treatment/Compliance: n/a for same diagnosis  PMHx/Surgical Hx: see chart  Work Hx: currently working at  (housekeeping, cook, teacher)  Living Situation: 2nd story home, lives with 2 brothers  Pt Goals: \"I want to get back to where I was so I can get back to work, stop the buckling to get back to [employer]"  Barriers: none  Motivation: good         OBJECTIVE/EXAMINATION    OBJECTIVE    Posture:  rounded shoulders, forward flexed trunk in standing  Other Observations:  decreased weightbearing through left LE  Gait and Functional Mobility:  Pt ambulated into clinic exhibiting mild antalgic gait using SPC and knee brace donned on left knee. Squat: unable  DL Heel Raises: 5x, p!  And minimal heel clearance bilaterally  Single limb heel raise test: unable   Single limb stance, EO/EC: R = 2 seconds, L = <1 sec, p!  5x STS: 56.16 sec L knee p! (20 in)  SLR: unable on LLE this visit      R Knee flexion: AROM 105 deg PROM 105 deg  L Knee flexion: AROM 95 deg PROM -not tolerable    Palpation: TTP surrounding knee joint medially and laterally, posterior aspect of knee, HS proximal insertion, patellar tendon and quad tendon    Flexibility: tightness noted in bilateral gastrocnemius and hamstring    Special Tests: H.S. SLR: (+ bilat)   Knee Varus Stress: R (-) L (p!)     Knee Valgus Stress: R (-) L (p!)      Modality rationale: decrease edema, decrease inflammation, decrease pain, and increase tissue extensibility to improve the patients ability to ambulate with efficient gait pattern and LRAD and perform ADLs with minimal to no pain. Min Type Additional Details    [] Estim: []Att   []Unatt        []TENS instruct                  []IFC  []Premod   []NMES                     []Other:  []w/US   []w/ice   []w/heat  Position:  Location:    []  Traction: [] Cervical       []Lumbar                       [] Prone          []Supine                       []Intermittent   []Continuous Lbs:  [] before manual  [] after manual  []w/heat    []  Ultrasound: []Continuous   [] Pulsed at:                            []1MHz   []3MHz Location:  W/cm2:    []  Paraffin         Location:  []w/heat    []  Ice     []  Heat  []  Ice massage Position:  Location:    []  Laser  []  Other: Position:  Location:   nt [x]  Vasopneumatic Device Pressure:       [] lo [x] med [] hi   Temperature: coldest   [x] Skin assessment post-treatment:  [x]intact []redness- no adverse reaction    []redness - adverse reaction:     15 min Therapeutic Exercise:  [x] See flow sheet :   Rationale: increase ROM, increase strength, and improve balance to improve the patients ability to ambulate with efficient gait pattern and LRAD and perform ADLs with minimal to no pain.           With   [x] TE   [] TA   [] Neuro   [] SC   [] other: Patient Education: [x] Review HEP    [] Progressed/Changed HEP based on:   [] positioning   [] body mechanics   [] transfers   [] heat/ice application    [] other:        Other Objective/Functional Measures: FOTO Functional Measure: 28/100                  Pain Level (0-10 scale) post treatment: 7/10      ASSESSMENT:      [x]  See Plan of [address], PT 11/8/2022

## 2022-11-10 ENCOUNTER — HOSPITAL ENCOUNTER (OUTPATIENT)
Dept: PHYSICAL THERAPY | Age: 62
Discharge: HOME | End: 2022-11-10
Payer: COMMERCIAL

## 2022-11-10 PROCEDURE — 97535 SELF CARE MNGMENT TRAINING: CPT

## 2022-11-10 PROCEDURE — 97110 THERAPEUTIC EXERCISES: CPT

## 2022-11-10 PROCEDURE — 97016 VASOPNEUMATIC DEVICE THERAPY: CPT

## 2022-11-10 NOTE — PROGRESS NOTES
PT DAILY TREATMENT NOTE 2-15    Patient Name: Kristi Gudino  Date:11/10/2022  : 1960  [x]  Patient  Verified  Payor: Suzan Slot / Plan: [address] [place] [address] HMO/CHOICE PLUS/POS / Product Type: HMO /    In time: 1:30 PM Out time: 2:39  Total Treatment Time (min): 71  Visit #:  2    Treatment Area: Left knee pain [M25.562]    SUBJECTIVE  Pain Level (0-10 scale): 7/10  Any medication changes, allergies to medications, adverse drug reactions, diagnosis change, or new procedure performed?: [x] No    [] Yes (see summary sheet for update)  Subjective functional status/changes:   [] No changes reported  Patient reports she is feeling very dizzy at beginning of session today as she was walking back into the clinic. She reports she has not eaten today and took a pain killer and anti-inflammatory earlier this afternoon. She has not checked her blood sugar levels today. Patient reports she was compliant with HEP everyday , but report she had 2 incidents of her knee buckling afterwards. OBJECTIVE    Modality rationale: decrease inflammation, decrease pain, and increase tissue extensibility to improve the patients ability to ambulate with efficient gait pattern and LRAD and perform ADLs with minimal to no pain.    Min Type Additional Details       [] Estim: []Att   []Unatt    []TENS instruct                  []IFC  []Premod   []NMES                     []Other:  []w/US   []w/ice   []w/heat  Position:  Location:       []  Traction: [] Cervical       []Lumbar                       [] Prone          []Supine                       []Intermittent   []Continuous Lbs:  [] before manual  [] after manual  []w/heat    []  Ultrasound: []Continuous   [] Pulsed                       at: []1MHz   []3MHz Location:  W/cm2:    [] Paraffin         Location:   []w/heat    []  Ice     []  Heat  []  Ice massage Position:  Location:    []  Laser  []  Other: Position:  Location:   nt   [x]  Vasopneumatic Device Pressure: [] lo [] med [] hi   Temperature:      [x] Skin assessment post-treatment:  [x]intact []redness- no adverse reaction    []redness - adverse reaction:         41 min Therapeutic Exercise:  [x] See flow sheet :   Rationale: increase ROM, increase strength, improve coordination, and improve balance to improve the patients ability to ambulate with efficient gait pattern and LRAD and perform ADLs with minimal to no pain. 8 min Self Care/Home Management:  [x]  See flow sheet : Time spent educating patient on the importance of nutrition, regular scheduled meals, and water intake prior to attending physical therapy and prior to taking any pain medication; discussed regularly checking BP and blood sugar levels to maintain within normal levels to allow for best possible participation in therapy       N/a min Manual Therapy:  L knee PROM per pt tolerance; STM around knee   Rationale: decrease pain, increase ROM, and increase tissue extensibility  to improve the patients ability to ambulate with efficient gait pattern and LRAD and perform ADLs with minimal to no pain. With   [x] TE   [] TA   [] Neuro   [] SC   [] other: Patient Education: [x] Review HEP    [] Progressed/Changed HEP based on:   [] positioning   [] body mechanics   [] transfers   [] heat/ice application    [] other:      Other Objective/Functional Measures:     Took patients BP at beginning of session and provided patient with small nutritious snack due to lack of eating and onset of dizzinesss:  BP: 128/86; 71 bpm  Patient reports improvement in dizziness     Pain Level (0-10 scale) post treatment: 8/10    ASSESSMENT/Changes in Function:   Patient tolerated treatment session fairly well. Patient tolerance to activity remains limited due to high pain levels. Patient demonstrates impaired quad activation performing quad sets and SAQs.  Patient has inc muscle fatigue, requiring decreased repetitions and increased rest breaks throughout all therapeutic exercises. Patient had one instance of knee hyperextension when attempting to initiate heel slide causing high levels of pain. Patient was able to rest and return to exercise. Patient quad demonstrates extreme fatigue post-tx. Patient will continue to benefit from skilled PT services to modify and progress therapeutic interventions, address functional mobility deficits, address ROM deficits, address strength deficits, analyze and address soft tissue restrictions, analyze and cue movement patterns, and analyze and modify body mechanics/ergonomics to attain remaining goals. [x]  See Plan of Care  []  See progress note/recertification  []  See Discharge Summary         Progress towards goals / Updated goals:  Short Term Goals: To be accomplished in 10-12 treatments:               Patient will demonstrate understanding of and compliance with HEP showing full participation in physical therapy. Patient will improve 5xSTS test to </50 seconds showing improving functional mobility. Patient will complete >/= 5 single limb heel raise repetitions on each leg showing improving LE strength and functional mobility. Patient will report reduced familiar symptoms by >/= 25% allowing for improving participation in daily tasks. Patient will demonstrate understanding/application of postural principles/recommendations to daily activities toward improved symptom management. Long Term Goals: To be accomplished in 18-20 treatments:               Patient will report minimal to no pain during work-related activities to show improving functional mobility and participation in daily tasks. Patient will improve FOTO score by the [employer] of 9 to >/= 37 showing significant improvement in their self-reported level of function. Patient will demonstrate improved single limb postural control >/= 5 sec  toward improved stability with prolonged walking tasks.   Patient will ambulate 200' with efficient gait pattern and LRAD toward improving functional mobility required for daily tasks and work tasks.     PLAN  [x]  Upgrade activities as tolerated     [x]  Continue plan of care  [x]  Update interventions per flow sheet       []  Discharge due to:_  []  Other:_      Yuri, PT 11/10/2022

## 2022-11-15 ENCOUNTER — OFFICE VISIT (OUTPATIENT)
Dept: ORTHOPEDIC SURGERY | Age: 62
End: 2022-11-15
Payer: COMMERCIAL

## 2022-11-15 VITALS
SYSTOLIC BLOOD PRESSURE: 167 MMHG | TEMPERATURE: 96.8 F | HEIGHT: 68 IN | OXYGEN SATURATION: 99 % | WEIGHT: 254 LBS | HEART RATE: 75 BPM | BODY MASS INDEX: 38.49 KG/M2 | DIASTOLIC BLOOD PRESSURE: 83 MMHG

## 2022-11-15 DIAGNOSIS — S83.92XA SPRAIN OF LEFT KNEE, UNSPECIFIED LIGAMENT, INITIAL ENCOUNTER: Primary | ICD-10-CM

## 2022-11-15 PROCEDURE — 99212 OFFICE O/P EST SF 10 MIN: CPT | Performed by: ORTHOPAEDIC SURGERY

## 2022-11-15 NOTE — PROGRESS NOTES
Identified pt with two pt identifiers (name and ). Reviewed chart in preparation for visit and have obtained necessary documentation. Zhou Rodriguez is a 57 y.o. female  Chief Complaint   Patient presents with    Knee Pain     LT Knee follow up     Visit Vitals  BP (!) 167/83 (BP 1 Location: Left upper arm, BP Patient Position: Sitting, BP Cuff Size: Large adult) Comment: Pt in pain today   Pulse 75   Temp 96.8 °F (36 °C) (Tympanic)   Ht 5' 8\" (1.727 m)   Wt 254 lb (115.2 kg)   SpO2 99%   BMI 38.62 kg/m²     1. Have you been to the ER, urgent care clinic since your last visit? Hospitalized since your last visit? No    2. Have you seen or consulted any other health care providers outside of the [address] [place] [address] since your last visit? Include any pap smears or colon screening.  No

## 2022-11-15 NOTE — LETTER
11/15/2022 9:04 AM    Ms. Reed Lovett  [address] [facility] [address]      To whom it may concern,         Reed Lovett is under the care of [facility]. Reed Lovett will be unable to work for medical reasons. Anticipated return to work will be one month. If you have any questions or concerns, please feel free to contact my office.                         Sincerely,      He Link, DO

## 2022-11-15 NOTE — PROGRESS NOTES
11/15/2022      CC: left knee pain    HPI:      This is a 57y.o. year old female who presents for a follow up visit. The patient was last seen and diagnosed with left knee sprain. The patient's treatments since the most recent visit have comprised of bracing, pain control, limited activity. The patient has had some relief of the chief complaint. PMH:  Past Medical History:   Diagnosis Date    Acute pancreatitis 8/9/2012    Acute pancreatitis 1/21/2011    Arthritis     both knees    Chronic pain     knees    Delivery normal     x1    Diabetes (HCC)     DJD (degenerative joint disease) of knee     Fatty liver 1/22/2011    GERD (gastroesophageal reflux disease)     Hypertension     Hypothyroidism     Obesity     PUD (peptic ulcer disease)     UTI (urinary tract infection) 1/21/2011       PSxHx:  Past Surgical History:   Procedure Laterality Date    HX BREAST BIOPSY Left     Surgical Bx 1990 - BENIGN    HX BREAST LUMPECTOMY      left breast    HX GYN      hysterectomy    HX ORTHOPAEDIC      left total knee replacement    HX ORTHOPAEDIC  6/16/15    RIGHT TOTAL KNEE ARTHROPLASTY     HX PARATHYROIDECTOMY  01/27/11       Meds:    Current Outpatient Medications:     HYDROcodone-acetaminophen (NORCO)  mg tablet, TAKE 1 TABLET BY MOUTH FOUR TIMES DAILY, Disp: , Rfl:     diclofenac EC (VOLTAREN) 50 mg EC tablet, Take 1 Tablet by mouth three (3) times daily. , Disp: 60 Tablet, Rfl: 1    meclizine (ANTIVERT) 25 mg tablet, Take 1 Tablet by mouth three (3) times daily as needed for Dizziness. , Disp: 30 Tablet, Rfl: 0    zolpidem (AMBIEN) 5 mg tablet, TAKE 1 TABLET BY MOUTH EVERY NIGHT AS NEEDED FOR SLEEP. MAX DAILY AMOUNT: 5 MG, Disp: 30 Tablet, Rfl: 2    loratadine-pseudoephedrine (Loratadine-D) 5-120 mg per tablet, Take 1 Tablet by mouth two (2) times a day., Disp: 20 Tablet, Rfl: 0    metFORMIN (GLUCOPHAGE) 1,000 mg tablet, Take 1 Tablet by mouth two (2) times daily (with meals). , Disp: 180 Tablet, Rfl: 1 glimepiride (AMARYL) 4 mg tablet, Take 2 Tablets by mouth daily. , Disp: 180 Tablet, Rfl: 1    atorvastatin (LIPITOR) 20 mg tablet, Take 1 Tablet by mouth daily. Indications: increased triglycerides and cholesterol, Disp: 90 Tablet, Rfl: 1    valsartan-hydroCHLOROthiazide (DIOVAN-HCT) 320-25 mg per tablet, Take 1 Tablet by mouth daily. , Disp: 90 Tablet, Rfl: 1    metoprolol succinate (TOPROL-XL) 100 mg tablet, Take 1 Tablet by mouth daily. FOR HIGH BLOOD PRESSURE, Disp: 90 Tablet, Rfl: 1    amLODIPine (NORVASC) 10 mg tablet, Take 1 Tablet by mouth daily. , Disp: 90 Tablet, Rfl: 1    empagliflozin (Jardiance) 25 mg tablet, Take 1 Tablet by mouth daily. , Disp: 30 Tablet, Rfl: 2    fluticasone propionate (FLONASE) 50 mcg/actuation nasal spray, SHAKE LIQUID AND USE 2 SPRAYS IN EACH NOSTRIL DAILY AS NEEDED FOR RHINITIS, Disp: 16 g, Rfl: 2    escitalopram oxalate (LEXAPRO) 20 mg tablet, Take 1 Tablet by mouth daily. (Patient not taking: No sig reported), Disp: 90 Tablet, Rfl: 1    ibuprofen (MOTRIN) 800 mg tablet, Take 1 Tablet by mouth every eight (8) hours as needed for Pain. Take with full meal (Patient not taking: No sig reported), Disp: 60 Tablet, Rfl: 2    All: Allergies   Allergen Reactions    Aspirin Other (comments)     Pt reports having a h/o gastric ulcers. Pt was on IBU when she was dx'd.        Social Hx:  Social History     Socioeconomic History    Marital status:    Tobacco Use    Smoking status: Former     Packs/day: 0.25     Years: 25.00     Pack years: 6.25     Types: Cigarettes     Quit date: 2008     Years since quittin.8    Smokeless tobacco: Never   Vaping Use    Vaping Use: Never used   Substance and Sexual Activity    Alcohol use: No     Alcohol/week: 0.0 standard drinks    Drug use: No    Sexual activity: Never       Family Hx:  Family History   Problem Relation Age of Onset    Cancer Mother     Hypertension Father     Diabetes Sister     Hypertension Sister     Diabetes Brother Hypertension Brother          Review of Systems:       General: Denies headache, lethargy, fever, weight loss  Ears/Nose/Throat: Denies ear discharge, drainage, nosebleeds, hoarse voice, dental problems  Cardiovascular: Denies chest pain, shortness of breath  Lungs: Denies chest pain, breathing problems, wheezing, pneumonia  Stomach: Denies stomach pain, heartburn, constipation, irritable bowel  Skin: Denies rash, sores, open wounds  Musculoskeletal: Left knee pain  Genitourinary: Denies dysuria, hematuria, polyuria  Gastrointestinal: Denies constipation, obstipation, diarrhea  Neurological: Denies changes in sight, smell, hearing, taste, seizures. Denies loss of consciousness. Psychiatric: Denies depression, sleep pattern changes, anxiety, change in personality  Endocrine: Denies mood swings, heat or cold intolerance  Hematologic/Lymphatic: Denies anemia, purpura, petechia  Allergic/Immunologic: Denies swelling of throat, pain or swelling at lymph nodes      Physical Examination:    Visit Vitals  BP (!) 167/83 (BP 1 Location: Left upper arm, BP Patient Position: Sitting, BP Cuff Size: Large adult)   Pulse 75   Temp 96.8 °F (36 °C) (Tympanic)   Ht 5' 8\" (1.727 m)   Wt 254 lb (115.2 kg)   SpO2 99%   BMI 38.62 kg/m²        General: AOX3, no apparent distress  Psychiatric: mood and affect appropriate  Lungs: breathing is symmetric and unlabored bilaterally  Heart: regular rate and rhythm  Abdomen: no guarding  Head: normocephalic, atraumatic  Skin: No significant abnormalities, good turgor  Sensation intact to light touch: C5-T1 dermatomes  Muscular exam: 5/5 strength in all major muscle groups unless noted in specialty exam.    Extremities        Left lower extremity:  No gross deformity. Generalized tenderness to palpation, no LCL or MCL instability. No restriction to range of motion of the hip, knee, ankle. Muscle bulk is appropriate without wasting.   Sensation is intact to light touch in the L1-S1 dermatomes. Able to do straight leg raise, no tendon gap, but minimal effort with leg extension. Capillary refill is less than 2 seconds in the fingers. Strength testing is 5/5 at the major muscle groups of the hip, knee, ankle. Right lower extremity: No gross deformity. No restriction to range of motion of the hip, knee, ankle. Muscle bulk is appropriate without wasting. Sensation is intact to light touch in the L1-S1 dermatomes. Capillary refill is less than 2 seconds in the fingers. Strength testing is 5/5 at the major muscle groups of the hip, knee, ankle. Diagnostics:    Pertinent Diagnostics: none today    Assessment: Sprain, left knee  Plan: This patient and I had a long discussion regarding treatment options, she will continue to have mobilization exercises, she is aware that it will take several more weeks before she has resolution of her symptoms entirely, she will continue to work on her mobility and pain control. One month follow up, no xrays. Ms. Mine Pearson has a reminder for a \"due or due soon\" health maintenance. I have asked that she contact her primary care provider for follow-up on this health maintenance.

## 2022-11-16 ENCOUNTER — HOSPITAL ENCOUNTER (OUTPATIENT)
Dept: PHYSICAL THERAPY | Age: 62
Discharge: HOME | End: 2022-11-16
Payer: COMMERCIAL

## 2022-11-16 PROCEDURE — 97016 VASOPNEUMATIC DEVICE THERAPY: CPT

## 2022-11-16 PROCEDURE — 97110 THERAPEUTIC EXERCISES: CPT

## 2022-11-16 NOTE — PROGRESS NOTES
PT DAILY TREATMENT NOTE 2-15    Patient Name: Asad Moraes  Date:2022  : 1960  [x]  Patient  Verified  Payor: [facility] / Plan: [facility] HMO/CHOICE PLUS/POS / Product Type: HMO /    In time: 11:00 PM Out time: 12:00  Total Treatment Time (min): 60  Visit #:  3    Treatment Area: Left knee pain [M25.562]    SUBJECTIVE  Pain Level (0-10 scale): 8/10  Any medication changes, allergies to medications, adverse drug reactions, diagnosis change, or new procedure performed?: [x] No    [] Yes (see summary sheet for update)  Subjective functional status/changes:   [] No changes reported  Patient reports she went to see Dr. Silver Avery for FU yesterday, told her to continue with therapy. Extended her leave from work for one more month. Pt reports she continues to have buckling on L knee. Reports she experienced some muscle spasms yesterday after walking up the stairs. Reports she feels like it is getting better, but still experiencing significant pain. Reports she has noticed an improvement in knee stability since learning how to properly put on her brace. OBJECTIVE    Modality rationale: decrease inflammation, decrease pain, and increase tissue extensibility to improve the patients ability to ambulate with efficient gait pattern and LRAD and perform ADLs with minimal to no pain.    Min Type Additional Details       [] Estim: []Att   []Unatt    []TENS instruct                  []IFC  []Premod   []NMES                     []Other:  []w/US   []w/ice   []w/heat  Position:  Location:       []  Traction: [] Cervical       []Lumbar                       [] Prone          []Supine                       []Intermittent   []Continuous Lbs:  [] before manual  [] after manual  []w/heat    []  Ultrasound: []Continuous   [] Pulsed                       at: []1MHz   []3MHz Location:  W/cm2:    [] Paraffin         Location:   []w/heat    []  Ice     []  Heat  []  Ice massage Position:  Location:    [] Laser  []  Other: Position:  Location:   nt   [x]  Vasopneumatic Device Pressure:       [] lo [] med [] hi   Temperature:      [x] Skin assessment post-treatment:  [x]intact []redness- no adverse reaction    []redness - adverse reaction:         45 min Therapeutic Exercise:  [x] See flow sheet :   Rationale: increase ROM, increase strength, improve coordination, and improve balance to improve the patients ability to ambulate with efficient gait pattern and LRAD and perform ADLs with minimal to no pain. N/a min Self Care/Home Management:  [x]  See flow sheet : Time spent educating patient on the importance of nutrition, regular scheduled meals, and water intake prior to attending physical therapy and prior to taking any pain medication; discussed regularly checking BP and blood sugar levels to maintain within normal levels to allow for best possible participation in therapy       N/a min Manual Therapy:  L knee PROM per pt tolerance; STM around knee   Rationale: decrease pain, increase ROM, and increase tissue extensibility  to improve the patients ability to ambulate with efficient gait pattern and LRAD and perform ADLs with minimal to no pain. With   [x] TE   [] TA   [] Neuro   [] SC   [] other: Patient Education: [x] Review HEP    [] Progressed/Changed HEP based on:   [] positioning   [] body mechanics   [] transfers   [] heat/ice application    [] other:      Other Objective/Functional Measures:   Patient ambulates into clinic with rollator (previously using SPC)    Pain Level (0-10 scale) post treatment: 6 /10    ASSESSMENT/Changes in Function:   Patient tolerated treatment session fairly well. Patient tolerance to activity remains limited due to high pain levels. Modified heel slides from supine to seated position and pt demonstrated improving muscle activation. Patient was able to perform partial LAQ at edge of bed between 90 - 45 deg knee flexion.  Updated HEP to include seated heel slides and LAQ exercise. Patient report of diminished familiar sx post-tx. Patient will continue to benefit from skilled PT services to modify and progress therapeutic interventions, address functional mobility deficits, address ROM deficits, address strength deficits, analyze and address soft tissue restrictions, analyze and cue movement patterns, and analyze and modify body mechanics/ergonomics to attain remaining goals. [x]  See Plan of Care  []  See progress note/recertification  []  See Discharge Summary         Progress towards goals / Updated goals:  Short Term Goals: To be accomplished in 10-12 treatments:               Patient will demonstrate understanding of and compliance with HEP showing full participation in physical therapy. Patient will improve 5xSTS test to </50 seconds showing improving functional mobility. Patient will complete >/= 5 single limb heel raise repetitions on each leg showing improving LE strength and functional mobility. Patient will report reduced familiar symptoms by >/= 25% allowing for improving participation in daily tasks. Patient will demonstrate understanding/application of postural principles/recommendations to daily activities toward improved symptom management. Long Term Goals: To be accomplished in 18-20 treatments:               Patient will report minimal to no pain during work-related activities to show improving functional mobility and participation in daily tasks. Patient will improve FOTO score by the [employer] of 9 to >/= 37 showing significant improvement in their self-reported level of function. Patient will demonstrate improved single limb postural control >/= 5 sec  toward improved stability with prolonged walking tasks. Patient will ambulate 200' with efficient gait pattern and LRAD toward improving functional mobility required for daily tasks and work tasks.     PLAN  [x]  Upgrade activities as tolerated     [x]  Continue plan of care  [x]  Update interventions per flow sheet       []  Discharge due to:_  []  Other:_      Yuri, PT 11/16/2022

## 2022-11-18 ENCOUNTER — APPOINTMENT (OUTPATIENT)
Dept: PHYSICAL THERAPY | Age: 62
End: 2022-11-18
Payer: COMMERCIAL

## 2022-11-18 DIAGNOSIS — S83.92XA SPRAIN OF LEFT KNEE, UNSPECIFIED LIGAMENT, INITIAL ENCOUNTER: Primary | ICD-10-CM

## 2022-11-18 RX ORDER — HYDROCODONE BITARTRATE AND ACETAMINOPHEN 10; 325 MG/1; MG/1
1 TABLET ORAL
Qty: 21 TABLET | Refills: 0 | Status: SHIPPED | OUTPATIENT
Start: 2022-11-18 | End: 2022-11-25

## 2022-11-21 ENCOUNTER — TELEPHONE (OUTPATIENT)
Dept: ORTHOPEDIC SURGERY | Age: 62
End: 2022-11-21

## 2022-11-21 NOTE — TELEPHONE ENCOUNTER
Patient was informed by her pharmacy that the doctor or his CMA needs to call in to authorize the patient's prescription of hydrocodone-acetaminophen to be filled.  The pharmacy is [facility] #[id number] and the phone number is [phone]

## 2022-11-22 ENCOUNTER — HOSPITAL ENCOUNTER (OUTPATIENT)
Dept: PHYSICAL THERAPY | Age: 62
Discharge: HOME | End: 2022-11-22
Payer: COMMERCIAL

## 2022-11-22 PROCEDURE — 97016 VASOPNEUMATIC DEVICE THERAPY: CPT

## 2022-11-22 PROCEDURE — 97110 THERAPEUTIC EXERCISES: CPT

## 2022-11-23 ENCOUNTER — TELEPHONE (OUTPATIENT)
Dept: ORTHOPEDIC SURGERY | Age: 62
End: 2022-11-23

## 2022-11-23 NOTE — TELEPHONE ENCOUNTER
Patient called and is asking for the prescription of Hydrocodone-acetaminophen date for 11/18/22 to 11/25/22 because it is causing a problem with her getting her medication prescribed by her pain management doctor to be filled. The pharmacy is [id number].  If you have any questions you can reach the patient at [phone]

## 2022-11-29 ENCOUNTER — HOSPITAL ENCOUNTER (OUTPATIENT)
Dept: PHYSICAL THERAPY | Age: 62
Discharge: HOME | End: 2022-11-29
Payer: COMMERCIAL

## 2022-11-29 PROCEDURE — 97110 THERAPEUTIC EXERCISES: CPT

## 2022-11-29 PROCEDURE — 97016 VASOPNEUMATIC DEVICE THERAPY: CPT

## 2022-11-29 NOTE — PROGRESS NOTES
PT DAILY TREATMENT NOTE 2-15    Patient Name: Rosey Kasper  Date:2022  : 1960  [x]  Patient  Verified  Payor: Michaele Fabry / Plan: [employer] HMO/CHOICE PLUS/POS / Product Type: HMO /    In time: 11:10 Out time: 12:10  Total Treatment Time (min): 60  Visit #:  5    Treatment Area: Left knee pain [M25.562]    SUBJECTIVE  Pain Level (0-10 scale): 6/10  Any medication changes, allergies to medications, adverse drug reactions, diagnosis change, or new procedure performed?: [x] No    [] Yes (see summary sheet for update)  Subjective functional status/changes:   [] No changes reported    Patient reports her pain has continued to get slightly better, but still experiences high pain levels. Patient reports the medial aspect of knee has not been swelling as much since previous session. Patient still has pain when twisting/turning in bed, but also notes slight improvements. OBJECTIVE    Modality rationale: decrease inflammation, decrease pain, and increase tissue extensibility to improve the patients ability to ambulate with efficient gait pattern and LRAD and perform ADLs with minimal to no pain.    Min Type Additional Details       [] Estim: []Att   []Unatt    []TENS instruct                  []IFC  []Premod   []NMES                     []Other:  []w/US   []w/ice   []w/heat  Position:  Location:       []  Traction: [] Cervical       []Lumbar                       [] Prone          []Supine                       []Intermittent   []Continuous Lbs:  [] before manual  [] after manual  []w/heat    []  Ultrasound: []Continuous   [] Pulsed                       at: []1MHz   []3MHz Location:  W/cm2:    [] Paraffin         Location:   []w/heat    []  Ice     []  Heat  []  Ice massage Position:  Location:    []  Laser  []  Other: Position:  Location:   nt   [x]  Vasopneumatic Device Pressure:       [] lo [] med [] hi   Temperature:      [x] Skin assessment post-treatment:  [x]intact []redness- no adverse reaction    []redness - adverse reaction:         45 min Therapeutic Exercise:  [x] See flow sheet :   Rationale: increase ROM, increase strength, improve coordination, and improve balance to improve the patients ability to ambulate with efficient gait pattern and LRAD and perform ADLs with minimal to no pain. N/a min Self Care/Home Management:  [x]  See flow sheet : Time spent educating patient on the importance of nutrition, regular scheduled meals, and water intake prior to attending physical therapy and prior to taking any pain medication; discussed regularly checking BP and blood sugar levels to maintain within normal levels to allow for best possible participation in therapy       N/a min Manual Therapy:  L knee PROM per pt tolerance; STM around knee   Rationale: decrease pain, increase ROM, and increase tissue extensibility  to improve the patients ability to ambulate with efficient gait pattern and LRAD and perform ADLs with minimal to no pain. With   [x] TE   [] TA   [] Neuro   [] SC   [] other: Patient Education: [x] Review HEP    [] Progressed/Changed HEP based on:   [] positioning   [] body mechanics   [] transfers   [] heat/ice application    [] other:      Other Objective/Functional Measures:      Pain Level (0-10 scale) post treatment: 6 /10    ASSESSMENT/Changes in Function:   Patient tolerated treatment session fairly well. Patient has slowly demonstrated improving tolerance to range of motion exercises and strengthening exercises. Patient exhibits improving quad activation, tolerating increased repetitions of SAQs and quad sets. Noted inc tenderness distal to joint line compared to previous sessions, but responded well to therex. Patient continues to exhibit high levels of muscular fatigue at end of session.  Patient will continue to benefit from skilled PT services to modify and progress therapeutic interventions, address functional mobility deficits, address ROM deficits, address strength deficits, analyze and address soft tissue restrictions, analyze and cue movement patterns, and analyze and modify body mechanics/ergonomics to attain remaining goals. [x]  See Plan of Care  []  See progress note/recertification  []  See Discharge Summary         Progress towards goals / Updated goals:  Short Term Goals: To be accomplished in 10-12 treatments:               Patient will demonstrate understanding of and compliance with HEP showing full participation in physical therapy. Patient will improve 5xSTS test to </50 seconds showing improving functional mobility. Patient will complete >/= 5 single limb heel raise repetitions on each leg showing improving LE strength and functional mobility. Patient will report reduced familiar symptoms by >/= 25% allowing for improving participation in daily tasks. Patient will demonstrate understanding/application of postural principles/recommendations to daily activities toward improved symptom management. Long Term Goals: To be accomplished in 18-20 treatments:               Patient will report minimal to no pain during work-related activities to show improving functional mobility and participation in daily tasks. Patient will improve FOTO score by the [employer] of 9 to >/= 37 showing significant improvement in their self-reported level of function. Patient will demonstrate improved single limb postural control >/= 5 sec  toward improved stability with prolonged walking tasks. Patient will ambulate 200' with efficient gait pattern and LRAD toward improving functional mobility required for daily tasks and work tasks.     PLAN  [x]  Upgrade activities as tolerated     [x]  Continue plan of care  [x]  Update interventions per flow sheet       []  Discharge due to:_  []  Other:_      Yuri, PT 11/29/2022

## 2022-12-06 ENCOUNTER — HOSPITAL ENCOUNTER (OUTPATIENT)
Dept: PHYSICAL THERAPY | Age: 62
Discharge: HOME | End: 2022-12-06
Payer: COMMERCIAL

## 2022-12-06 PROCEDURE — 97016 VASOPNEUMATIC DEVICE THERAPY: CPT

## 2022-12-06 PROCEDURE — 97110 THERAPEUTIC EXERCISES: CPT

## 2022-12-08 ENCOUNTER — HOSPITAL ENCOUNTER (OUTPATIENT)
Dept: PHYSICAL THERAPY | Age: 62
Discharge: HOME | End: 2022-12-08
Payer: COMMERCIAL

## 2022-12-08 PROCEDURE — 97016 VASOPNEUMATIC DEVICE THERAPY: CPT

## 2022-12-08 PROCEDURE — 97110 THERAPEUTIC EXERCISES: CPT

## 2022-12-08 PROCEDURE — 97140 MANUAL THERAPY 1/> REGIONS: CPT

## 2022-12-08 NOTE — PROGRESS NOTES
PT DAILY TREATMENT NOTE 2-15    Patient Name: Reed Lovett  Date:2022  : 1960  [x]  Patient  Verified  Payor: Faisal Doe / Plan: [employer] HMO/CHOICE PLUS/POS / Product Type: HMO /      In time: 10:30 Out time: 11:38  Total Treatment Time (min): 76  Visit #:  7    Treatment Area: Left knee pain [M25.562]    SUBJECTIVE  Pain Level (0-10 scale): 5/10  Any medication changes, allergies to medications, adverse drug reactions, diagnosis change, or new procedure performed?: [x] No    [] Yes (see summary sheet for update)  Subjective functional status/changes:   [] No changes reported    Patient ambulates into clinic using SPC and knee brace donned. Reports today was first day she started using the cane again instead of rollator. OBJECTIVE    Modality rationale: decrease inflammation, decrease pain, and increase tissue extensibility to improve the patients ability to ambulate with efficient gait pattern and LRAD and perform ADLs with minimal to no pain.    Min Type Additional Details       [] Estim: []Att   []Unatt    []TENS instruct                  []IFC  []Premod   []NMES                     []Other:  []w/US   []w/ice   []w/heat  Position:  Location:       []  Traction: [] Cervical       []Lumbar                       [] Prone          []Supine                       []Intermittent   []Continuous Lbs:  [] before manual  [] after manual  []w/heat    []  Ultrasound: []Continuous   [] Pulsed                       at: []1MHz   []3MHz Location:  W/cm2:    [] Paraffin         Location:   []w/heat    []  Ice     []  Heat  []  Ice massage Position:  Location:    []  Laser  []  Other: Position:  Location:   nt   [x]  Vasopneumatic Device Pressure:       [] lo [] med [] hi   Temperature:      [x] Skin assessment post-treatment:  [x]intact []redness- no adverse reaction    []redness - adverse reaction:         43 min Therapeutic Exercise:  [x] See flow sheet :   Rationale: increase ROM, increase strength, improve coordination, and improve balance to improve the patients ability to ambulate with efficient gait pattern and LRAD and perform ADLs with minimal to no pain. N/a min Self Care/Home Management:  [x]  See flow sheet : Time spent educating patient on the importance of nutrition, regular scheduled meals, and water intake prior to attending physical therapy and prior to taking any pain medication; discussed regularly checking BP and blood sugar levels to maintain within normal levels to allow for best possible participation in therapy       10 min Manual Therapy:  L knee PROM per pt tolerance supine and EOB; patella mobilizations   Rationale: decrease pain, increase ROM, and increase tissue extensibility  to improve the patients ability to ambulate with efficient gait pattern and LRAD and perform ADLs with minimal to no pain. With   [x] TE   [] TA   [] Neuro   [] SC   [] other: Patient Education: [x] Review HEP    [] Progressed/Changed HEP based on:   [] positioning   [] body mechanics   [] transfers   [] heat/ice application    [] other:      Other Objective/Functional Measures: Today vs (EVAL):   DL Heel Raises: >15x (5x, p! And minimal heel clearance bilaterally)  Single limb heel raise test: R = 5 repetitions L = unable, p! (Unable bilaterally )  Single limb stance, EO: R = 7 sec avg, L = 7 sec   (R = 2 seconds, L = <1 sec, p!)  5x STS:  19.81 sec      (56.16 sec L knee p! (20 in))  SLR: 10x (unable on LLE this visit)     L Knee flexion: AROM 100 deg (95 deg)         PROM 110 deg (-not tolerable)    FOTO functional score: 49/100 (28/100)    Pain Level (0-10 scale) post treatment: 6/10    ASSESSMENT/Changes in Function:   Patient tolerated treatment session well today. Patient is a 57year old female who has been seen for 7 visits since initial evaluation on 11/08/2022 due to left knee pain s/p fall down a flight of stairs on 10/23/2022.  Patient has made consistent progress towards all goals. Beginning of plan of care was limited due to high pain levels and decreased tolerance to activity, however patient has made recent strides in progress. Patient ambulates into clinic today with use of SPC and knee brace donned, previously using rollator. Patient demonstrates improving lower extremity strength via straight leg raise test, performing 2 sets of 10 repetitions; patient was unable to perform 1x repetition at initial evaluation. However, patient continues to exhibit some lower extremity muscle weakness, L>R, during single limb heel raise test. Pt performed 5 repetitions on right leg, but was unable on left due to pain. Pt demonstrates improving functional mobility via 5xSTS test (19.81 sec) compared to evaluation (previously 56.16 sec). Pt exhibits improving postural control bilaterally during single limb stance attempts. Pt maintained SLS for average of 7 sec on each leg, compared to R = 2 seconds, L = <1 sec at evaluation. Patient active/passive left knee range of motion has improved since evaluation as well. Pt exhibits 100 deg actively and 110 deg knee flexion passively. Pt continues to exhibit high levels of pain during ambulation and therapeutic exercises, but has subjective report of feeling 50% better compared to time of initial evaluation. Patient will continue to benefit from skilled PT services to modify and progress therapeutic interventions, address functional mobility deficits, address ROM deficits, address strength deficits, analyze and address soft tissue restrictions, analyze and cue movement patterns, and analyze and modify body mechanics/ergonomics to attain remaining goals. [x]  See Plan of Care  []  See progress note/recertification  []  See Discharge Summary         Progress towards goals / Updated goals:  Short Term Goals:  To be accomplished in 10-12 treatments:               Patient will demonstrate understanding of and compliance with HEP showing full participation in physical therapy. MET  Patient will improve 5xSTS test to </50 seconds showing improving functional mobility. MET  Patient will complete >/= 5 single limb heel raise repetitions on each leg showing improving LE strength and functional mobility. Progressing  Patient will report reduced familiar symptoms by >/= 25% allowing for improving participation in daily tasks. MET (subjective report: 50%)  Patient will demonstrate understanding/application of postural principles/recommendations to daily activities toward improved symptom management. MET     Long Term Goals: To be accomplished in 18-20 treatments:               Patient will report minimal to no pain during work-related activities to show improving functional mobility and participation in daily tasks. Progressing  Patient will [facility] the [facility] 9 to >/= 37 showing significant improvement in their self-reported level of function. MET  Patient will demonstrate improved single limb postural control >/= 5 sec  toward improved stability with prolonged walking tasks. MET  Patient will ambulate 200' with efficient gait pattern and LRAD toward improving functional mobility required for daily tasks and work tasks.  Progressing    PLAN  [x]  Upgrade activities as tolerated     [x]  Continue plan of care  [x]  Update interventions per flow sheet       []  Discharge due to:_  []  Other:_      Yuri, PT 12/8/2022

## 2022-12-08 NOTE — PROGRESS NOTES
[facility]  [address] [place] [facility], [facility] [address]  Phone: [phone] Fax: [phone]    Progress Note    Name: Deanna Fritz   : 1960   MD: Chau Garcia DO       Treatment Diagnosis: Left knee pain [M25.562]  Start of Care: 2022    Visits from Start of Care: 8  Missed Visits: 0    Summary of Care: Skilled physical therapy has consisted of therapeutic exercise, manual intervention, and modalities focused on improving overall lower extremity strength, static and dynamic balance, pain-free range of motion, and pain modulation. Assessment / Recommendations:    Patient is a 57year old female who has been seen for 7 visits since initial evaluation on 2022 due to left knee pain s/p fall down a flight of stairs on 10/23/2022. Patient has made consistent progress towards all goals. Beginning of plan of care was limited due to high pain levels and decreased tolerance to activity, however patient has made recent strides in progress. Patient ambulates into clinic today with use of SPC and knee brace donned, previously using rollator. Patient demonstrates improving lower extremity strength via straight leg raise test, performing 2 sets of 10 repetitions; patient was unable to perform 1x repetition at initial evaluation. However, patient continues to exhibit some lower extremity muscle weakness, L>R, during single limb heel raise test. Pt performed 5 repetitions on right leg, but was unable on left due to pain. Pt demonstrates improving functional mobility via 5xSTS test (19.81 sec) compared to evaluation (previously 56.16 sec). Pt exhibits improving postural control bilaterally during single limb stance attempts. Pt maintained SLS for average of 7 sec on each leg, compared to R = 2 seconds, L = <1 sec at evaluation. Patient active/passive left knee range of motion has improved since evaluation as well.  Pt exhibits 100 deg actively and 110 deg knee flexion passively. Pt continues to exhibit high levels of pain during ambulation and therapeutic exercises, but has subjective report of feeling 50% better compared to time of initial evaluation. Patient will continue to benefit from skilled PT services to modify and progress therapeutic interventions, address functional mobility deficits, address ROM deficits, address strength deficits, analyze and address soft tissue restrictions, analyze and cue movement patterns, and analyze and modify body mechanics/ergonomics to attain remaining goals. Other Objective/Functional Measures: Today vs (EVAL):   DL Heel Raises: >15x (5x, p! And minimal heel clearance bilaterally)  Single limb heel raise test: R = 5 repetitions L = unable, p! (Unable bilaterally )  Single limb stance, EO: R = 7 sec avg, L = 7 sec   (R = 2 seconds, L = <1 sec, p!)  5x STS:  19.81 sec      (56.16 sec L knee p! (20 in))  SLR: 10x (unable on LLE this visit)     L Knee flexion: AROM 100 deg (95 deg)         PROM 110 deg (-not tolerable)     FOTO functional score: 49/100 (28/100)    Progress towards goals / Updated goals:  Short Term Goals: To be accomplished in 10-12 treatments:               Patient will demonstrate understanding of and compliance with HEP showing full participation in physical therapy. MET  Patient will improve 5xSTS test to </50 seconds showing improving functional mobility. MET  Patient will complete >/= 5 single limb heel raise repetitions on each leg showing improving LE strength and functional mobility. Progressing  Patient will report reduced familiar symptoms by >/= 25% allowing for improving participation in daily tasks. MET (subjective report: 50%)  Patient will demonstrate understanding/application of postural principles/recommendations to daily activities toward improved symptom management. MET     Long Term Goals:  To be accomplished in 18-20 treatments:               Patient will report minimal to no pain during work-related activities to show improving functional mobility and participation in daily tasks. Progressing  Patient will [facility] 9 to >/= 37 showing significant improvement in their self-reported level of function. MET  Patient will demonstrate improved single limb postural control >/= 5 sec  toward improved stability with prolonged walking tasks. MET  Patient will ambulate 200' with efficient gait pattern and LRAD toward improving functional mobility required for daily tasks and work tasks.  Progressing    Other: Continue per POC  2x/week for 20 treatments        Yuri, PT 12/8/2022

## 2022-12-13 ENCOUNTER — HOSPITAL ENCOUNTER (OUTPATIENT)
Dept: PHYSICAL THERAPY | Age: 62
Discharge: HOME | End: 2022-12-13
Payer: COMMERCIAL

## 2022-12-13 PROCEDURE — 97110 THERAPEUTIC EXERCISES: CPT

## 2022-12-13 PROCEDURE — 97016 VASOPNEUMATIC DEVICE THERAPY: CPT

## 2022-12-13 PROCEDURE — 97140 MANUAL THERAPY 1/> REGIONS: CPT

## 2022-12-13 NOTE — PROGRESS NOTES
PT DAILY TREATMENT NOTE 2-15    Patient Name: Jose Man  Date:2022  : 1960  [x]  Patient  Verified  Payor: [employer] / Plan: [employer] HMO/CHOICE PLUS/POS / Product Type: HMO /      In time: 10:09 Out time: 11:15   Total Treatment Time (min): 77   Visit #:  8    Treatment Area: Left knee pain [M25.562]    SUBJECTIVE  Pain Level (0-10 scale): 7/10  Any medication changes, allergies to medications, adverse drug reactions, diagnosis change, or new procedure performed?: [x] No    [] Yes (see summary sheet for update)  Subjective functional status/changes:   [] No changes reported    Patient reports she went out running errands to [place] and other places (2-3 hours) yesterday and forgot their cane at home. Reports increased pain due to prolonged walking without cane. Reports she \"over did it\". OBJECTIVE    Modality rationale: decrease inflammation, decrease pain, and increase tissue extensibility to improve the patients ability to ambulate with efficient gait pattern and LRAD and perform ADLs with minimal to no pain.    Min Type Additional Details       [] Estim: []Att   []Unatt    []TENS instruct                  []IFC  []Premod   []NMES                     []Other:  []w/US   []w/ice   []w/heat  Position:  Location:       []  Traction: [] Cervical       []Lumbar                       [] Prone          []Supine                       []Intermittent   []Continuous Lbs:  [] before manual  [] after manual  []w/heat    []  Ultrasound: []Continuous   [] Pulsed                       at: []1MHz   []3MHz Location:  W/cm2:    [] Paraffin         Location:   []w/heat    []  Ice     []  Heat  []  Ice massage Position:  Location:    []  Laser  []  Other: Position:  Location:   nt   [x]  Vasopneumatic Device Pressure:       [] lo [] med [] hi   Temperature:      [x] Skin assessment post-treatment:  [x]intact []redness- no adverse reaction    []redness - adverse reaction:         38  min Therapeutic Exercise:  [x] See flow sheet :   Rationale: increase ROM, increase strength, improve coordination, and improve balance to improve the patients ability to ambulate with efficient gait pattern and LRAD and perform ADLs with minimal to no pain. N/a min Self Care/Home Management:  [x]  See flow sheet : Time spent educating patient on the importance of nutrition, regular scheduled meals, and water intake prior to attending physical therapy and prior to taking any pain medication; discussed regularly checking BP and blood sugar levels to maintain within normal levels to allow for best possible participation in therapy       13 min Manual Therapy: IASTM L quadricep/hamstring/TFL; L knee PROM per pt tolerance supine and EOB; patella mobilizations   Rationale: decrease pain, increase ROM, and increase tissue extensibility  to improve the patients ability to ambulate with efficient gait pattern and LRAD and perform ADLs with minimal to no pain. With   [x] TE   [] TA   [] Neuro   [] SC   [] other: Patient Education: [x] Review HEP    [] Progressed/Changed HEP based on:   [] positioning   [] body mechanics   [] transfers   [] heat/ice application    [] other:      Other Objective/Functional Measures:    Pain Level (0-10 scale) post treatment: 9/10    ASSESSMENT/Changes in Function:   Patient tolerated treatment session well today. Patient tolerance to activity was limited in today's session due to higher than baseline pain levels. Noted onset of quadricep muscle spasms due to pain attempting supine SLR exercise. Pt was unable to perform SLR in today's session, previously was able to perform 2x10 repetitions. Pt muscle spasms responded favorably to IASTM, with notable decrease in muscle turgor. Will continue to utilize manual intervention, therapeutic exercises, and modalities to improve symptom-management and pain control.  Patient will continue to benefit from skilled PT services to modify and progress therapeutic interventions, address functional mobility deficits, address ROM deficits, address strength deficits, analyze and address soft tissue restrictions, analyze and cue movement patterns, and analyze and modify body mechanics/ergonomics to attain remaining goals. [x]  See Plan of Care  []  See progress note/recertification  []  See Discharge Summary         Progress towards goals / Updated goals:  Short Term Goals: To be accomplished in 10-12 treatments:               Patient will demonstrate understanding of and compliance with HEP showing full participation in physical therapy. MET  Patient will improve 5xSTS test to </50 seconds showing improving functional mobility. MET  Patient will complete >/= 5 single limb heel raise repetitions on each leg showing improving LE strength and functional mobility. Progressing  Patient will report reduced familiar symptoms by >/= 25% allowing for improving participation in daily tasks. MET (subjective report: 50%)  Patient will demonstrate understanding/application of postural principles/recommendations to daily activities toward improved symptom management. MET     Long Term Goals: To be accomplished in 18-20 treatments:               Patient will report minimal to no pain during work-related activities to show improving functional mobility and participation in daily tasks. Progressing  Patient will improve FOTO score by the [employer] of 9 to >/= 37 showing significant improvement in their self-reported level of function. MET  Patient will demonstrate improved single limb postural control >/= 5 sec  toward improved stability with prolonged walking tasks. MET  Patient will ambulate 200' with efficient gait pattern and LRAD toward improving functional mobility required for daily tasks and work tasks.  Progressing    PLAN  [x]  Upgrade activities as tolerated     [x]  Continue plan of care  [x]  Update interventions per flow sheet       []  Discharge due to:_  [] Other:_      [employer], PT 12/13/2022

## 2022-12-15 ENCOUNTER — HOSPITAL ENCOUNTER (OUTPATIENT)
Dept: PHYSICAL THERAPY | Age: 62
Discharge: HOME | End: 2022-12-15
Payer: COMMERCIAL

## 2022-12-15 PROCEDURE — 97140 MANUAL THERAPY 1/> REGIONS: CPT

## 2022-12-15 PROCEDURE — 97016 VASOPNEUMATIC DEVICE THERAPY: CPT

## 2022-12-15 PROCEDURE — 97110 THERAPEUTIC EXERCISES: CPT

## 2022-12-15 NOTE — PROGRESS NOTES
PT DAILY TREATMENT NOTE 2-15    Patient Name: Last Jackson  Date:12/15/2022  : 1960  [x]  Patient  Verified  Payor: Vero Darrell / Plan: [employer] HMO/CHOICE PLUS/POS / Product Type: HMO /      In time: 10:09 Out time: 11:12   Total Treatment Time (min): 63  Visit #:  9    Treatment Area: Left knee pain [M25.562]    SUBJECTIVE  Pain Level (0-10 scale): 5/10  Any medication changes, allergies to medications, adverse drug reactions, diagnosis change, or new procedure performed?: [x] No    [] Yes (see summary sheet for update)  Subjective functional status/changes:   [] No changes reported    Patient reports her pain is back down to baseline after experiencing higher than normal pain levels last session. Reports increased pain lasted for about 24-48 hours after previous session. OBJECTIVE    Modality rationale: decrease inflammation, decrease pain, and increase tissue extensibility to improve the patients ability to ambulate with efficient gait pattern and LRAD and perform ADLs with minimal to no pain.    Min Type Additional Details       [] Estim: []Att   []Unatt    []TENS instruct                  []IFC  []Premod   []NMES                     []Other:  []w/US   []w/ice   []w/heat  Position:  Location:       []  Traction: [] Cervical       []Lumbar                       [] Prone          []Supine                       []Intermittent   []Continuous Lbs:  [] before manual  [] after manual  []w/heat    []  Ultrasound: []Continuous   [] Pulsed                       at: []1MHz   []3MHz Location:  W/cm2:    [] Paraffin         Location:   []w/heat    []  Ice     []  Heat  []  Ice massage Position:  Location:    []  Laser  []  Other: Position:  Location:   nt   [x]  Vasopneumatic Device Pressure:       [] lo [] med [] hi   Temperature:      [x] Skin assessment post-treatment:  [x]intact []redness- no adverse reaction    []redness - adverse reaction:         41  min Therapeutic Exercise:  [x] See flow sheet :   Rationale: increase ROM, increase strength, improve coordination, and improve balance to improve the patients ability to ambulate with efficient gait pattern and LRAD and perform ADLs with minimal to no pain. N/a min Self Care/Home Management:  [x]  See flow sheet : Time spent educating patient on the importance of nutrition, regular scheduled meals, and water intake prior to attending physical therapy and prior to taking any pain medication; discussed regularly checking BP and blood sugar levels to maintain within normal levels to allow for best possible participation in therapy       12 min Manual Therapy: IASTM L quadricep/hamstring/gastroc; L knee PROM per pt tolerance supine and EOB; patella mobilizations   Rationale: decrease pain, increase ROM, and increase tissue extensibility  to improve the patients ability to ambulate with efficient gait pattern and LRAD and perform ADLs with minimal to no pain. With   [x] TE   [] TA   [] Neuro   [] SC   [] other: Patient Education: [x] Review HEP    [] Progressed/Changed HEP based on:   [] positioning   [] body mechanics   [] transfers   [] heat/ice application    [] other:      Other Objective/Functional Measures:    Pain Level (0-10 scale) post treatment: 4/10    ASSESSMENT/Changes in Function:   Patient tolerated treatment session well today. Patient continues to have onset of muscle spasms throughout therapeutic exercises, requiring increased rest break between exercises. Patient is progressing slowly and demonstrates improving quad activation performing SAQs and LAQs. Discussed possibility of gradual return to light duties at work as pain control improves.  Patient will continue to benefit from skilled PT services to modify and progress therapeutic interventions, address functional mobility deficits, address ROM deficits, address strength deficits, analyze and address soft tissue restrictions, analyze and cue movement patterns, and analyze and modify body mechanics/ergonomics to attain remaining goals. [x]  See Plan of Care  [x]  See progress note/recertification  []  See Discharge Summary         Progress towards goals / Updated goals:  Short Term Goals: To be accomplished in 10-12 treatments:               Patient will demonstrate understanding of and compliance with HEP showing full participation in physical therapy. MET  Patient will improve 5xSTS test to </50 seconds showing improving functional mobility. MET  Patient will complete >/= 5 single limb heel raise repetitions on each leg showing improving LE strength and functional mobility. Progressing  Patient will report reduced familiar symptoms by >/= 25% allowing for improving participation in daily tasks. MET (subjective report: 50%)  Patient will demonstrate understanding/application of postural principles/recommendations to daily activities toward improved symptom management. MET     Long Term Goals: To be accomplished in 18-20 treatments:               Patient will report minimal to no pain during work-related activities to show improving functional mobility and participation in daily tasks. Progressing  Patient will improve FOTO score by the [facility] of 9 to >/= 37 showing significant improvement in their self-reported level of function. MET  Patient will demonstrate improved single limb postural control >/= 5 sec  toward improved stability with prolonged walking tasks. MET  Patient will ambulate 200' with efficient gait pattern and LRAD toward improving functional mobility required for daily tasks and work tasks.  Progressing    PLAN  [x]  Upgrade activities as tolerated     [x]  Continue plan of care  [x]  Update interventions per flow sheet       []  Discharge due to:_  []  Other:_      Josena, PT 12/15/2022

## 2022-12-16 ENCOUNTER — OFFICE VISIT (OUTPATIENT)
Dept: ORTHOPEDIC SURGERY | Age: 62
End: 2022-12-16
Payer: COMMERCIAL

## 2022-12-16 VITALS
TEMPERATURE: 96.3 F | HEART RATE: 84 BPM | BODY MASS INDEX: 38.65 KG/M2 | OXYGEN SATURATION: 97 % | WEIGHT: 255 LBS | HEIGHT: 68 IN | SYSTOLIC BLOOD PRESSURE: 145 MMHG | DIASTOLIC BLOOD PRESSURE: 77 MMHG

## 2022-12-16 DIAGNOSIS — S83.92XA SPRAIN OF LEFT KNEE, UNSPECIFIED LIGAMENT, INITIAL ENCOUNTER: Primary | ICD-10-CM

## 2022-12-16 PROCEDURE — 99213 OFFICE O/P EST LOW 20 MIN: CPT | Performed by: ORTHOPAEDIC SURGERY

## 2022-12-16 RX ORDER — HYDROCODONE BITARTRATE AND ACETAMINOPHEN 5; 325 MG/1; MG/1
1 TABLET ORAL
Qty: 42 TABLET | Refills: 0 | Status: SHIPPED | OUTPATIENT
Start: 2022-12-16 | End: 2022-12-23

## 2022-12-16 NOTE — PROGRESS NOTES
12/16/2022      CC: left knee pain    HPI:      This is a 57y.o. year old female who presents for a follow up visit. The patient was last seen and diagnosed with sprain left knee. The patient's treatments since the most recent visit have comprised of PT, norco.   The patient has had moderate relief of the chief complaint. PMH:  Past Medical History:   Diagnosis Date    Acute pancreatitis 8/9/2012    Acute pancreatitis 1/21/2011    Arthritis     both knees    Chronic pain     knees    Delivery normal     x1    Diabetes (HCC)     DJD (degenerative joint disease) of knee     Fatty liver 1/22/2011    GERD (gastroesophageal reflux disease)     Hypertension     Hypothyroidism     Obesity     PUD (peptic ulcer disease)     UTI (urinary tract infection) 1/21/2011       PSxHx:  Past Surgical History:   Procedure Laterality Date    HX BREAST BIOPSY Left     Surgical Bx 1990 - BENIGN    HX BREAST LUMPECTOMY      left breast    HX GYN      hysterectomy    HX ORTHOPAEDIC      left total knee replacement    HX ORTHOPAEDIC  6/16/15    RIGHT TOTAL KNEE ARTHROPLASTY     HX PARATHYROIDECTOMY  01/27/11       Meds:    Current Outpatient Medications:     diclofenac EC (VOLTAREN) 50 mg EC tablet, Take 1 Tablet by mouth three (3) times daily. , Disp: 60 Tablet, Rfl: 1    meclizine (ANTIVERT) 25 mg tablet, Take 1 Tablet by mouth three (3) times daily as needed for Dizziness. , Disp: 30 Tablet, Rfl: 0    zolpidem (AMBIEN) 5 mg tablet, TAKE 1 TABLET BY MOUTH EVERY NIGHT AS NEEDED FOR SLEEP. MAX DAILY AMOUNT: 5 MG, Disp: 30 Tablet, Rfl: 2    loratadine-pseudoephedrine (Loratadine-D) 5-120 mg per tablet, Take 1 Tablet by mouth two (2) times a day., Disp: 20 Tablet, Rfl: 0    metFORMIN (GLUCOPHAGE) 1,000 mg tablet, Take 1 Tablet by mouth two (2) times daily (with meals). , Disp: 180 Tablet, Rfl: 1    glimepiride (AMARYL) 4 mg tablet, Take 2 Tablets by mouth daily. , Disp: 180 Tablet, Rfl: 1    atorvastatin (LIPITOR) 20 mg tablet, Take 1 Tablet by mouth daily. Indications: increased triglycerides and cholesterol, Disp: 90 Tablet, Rfl: 1    valsartan-hydroCHLOROthiazide (DIOVAN-HCT) 320-25 mg per tablet, Take 1 Tablet by mouth daily. , Disp: 90 Tablet, Rfl: 1    metoprolol succinate (TOPROL-XL) 100 mg tablet, Take 1 Tablet by mouth daily. FOR HIGH BLOOD PRESSURE, Disp: 90 Tablet, Rfl: 1    amLODIPine (NORVASC) 10 mg tablet, Take 1 Tablet by mouth daily. , Disp: 90 Tablet, Rfl: 1    empagliflozin (Jardiance) 25 mg tablet, Take 1 Tablet by mouth daily. , Disp: 30 Tablet, Rfl: 2    fluticasone propionate (FLONASE) 50 mcg/actuation nasal spray, SHAKE LIQUID AND USE 2 SPRAYS IN EACH NOSTRIL DAILY AS NEEDED FOR RHINITIS, Disp: 16 g, Rfl: 2    escitalopram oxalate (LEXAPRO) 20 mg tablet, Take 1 Tablet by mouth daily. (Patient not taking: No sig reported), Disp: 90 Tablet, Rfl: 1    ibuprofen (MOTRIN) 800 mg tablet, Take 1 Tablet by mouth every eight (8) hours as needed for Pain. Take with full meal (Patient not taking: No sig reported), Disp: 60 Tablet, Rfl: 2    All: Allergies   Allergen Reactions    Aspirin Other (comments)     Pt reports having a h/o gastric ulcers. Pt was on IBU when she was dx'd.        Social Hx:  Social History     Socioeconomic History    Marital status:    Tobacco Use    Smoking status: Former     Packs/day: 0.25     Years: 25.00     Pack years: 6.25     Types: Cigarettes     Quit date: 2008     Years since quittin.9    Smokeless tobacco: Never   Vaping Use    Vaping Use: Never used   Substance and Sexual Activity    Alcohol use: No     Alcohol/week: 0.0 standard drinks    Drug use: No    Sexual activity: Never       Family Hx:  Family History   Problem Relation Age of Onset    Cancer Mother     Hypertension Father     Diabetes Sister     Hypertension Sister     Diabetes Brother     Hypertension Brother          Review of Systems:       General: Denies headache, lethargy, fever, weight loss  Ears/Nose/Throat: Denies ear discharge, drainage, nosebleeds, hoarse voice, dental problems  Cardiovascular: Denies chest pain, shortness of breath  Lungs: Denies chest pain, breathing problems, wheezing, pneumonia  Stomach: Denies stomach pain, heartburn, constipation, irritable bowel  Skin: Denies rash, sores, open wounds  Musculoskeletal: improving knee pain  Genitourinary: Denies dysuria, hematuria, polyuria  Gastrointestinal: Denies constipation, obstipation, diarrhea  Neurological: Denies changes in sight, smell, hearing, taste, seizures. Denies loss of consciousness. Psychiatric: Denies depression, sleep pattern changes, anxiety, change in personality  Endocrine: Denies mood swings, heat or cold intolerance  Hematologic/Lymphatic: Denies anemia, purpura, petechia  Allergic/Immunologic: Denies swelling of throat, pain or swelling at lymph nodes      Physical Examination:    Visit Vitals  BP (!) 145/77 (BP 1 Location: Right arm, BP Patient Position: Sitting, BP Cuff Size: Large adult)   Pulse 84   Temp (!) 96.3 °F (35.7 °C) (Tympanic)   Ht 5' 8\" (1.727 m)   Wt 255 lb (115.7 kg)   SpO2 97%   BMI 38.77 kg/m²        General: AOX3, no apparent distress  Psychiatric: mood and affect appropriate  Lungs: breathing is symmetric and unlabored bilaterally  Heart: regular rate and rhythm  Abdomen: no guarding  Head: normocephalic, atraumatic  Skin: No significant abnormalities, good turgor  Sensation intact to light touch: C5-T1 dermatomes  Muscular exam: 5/5 strength in all major muscle groups unless noted in specialty exam.    Extremities        Left lower extremity:  No gross deformity. No restriction to range of motion of the hip, knee, ankle. Muscle bulk is appropriate without wasting. Sensation is intact to light touch in the L1-S1 dermatomes. Capillary refill is less than 2 seconds in the fingers. Strength testing is 5/5 at the major muscle groups of the hip, knee, ankle. Right lower extremity: No gross deformity.   No restriction to range of motion of the hip, knee, ankle. Muscle bulk is appropriate without wasting. Sensation is intact to light touch in the L1-S1 dermatomes. Capillary refill is less than 2 seconds in the fingers. Strength testing is 5/5 at the major muscle groups of the hip, knee, ankle. Diagnostics:    Pertinent Diagnostics: none today    Assessment: sprain left knee  Plan:    Continue PT  Weight bearing as tolerated  Release to light duties at work  New prescriptions of norco given  Follow up 1 month for final check and work clearance. Ms. Ravindra Batista has a reminder for a \"due or due soon\" health maintenance. I have asked that she contact her primary care provider for follow-up on this health maintenance.

## 2022-12-16 NOTE — LETTER
12/16/2022 8:16 AM    Ms. Kristi Gudino  [address] [address]      To whom it may concern,         Kristi Gudino is under the care of [address] [place] [address] Specialists. Kristi Gudino will be released to work on 12/22/23 with the following restrictions: no lifting over 30 lbs. If you have any questions or concerns, please feel free to contact my office.                 Sincerely,      Naun Kent, DO

## 2022-12-16 NOTE — PROGRESS NOTES
Identified pt with two pt identifiers (name and ). Reviewed chart in preparation for visit and have obtained necessary documentation. Ilene Power is a 57 y.o. female  Chief Complaint   Patient presents with    Knee Pain     LT Knee     Visit Vitals  BP (!) 145/77 (BP 1 Location: Right arm, BP Patient Position: Sitting, BP Cuff Size: Large adult)   Pulse 84   Temp (!) 96.3 °F (35.7 °C) (Tympanic)   Ht 5' 8\" (1.727 m)   Wt 255 lb (115.7 kg)   SpO2 97%   BMI 38.77 kg/m²     1. Have you been to the ER, urgent care clinic since your last visit? Hospitalized since your last visit? No    2. Have you seen or consulted any other health care providers outside of the [address] [place] [address] since your last visit? Include any pap smears or colon screening.  No

## 2022-12-20 DIAGNOSIS — I15.2 HYPERTENSION COMPLICATING DIABETES (HCC): ICD-10-CM

## 2022-12-20 DIAGNOSIS — F33.9 EPISODE OF RECURRENT MAJOR DEPRESSIVE DISORDER, UNSPECIFIED DEPRESSION EPISODE SEVERITY (HCC): ICD-10-CM

## 2022-12-20 DIAGNOSIS — E11.59 HYPERTENSION COMPLICATING DIABETES (HCC): ICD-10-CM

## 2022-12-20 RX ORDER — VALSARTAN AND HYDROCHLOROTHIAZIDE 320; 25 MG/1; MG/1
1 TABLET, FILM COATED ORAL DAILY
Qty: 90 TABLET | Refills: 1 | Status: SHIPPED | OUTPATIENT
Start: 2022-12-20

## 2022-12-20 RX ORDER — ESCITALOPRAM OXALATE 20 MG/1
20 TABLET ORAL DAILY
Qty: 90 TABLET | Refills: 1 | Status: SHIPPED | OUTPATIENT
Start: 2022-12-20

## 2022-12-20 RX ORDER — METOPROLOL SUCCINATE 100 MG/1
100 TABLET, EXTENDED RELEASE ORAL DAILY
Qty: 90 TABLET | Refills: 1 | Status: SHIPPED | OUTPATIENT
Start: 2022-12-20

## 2023-01-05 ENCOUNTER — APPOINTMENT (OUTPATIENT)
Dept: PHYSICAL THERAPY | Age: 63
End: 2023-01-05

## 2023-01-10 ENCOUNTER — APPOINTMENT (OUTPATIENT)
Dept: PHYSICAL THERAPY | Age: 63
End: 2023-01-10

## 2023-01-12 ENCOUNTER — APPOINTMENT (OUTPATIENT)
Dept: PHYSICAL THERAPY | Age: 63
End: 2023-01-12

## 2023-01-17 ENCOUNTER — APPOINTMENT (OUTPATIENT)
Dept: PHYSICAL THERAPY | Age: 63
End: 2023-01-17

## 2023-01-19 ENCOUNTER — APPOINTMENT (OUTPATIENT)
Dept: PHYSICAL THERAPY | Age: 63
End: 2023-01-19

## 2023-01-24 ENCOUNTER — APPOINTMENT (OUTPATIENT)
Dept: PHYSICAL THERAPY | Age: 63
End: 2023-01-24

## 2023-01-26 ENCOUNTER — APPOINTMENT (OUTPATIENT)
Dept: PHYSICAL THERAPY | Age: 63
End: 2023-01-26

## 2023-02-19 ENCOUNTER — HOSPITAL ENCOUNTER (EMERGENCY)
Age: 63
Discharge: HOME | End: 2023-02-19
Attending: EMERGENCY MEDICINE
Payer: COMMERCIAL

## 2023-02-19 ENCOUNTER — APPOINTMENT (OUTPATIENT)
Dept: GENERAL RADIOLOGY | Age: 63
End: 2023-02-19
Attending: NURSE PRACTITIONER
Payer: COMMERCIAL

## 2023-02-19 VITALS
BODY MASS INDEX: 35 KG/M2 | SYSTOLIC BLOOD PRESSURE: 116 MMHG | RESPIRATION RATE: 18 BRPM | DIASTOLIC BLOOD PRESSURE: 71 MMHG | HEIGHT: 71 IN | HEART RATE: 81 BPM | OXYGEN SATURATION: 99 % | WEIGHT: 250 LBS | TEMPERATURE: 98.2 F

## 2023-02-19 DIAGNOSIS — M25.562 ACUTE PAIN OF LEFT KNEE: Primary | ICD-10-CM

## 2023-02-19 PROCEDURE — 73562 X-RAY EXAM OF KNEE 3: CPT

## 2023-02-19 PROCEDURE — 99283 EMERGENCY DEPT VISIT LOW MDM: CPT

## 2023-02-19 RX ORDER — FAMOTIDINE 20 MG/1
20 TABLET, FILM COATED ORAL 2 TIMES DAILY
Qty: 20 TABLET | Refills: 0 | Status: SHIPPED | OUTPATIENT
Start: 2023-02-19 | End: 2023-03-01

## 2023-02-19 RX ORDER — DIFLUNISAL 500 MG/1
500 TABLET, FILM COATED ORAL 2 TIMES DAILY
Qty: 20 TABLET | Refills: 0 | Status: SHIPPED | OUTPATIENT
Start: 2023-02-19

## 2023-02-19 NOTE — ED PROVIDER NOTES
[facility] EMERGENCY DEPT  EMERGENCY DEPARTMENT ENCOUNTER       Pt Name: Hemalatha Marroquin  MRN: [id number]  [place] 1960  Date of evaluation: 2/19/2023  Provider: Garcia Case NP   PCP: Matt Godinez MD  Note Started: 2:45 PM 2/19/23     CHIEF COMPLAINT       Chief Complaint   Patient presents with    Fall        HISTORY OF PRESENT ILLNESS: 1 or more elements      History From: Patient  HPI Limitations : None     Hemalatha Marroquin is a 57 y.o. female who presents wheelchair to the emergency department. Patient complains of left knee pain. Patient states she tripped over a blanket on the floor and twisted her knee. States she has had a previous left knee replacement. States she has been taking more of her routine medication at home for the pain. Patient is on pain management. She needs more medication to hold her over until Friday. Nursing Notes were all reviewed and agreed with or any disagreements were addressed in the HPI. REVIEW OF SYSTEMS      Review of Systems   Constitutional:  Negative for fatigue and fever. Respiratory:  Negative for shortness of breath and wheezing. Cardiovascular:  Negative for chest pain. Gastrointestinal:  Negative for abdominal pain. Musculoskeletal:  Negative for arthralgias, myalgias, neck pain and neck stiffness. Knee pain   Skin:  Negative for pallor and rash. Neurological:  Negative for dizziness, tremors and headaches. All other systems reviewed and are negative. Positives and Pertinent negatives as per HPI.     PAST HISTORY     Past Medical History:  Past Medical History:   Diagnosis Date    Acute pancreatitis 8/9/2012    Acute pancreatitis 1/21/2011    Arthritis     both knees    Chronic pain     knees    Delivery normal     x1    Diabetes (HCC)     DJD (degenerative joint disease) of knee     Fatty liver 1/22/2011    GERD (gastroesophageal reflux disease)     Hypertension     Hypothyroidism     Obesity     PUD (peptic ulcer disease) UTI (urinary tract infection) 1/21/2011       Past Surgical History:  Past Surgical History:   Procedure Laterality Date    HX BREAST BIOPSY Left     Surgical Bx 1990 - BENIGN    HX BREAST LUMPECTOMY      left breast    HX GYN      hysterectomy    HX ORTHOPAEDIC      left total knee replacement    HX ORTHOPAEDIC  6/16/15    RIGHT TOTAL KNEE ARTHROPLASTY     HX PARATHYROIDECTOMY  01/27/11       Family History:  Family History   Problem Relation Age of Onset    Cancer Mother     Hypertension Father     Diabetes Sister     Hypertension Sister     Diabetes Brother     Hypertension Brother        Social History:  Social History     Tobacco Use    Smoking status: Former     Packs/day: 0.25     Years: 25.00     Pack years: 6.25     Types: Cigarettes     Quit date: 1/24/2008     Years since quitting: 15.0    Smokeless tobacco: Never   Vaping Use    Vaping Use: Never used   Substance Use Topics    Alcohol use: No     Alcohol/week: 0.0 standard drinks    Drug use: No       Allergies: Allergies   Allergen Reactions    Aspirin Other (comments)     Pt reports having a h/o gastric ulcers. Pt was on IBU when she was dx'd. CURRENT MEDICATIONS      Discharge Medication List as of 2/19/2023  3:52 PM        CONTINUE these medications which have NOT CHANGED    Details   zolpidem (AMBIEN) 5 mg tablet TAKE 1 TABLET BY MOUTH EVERY NIGHT AS NEEDED FOR SLEEP. MAX DAILY AMOUNT: 5 MG, Normal, Disp-30 Tablet, R-0      empagliflozin (Jardiance) 25 mg tablet Take 1 Tablet by mouth daily. , Normal, Disp-90 Tablet, R-3      valsartan-hydroCHLOROthiazide (DIOVAN-HCT) 320-25 mg per tablet TAKE 1 TABLET BY MOUTH DAILY, Normal, Disp-90 Tablet, R-1      metoprolol succinate (TOPROL-XL) 100 mg tablet TAKE 1 TABLET BY MOUTH DAILY FOR HIGH BLOOD PRESSURE, Normal, Disp-90 Tablet, R-1      escitalopram oxalate (LEXAPRO) 20 mg tablet TAKE 1 TABLET BY MOUTH DAILY, Normal, Disp-90 Tablet, R-1      diclofenac EC (VOLTAREN) 50 mg EC tablet Take 1 Tablet by mouth three (3) times daily. , Normal, Disp-60 Tablet, R-1      meclizine (ANTIVERT) 25 mg tablet Take 1 Tablet by mouth three (3) times daily as needed for Dizziness., Normal, Disp-30 Tablet, R-0      loratadine-pseudoephedrine (Loratadine-D) 5-120 mg per tablet Take 1 Tablet by mouth two (2) times a day., Normal, Disp-20 Tablet, R-0      metFORMIN (GLUCOPHAGE) 1,000 mg tablet Take 1 Tablet by mouth two (2) times daily (with meals). , Normal, Disp-180 Tablet, R-1      glimepiride (AMARYL) 4 mg tablet Take 2 Tablets by mouth daily. , Normal, Disp-180 Tablet, R-1      atorvastatin (LIPITOR) 20 mg tablet Take 1 Tablet by mouth daily. Indications: increased triglycerides and cholesterol, Normal, Disp-90 Tablet, R-1      amLODIPine (NORVASC) 10 mg tablet Take 1 Tablet by mouth daily. , Normal, Disp-90 Tablet, R-1      ibuprofen (MOTRIN) 800 mg tablet Take 1 Tablet by mouth every eight (8) hours as needed for Pain. Take with full meal, Normal, Disp-60 Tablet, R-2      fluticasone propionate (FLONASE) 50 mcg/actuation nasal spray SHAKE LIQUID AND USE 2 SPRAYS IN EACH NOSTRIL DAILY AS NEEDED FOR RHINITIS, Normal, Disp-16 g, R-2             PHYSICAL EXAM      ED Triage Vitals [02/19/23 1418]   ED Encounter Vitals Group      /71      Pulse (Heart Rate) 81      Resp Rate 18      Temp 98.2 °F (36.8 °C)      Temp src       O2 Sat (%) 99 %      Weight 250 lb      Height 5' 11\"        Physical Exam  Vitals and nursing note reviewed. Constitutional:       General: She is not in acute distress. Appearance: Normal appearance. She is well-developed. HENT:      Head: Normocephalic and atraumatic. Right Ear: External ear normal.      Left Ear: External ear normal.      Nose: Nose normal.      Mouth/Throat:      Mouth: Mucous membranes are moist.   Eyes:      Conjunctiva/sclera: Conjunctivae normal.   Cardiovascular:      Rate and Rhythm: Normal rate and regular rhythm. Heart sounds: Normal heart sounds. Pulmonary:      Effort: Pulmonary effort is normal. No respiratory distress. Breath sounds: Normal breath sounds. No wheezing. Abdominal:      General: Bowel sounds are normal.      Palpations: Abdomen is soft. Tenderness: There is no abdominal tenderness. Musculoskeletal:         General: Normal range of motion. Cervical back: Normal range of motion and neck supple. Comments: Swelling noted of left knee. Well-defined scar. No bruising noted DNV is intact. Lymphadenopathy:      Cervical: No cervical adenopathy. Skin:     General: Skin is warm and dry. Findings: No rash. Neurological:      Mental Status: She is alert and oriented to person, place, and time. Cranial Nerves: No cranial nerve deficit. Coordination: Coordination normal.   Psychiatric:         Behavior: Behavior normal.         Thought Content: Thought content normal.         Judgment: Judgment normal.        DIAGNOSTIC RESULTS   LABS:     No results found for this or any previous visit (from the past 12 hour(s)). EKG: When ordered, EKG's are interpreted by the Emergency Department Physician in the absence of a cardiologist.  Please see their note for interpretation of EKG. RADIOLOGY:  Non-plain film images such as CT, Ultrasound and MRI are read by the radiologist. Plain radiographic images are visualized and preliminarily interpreted by the ED Provider with the below findings:          Interpretation per the Radiologist below, if available at the time of this note:     XR KNEE LT 3 V    Result Date: 2/19/2023  EXAM: XR KNEE LT 3 V INDICATION: trauma. COMPARISON: 10/23/2022. FINDINGS: Three views of the left knee demonstrate no fracture. There is a left knee prosthesis without fracture or dislocation or loosening. A small joint effusion is seen. No hardware failure.  Small joint effusion       PROCEDURES   Unless otherwise noted below, none  Procedures     CRITICAL CARE TIME       EMERGENCY DEPARTMENT COURSE and DIFFERENTIAL DIAGNOSIS/MDM   Vitals:    Vitals:    02/19/23 1418 02/19/23 1602   BP: 116/71    Pulse: 81    Resp: 18 18   Temp: 98.2 °F (36.8 °C)    SpO2: 99%    Weight: 113.4 kg (250 lb)    Height: 5' 11\" (1.803 m)         Patient was given the following medications:  Medications - No data to display    CONSULTS: (Who and What was discussed)  None    Chronic Conditions: Diabetes hypothyroid hypertension    Social Determinants affecting Dx or Tx: None    Records Reviewed (source and summary of external records): Nursing notes    CC/HPI Summary, DDx, ED Course, and Reassessment: 49-year-old female presents with left knee pain. States she tripped and fell onto the floor after stepping on a blanket that was on the floor. States she has had previous left knee replacement. Reports pain and swelling. States she has taken more than her required pain management medications. States she needs some medication to hold her over till Friday when her medication is refilled. On exam noticed mild swelling of the knee with no bruising no deformities well-defined scar DNV is intact will order x-ray reevaluate. Differential diagnosis disruption of hardware knee sprain knee effusion    ED Course as of 02/19/23 2318   Sun Feb 19, 2023   1511 Xray no hardware failure. Small effusion. will discharge. [AN]   1550 Discussed pain medications with patient. Will prescribe Dolobid patient is in agreement. [AN]      ED Course User Index  [AN] Merissa Orozco NP       Disposition Considerations (Tests not done, Shared Decision Making, Pt Expectation of Test or Tx.):      FINAL IMPRESSION     1. Acute pain of left knee          DISPOSITION/PLAN   Discharged    Discharge Note: The patient is stable for discharge home. The signs, symptoms, diagnosis, and discharge instructions have been discussed, understanding conveyed, and agreed upon.  The patient is to follow up as recommended or return to ER should their symptoms worsen. PATIENT REFERRED TO:  Follow-up Information       Follow up With Specialties Details Why Contact Info    Lesly Remy MD Internal Medicine Physician   [address] [place] [address] [address]  [phone]                DISCHARGE MEDICATIONS:  Discharge Medication List as of 2/19/2023  3:52 PM        START taking these medications    Details   diflunisaL (DOLOBID) 500 mg tab Take 1 Tablet by mouth two (2) times a day., Normal, Disp-20 Tablet, R-0      famotidine (Pepcid) 20 mg tablet Take 1 Tablet by mouth two (2) times a day for 10 days. , Normal, Disp-20 Tablet, R-0           CONTINUE these medications which have NOT CHANGED    Details   zolpidem (AMBIEN) 5 mg tablet TAKE 1 TABLET BY MOUTH EVERY NIGHT AS NEEDED FOR SLEEP. MAX DAILY AMOUNT: 5 MG, Normal, Disp-30 Tablet, R-0      empagliflozin (Jardiance) 25 mg tablet Take 1 Tablet by mouth daily. , Normal, Disp-90 Tablet, R-3      valsartan-hydroCHLOROthiazide (DIOVAN-HCT) 320-25 mg per tablet TAKE 1 TABLET BY MOUTH DAILY, Normal, Disp-90 Tablet, R-1      metoprolol succinate (TOPROL-XL) 100 mg tablet TAKE 1 TABLET BY MOUTH DAILY FOR HIGH BLOOD PRESSURE, Normal, Disp-90 Tablet, R-1      escitalopram oxalate (LEXAPRO) 20 mg tablet TAKE 1 TABLET BY MOUTH DAILY, Normal, Disp-90 Tablet, R-1      diclofenac EC (VOLTAREN) 50 mg EC tablet Take 1 Tablet by mouth three (3) times daily. , Normal, Disp-60 Tablet, R-1      meclizine (ANTIVERT) 25 mg tablet Take 1 Tablet by mouth three (3) times daily as needed for Dizziness., Normal, Disp-30 Tablet, R-0      loratadine-pseudoephedrine (Loratadine-D) 5-120 mg per tablet Take 1 Tablet by mouth two (2) times a day., Normal, Disp-20 Tablet, R-0      metFORMIN (GLUCOPHAGE) 1,000 mg tablet Take 1 Tablet by mouth two (2) times daily (with meals). , Normal, Disp-180 Tablet, R-1      glimepiride (AMARYL) 4 mg tablet Take 2 Tablets by mouth daily. , Normal, Disp-180 Tablet, R-1      atorvastatin (LIPITOR) 20 mg tablet Take 1 Tablet by mouth daily. Indications: increased triglycerides and cholesterol, Normal, Disp-90 Tablet, R-1      amLODIPine (NORVASC) 10 mg tablet Take 1 Tablet by mouth daily. , Normal, Disp-90 Tablet, R-1      ibuprofen (MOTRIN) 800 mg tablet Take 1 Tablet by mouth every eight (8) hours as needed for Pain. Take with full meal, Normal, Disp-60 Tablet, R-2      fluticasone propionate (FLONASE) 50 mcg/actuation nasal spray SHAKE LIQUID AND USE 2 SPRAYS IN EACH NOSTRIL DAILY AS NEEDED FOR RHINITIS, Normal, Disp-16 g, R-2               DISCONTINUED MEDICATIONS:  Discharge Medication List as of 2/19/2023  3:52 PM          ED Attending Involvement : I have seen and evaluated the patient. My supervision physician was available for consultation. I am the Primary Clinician of Record. Vance Plata NP (electronically signed)    (Please note that parts of this dictation were completed with voice recognition software. Quite often unanticipated grammatical, syntax, homophones, and other interpretive errors are inadvertently transcribed by the computer software. Please disregards these errors.  Please excuse any errors that have escaped final proofreading.)

## 2023-02-19 NOTE — ED NOTES
Patient here with c/o fall and left knee pain. 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 Patient denies LOC with fall. Patient states that she landed on her left knee, states that she has a hx of a total knee replacement, and stated concern with the pain and swelling. Patient states that she has an appointment with her doctor next Friday. Emergency Department Nursing Plan of Care       The Nursing Plan of Care is developed from the Nursing assessment and Emergency Department Attending provider initial evaluation. The plan of care may be reviewed in the ED Provider note.     The Plan of Care was developed with the following considerations:   Patient / Family readiness to learn indicated by:verbalized understanding  Persons(s) to be included in education: patient  Barriers to Learning/Limitations:No    Signed     Opal Pope RN    2/19/2023   2:40 PM

## 2023-03-22 ENCOUNTER — OFFICE VISIT (OUTPATIENT)
Dept: INTERNAL MEDICINE CLINIC | Age: 63
End: 2023-03-22

## 2023-03-22 VITALS
DIASTOLIC BLOOD PRESSURE: 74 MMHG | OXYGEN SATURATION: 95 % | SYSTOLIC BLOOD PRESSURE: 129 MMHG | WEIGHT: 251 LBS | RESPIRATION RATE: 18 BRPM | HEART RATE: 84 BPM | HEIGHT: 71 IN | TEMPERATURE: 97.8 F | BODY MASS INDEX: 35.14 KG/M2

## 2023-03-22 DIAGNOSIS — I15.2 HYPERTENSION COMPLICATING DIABETES (HCC): ICD-10-CM

## 2023-03-22 DIAGNOSIS — E11.40 TYPE 2 DIABETES MELLITUS WITH DIABETIC NEUROPATHY, WITHOUT LONG-TERM CURRENT USE OF INSULIN (HCC): Primary | ICD-10-CM

## 2023-03-22 DIAGNOSIS — F41.8 ANXIETY WITH DEPRESSION: ICD-10-CM

## 2023-03-22 DIAGNOSIS — M54.16 LUMBAR RADICULOPATHY: ICD-10-CM

## 2023-03-22 DIAGNOSIS — F51.04 CHRONIC INSOMNIA: ICD-10-CM

## 2023-03-22 DIAGNOSIS — M47.22 OSTEOARTHRITIS OF SPINE WITH RADICULOPATHY, CERVICAL REGION: ICD-10-CM

## 2023-03-22 DIAGNOSIS — E78.00 PURE HYPERCHOLESTEROLEMIA: ICD-10-CM

## 2023-03-22 DIAGNOSIS — E66.01 MORBID OBESITY (HCC): ICD-10-CM

## 2023-03-22 DIAGNOSIS — M17.0 PRIMARY OSTEOARTHRITIS OF BOTH KNEES: ICD-10-CM

## 2023-03-22 DIAGNOSIS — Z79.891 CHRONIC PRESCRIPTION OPIATE USE: ICD-10-CM

## 2023-03-22 DIAGNOSIS — F33.9 EPISODE OF RECURRENT MAJOR DEPRESSIVE DISORDER, UNSPECIFIED DEPRESSION EPISODE SEVERITY (HCC): ICD-10-CM

## 2023-03-22 DIAGNOSIS — E11.59 HYPERTENSION COMPLICATING DIABETES (HCC): ICD-10-CM

## 2023-03-22 LAB
GLUCOSE POC: 255 MG/DL
HBA1C MFR BLD HPLC: 10.8 %

## 2023-03-22 NOTE — PROGRESS NOTES
Gio Avitia is a 70 y.o. female  HIPAA verified by two patient identifiers. Health Maintenance Due   Topic    Pneumococcal 65+ years (1 - PCV)    Foot Exam Q1     DTaP/Tdap/Td series (1 - Tdap)    Cervical cancer screen     Shingles Vaccine (1 of 2)    Eye Exam Retinal or Dilated     Breast Cancer Screen Mammogram     Colorectal Cancer Screening Combo     COVID-19 Vaccine (3 - Booster for Pfizer series)    Diabetic Alb to Cr ratio (uACR) test     Lipid Screen     Flu Vaccine (1)    Bone Densitometry (Dexa) Screening      No chief complaint on file. Visit Vitals  /74   Pulse 84   Temp 97.8 °F (36.6 °C) (Temporal)   Resp 18   Ht 5' 11\" (1.803 m)   Wt 251 lb (113.9 kg)   SpO2 95%   BMI 35.01 kg/m²       Pain Scale: 9/10  Pain Location: Generalized  1. Have you been to the ER, urgent care clinic since your last visit? Hospitalized since your last visit? No    2. Have you seen or consulted any other health care providers outside of the [address] [place] [address] since your last visit? Include any pap smears or colon screening.  No

## 2023-03-22 NOTE — PROGRESS NOTES
Lia Ni is a 70 y.o. female and presents with No chief complaint on file. .  Subjective:    Pt relays she is taking care of her 2 ill brothers and she cannot focus on her health    PMH:  Hypertension Review:  The patient has essential hypertension  Diet and Lifestyle: generally follows a  low sodium diet, exercises never  Home BP Monitoring: is not measured at home. Pertinent ROS: taking medications as instructed, no medication side effects noted, no TIA's, no chest pain on exertion, no dyspnea on exertion, no swelling of ankles. BP Readings from Last 3 Encounters:   03/22/23 129/74   02/19/23 116/71   12/16/22 (!) 145/77     Type 2 DM-on metformin, aglimepiride and [place]   -pt admits to dietary indiscretions and elevated blood sugars   -PT REFUSES INSULIN INITIATION.  She is well aware of risks of end organ damage   -PT REFUSES GLP BC SHE DOES NOT WIOSH TO LOSE WEIGHT  Lab Results   Component Value Date/Time    Hemoglobin A1c 9.6 (H) 07/13/2021 04:20 PM    Hemoglobin A1c (POC) 10.8 03/22/2023 03:09 PM     Lab Results   Component Value Date/Time    Glucose 295 (H) 07/26/2022 06:07 PM    Glucose (POC) 295 (H) 07/26/2022 06:02 PM    Glucose  03/22/2023 03:01 PM         Hyperlipidemia-on atorvastatin 20mg   -  Lab Results   Component Value Date/Time    Cholesterol, total 128 07/13/2021 04:20 PM    Cholesterol (POC) <100 09/19/2019 03:36 PM    HDL Cholesterol 37 07/13/2021 04:20 PM    HDL Cholesterol (POC) 25 09/19/2019 03:36 PM    LDL Cholesterol (POC) N/A 09/19/2019 03:36 PM    LDL, calculated 33.4 07/13/2021 04:20 PM    VLDL, calculated 57.6 07/13/2021 04:20 PM    Triglyceride 288 (H) 07/13/2021 04:20 PM    Triglycerides (POC) 354 09/19/2019 03:36 PM    CHOL/HDL Ratio 3.5 07/13/2021 04:20 PM       Morbid obesity-pt reports she is thin enough and does not want to lose much more weight  Wt Readings from Last 3 Encounters:   03/22/23 251 lb (113.9 kg)   02/19/23 250 lb (113.4 kg)   12/16/22 255 lb (115.7 kg)     Chronic Pain-pt is seeing Pain Mngmnt and is on a regimen of gabapentin, tramadol and oxycodone and percocet   -pt request a refill of her painmed, until she sees her pain mngmnt provider in 2 days      Chronic insomnia- on zolpidem 5mg          Review of Systems  Constitutional: negative for fevers, chills, anorexia and weight loss  Respiratory:  negative for cough, hemoptysis, dyspnea,wheezing  CV:   negative for chest pain, palpitations, lower extremity edema  GI:   negative for nausea, vomiting, diarrhea, abdominal pain,melena  Musculoskel: positive for myalgias, arthralgias, back pain, joint pain  Neurological:  negative for headaches, dizziness, vertigo, memory problems and gait   Behavl/Psych: negative for feelings of anxiety, depression, mood changes    Past Medical History:   Diagnosis Date    Acute pancreatitis 8/9/2012    Acute pancreatitis 1/21/2011    Arthritis     both knees    Chronic pain     knees    Delivery normal     x1    Diabetes (HCC)     DJD (degenerative joint disease) of knee     Fatty liver 1/22/2011    GERD (gastroesophageal reflux disease)     Hypertension     Hypothyroidism     Obesity     PUD (peptic ulcer disease)     UTI (urinary tract infection) 1/21/2011     Past Surgical History:   Procedure Laterality Date    HX BREAST BIOPSY Left     Surgical Bx 1990 - BENIGN    HX BREAST LUMPECTOMY      left breast    HX GYN      hysterectomy    HX ORTHOPAEDIC      left total knee replacement    HX ORTHOPAEDIC  6/16/15    RIGHT TOTAL KNEE ARTHROPLASTY     HX PARATHYROIDECTOMY  01/27/11     Social History     Socioeconomic History    Marital status:    Tobacco Use    Smoking status: Former     Packs/day: 0.25     Years: 25.00     Pack years: 6.25     Types: Cigarettes     Quit date: 1/24/2008     Years since quitting: 15.1    Smokeless tobacco: Never   Vaping Use    Vaping Use: Never used   Substance and Sexual Activity    Alcohol use: No     Alcohol/week: 0.0 standard drinks    Drug use: No    Sexual activity: Never     Social Determinants of Health     Financial Resource Strain: Low Risk     Difficulty of Paying Living Expenses: Not hard at all   Food Insecurity: No Food Insecurity    Worried About Running Out of Food in the Last Year: Never true    Ran Out of Food in the Last Year: Never true     Family History   Problem Relation Age of Onset    Cancer Mother     Hypertension Father     Diabetes Sister     Hypertension Sister     Diabetes Brother     Hypertension Brother      Current Outpatient Medications   Medication Sig Dispense Refill    zolpidem (AMBIEN) 5 mg tablet TAKE 1 TABLET BY MOUTH EVERY NIGHT AS NEEDED FOR SLEEP. MAX DAILY AMOUNT: 5 MG 30 Tablet 0    valsartan-hydroCHLOROthiazide (DIOVAN-HCT) 320-25 mg per tablet TAKE 1 TABLET BY MOUTH DAILY 90 Tablet 1    metoprolol succinate (TOPROL-XL) 100 mg tablet TAKE 1 TABLET BY MOUTH DAILY FOR HIGH BLOOD PRESSURE 90 Tablet 1    escitalopram oxalate (LEXAPRO) 20 mg tablet TAKE 1 TABLET BY MOUTH DAILY 90 Tablet 1    diclofenac EC (VOLTAREN) 50 mg EC tablet Take 1 Tablet by mouth three (3) times daily. 60 Tablet 1    meclizine (ANTIVERT) 25 mg tablet Take 1 Tablet by mouth three (3) times daily as needed for Dizziness. 30 Tablet 0    loratadine-pseudoephedrine (Loratadine-D) 5-120 mg per tablet Take 1 Tablet by mouth two (2) times a day. 20 Tablet 0    metFORMIN (GLUCOPHAGE) 1,000 mg tablet Take 1 Tablet by mouth two (2) times daily (with meals). 180 Tablet 1    atorvastatin (LIPITOR) 20 mg tablet Take 1 Tablet by mouth daily. Indications: increased triglycerides and cholesterol 90 Tablet 1    amLODIPine (NORVASC) 10 mg tablet Take 1 Tablet by mouth daily. 90 Tablet 1    diflunisaL (DOLOBID) 500 mg tab Take 1 Tablet by mouth two (2) times a day. (Patient not taking: Reported on 3/22/2023) 20 Tablet 0    empagliflozin (Jardiance) 25 mg tablet Take 1 Tablet by mouth daily.  (Patient not taking: Reported on 3/22/2023) 90 Tablet 3 glimepiride (AMARYL) 4 mg tablet Take 2 Tablets by mouth daily. (Patient not taking: Reported on 3/22/2023) 180 Tablet 1    ibuprofen (MOTRIN) 800 mg tablet Take 1 Tablet by mouth every eight (8) hours as needed for Pain. Take with full meal (Patient not taking: No sig reported) 60 Tablet 2    fluticasone propionate (FLONASE) 50 mcg/actuation nasal spray SHAKE LIQUID AND USE 2 SPRAYS IN EACH NOSTRIL DAILY AS NEEDED FOR RHINITIS (Patient not taking: Reported on 3/22/2023) 16 g 2     Allergies   Allergen Reactions    Aspirin Other (comments)     Pt reports having a h/o gastric ulcers. Pt was on IBU when she was dx'd. Objective:  Visit Vitals  /74   Pulse 84   Temp 97.8 °F (36.6 °C) (Temporal)   Resp 18   Ht 5' 11\" (1.803 m)   Wt 251 lb (113.9 kg)   SpO2 95%   BMI 35.01 kg/m²       Physical Exam:   General appearance - alert, obese  Mental status - alert, oriented to person, place, and time  EYE-EOMI  Neck - supple,   Chest - symmetric air entry    CVS-reg + S4 + 2/6 systolic murmur  Abdomen - obese  Ext-no pedal edema, no clubbing or cyanosis  Skin-Warm and dry. no hyperpigmentation, vitiligo, or suspicious lesions  Neuro -alert, oriented, normal speech, no focal findings or movement disorder noted        Results for orders placed or performed in visit on 03/22/23   AMB POC GLUCOSE BLOOD, BY GLUCOSE MONITORING DEVICE   Result Value Ref Range    Glucose  MG/DL   AMB POC HEMOGLOBIN A1C   Result Value Ref Range    Hemoglobin A1c (POC) 10.8 %       Assessment/Plan:    ICD-10-CM ICD-9-CM    1. Type 2 diabetes mellitus with diabetic neuropathy, without long-term current use of insulin ([facility])  E11.40 250.60 AMB POC GLUCOSE BLOOD, BY GLUCOSE MONITORING DEVICE     357.2 AMB POC HEMOGLOBIN A1C      2. Episode of recurrent major depressive disorder, unspecified depression episode severity ([facility]  F33.9 296.30       3. Chronic insomnia  F51.04 780.52       4.  Pure hypercholesterolemia  E78.00 272.0       5. Hypertension complicating diabetes ([place] [address]  E11.59 250.80     I15.2 401.9             Orders Placed This Encounter    AMB POC GLUCOSE BLOOD, BY GLUCOSE MONITORING DEVICE    AMB POC HEMOGLOBIN A1C         1. Type 2 diabetes mellitus with diabetic neuropathy, without long-term current use of insulin (HCC)  Pt would benefit from GLP. She refuses  - AMB POC GLUCOSE BLOOD, BY GLUCOSE MONITORING DEVICE  - AMB POC HEMOGLOBIN A1C  - metFORMIN (GLUCOPHAGE) 1,000 mg tablet; Take 1 Tablet by mouth two (2) times daily (with meals). Dispense: 180 Tablet; Refill: 1  - REFERRAL TO DIABETIC EDUCATION    2. Hypertension complicating diabetes ([place] [address]  Controlled on current regimen  - valsartan-hydroCHLOROthiazide (DIOVAN-HCT) 320-25 mg per tablet; Take 1 Tablet by mouth daily. Dispense: 90 Tablet; Refill: 1  - amLODIPine (NORVASC) 10 mg tablet; Take 1 Tablet by mouth daily. Dispense: 90 Tablet; Refill: 1  - metoprolol succinate (TOPROL-XL) 100 mg tablet; Take 1 Tablet by mouth daily. FOR HIGH BLOOD PRESSURE  Dispense: 90 Tablet; Refill: 1    3. Pure hypercholesterolemia    - atorvastatin (LIPITOR) 20 mg tablet; Take 1 Tablet by mouth daily. Indications: increased triglycerides and cholesterol  Dispense: 90 Tablet; Refill: 1    4. Episode of recurrent major depressive disorder, unspecified depression episode severity ([place] [address]  Pt declines therapist resources  - escitalopram oxalate (LEXAPRO) 20 mg tablet; Take 1 Tablet by mouth daily. Dispense: 90 Tablet; Refill: 1  - REFERRAL TO BEHAVIORAL HEALTH    5. Chronic insomnia    - zolpidem (AMBIEN) 5 mg tablet; TAKE 1 TABLET BY MOUTH EVERY NIGHT AS NEEDED FOR SLEEP. MAX DAILY AMOUNT: 5 MG  Dispense: 30 Tablet; Refill: 2    6. Morbid obesity ([facility]  Noted    7. Osteoarthritis of spine with radiculopathy, cervical region  F/up pain mngmnt    8. Lumbar radiculopathy  As above    9. Primary osteoarthritis of both knees  As above    10. Chronic prescription opiate use  As above    11.  Anxiety with depression  On lexapro        There are no Patient Instructions on file for this visit. I have reviewed with the patient details of the assessment and plan and all questions were answered. Relevent patient education was performed. The most recent lab findings were reviewed with the patient. An After Visit Summary was printed and given to the patient.

## 2023-03-23 RX ORDER — ATORVASTATIN CALCIUM 20 MG/1
20 TABLET, FILM COATED ORAL DAILY
Qty: 90 TABLET | Refills: 1 | Status: SHIPPED | OUTPATIENT
Start: 2023-03-23

## 2023-03-23 RX ORDER — ESCITALOPRAM OXALATE 20 MG/1
20 TABLET ORAL DAILY
Qty: 90 TABLET | Refills: 1 | Status: SHIPPED | OUTPATIENT
Start: 2023-03-23

## 2023-03-23 RX ORDER — ZOLPIDEM TARTRATE 5 MG/1
TABLET ORAL
Qty: 30 TABLET | Refills: 2 | Status: SHIPPED | OUTPATIENT
Start: 2023-03-23

## 2023-03-23 RX ORDER — AMLODIPINE BESYLATE 10 MG/1
10 TABLET ORAL DAILY
Qty: 90 TABLET | Refills: 1 | Status: SHIPPED | OUTPATIENT
Start: 2023-03-23

## 2023-03-23 RX ORDER — METOPROLOL SUCCINATE 100 MG/1
100 TABLET, EXTENDED RELEASE ORAL DAILY
Qty: 90 TABLET | Refills: 1 | Status: SHIPPED | OUTPATIENT
Start: 2023-03-23

## 2023-03-23 RX ORDER — VALSARTAN AND HYDROCHLOROTHIAZIDE 320; 25 MG/1; MG/1
1 TABLET, FILM COATED ORAL DAILY
Qty: 90 TABLET | Refills: 1 | Status: SHIPPED | OUTPATIENT
Start: 2023-03-23

## 2023-03-23 RX ORDER — METFORMIN HYDROCHLORIDE 1000 MG/1
1000 TABLET ORAL 2 TIMES DAILY WITH MEALS
Qty: 180 TABLET | Refills: 1 | Status: SHIPPED | OUTPATIENT
Start: 2023-03-23

## 2023-06-21 DIAGNOSIS — F51.04 PSYCHOPHYSIOLOGIC INSOMNIA: Primary | ICD-10-CM

## 2023-06-26 ENCOUNTER — TELEPHONE (OUTPATIENT)
Facility: CLINIC | Age: 63
End: 2023-06-26

## 2023-06-27 ENCOUNTER — TELEPHONE (OUTPATIENT)
Facility: CLINIC | Age: 63
End: 2023-06-27

## 2023-06-27 RX ORDER — ZOLPIDEM TARTRATE 5 MG/1
TABLET ORAL
Qty: 30 TABLET | Refills: 1 | Status: SHIPPED | OUTPATIENT
Start: 2023-06-27 | End: 2023-08-26

## 2023-08-15 RX ORDER — GLIMEPIRIDE 4 MG/1
TABLET ORAL
Qty: 180 TABLET | Refills: 1 | Status: SHIPPED | OUTPATIENT
Start: 2023-08-15 | End: 2023-08-31 | Stop reason: SDUPTHER

## 2023-08-20 DIAGNOSIS — F51.04 PSYCHOPHYSIOLOGIC INSOMNIA: ICD-10-CM

## 2023-08-22 RX ORDER — ZOLPIDEM TARTRATE 5 MG/1
TABLET ORAL
Qty: 30 TABLET | OUTPATIENT
Start: 2023-08-22 | End: 2023-10-21

## 2023-08-31 ENCOUNTER — TELEPHONE (OUTPATIENT)
Facility: CLINIC | Age: 63
End: 2023-08-31

## 2023-08-31 ENCOUNTER — TELEMEDICINE (OUTPATIENT)
Facility: CLINIC | Age: 63
End: 2023-08-31

## 2023-08-31 DIAGNOSIS — F51.04 CHRONIC INSOMNIA: Primary | ICD-10-CM

## 2023-08-31 RX ORDER — AMLODIPINE BESYLATE 10 MG/1
10 TABLET ORAL DAILY
Qty: 90 TABLET | Refills: 0 | Status: SHIPPED | OUTPATIENT
Start: 2023-08-31

## 2023-08-31 RX ORDER — GLIMEPIRIDE 4 MG/1
8 TABLET ORAL DAILY
Qty: 180 TABLET | Refills: 0 | Status: SHIPPED | OUTPATIENT
Start: 2023-08-31

## 2023-08-31 RX ORDER — ZOLPIDEM TARTRATE 5 MG/1
5 TABLET ORAL NIGHTLY PRN
Qty: 30 TABLET | Refills: 1 | Status: SHIPPED | OUTPATIENT
Start: 2023-08-31 | End: 2023-10-30

## 2023-08-31 RX ORDER — ATORVASTATIN CALCIUM 20 MG/1
20 TABLET, FILM COATED ORAL DAILY
Qty: 90 TABLET | Refills: 0 | Status: SHIPPED | OUTPATIENT
Start: 2023-08-31

## 2023-08-31 RX ORDER — IBUPROFEN 800 MG/1
800 TABLET ORAL EVERY 8 HOURS PRN
Qty: 120 TABLET | Status: CANCELLED | OUTPATIENT
Start: 2023-08-31

## 2023-08-31 RX ORDER — ESCITALOPRAM OXALATE 20 MG/1
20 TABLET ORAL DAILY
Qty: 90 TABLET | Refills: 0 | Status: SHIPPED | OUTPATIENT
Start: 2023-08-31

## 2023-08-31 ASSESSMENT — PATIENT HEALTH QUESTIONNAIRE - PHQ9
SUM OF ALL RESPONSES TO PHQ9 QUESTIONS 1 & 2: 0
2. FEELING DOWN, DEPRESSED OR HOPELESS: 0
1. LITTLE INTEREST OR PLEASURE IN DOING THINGS: 0
SUM OF ALL RESPONSES TO PHQ QUESTIONS 1-9: 0

## 2023-08-31 NOTE — PROGRESS NOTES
1. Have you been to the ER, urgent care clinic since your last visit? Hospitalized since your last visit? {Yes when where and reason for visit:32070}    2. Have you seen or consulted any other health care providers outside of the [address] [place] [address] since your last visit? Include any pap smears or colon screening.  {Yes when where and reason for visit:25089}     Chief Complaint   Patient presents with    Diabetes         PHQ-9 Total Score: 0 (8/31/2023  8:35 AM)

## 2023-08-31 NOTE — PROGRESS NOTES
Pt has no-showed 2 in office appt. Scheduled VV for bp and DM. Does not check her sugars and does not check bp. Pt perseverates on ambien refills.   Will reschedule for IN PERSON appt

## 2023-10-23 ENCOUNTER — OFFICE VISIT (OUTPATIENT)
Facility: CLINIC | Age: 63
End: 2023-10-23
Payer: COMMERCIAL

## 2023-10-23 VITALS
SYSTOLIC BLOOD PRESSURE: 146 MMHG | DIASTOLIC BLOOD PRESSURE: 76 MMHG | WEIGHT: 252 LBS | HEART RATE: 72 BPM | HEIGHT: 71 IN | TEMPERATURE: 97.6 F | OXYGEN SATURATION: 98 % | RESPIRATION RATE: 18 BRPM | BODY MASS INDEX: 35.28 KG/M2

## 2023-10-23 DIAGNOSIS — Z12.11 COLON CANCER SCREENING: ICD-10-CM

## 2023-10-23 DIAGNOSIS — F33.1 MODERATE EPISODE OF RECURRENT MAJOR DEPRESSIVE DISORDER (HCC): ICD-10-CM

## 2023-10-23 DIAGNOSIS — I15.2 HYPERTENSION COMPLICATING DIABETES (HCC): ICD-10-CM

## 2023-10-23 DIAGNOSIS — Z12.31 ENCOUNTER FOR SCREENING MAMMOGRAM FOR MALIGNANT NEOPLASM OF BREAST: ICD-10-CM

## 2023-10-23 DIAGNOSIS — E11.59 HYPERTENSION COMPLICATING DIABETES (HCC): ICD-10-CM

## 2023-10-23 DIAGNOSIS — Z91.199 NONCOMPLIANCE: ICD-10-CM

## 2023-10-23 DIAGNOSIS — E78.00 PURE HYPERCHOLESTEROLEMIA, UNSPECIFIED: ICD-10-CM

## 2023-10-23 DIAGNOSIS — E11.40 TYPE 2 DIABETES MELLITUS WITH DIABETIC NEUROPATHY, WITHOUT LONG-TERM CURRENT USE OF INSULIN (HCC): Primary | ICD-10-CM

## 2023-10-23 LAB
GLUCOSE, POC: NORMAL MG/DL
HBA1C MFR BLD: 10.2 %

## 2023-10-23 PROCEDURE — 2022F DILAT RTA XM EVC RTNOPTHY: CPT | Performed by: INTERNAL MEDICINE

## 2023-10-23 PROCEDURE — 3078F DIAST BP <80 MM HG: CPT | Performed by: INTERNAL MEDICINE

## 2023-10-23 PROCEDURE — G8417 CALC BMI ABV UP PARAM F/U: HCPCS | Performed by: INTERNAL MEDICINE

## 2023-10-23 PROCEDURE — 83036 HEMOGLOBIN GLYCOSYLATED A1C: CPT | Performed by: INTERNAL MEDICINE

## 2023-10-23 PROCEDURE — 3017F COLORECTAL CA SCREEN DOC REV: CPT | Performed by: INTERNAL MEDICINE

## 2023-10-23 PROCEDURE — 1123F ACP DISCUSS/DSCN MKR DOCD: CPT | Performed by: INTERNAL MEDICINE

## 2023-10-23 PROCEDURE — 1036F TOBACCO NON-USER: CPT | Performed by: INTERNAL MEDICINE

## 2023-10-23 PROCEDURE — G8427 DOCREV CUR MEDS BY ELIG CLIN: HCPCS | Performed by: INTERNAL MEDICINE

## 2023-10-23 PROCEDURE — 3046F HEMOGLOBIN A1C LEVEL >9.0%: CPT | Performed by: INTERNAL MEDICINE

## 2023-10-23 PROCEDURE — G8484 FLU IMMUNIZE NO ADMIN: HCPCS | Performed by: INTERNAL MEDICINE

## 2023-10-23 PROCEDURE — 99214 OFFICE O/P EST MOD 30 MIN: CPT | Performed by: INTERNAL MEDICINE

## 2023-10-23 PROCEDURE — 82962 GLUCOSE BLOOD TEST: CPT | Performed by: INTERNAL MEDICINE

## 2023-10-23 PROCEDURE — 3077F SYST BP >= 140 MM HG: CPT | Performed by: INTERNAL MEDICINE

## 2023-10-23 PROCEDURE — G8400 PT W/DXA NO RESULTS DOC: HCPCS | Performed by: INTERNAL MEDICINE

## 2023-10-23 PROCEDURE — 1090F PRES/ABSN URINE INCON ASSESS: CPT | Performed by: INTERNAL MEDICINE

## 2023-10-23 RX ORDER — ESCITALOPRAM OXALATE 20 MG/1
20 TABLET ORAL DAILY
Qty: 90 TABLET | Refills: 0 | Status: SHIPPED | OUTPATIENT
Start: 2023-10-23

## 2023-10-23 RX ORDER — VALSARTAN AND HYDROCHLOROTHIAZIDE 320; 25 MG/1; MG/1
1 TABLET, FILM COATED ORAL DAILY
Qty: 90 TABLET | Refills: 1 | Status: SHIPPED | OUTPATIENT
Start: 2023-10-23

## 2023-10-23 RX ORDER — GLIMEPIRIDE 4 MG/1
8 TABLET ORAL DAILY
Qty: 180 TABLET | Refills: 1 | Status: SHIPPED | OUTPATIENT
Start: 2023-10-23

## 2023-10-23 RX ORDER — ATORVASTATIN CALCIUM 20 MG/1
20 TABLET, FILM COATED ORAL DAILY
Qty: 90 TABLET | Refills: 0 | Status: SHIPPED | OUTPATIENT
Start: 2023-10-23

## 2023-10-23 RX ORDER — METOPROLOL SUCCINATE 100 MG/1
100 TABLET, EXTENDED RELEASE ORAL DAILY
Qty: 90 TABLET | Refills: 1 | Status: SHIPPED | OUTPATIENT
Start: 2023-10-23

## 2023-10-23 RX ORDER — AMLODIPINE BESYLATE 10 MG/1
10 TABLET ORAL DAILY
Qty: 90 TABLET | Refills: 0 | Status: SHIPPED | OUTPATIENT
Start: 2023-10-23

## 2023-10-23 NOTE — PROGRESS NOTES
Patrice Zafar is a 70 y.o. female and presents with   Chief Complaint   Patient presents with    Diabetes    Follow-up       . Subjective:    Pt relays her glucose is HHH this AM bc she drank a [employer]. She said she usually does not drink soda. She drinks SWEET TEA. PMH:  Hypertension Review:  The patient has essential hypertension  Diet and Lifestyle: generally follows a  low sodium diet, exercises never  Home BP Monitoring: is not measured at home. Pertinent ROS: taking medications as instructed, no medication side effects noted, no TIA's, no chest pain on exertion, no dyspnea on exertion, no swelling of ankles. BP Readings from Last 3 Encounters:   10/23/23 (!) 146/76   03/22/23 129/74   12/16/22 (!) 145/77      Type 2 DM-on metformin, and glimepiride. Could not tolerate/afford januvia/jardiance   -pt admits to dietary indiscretions and elevated blood sugars   -PT REFUSES INSULIN INITIATION.  She is well aware of risks of end organ damage   -PT REFUSES GLP BC SHE DOES NOT WIOSH TO LOSE WEIGHT  Hemoglobin A1C   Date Value Ref Range Status   07/13/2021 9.6 (H) 4.0 - 5.6 % Final     Comment:     NEW METHOD PLEASE NOTE NEW REFERENCE RANGE  (NOTE)  HbA1C Interpretive Ranges  <5.7              Normal  5.7 - 6.4         Consider Prediabetes  >6.5              Consider Diabetes       Hemoglobin A1C, POC   Date Value Ref Range Status   10/23/2023 10.2 % Final        Hyperlipidemia-on atorvastatin 20mg  Lab Results   Component Value Date    CHOL 128 07/13/2021    TRIG 288 (H) 07/13/2021    HDL 37 07/13/2021    LDLCALC 33.4 07/13/2021    LABVLDL 57.6 07/13/2021    CHOLHDLRATIO 3.5 07/13/2021          Morbid obesity-pt reports she is thin enough and does not want to lose much more weight  Wt Readings from Last 3 Encounters:   03/22/23 251 lb (113.9 kg)   02/19/23 250 lb (113.4 kg)   12/16/22 255 lb (115.7 kg)     Chronic Pain-pt is seeing Pain Mngmnt and is on a regimen of gabapentin, tramadol and oxycodone and

## 2023-10-30 DIAGNOSIS — I15.2 HYPERTENSION COMPLICATING DIABETES (HCC): ICD-10-CM

## 2023-10-30 DIAGNOSIS — E11.59 HYPERTENSION COMPLICATING DIABETES (HCC): ICD-10-CM

## 2023-11-02 RX ORDER — METOPROLOL SUCCINATE 100 MG/1
100 TABLET, EXTENDED RELEASE ORAL DAILY
Qty: 90 TABLET | Refills: 1 | Status: SHIPPED | OUTPATIENT
Start: 2023-11-02

## 2023-11-16 DIAGNOSIS — F51.04 CHRONIC INSOMNIA: Primary | ICD-10-CM

## 2023-11-16 RX ORDER — ZOLPIDEM TARTRATE 5 MG/1
5 TABLET ORAL NIGHTLY PRN
Qty: 30 TABLET | Refills: 1 | Status: SHIPPED | OUTPATIENT
Start: 2023-11-16 | End: 2024-01-15

## 2024-01-12 ENCOUNTER — TELEPHONE (OUTPATIENT)
Age: 64
End: 2024-01-12

## 2024-01-12 DIAGNOSIS — F33.1 MODERATE EPISODE OF RECURRENT MAJOR DEPRESSIVE DISORDER (HCC): ICD-10-CM

## 2024-01-12 RX ORDER — ESCITALOPRAM OXALATE 20 MG/1
20 TABLET ORAL DAILY
Qty: 90 TABLET | Refills: 3 | Status: SHIPPED | OUTPATIENT
Start: 2024-01-12

## 2024-01-13 NOTE — TELEPHONE ENCOUNTER
Patient called to invite to the Hypertension Management Program through [facility]. No answer. Voicemail left.

## 2024-01-13 NOTE — TELEPHONE ENCOUNTER
Patient called to invite to the Hypertension Management Program through [facility]. Patient declined.

## 2024-01-24 DIAGNOSIS — F51.04 CHRONIC INSOMNIA: ICD-10-CM

## 2024-01-25 DIAGNOSIS — E11.59 HYPERTENSION COMPLICATING DIABETES (HCC): ICD-10-CM

## 2024-01-25 DIAGNOSIS — I15.2 HYPERTENSION COMPLICATING DIABETES (HCC): ICD-10-CM

## 2024-01-25 RX ORDER — AMLODIPINE BESYLATE 10 MG/1
10 TABLET ORAL DAILY
Qty: 90 TABLET | Refills: 1 | Status: SHIPPED | OUTPATIENT
Start: 2024-01-25

## 2024-02-02 DIAGNOSIS — F51.04 CHRONIC INSOMNIA: ICD-10-CM

## 2024-02-02 RX ORDER — ZOLPIDEM TARTRATE 5 MG/1
TABLET ORAL
Qty: 30 TABLET | Refills: 0 | Status: SHIPPED | OUTPATIENT
Start: 2024-02-02 | End: 2024-03-03

## 2024-02-05 RX ORDER — ZOLPIDEM TARTRATE 5 MG/1
TABLET ORAL
Qty: 30 TABLET | Refills: 0 | OUTPATIENT
Start: 2024-02-05 | End: 2024-03-06

## 2024-02-07 DIAGNOSIS — E78.00 PURE HYPERCHOLESTEROLEMIA, UNSPECIFIED: ICD-10-CM

## 2024-02-07 RX ORDER — ATORVASTATIN CALCIUM 20 MG/1
20 TABLET, FILM COATED ORAL DAILY
Qty: 90 TABLET | Refills: 3 | Status: SHIPPED | OUTPATIENT
Start: 2024-02-07

## 2024-02-07 NOTE — TELEPHONE ENCOUNTER
Last appointment: 10/23/2023 MD Tabor   Next appointment: 04/26/2024 MD Tabor   Previous refill encounter(s):   10/23/2023 Lipitor #[id number]     For Pharmacy Admin Tracking Only    Program: Medication Refill  Intervention Detail: New Rx: 1, reason: Patient Preference  Time Spent (min): 5    Requested Prescriptions     Pending Prescriptions Disp Refills    atorvastatin (LIPITOR) 20 MG tablet [Pharmacy Med Name: Atorvastatin Calcium 20 MG Oral Tablet] 90 tablet 0     Sig: Take 1 tablet by mouth once daily

## 2024-02-27 DIAGNOSIS — F51.04 CHRONIC INSOMNIA: ICD-10-CM

## 2024-02-29 NOTE — TELEPHONE ENCOUNTER
Pt is following up on medication refill request on the following medication:    zolpidem (AMBIEN) 5 MG tablet      Pt would like prescription sent to [facility] on [address]

## 2024-03-01 RX ORDER — ZOLPIDEM TARTRATE 5 MG/1
TABLET ORAL
Qty: 30 TABLET | Refills: 0 | Status: SHIPPED | OUTPATIENT
Start: 2024-03-01 | End: 2024-03-31

## 2024-03-27 DIAGNOSIS — F51.04 CHRONIC INSOMNIA: ICD-10-CM

## 2024-03-27 RX ORDER — ZOLPIDEM TARTRATE 5 MG/1
TABLET ORAL
Qty: 30 TABLET | Refills: 0 | Status: CANCELLED | OUTPATIENT
Start: 2024-03-27

## 2024-03-27 NOTE — TELEPHONE ENCOUNTER
Pt left a voice message requesting a refill.     [phone]    Last appointment: 10/23/2023 MD Tabor   Next appointment: 03/29/2024 MD Tabor   Previous refill encounter(s):   03/01/2024 Ambien #[id number]     No access to PDMP    For Pharmacy Admin Tracking Only    Program: Medication Refill  Intervention Detail: New Rx: 1, reason: Patient Preference  Time Spent (min): 5    Requested Prescriptions     Pending Prescriptions Disp Refills    zolpidem (AMBIEN) 5 MG tablet 30 tablet 0     Sig: TAKE 1 TABLET BY MOUTH ONCE DAILY AT NIGHT AS NEEDED FOR  SLEEP

## 2024-03-29 ENCOUNTER — OFFICE VISIT (OUTPATIENT)
Facility: CLINIC | Age: 64
End: 2024-03-29
Payer: COMMERCIAL

## 2024-03-29 VITALS
WEIGHT: 256.4 LBS | OXYGEN SATURATION: 97 % | HEIGHT: 71 IN | RESPIRATION RATE: 18 BRPM | BODY MASS INDEX: 35.9 KG/M2 | DIASTOLIC BLOOD PRESSURE: 74 MMHG | SYSTOLIC BLOOD PRESSURE: 133 MMHG | TEMPERATURE: 97.4 F | HEART RATE: 74 BPM

## 2024-03-29 DIAGNOSIS — Z91.119 DIETARY NONCOMPLIANCE: ICD-10-CM

## 2024-03-29 DIAGNOSIS — F51.04 CHRONIC INSOMNIA: ICD-10-CM

## 2024-03-29 DIAGNOSIS — F33.1 MODERATE EPISODE OF RECURRENT MAJOR DEPRESSIVE DISORDER (HCC): ICD-10-CM

## 2024-03-29 DIAGNOSIS — E66.01 SEVERE OBESITY (BMI 35.0-39.9) WITH COMORBIDITY (HCC): ICD-10-CM

## 2024-03-29 DIAGNOSIS — E11.40 TYPE 2 DIABETES MELLITUS WITH DIABETIC NEUROPATHY, WITHOUT LONG-TERM CURRENT USE OF INSULIN (HCC): Primary | ICD-10-CM

## 2024-03-29 LAB
GLUCOSE, POC: 264 MG/DL
HBA1C MFR BLD: 10.1 %

## 2024-03-29 PROCEDURE — 3075F SYST BP GE 130 - 139MM HG: CPT | Performed by: INTERNAL MEDICINE

## 2024-03-29 PROCEDURE — 3017F COLORECTAL CA SCREEN DOC REV: CPT | Performed by: INTERNAL MEDICINE

## 2024-03-29 PROCEDURE — 82962 GLUCOSE BLOOD TEST: CPT | Performed by: INTERNAL MEDICINE

## 2024-03-29 PROCEDURE — G8417 CALC BMI ABV UP PARAM F/U: HCPCS | Performed by: INTERNAL MEDICINE

## 2024-03-29 PROCEDURE — 3078F DIAST BP <80 MM HG: CPT | Performed by: INTERNAL MEDICINE

## 2024-03-29 PROCEDURE — 83036 HEMOGLOBIN GLYCOSYLATED A1C: CPT | Performed by: INTERNAL MEDICINE

## 2024-03-29 PROCEDURE — 1090F PRES/ABSN URINE INCON ASSESS: CPT | Performed by: INTERNAL MEDICINE

## 2024-03-29 PROCEDURE — G8484 FLU IMMUNIZE NO ADMIN: HCPCS | Performed by: INTERNAL MEDICINE

## 2024-03-29 PROCEDURE — 99214 OFFICE O/P EST MOD 30 MIN: CPT | Performed by: INTERNAL MEDICINE

## 2024-03-29 PROCEDURE — 3046F HEMOGLOBIN A1C LEVEL >9.0%: CPT | Performed by: INTERNAL MEDICINE

## 2024-03-29 PROCEDURE — G8400 PT W/DXA NO RESULTS DOC: HCPCS | Performed by: INTERNAL MEDICINE

## 2024-03-29 PROCEDURE — 2022F DILAT RTA XM EVC RTNOPTHY: CPT | Performed by: INTERNAL MEDICINE

## 2024-03-29 PROCEDURE — G8427 DOCREV CUR MEDS BY ELIG CLIN: HCPCS | Performed by: INTERNAL MEDICINE

## 2024-03-29 PROCEDURE — 1036F TOBACCO NON-USER: CPT | Performed by: INTERNAL MEDICINE

## 2024-03-29 PROCEDURE — 1123F ACP DISCUSS/DSCN MKR DOCD: CPT | Performed by: INTERNAL MEDICINE

## 2024-03-29 RX ORDER — ZOLPIDEM TARTRATE 5 MG/1
TABLET ORAL
Qty: 30 TABLET | Refills: 3 | Status: CANCELLED | OUTPATIENT
Start: 2024-03-29

## 2024-03-29 ASSESSMENT — PATIENT HEALTH QUESTIONNAIRE - PHQ9
SUM OF ALL RESPONSES TO PHQ QUESTIONS 1-9: 11
2. FEELING DOWN, DEPRESSED OR HOPELESS: NEARLY EVERY DAY
1. LITTLE INTEREST OR PLEASURE IN DOING THINGS: MORE THAN HALF THE DAYS
6. FEELING BAD ABOUT YOURSELF - OR THAT YOU ARE A FAILURE OR HAVE LET YOURSELF OR YOUR FAMILY DOWN: SEVERAL DAYS
SUM OF ALL RESPONSES TO PHQ QUESTIONS 1-9: 11
10. IF YOU CHECKED OFF ANY PROBLEMS, HOW DIFFICULT HAVE THESE PROBLEMS MADE IT FOR YOU TO DO YOUR WORK, TAKE CARE OF THINGS AT HOME, OR GET ALONG WITH OTHER PEOPLE: SOMEWHAT DIFFICULT
SUM OF ALL RESPONSES TO PHQ9 QUESTIONS 1 & 2: 5
7. TROUBLE CONCENTRATING ON THINGS, SUCH AS READING THE NEWSPAPER OR WATCHING TELEVISION: SEVERAL DAYS
SUM OF ALL RESPONSES TO PHQ QUESTIONS 1-9: 11
SUM OF ALL RESPONSES TO PHQ QUESTIONS 1-9: 11
8. MOVING OR SPEAKING SO SLOWLY THAT OTHER PEOPLE COULD HAVE NOTICED. OR THE OPPOSITE, BEING SO FIGETY OR RESTLESS THAT YOU HAVE BEEN MOVING AROUND A LOT MORE THAN USUAL: NOT AT ALL
3. TROUBLE FALLING OR STAYING ASLEEP: MORE THAN HALF THE DAYS
5. POOR APPETITE OR OVEREATING: NOT AT ALL
9. THOUGHTS THAT YOU WOULD BE BETTER OFF DEAD, OR OF HURTING YOURSELF: NOT AT ALL
4. FEELING TIRED OR HAVING LITTLE ENERGY: MORE THAN HALF THE DAYS

## 2024-03-29 ASSESSMENT — ANXIETY QUESTIONNAIRES
7. FEELING AFRAID AS IF SOMETHING AWFUL MIGHT HAPPEN: NOT AT ALL
3. WORRYING TOO MUCH ABOUT DIFFERENT THINGS: NOT AT ALL
4. TROUBLE RELAXING: SEVERAL DAYS
IF YOU CHECKED OFF ANY PROBLEMS ON THIS QUESTIONNAIRE, HOW DIFFICULT HAVE THESE PROBLEMS MADE IT FOR YOU TO DO YOUR WORK, TAKE CARE OF THINGS AT HOME, OR GET ALONG WITH OTHER PEOPLE: SOMEWHAT DIFFICULT
5. BEING SO RESTLESS THAT IT IS HARD TO SIT STILL: NOT AT ALL
1. FEELING NERVOUS, ANXIOUS, OR ON EDGE: MORE THAN HALF THE DAYS
6. BECOMING EASILY ANNOYED OR IRRITABLE: SEVERAL DAYS
GAD7 TOTAL SCORE: 4
2. NOT BEING ABLE TO STOP OR CONTROL WORRYING: NOT AT ALL

## 2024-03-29 NOTE — PROGRESS NOTES
Chief Complaint   Patient presents with    Follow-up    Medication Refill     Pt states she was seen for swelling in her left knee at an urgent Care center     \"Have you been to the ER, urgent care clinic since your last visit?  Hospitalized since your last visit?\"    YES - When: approximately 2 days ago.  Where and Why: UC for swelling in her left knee.    “Have you seen or consulted any other health care providers outside of [facility] since your last visit?”    NO    Have you had a mammogram?”   NO    Date of last Mammogram: 4/6/2018         “Have you had a colorectal cancer screening such as a colonoscopy/FIT/Cologuard?    NO    Date of last Colonoscopy: 6/14/2010  No cologuard on file  No FIT/FOBT on file   No flexible sigmoidoscopy on file         Click Here for Release of Records Request  HIPPA confirmed by two patient identifiers.

## 2024-03-29 NOTE — PROGRESS NOTES
Marti Larkin is a 63 y.o. female and presents with   Chief Complaint   Patient presents with    Follow-up    Medication Refill     Pt states she was seen for swelling in her left knee at an urgent Care center       .  Subjective:        PMH:  Hypertension Review:  The patient has essential hypertension  Diet and Lifestyle: generally follows a  low sodium diet, exercises never  Home BP Monitoring: is not measured at home.  Pertinent ROS: taking medications as instructed, no medication side effects noted, no TIA's, no chest pain on exertion, no dyspnea on exertion, no swelling of ankles.   BP Readings from Last 3 Encounters:   03/29/24 133/74   10/23/23 (!) 146/76   03/22/23 129/74      Type 2 DM-on metformin, and glimepiride. Could not tolerate/afford januvia/jardiance   -pt admits to dietary indiscretions and elevated blood sugars   -PT REFUSES INSULIN INITIATION. She is well aware of risks of end organ damage   -PT REFUSES GLP BC SHE DOES NOT WISH TO LOSE WEIGHT  Hemoglobin A1C   Date Value Ref Range Status   07/13/2021 9.6 (H) 4.0 - 5.6 % Final     Comment:     NEW METHOD PLEASE NOTE NEW REFERENCE RANGE  (NOTE)  HbA1C Interpretive Ranges  <5.7              Normal  5.7 - 6.4         Consider Prediabetes  >6.5              Consider Diabetes       Hemoglobin A1C, POC   Date Value Ref Range Status   03/29/2024 10.1 % Final        Hyperlipidemia-on atorvastatin 20mg  Lab Results   Component Value Date    CHOL 128 07/13/2021    TRIG 288 (H) 07/13/2021    HDL 37 07/13/2021    LDLCALC 33.4 07/13/2021    CHOLHDLRATIO 3.5 07/13/2021          Morbid obesity-pt reports she is thin enough and does not want to lose much more weight  Wt Readings from Last 3 Encounters:   03/22/23 251 lb (113.9 kg)   02/19/23 250 lb (113.4 kg)   12/16/22 255 lb (115.7 kg)     Chronic Pain-pt is seeing Pain Mngmnt and is on a regimen of gabapentin, tramadol and oxycodone and percocet   -pt request a refill of her painmed, until she sees her pain

## 2024-04-01 PROBLEM — Z91.119 DIETARY NONCOMPLIANCE: Status: ACTIVE | Noted: 2024-04-01

## 2024-04-01 RX ORDER — ZOLPIDEM TARTRATE 5 MG/1
TABLET ORAL
Qty: 30 TABLET | Refills: 2 | Status: SHIPPED | OUTPATIENT
Start: 2024-04-01 | End: 2024-06-03

## 2024-04-01 RX ORDER — ZOLPIDEM TARTRATE 5 MG/1
TABLET ORAL
Qty: 30 TABLET | Refills: 0 | OUTPATIENT
Start: 2024-04-01

## 2024-04-10 DIAGNOSIS — I15.2 HYPERTENSION COMPLICATING DIABETES (HCC): ICD-10-CM

## 2024-04-10 DIAGNOSIS — E11.59 HYPERTENSION COMPLICATING DIABETES (HCC): ICD-10-CM

## 2024-04-10 NOTE — TELEPHONE ENCOUNTER
Last appointment: 03/29/2024 MD Tabor   Next appointment: 07/30/2024 MD Tabor   Previous refill encounter(s):   10/23/2023 Diovan-HCT #90 with 1 refill.     For Pharmacy Admin Tracking Only    Program: Medication Refill  Intervention Detail: New Rx: 1, reason: Patient Preference  Time Spent (min): 5      Requested Prescriptions     Pending Prescriptions Disp Refills    valsartan-hydroCHLOROthiazide (DIOVAN-HCT) 320-25 MG per tablet 90 tablet 0     Sig: Take 1 tablet by mouth daily     
Statement Selected

## 2024-04-11 RX ORDER — VALSARTAN AND HYDROCHLOROTHIAZIDE 320; 25 MG/1; MG/1
1 TABLET, FILM COATED ORAL DAILY
Qty: 90 TABLET | Refills: 3 | Status: SHIPPED | OUTPATIENT
Start: 2024-04-11

## 2024-05-31 ENCOUNTER — APPOINTMENT (OUTPATIENT)
Facility: HOSPITAL | Age: 64
DRG: 617 | End: 2024-05-31
Payer: MEDICARE

## 2024-05-31 ENCOUNTER — HOSPITAL ENCOUNTER (INPATIENT)
Facility: HOSPITAL | Age: 64
LOS: 10 days | Discharge: NURSING, REHAB OR LONG-TERM CARE | DRG: 617 | End: 2024-06-10
Attending: EMERGENCY MEDICINE | Admitting: FAMILY MEDICINE
Payer: MEDICARE

## 2024-05-31 DIAGNOSIS — F51.04 CHRONIC INSOMNIA: ICD-10-CM

## 2024-05-31 DIAGNOSIS — M86.9 OSTEOMYELITIS OF TOE (HCC): Primary | ICD-10-CM

## 2024-05-31 DIAGNOSIS — E11.65 TYPE 2 DIABETES MELLITUS WITH HYPERGLYCEMIA (HCC): ICD-10-CM

## 2024-05-31 DIAGNOSIS — E11.65 TYPE 2 DIABETES MELLITUS WITH HYPERGLYCEMIA, WITHOUT LONG-TERM CURRENT USE OF INSULIN (HCC): ICD-10-CM

## 2024-05-31 DIAGNOSIS — M86.9 OSTEOMYELITIS OF TOE OF LEFT FOOT (HCC): ICD-10-CM

## 2024-05-31 DIAGNOSIS — E11.40 TYPE 2 DIABETES MELLITUS WITH DIABETIC NEUROPATHY, WITHOUT LONG-TERM CURRENT USE OF INSULIN (HCC): ICD-10-CM

## 2024-05-31 LAB
ALBUMIN SERPL-MCNC: 4 G/DL (ref 3.5–5)
ALBUMIN/GLOB SERPL: 0.9 (ref 1.1–2.2)
ALP SERPL-CCNC: 87 U/L (ref 45–117)
ALT SERPL-CCNC: 23 U/L (ref 12–78)
ANION GAP SERPL CALC-SCNC: 4 MMOL/L (ref 5–15)
AST SERPL-CCNC: 14 U/L (ref 15–37)
BASOPHILS # BLD: 0 K/UL (ref 0–0.1)
BASOPHILS NFR BLD: 0 % (ref 0–1)
BILIRUB SERPL-MCNC: 0.9 MG/DL (ref 0.2–1)
BUN SERPL-MCNC: 17 MG/DL (ref 6–20)
BUN/CREAT SERPL: 18 (ref 12–20)
CALCIUM SERPL-MCNC: 10 MG/DL (ref 8.5–10.1)
CHLORIDE SERPL-SCNC: 95 MMOL/L (ref 97–108)
CO2 SERPL-SCNC: 33 MMOL/L (ref 21–32)
COMMENT:: NORMAL
CREAT SERPL-MCNC: 0.94 MG/DL (ref 0.55–1.02)
CRP SERPL-MCNC: 19.9 MG/DL (ref 0–0.3)
DIFFERENTIAL METHOD BLD: ABNORMAL
EOSINOPHIL # BLD: 0.1 K/UL (ref 0–0.4)
EOSINOPHIL NFR BLD: 1 % (ref 0–7)
ERYTHROCYTE [DISTWIDTH] IN BLOOD BY AUTOMATED COUNT: 12.2 % (ref 11.5–14.5)
ERYTHROCYTE [SEDIMENTATION RATE] IN BLOOD: 11 MM/HR (ref 0–30)
GLOBULIN SER CALC-MCNC: 4.4 G/DL (ref 2–4)
GLUCOSE SERPL-MCNC: 292 MG/DL (ref 65–100)
HCT VFR BLD AUTO: 39.1 % (ref 35–47)
HGB BLD-MCNC: 13.3 G/DL (ref 11.5–16)
IMM GRANULOCYTES # BLD AUTO: 0 K/UL (ref 0–0.04)
IMM GRANULOCYTES NFR BLD AUTO: 1 % (ref 0–0.5)
LACTATE SERPL-SCNC: 1.5 MMOL/L (ref 0.4–2)
LYMPHOCYTES # BLD: 1 K/UL (ref 0.8–3.5)
LYMPHOCYTES NFR BLD: 12 % (ref 12–49)
MCH RBC QN AUTO: 29.3 PG (ref 26–34)
MCHC RBC AUTO-ENTMCNC: 34 G/DL (ref 30–36.5)
MCV RBC AUTO: 86.1 FL (ref 80–99)
MONOCYTES # BLD: 0.6 K/UL (ref 0–1)
MONOCYTES NFR BLD: 7 % (ref 5–13)
NEUTS SEG # BLD: 6.6 K/UL (ref 1.8–8)
NEUTS SEG NFR BLD: 79 % (ref 32–75)
NRBC # BLD: 0 K/UL (ref 0–0.01)
NRBC BLD-RTO: 0 PER 100 WBC
PLATELET # BLD AUTO: 298 K/UL (ref 150–400)
PMV BLD AUTO: 9.1 FL (ref 8.9–12.9)
POTASSIUM SERPL-SCNC: 3.4 MMOL/L (ref 3.5–5.1)
PROT SERPL-MCNC: 8.4 G/DL (ref 6.4–8.2)
RBC # BLD AUTO: 4.54 M/UL (ref 3.8–5.2)
SODIUM SERPL-SCNC: 132 MMOL/L (ref 136–145)
SPECIMEN HOLD: NORMAL
WBC # BLD AUTO: 8.4 K/UL (ref 3.6–11)

## 2024-05-31 PROCEDURE — 36415 COLL VENOUS BLD VENIPUNCTURE: CPT

## 2024-05-31 PROCEDURE — 86140 C-REACTIVE PROTEIN: CPT

## 2024-05-31 PROCEDURE — 80053 COMPREHEN METABOLIC PANEL: CPT

## 2024-05-31 PROCEDURE — 93971 EXTREMITY STUDY: CPT

## 2024-05-31 PROCEDURE — 73630 X-RAY EXAM OF FOOT: CPT

## 2024-05-31 PROCEDURE — 6360000002 HC RX W HCPCS: Performed by: FAMILY MEDICINE

## 2024-05-31 PROCEDURE — 83605 ASSAY OF LACTIC ACID: CPT

## 2024-05-31 PROCEDURE — 85025 COMPLETE CBC W/AUTO DIFF WBC: CPT

## 2024-05-31 PROCEDURE — 99285 EMERGENCY DEPT VISIT HI MDM: CPT

## 2024-05-31 PROCEDURE — 2580000003 HC RX 258: Performed by: FAMILY MEDICINE

## 2024-05-31 PROCEDURE — 96374 THER/PROPH/DIAG INJ IV PUSH: CPT

## 2024-05-31 PROCEDURE — 87040 BLOOD CULTURE FOR BACTERIA: CPT

## 2024-05-31 PROCEDURE — 96375 TX/PRO/DX INJ NEW DRUG ADDON: CPT

## 2024-05-31 PROCEDURE — 1100000000 HC RM PRIVATE

## 2024-05-31 PROCEDURE — 85652 RBC SED RATE AUTOMATED: CPT

## 2024-05-31 PROCEDURE — 6360000002 HC RX W HCPCS: Performed by: EMERGENCY MEDICINE

## 2024-05-31 RX ORDER — POTASSIUM CHLORIDE 750 MG/1
40 TABLET, FILM COATED, EXTENDED RELEASE ORAL PRN
Status: DISCONTINUED | OUTPATIENT
Start: 2024-05-31 | End: 2024-06-10 | Stop reason: HOSPADM

## 2024-05-31 RX ORDER — VALSARTAN 160 MG/1
320 TABLET ORAL DAILY
Status: DISCONTINUED | OUTPATIENT
Start: 2024-06-01 | End: 2024-06-10 | Stop reason: HOSPADM

## 2024-05-31 RX ORDER — ATORVASTATIN CALCIUM 20 MG/1
20 TABLET, FILM COATED ORAL DAILY
Status: DISCONTINUED | OUTPATIENT
Start: 2024-06-01 | End: 2024-06-10 | Stop reason: HOSPADM

## 2024-05-31 RX ORDER — ACETAMINOPHEN 650 MG/1
650 SUPPOSITORY RECTAL EVERY 6 HOURS PRN
Status: DISCONTINUED | OUTPATIENT
Start: 2024-05-31 | End: 2024-06-10 | Stop reason: HOSPADM

## 2024-05-31 RX ORDER — NALOXONE HYDROCHLORIDE 0.4 MG/ML
0.4 INJECTION, SOLUTION INTRAMUSCULAR; INTRAVENOUS; SUBCUTANEOUS PRN
Status: DISCONTINUED | OUTPATIENT
Start: 2024-05-31 | End: 2024-06-10 | Stop reason: HOSPADM

## 2024-05-31 RX ORDER — ONDANSETRON 4 MG/1
4 TABLET, ORALLY DISINTEGRATING ORAL EVERY 8 HOURS PRN
Status: DISCONTINUED | OUTPATIENT
Start: 2024-05-31 | End: 2024-06-10 | Stop reason: HOSPADM

## 2024-05-31 RX ORDER — METOPROLOL SUCCINATE 50 MG/1
100 TABLET, EXTENDED RELEASE ORAL DAILY
Status: DISCONTINUED | OUTPATIENT
Start: 2024-06-01 | End: 2024-06-10 | Stop reason: HOSPADM

## 2024-05-31 RX ORDER — ACETAMINOPHEN 325 MG/1
650 TABLET ORAL EVERY 6 HOURS PRN
Status: DISCONTINUED | OUTPATIENT
Start: 2024-05-31 | End: 2024-06-10 | Stop reason: HOSPADM

## 2024-05-31 RX ORDER — VALSARTAN AND HYDROCHLOROTHIAZIDE 320; 25 MG/1; MG/1
1 TABLET, FILM COATED ORAL DAILY
Status: DISCONTINUED | OUTPATIENT
Start: 2024-06-01 | End: 2024-05-31 | Stop reason: CLARIF

## 2024-05-31 RX ORDER — ONDANSETRON 2 MG/ML
4 INJECTION INTRAMUSCULAR; INTRAVENOUS ONCE
Status: COMPLETED | OUTPATIENT
Start: 2024-05-31 | End: 2024-05-31

## 2024-05-31 RX ORDER — FLUTICASONE PROPIONATE 50 MCG
2 SPRAY, SUSPENSION (ML) NASAL DAILY
Status: DISCONTINUED | OUTPATIENT
Start: 2024-06-01 | End: 2024-06-10 | Stop reason: HOSPADM

## 2024-05-31 RX ORDER — POTASSIUM CHLORIDE 7.45 MG/ML
10 INJECTION INTRAVENOUS PRN
Status: DISCONTINUED | OUTPATIENT
Start: 2024-05-31 | End: 2024-06-10 | Stop reason: HOSPADM

## 2024-05-31 RX ORDER — SODIUM CHLORIDE 0.9 % (FLUSH) 0.9 %
5-40 SYRINGE (ML) INJECTION EVERY 12 HOURS SCHEDULED
Status: DISCONTINUED | OUTPATIENT
Start: 2024-05-31 | End: 2024-06-10 | Stop reason: HOSPADM

## 2024-05-31 RX ORDER — HYDROCHLOROTHIAZIDE 25 MG/1
25 TABLET ORAL DAILY
Status: DISCONTINUED | OUTPATIENT
Start: 2024-06-01 | End: 2024-06-10 | Stop reason: HOSPADM

## 2024-05-31 RX ORDER — ZOLPIDEM TARTRATE 5 MG/1
5 TABLET ORAL NIGHTLY
Status: DISCONTINUED | OUTPATIENT
Start: 2024-05-31 | End: 2024-06-10 | Stop reason: HOSPADM

## 2024-05-31 RX ORDER — SODIUM CHLORIDE 0.9 % (FLUSH) 0.9 %
5-40 SYRINGE (ML) INJECTION PRN
Status: DISCONTINUED | OUTPATIENT
Start: 2024-05-31 | End: 2024-06-10 | Stop reason: HOSPADM

## 2024-05-31 RX ORDER — ONDANSETRON 2 MG/ML
4 INJECTION INTRAMUSCULAR; INTRAVENOUS EVERY 6 HOURS PRN
Status: DISCONTINUED | OUTPATIENT
Start: 2024-05-31 | End: 2024-06-10 | Stop reason: HOSPADM

## 2024-05-31 RX ORDER — INSULIN LISPRO 100 [IU]/ML
0-4 INJECTION, SOLUTION INTRAVENOUS; SUBCUTANEOUS
Status: DISCONTINUED | OUTPATIENT
Start: 2024-06-01 | End: 2024-06-02

## 2024-05-31 RX ORDER — MAGNESIUM SULFATE IN WATER 40 MG/ML
2000 INJECTION, SOLUTION INTRAVENOUS PRN
Status: DISCONTINUED | OUTPATIENT
Start: 2024-05-31 | End: 2024-06-10 | Stop reason: HOSPADM

## 2024-05-31 RX ORDER — ESCITALOPRAM OXALATE 10 MG/1
20 TABLET ORAL DAILY
Status: DISCONTINUED | OUTPATIENT
Start: 2024-06-01 | End: 2024-06-10 | Stop reason: HOSPADM

## 2024-05-31 RX ORDER — DEXTROSE MONOHYDRATE 100 MG/ML
INJECTION, SOLUTION INTRAVENOUS CONTINUOUS PRN
Status: DISCONTINUED | OUTPATIENT
Start: 2024-05-31 | End: 2024-06-10 | Stop reason: HOSPADM

## 2024-05-31 RX ORDER — INSULIN LISPRO 100 [IU]/ML
0-4 INJECTION, SOLUTION INTRAVENOUS; SUBCUTANEOUS NIGHTLY
Status: DISCONTINUED | OUTPATIENT
Start: 2024-06-01 | End: 2024-06-02

## 2024-05-31 RX ORDER — ENOXAPARIN SODIUM 100 MG/ML
30 INJECTION SUBCUTANEOUS 2 TIMES DAILY
Status: DISCONTINUED | OUTPATIENT
Start: 2024-05-31 | End: 2024-06-10 | Stop reason: HOSPADM

## 2024-05-31 RX ORDER — HYDROMORPHONE HYDROCHLORIDE 1 MG/ML
1 INJECTION, SOLUTION INTRAMUSCULAR; INTRAVENOUS; SUBCUTANEOUS EVERY 4 HOURS PRN
Status: DISCONTINUED | OUTPATIENT
Start: 2024-05-31 | End: 2024-06-10 | Stop reason: HOSPADM

## 2024-05-31 RX ORDER — POLYETHYLENE GLYCOL 3350 17 G/17G
17 POWDER, FOR SOLUTION ORAL DAILY PRN
Status: DISCONTINUED | OUTPATIENT
Start: 2024-05-31 | End: 2024-06-10 | Stop reason: HOSPADM

## 2024-05-31 RX ORDER — AMLODIPINE BESYLATE 5 MG/1
10 TABLET ORAL DAILY
Status: DISCONTINUED | OUTPATIENT
Start: 2024-06-01 | End: 2024-06-10 | Stop reason: HOSPADM

## 2024-05-31 RX ORDER — SODIUM CHLORIDE 9 MG/ML
INJECTION, SOLUTION INTRAVENOUS PRN
Status: DISCONTINUED | OUTPATIENT
Start: 2024-05-31 | End: 2024-06-10 | Stop reason: HOSPADM

## 2024-05-31 RX ORDER — HYDROMORPHONE HYDROCHLORIDE 1 MG/ML
1 INJECTION, SOLUTION INTRAMUSCULAR; INTRAVENOUS; SUBCUTANEOUS
Status: COMPLETED | OUTPATIENT
Start: 2024-05-31 | End: 2024-05-31

## 2024-05-31 RX ADMIN — HYDROMORPHONE HYDROCHLORIDE 1 MG: 1 INJECTION, SOLUTION INTRAMUSCULAR; INTRAVENOUS; SUBCUTANEOUS at 23:42

## 2024-05-31 RX ADMIN — HYDROMORPHONE HYDROCHLORIDE 1 MG: 1 INJECTION, SOLUTION INTRAMUSCULAR; INTRAVENOUS; SUBCUTANEOUS at 20:50

## 2024-05-31 RX ADMIN — ONDANSETRON 4 MG: 2 INJECTION INTRAMUSCULAR; INTRAVENOUS at 20:50

## 2024-05-31 RX ADMIN — Medication 10 ML: at 21:15

## 2024-05-31 RX ADMIN — Medication 2500 MG: at 20:55

## 2024-05-31 ASSESSMENT — PAIN DESCRIPTION - LOCATION: LOCATION: TOE (COMMENT WHICH ONE)

## 2024-05-31 ASSESSMENT — PAIN - FUNCTIONAL ASSESSMENT: PAIN_FUNCTIONAL_ASSESSMENT: 0-10

## 2024-05-31 ASSESSMENT — PAIN SCALES - GENERAL
PAINLEVEL_OUTOF10: 6
PAINLEVEL_OUTOF10: 10

## 2024-05-31 ASSESSMENT — PAIN DESCRIPTION - ORIENTATION: ORIENTATION: LEFT

## 2024-06-01 LAB
ANION GAP SERPL CALC-SCNC: 2 MMOL/L (ref 5–15)
BASOPHILS # BLD: 0 K/UL (ref 0–0.1)
BASOPHILS NFR BLD: 0 % (ref 0–1)
BUN SERPL-MCNC: 13 MG/DL (ref 6–20)
BUN/CREAT SERPL: 19 (ref 12–20)
CALCIUM SERPL-MCNC: 8.4 MG/DL (ref 8.5–10.1)
CHLORIDE SERPL-SCNC: 102 MMOL/L (ref 97–108)
CO2 SERPL-SCNC: 33 MMOL/L (ref 21–32)
CREAT SERPL-MCNC: 0.7 MG/DL (ref 0.55–1.02)
DIFFERENTIAL METHOD BLD: ABNORMAL
ECHO BSA: 2.32 M2
EOSINOPHIL # BLD: 0.1 K/UL (ref 0–0.4)
EOSINOPHIL NFR BLD: 1 % (ref 0–7)
ERYTHROCYTE [DISTWIDTH] IN BLOOD BY AUTOMATED COUNT: 12 % (ref 11.5–14.5)
EST. AVERAGE GLUCOSE BLD GHB EST-MCNC: 235 MG/DL
GLUCOSE BLD STRIP.AUTO-MCNC: 249 MG/DL (ref 65–117)
GLUCOSE BLD STRIP.AUTO-MCNC: 259 MG/DL (ref 65–117)
GLUCOSE BLD STRIP.AUTO-MCNC: 261 MG/DL (ref 65–117)
GLUCOSE BLD STRIP.AUTO-MCNC: 294 MG/DL (ref 65–117)
GLUCOSE BLD STRIP.AUTO-MCNC: 322 MG/DL (ref 65–117)
GLUCOSE SERPL-MCNC: 244 MG/DL (ref 65–100)
HBA1C MFR BLD: 9.8 % (ref 4–5.6)
HCT VFR BLD AUTO: 34.3 % (ref 35–47)
HGB BLD-MCNC: 11.3 G/DL (ref 11.5–16)
IMM GRANULOCYTES # BLD AUTO: 0 K/UL (ref 0–0.04)
IMM GRANULOCYTES NFR BLD AUTO: 0 % (ref 0–0.5)
LYMPHOCYTES # BLD: 1.3 K/UL (ref 0.8–3.5)
LYMPHOCYTES NFR BLD: 21 % (ref 12–49)
MAGNESIUM SERPL-MCNC: 1.6 MG/DL (ref 1.6–2.4)
MCH RBC QN AUTO: 29 PG (ref 26–34)
MCV RBC AUTO: 87.9 FL (ref 80–99)
MONOCYTES # BLD: 0.6 K/UL (ref 0–1)
MONOCYTES NFR BLD: 9 % (ref 5–13)
NEUTS SEG # BLD: 4.4 K/UL (ref 1.8–8)
NEUTS SEG NFR BLD: 69 % (ref 32–75)
NRBC # BLD: 0 K/UL (ref 0–0.01)
NRBC BLD-RTO: 0 PER 100 WBC
PLATELET # BLD AUTO: 253 K/UL (ref 150–400)
PMV BLD AUTO: 9.2 FL (ref 8.9–12.9)
POTASSIUM SERPL-SCNC: 3.2 MMOL/L (ref 3.5–5.1)
RBC # BLD AUTO: 3.9 M/UL (ref 3.8–5.2)
SERVICE CMNT-IMP: ABNORMAL
SODIUM SERPL-SCNC: 137 MMOL/L (ref 136–145)
WBC # BLD AUTO: 6.5 K/UL (ref 3.6–11)

## 2024-06-01 PROCEDURE — 82962 GLUCOSE BLOOD TEST: CPT

## 2024-06-01 PROCEDURE — 36415 COLL VENOUS BLD VENIPUNCTURE: CPT

## 2024-06-01 PROCEDURE — 2580000003 HC RX 258: Performed by: FAMILY MEDICINE

## 2024-06-01 PROCEDURE — 1100000000 HC RM PRIVATE

## 2024-06-01 PROCEDURE — 80048 BASIC METABOLIC PNL TOTAL CA: CPT

## 2024-06-01 PROCEDURE — 2580000003 HC RX 258: Performed by: HOSPITALIST

## 2024-06-01 PROCEDURE — 83036 HEMOGLOBIN GLYCOSYLATED A1C: CPT

## 2024-06-01 PROCEDURE — 83735 ASSAY OF MAGNESIUM: CPT

## 2024-06-01 PROCEDURE — 6370000000 HC RX 637 (ALT 250 FOR IP): Performed by: FAMILY MEDICINE

## 2024-06-01 PROCEDURE — 85025 COMPLETE CBC W/AUTO DIFF WBC: CPT

## 2024-06-01 PROCEDURE — 6360000002 HC RX W HCPCS: Performed by: FAMILY MEDICINE

## 2024-06-01 PROCEDURE — 6360000002 HC RX W HCPCS: Performed by: HOSPITALIST

## 2024-06-01 RX ORDER — INSULIN GLARGINE 100 [IU]/ML
15 INJECTION, SOLUTION SUBCUTANEOUS NIGHTLY
Status: DISCONTINUED | OUTPATIENT
Start: 2024-06-01 | End: 2024-06-03

## 2024-06-01 RX ADMIN — WATER 2000 MG: 1 INJECTION INTRAMUSCULAR; INTRAVENOUS; SUBCUTANEOUS at 00:13

## 2024-06-01 RX ADMIN — CEFEPIME 2000 MG: 2 INJECTION, POWDER, FOR SOLUTION INTRAVENOUS at 15:23

## 2024-06-01 RX ADMIN — VANCOMYCIN HYDROCHLORIDE 1500 MG: 1.5 INJECTION, POWDER, LYOPHILIZED, FOR SOLUTION INTRAVENOUS at 15:33

## 2024-06-01 RX ADMIN — ENOXAPARIN SODIUM 30 MG: 100 INJECTION SUBCUTANEOUS at 09:26

## 2024-06-01 RX ADMIN — VALSARTAN 320 MG: 160 TABLET, FILM COATED ORAL at 09:26

## 2024-06-01 RX ADMIN — INSULIN LISPRO 2 UNITS: 100 INJECTION, SOLUTION INTRAVENOUS; SUBCUTANEOUS at 12:54

## 2024-06-01 RX ADMIN — Medication 10 ML: at 20:37

## 2024-06-01 RX ADMIN — HYDROMORPHONE HYDROCHLORIDE 1 MG: 1 INJECTION, SOLUTION INTRAMUSCULAR; INTRAVENOUS; SUBCUTANEOUS at 09:34

## 2024-06-01 RX ADMIN — ATORVASTATIN CALCIUM 20 MG: 40 TABLET, FILM COATED ORAL at 09:26

## 2024-06-01 RX ADMIN — INSULIN LISPRO 3 UNITS: 100 INJECTION, SOLUTION INTRAVENOUS; SUBCUTANEOUS at 16:38

## 2024-06-01 RX ADMIN — FLUTICASONE PROPIONATE 2 SPRAY: 50 SPRAY, METERED NASAL at 09:26

## 2024-06-01 RX ADMIN — HYDROMORPHONE HYDROCHLORIDE 1 MG: 1 INJECTION, SOLUTION INTRAMUSCULAR; INTRAVENOUS; SUBCUTANEOUS at 15:37

## 2024-06-01 RX ADMIN — ESCITALOPRAM OXALATE 20 MG: 10 TABLET ORAL at 09:26

## 2024-06-01 RX ADMIN — HYDROCHLOROTHIAZIDE 25 MG: 25 TABLET ORAL at 09:26

## 2024-06-01 RX ADMIN — ZOLPIDEM TARTRATE 5 MG: 5 TABLET ORAL at 00:23

## 2024-06-01 RX ADMIN — ACETAMINOPHEN 650 MG: 325 TABLET ORAL at 15:23

## 2024-06-01 RX ADMIN — HYDROMORPHONE HYDROCHLORIDE 1 MG: 1 INJECTION, SOLUTION INTRAMUSCULAR; INTRAVENOUS; SUBCUTANEOUS at 22:23

## 2024-06-01 RX ADMIN — METOPROLOL SUCCINATE 100 MG: 50 TABLET, EXTENDED RELEASE ORAL at 09:26

## 2024-06-01 RX ADMIN — HYDROMORPHONE HYDROCHLORIDE 1 MG: 1 INJECTION, SOLUTION INTRAMUSCULAR; INTRAVENOUS; SUBCUTANEOUS at 05:37

## 2024-06-01 RX ADMIN — AMLODIPINE BESYLATE 10 MG: 5 TABLET ORAL at 09:26

## 2024-06-01 RX ADMIN — CEFEPIME 2000 MG: 2 INJECTION, POWDER, FOR SOLUTION INTRAVENOUS at 06:47

## 2024-06-01 RX ADMIN — SODIUM CHLORIDE: 900 INJECTION INTRAVENOUS at 06:44

## 2024-06-01 RX ADMIN — ENOXAPARIN SODIUM 30 MG: 100 INJECTION SUBCUTANEOUS at 20:26

## 2024-06-01 ASSESSMENT — PAIN DESCRIPTION - LOCATION
LOCATION: FOOT
LOCATION: FOOT

## 2024-06-01 ASSESSMENT — PAIN SCALES - GENERAL
PAINLEVEL_OUTOF10: 7
PAINLEVEL_OUTOF10: 9
PAINLEVEL_OUTOF10: 9
PAINLEVEL_OUTOF10: 6
PAINLEVEL_OUTOF10: 4

## 2024-06-01 ASSESSMENT — PAIN DESCRIPTION - ORIENTATION
ORIENTATION: LEFT
ORIENTATION: LEFT

## 2024-06-01 ASSESSMENT — PAIN DESCRIPTION - DESCRIPTORS: DESCRIPTORS: ACHING

## 2024-06-01 NOTE — H&P
without rashes or lesions    Data Review:   I have independently reviewed and interpreted patient's lab and all other diagnostic data    Abnormal Labs Reviewed   CBC WITH AUTO DIFFERENTIAL - Abnormal; Notable for the following components:       Result Value    Neutrophils % 79 (*)     Immature Granulocytes % 1 (*)     All other components within normal limits   COMPREHENSIVE METABOLIC PANEL - Abnormal; Notable for the following components:    Sodium 132 (*)     Potassium 3.4 (*)     Chloride 95 (*)     CO2 33 (*)     Anion Gap 4 (*)     Glucose 292 (*)     AST 14 (*)     Total Protein 8.4 (*)     Globulin 4.4 (*)     Albumin/Globulin Ratio 0.9 (*)     All other components within normal limits   C-REACTIVE PROTEIN - Abnormal; Notable for the following components:    CRP 19.90 (*)     All other components within normal limits       Results       Procedure Component Value Units Date/Time    Blood Culture 1 [[id number]]     Order Status: Canceled Specimen: Blood     Blood Culture 2 [[id number]]     Order Status: Canceled Specimen: Blood     Culture, Blood 2 [[id number]] Collected: 05/31/24 1748    Order Status: Completed Specimen: Blood Updated: 05/31/24 1811     Special Requests --        NO SPECIAL REQUESTS  RIGHT  Antecubital       Culture NO GROWTH <24 HRS       Culture, Blood 1 [[id number]] Collected: 05/31/24 1748    Order Status: Completed Specimen: Blood Updated: 05/31/24 1811     Special Requests --        NO SPECIAL REQUESTS  LEFT  Antecubital       Culture NO GROWTH <24 HRS                IMAGING:   XR FOOT LEFT (MIN 3 VIEWS)   Final Result   Osteomyelitis of the great toe distal phalanx.      Vascular duplex lower extremity venous left              ECG/ECHO:    No results found for this or any previous visit (from the past 4464 hour(s)).  No results found for this or any previous visit.        Notes reviewed from all clinical/nonclinical/nursing services involved in patient's clinical care. Care coordination

## 2024-06-01 NOTE — ED PROVIDER NOTES
the ER and be admitted for osteomyelitis of the left first toe.     Total critical care time spent exclusive of procedures:  75 minutes.     FINAL IMPRESSION      1. Osteomyelitis of toe (HCC)          DISPOSITION/PLAN   DISPOSITION Decision To Admit 05/31/2024 08:13:40 PM      PATIENT REFERRED TO:  No follow-up provider specified.    DISCHARGE MEDICATIONS:  New Prescriptions    No medications on file         (Please note that portions of this note were completed with a voice recognition program.  Efforts were made to edit the dictations but occasionally words are mis-transcribed.)    Nuno Toussaint MD (electronically signed)  Emergency Attending Physician / Physician Assistant / Nurse Practitioner              Nuno Toussaint MD  05/31/24 2026

## 2024-06-01 NOTE — ED NOTES
ED TO INPATIENT SBAR HANDOFF    Patient Name: Marti Larkin   :  1960  64 y.o.   MRN:  [id number]  ED Room #:  ER27/27  Family/Caregiver Present no       Situation  Code Status: Full Code     Allergies: Aspirin  Weight: Patient Vitals for the past 96 hrs (Last 3 readings):   Weight   24 1718 115.7 kg (255 lb 1.2 oz)     Arrived from: home  Chief Complaint:   Chief Complaint   Patient presents with    Toe Injury     States having left big toe pain. States that the nail came off and had pus coming from the toe     Hospital Problem/Diagnosis:  Principal Problem:    Osteomyelitis of toe of left foot (HCC)  Resolved Problems:    * No resolved hospital problems. *    Imaging:   XR FOOT LEFT (MIN 3 VIEWS)   Final Result   Osteomyelitis of the great toe distal phalanx.      Vascular duplex lower extremity venous left           Abnormal labs:   Abnormal Labs Reviewed   CBC WITH AUTO DIFFERENTIAL - Abnormal; Notable for the following components:       Result Value    Neutrophils % 79 (*)     Immature Granulocytes % 1 (*)     All other components within normal limits   COMPREHENSIVE METABOLIC PANEL - Abnormal; Notable for the following components:    Sodium 132 (*)     Potassium 3.4 (*)     Chloride 95 (*)     CO2 33 (*)     Anion Gap 4 (*)     Glucose 292 (*)     AST 14 (*)     Total Protein 8.4 (*)     Globulin 4.4 (*)     Albumin/Globulin Ratio 0.9 (*)     All other components within normal limits   C-REACTIVE PROTEIN - Abnormal; Notable for the following components:    CRP 19.90 (*)     All other components within normal limits     Abnormal Assessment Findings: Swelling and redness to right great toe     SAFETY    Mobility: limited transfer mobility   ED Fall Risk: No data recorded   Restraints no   Sitter no     Background  History:   Past Medical History:   Diagnosis Date    Acute pancreatitis 2011    Acute pancreatitis 2012    Arthritis     both knees    Chronic pain     knees    Delivery normal

## 2024-06-02 ENCOUNTER — APPOINTMENT (OUTPATIENT)
Facility: HOSPITAL | Age: 64
DRG: 617 | End: 2024-06-02
Payer: MEDICARE

## 2024-06-02 LAB
ANION GAP SERPL CALC-SCNC: 2 MMOL/L (ref 5–15)
BASOPHILS # BLD: 0 K/UL (ref 0–0.1)
BASOPHILS NFR BLD: 0 % (ref 0–1)
BUN SERPL-MCNC: 17 MG/DL (ref 6–20)
BUN/CREAT SERPL: 20 (ref 12–20)
CALCIUM SERPL-MCNC: 9.4 MG/DL (ref 8.5–10.1)
CHLORIDE SERPL-SCNC: 101 MMOL/L (ref 97–108)
CO2 SERPL-SCNC: 34 MMOL/L (ref 21–32)
CREAT SERPL-MCNC: 0.87 MG/DL (ref 0.55–1.02)
DIFFERENTIAL METHOD BLD: NORMAL
EOSINOPHIL # BLD: 0.2 K/UL (ref 0–0.4)
EOSINOPHIL NFR BLD: 4 % (ref 0–7)
ERYTHROCYTE [DISTWIDTH] IN BLOOD BY AUTOMATED COUNT: 11.9 % (ref 11.5–14.5)
GLUCOSE BLD STRIP.AUTO-MCNC: 198 MG/DL (ref 65–117)
GLUCOSE BLD STRIP.AUTO-MCNC: 295 MG/DL (ref 65–117)
GLUCOSE BLD STRIP.AUTO-MCNC: 335 MG/DL (ref 65–117)
GLUCOSE BLD STRIP.AUTO-MCNC: 344 MG/DL (ref 65–117)
GLUCOSE SERPL-MCNC: 287 MG/DL (ref 65–100)
HCT VFR BLD AUTO: 35.6 % (ref 35–47)
HGB BLD-MCNC: 11.5 G/DL (ref 11.5–16)
IMM GRANULOCYTES # BLD AUTO: 0 K/UL (ref 0–0.04)
IMM GRANULOCYTES NFR BLD AUTO: 0 % (ref 0–0.5)
LYMPHOCYTES # BLD: 1.2 K/UL (ref 0.8–3.5)
LYMPHOCYTES NFR BLD: 23 % (ref 12–49)
MCH RBC QN AUTO: 28.6 PG (ref 26–34)
MCHC RBC AUTO-ENTMCNC: 32.3 G/DL (ref 30–36.5)
MCV RBC AUTO: 88.6 FL (ref 80–99)
MONOCYTES # BLD: 0.5 K/UL (ref 0–1)
MONOCYTES NFR BLD: 10 % (ref 5–13)
NEUTS SEG # BLD: 3.3 K/UL (ref 1.8–8)
NEUTS SEG NFR BLD: 63 % (ref 32–75)
NRBC # BLD: 0 K/UL (ref 0–0.01)
NRBC BLD-RTO: 0 PER 100 WBC
PLATELET # BLD AUTO: 306 K/UL (ref 150–400)
PMV BLD AUTO: 9.3 FL (ref 8.9–12.9)
POTASSIUM SERPL-SCNC: 3.9 MMOL/L (ref 3.5–5.1)
RBC # BLD AUTO: 4.02 M/UL (ref 3.8–5.2)
SERVICE CMNT-IMP: ABNORMAL
SODIUM SERPL-SCNC: 137 MMOL/L (ref 136–145)
VANCOMYCIN SERPL-MCNC: 11.7 UG/ML
WBC # BLD AUTO: 5.2 K/UL (ref 3.6–11)

## 2024-06-02 PROCEDURE — 1100000000 HC RM PRIVATE

## 2024-06-02 PROCEDURE — 6360000004 HC RX CONTRAST MEDICATION: Performed by: STUDENT IN AN ORGANIZED HEALTH CARE EDUCATION/TRAINING PROGRAM

## 2024-06-02 PROCEDURE — 82962 GLUCOSE BLOOD TEST: CPT

## 2024-06-02 PROCEDURE — 36415 COLL VENOUS BLD VENIPUNCTURE: CPT

## 2024-06-02 PROCEDURE — 6370000000 HC RX 637 (ALT 250 FOR IP): Performed by: NURSE PRACTITIONER

## 2024-06-02 PROCEDURE — 2580000003 HC RX 258: Performed by: FAMILY MEDICINE

## 2024-06-02 PROCEDURE — 2580000003 HC RX 258: Performed by: HOSPITALIST

## 2024-06-02 PROCEDURE — 80202 ASSAY OF VANCOMYCIN: CPT

## 2024-06-02 PROCEDURE — 6360000002 HC RX W HCPCS: Performed by: FAMILY MEDICINE

## 2024-06-02 PROCEDURE — 73701 CT LOWER EXTREMITY W/DYE: CPT

## 2024-06-02 PROCEDURE — 85025 COMPLETE CBC W/AUTO DIFF WBC: CPT

## 2024-06-02 PROCEDURE — 80048 BASIC METABOLIC PNL TOTAL CA: CPT

## 2024-06-02 PROCEDURE — 6370000000 HC RX 637 (ALT 250 FOR IP): Performed by: FAMILY MEDICINE

## 2024-06-02 PROCEDURE — 6360000002 HC RX W HCPCS: Performed by: HOSPITALIST

## 2024-06-02 PROCEDURE — 73702 CT LWR EXTREMITY W/O&W/DYE: CPT

## 2024-06-02 RX ORDER — INSULIN LISPRO 100 [IU]/ML
0-4 INJECTION, SOLUTION INTRAVENOUS; SUBCUTANEOUS NIGHTLY
Status: DISCONTINUED | OUTPATIENT
Start: 2024-06-02 | End: 2024-06-10 | Stop reason: HOSPADM

## 2024-06-02 RX ORDER — INSULIN LISPRO 100 [IU]/ML
0-8 INJECTION, SOLUTION INTRAVENOUS; SUBCUTANEOUS
Status: DISCONTINUED | OUTPATIENT
Start: 2024-06-02 | End: 2024-06-10 | Stop reason: HOSPADM

## 2024-06-02 RX ADMIN — HYDROMORPHONE HYDROCHLORIDE 1 MG: 1 INJECTION, SOLUTION INTRAMUSCULAR; INTRAVENOUS; SUBCUTANEOUS at 17:22

## 2024-06-02 RX ADMIN — ENOXAPARIN SODIUM 30 MG: 100 INJECTION SUBCUTANEOUS at 21:24

## 2024-06-02 RX ADMIN — VANCOMYCIN HYDROCHLORIDE 1500 MG: 1.5 INJECTION, POWDER, LYOPHILIZED, FOR SOLUTION INTRAVENOUS at 08:44

## 2024-06-02 RX ADMIN — ZOLPIDEM TARTRATE 5 MG: 5 TABLET ORAL at 22:55

## 2024-06-02 RX ADMIN — METOPROLOL SUCCINATE 100 MG: 50 TABLET, EXTENDED RELEASE ORAL at 08:23

## 2024-06-02 RX ADMIN — ATORVASTATIN CALCIUM 20 MG: 40 TABLET, FILM COATED ORAL at 08:22

## 2024-06-02 RX ADMIN — ESCITALOPRAM OXALATE 20 MG: 10 TABLET ORAL at 08:22

## 2024-06-02 RX ADMIN — SODIUM CHLORIDE: 900 INJECTION INTRAVENOUS at 06:47

## 2024-06-02 RX ADMIN — HYDROCHLOROTHIAZIDE 25 MG: 25 TABLET ORAL at 08:23

## 2024-06-02 RX ADMIN — POLYETHYLENE GLYCOL 3350 17 G: 17 POWDER, FOR SOLUTION ORAL at 12:53

## 2024-06-02 RX ADMIN — INSULIN GLARGINE 15 UNITS: 100 INJECTION, SOLUTION SUBCUTANEOUS at 08:51

## 2024-06-02 RX ADMIN — HYDROMORPHONE HYDROCHLORIDE 1 MG: 1 INJECTION, SOLUTION INTRAMUSCULAR; INTRAVENOUS; SUBCUTANEOUS at 08:25

## 2024-06-02 RX ADMIN — FLUTICASONE PROPIONATE 2 SPRAY: 50 SPRAY, METERED NASAL at 08:24

## 2024-06-02 RX ADMIN — Medication 5 ML: at 21:30

## 2024-06-02 RX ADMIN — INSULIN LISPRO 2 UNITS: 100 INJECTION, SOLUTION INTRAVENOUS; SUBCUTANEOUS at 07:49

## 2024-06-02 RX ADMIN — CEFEPIME 2000 MG: 2 INJECTION, POWDER, FOR SOLUTION INTRAVENOUS at 06:48

## 2024-06-02 RX ADMIN — HYDROMORPHONE HYDROCHLORIDE 1 MG: 1 INJECTION, SOLUTION INTRAMUSCULAR; INTRAVENOUS; SUBCUTANEOUS at 12:43

## 2024-06-02 RX ADMIN — IOPAMIDOL 100 ML: 612 INJECTION, SOLUTION INTRAVENOUS at 17:33

## 2024-06-02 RX ADMIN — AMLODIPINE BESYLATE 10 MG: 5 TABLET ORAL at 08:22

## 2024-06-02 RX ADMIN — INSULIN LISPRO 2 UNITS: 100 INJECTION, SOLUTION INTRAVENOUS; SUBCUTANEOUS at 06:48

## 2024-06-02 RX ADMIN — ENOXAPARIN SODIUM 30 MG: 100 INJECTION SUBCUTANEOUS at 08:23

## 2024-06-02 RX ADMIN — Medication 10 ML: at 08:24

## 2024-06-02 RX ADMIN — INSULIN LISPRO 4 UNITS: 100 INJECTION, SOLUTION INTRAVENOUS; SUBCUTANEOUS at 21:20

## 2024-06-02 RX ADMIN — CEFEPIME 2000 MG: 2 INJECTION, POWDER, FOR SOLUTION INTRAVENOUS at 22:42

## 2024-06-02 RX ADMIN — INSULIN LISPRO 6 UNITS: 100 INJECTION, SOLUTION INTRAVENOUS; SUBCUTANEOUS at 12:15

## 2024-06-02 RX ADMIN — INSULIN GLARGINE 15 UNITS: 100 INJECTION, SOLUTION SUBCUTANEOUS at 21:20

## 2024-06-02 RX ADMIN — CEFEPIME 2000 MG: 2 INJECTION, POWDER, FOR SOLUTION INTRAVENOUS at 13:31

## 2024-06-02 RX ADMIN — VALSARTAN 320 MG: 160 TABLET, FILM COATED ORAL at 08:44

## 2024-06-02 RX ADMIN — HYDROMORPHONE HYDROCHLORIDE 1 MG: 1 INJECTION, SOLUTION INTRAMUSCULAR; INTRAVENOUS; SUBCUTANEOUS at 21:17

## 2024-06-02 ASSESSMENT — PAIN SCALES - GENERAL
PAINLEVEL_OUTOF10: 8
PAINLEVEL_OUTOF10: 9
PAINLEVEL_OUTOF10: 8
PAINLEVEL_OUTOF10: 9

## 2024-06-02 ASSESSMENT — PAIN DESCRIPTION - ORIENTATION
ORIENTATION: RIGHT
ORIENTATION: LEFT
ORIENTATION: RIGHT

## 2024-06-02 ASSESSMENT — PAIN DESCRIPTION - LOCATION
LOCATION: FOOT
LOCATION: FOOT;LEG
LOCATION: FOOT
LOCATION: FOOT;LEG

## 2024-06-02 ASSESSMENT — PAIN DESCRIPTION - DESCRIPTORS
DESCRIPTORS: ACHING
DESCRIPTORS: ACHING

## 2024-06-02 ASSESSMENT — PAIN - FUNCTIONAL ASSESSMENT
PAIN_FUNCTIONAL_ASSESSMENT: PREVENTS OR INTERFERES SOME ACTIVE ACTIVITIES AND ADLS
PAIN_FUNCTIONAL_ASSESSMENT: PREVENTS OR INTERFERES SOME ACTIVE ACTIVITIES AND ADLS

## 2024-06-02 NOTE — ACP (ADVANCE CARE PLANNING)
Advance Care Planning     Advance Care Planning (ACP) Physician/NP/PA Conversation    Date of Conversation: 5/31/2024  Conducted with: Patient with Decision Making Capacity  Other persons present: None    Healthcare Decision Maker:   Primary Decision Maker: Yanet Charles  Lorrie - [phone]  Click here to complete Healthcare Decision Makers including selection of the Healthcare Decision Maker Relationship (ie \"Primary\").   Today we documented Decision Maker(s) consistent with Legal Next of Kin hierarchy.   Patient actually chooses Vanessa Bolanos, her sister, as primary decision maker and will supply her phone number later today.   Care Preferences:    Hospitalization:  \"If your health worsens and it becomes clear that your chance of recovery is unlikely, what would be your preference regarding hospitalization?\"  The patient would prefer hospitalization.    Ventilation:  \"If you were unable to breath on your own and your chance of recovery was unlikely, what would be your preference about the use of a ventilator (breathing machine) if it was available to you?\"  The patient would desire the use of a ventilator.    Resuscitation:  \"In the event your heart stopped as a result of an underlying serious health condition, would you want attempts made to restart your heart, or would you prefer a natural death?\"  Yes, attempt to resuscitate.    ventilation preferences, hospitalization preferences, and resuscitation preferences    Conversation Outcomes / Follow-Up Plan:  ACP complete - no further action today  Reviewed DNR/DNI and patient elects Full Code (Attempt Resuscitation)    Length of Voluntary ACP Conversation in minutes:  20 minutes    Carolyn Rubi, APRN - CNP

## 2024-06-03 ENCOUNTER — ANESTHESIA EVENT (OUTPATIENT)
Facility: HOSPITAL | Age: 64
End: 2024-06-03
Payer: MEDICARE

## 2024-06-03 PROBLEM — M86.9 OSTEOMYELITIS OF TOE (HCC): Status: ACTIVE | Noted: 2024-06-03

## 2024-06-03 LAB
ANION GAP SERPL CALC-SCNC: 3 MMOL/L (ref 5–15)
BUN SERPL-MCNC: 12 MG/DL (ref 6–20)
BUN/CREAT SERPL: 16 (ref 12–20)
CALCIUM SERPL-MCNC: 9.8 MG/DL (ref 8.5–10.1)
CHLORIDE SERPL-SCNC: 100 MMOL/L (ref 97–108)
CO2 SERPL-SCNC: 33 MMOL/L (ref 21–32)
CREAT SERPL-MCNC: 0.74 MG/DL (ref 0.55–1.02)
GLUCOSE BLD STRIP.AUTO-MCNC: 221 MG/DL (ref 65–117)
GLUCOSE BLD STRIP.AUTO-MCNC: 260 MG/DL (ref 65–117)
GLUCOSE BLD STRIP.AUTO-MCNC: 295 MG/DL (ref 65–117)
GLUCOSE BLD STRIP.AUTO-MCNC: 322 MG/DL (ref 65–117)
GLUCOSE BLD STRIP.AUTO-MCNC: 363 MG/DL (ref 65–117)
GLUCOSE SERPL-MCNC: 275 MG/DL (ref 65–100)
POTASSIUM SERPL-SCNC: 3.6 MMOL/L (ref 3.5–5.1)
SERVICE CMNT-IMP: ABNORMAL
SODIUM SERPL-SCNC: 136 MMOL/L (ref 136–145)

## 2024-06-03 PROCEDURE — 82962 GLUCOSE BLOOD TEST: CPT

## 2024-06-03 PROCEDURE — 36415 COLL VENOUS BLD VENIPUNCTURE: CPT

## 2024-06-03 PROCEDURE — 6360000002 HC RX W HCPCS: Performed by: HOSPITALIST

## 2024-06-03 PROCEDURE — 1100000000 HC RM PRIVATE

## 2024-06-03 PROCEDURE — 99222 1ST HOSP IP/OBS MODERATE 55: CPT | Performed by: CLINICAL NURSE SPECIALIST

## 2024-06-03 PROCEDURE — 2580000003 HC RX 258: Performed by: FAMILY MEDICINE

## 2024-06-03 PROCEDURE — 2580000003 HC RX 258: Performed by: HOSPITALIST

## 2024-06-03 PROCEDURE — 6360000002 HC RX W HCPCS: Performed by: FAMILY MEDICINE

## 2024-06-03 PROCEDURE — 6370000000 HC RX 637 (ALT 250 FOR IP): Performed by: NURSE PRACTITIONER

## 2024-06-03 PROCEDURE — 6370000000 HC RX 637 (ALT 250 FOR IP): Performed by: CLINICAL NURSE SPECIALIST

## 2024-06-03 PROCEDURE — 6370000000 HC RX 637 (ALT 250 FOR IP): Performed by: FAMILY MEDICINE

## 2024-06-03 PROCEDURE — 80048 BASIC METABOLIC PNL TOTAL CA: CPT

## 2024-06-03 RX ORDER — INSULIN GLARGINE 100 [IU]/ML
30 INJECTION, SOLUTION SUBCUTANEOUS NIGHTLY
Qty: 5 ADJUSTABLE DOSE PRE-FILLED PEN SYRINGE | Refills: 3 | Status: SHIPPED | OUTPATIENT
Start: 2024-06-03 | End: 2024-06-10

## 2024-06-03 RX ORDER — INSULIN LISPRO 100 [IU]/ML
5 INJECTION, SOLUTION INTRAVENOUS; SUBCUTANEOUS
Status: DISCONTINUED | OUTPATIENT
Start: 2024-06-03 | End: 2024-06-05

## 2024-06-03 RX ORDER — KETOROLAC TROMETHAMINE 30 MG/ML
INJECTION, SOLUTION INTRAMUSCULAR; INTRAVENOUS
Qty: 1 EACH | Refills: 0 | Status: SHIPPED | OUTPATIENT
Start: 2024-06-03

## 2024-06-03 RX ORDER — BLOOD-GLUCOSE SENSOR
EACH MISCELLANEOUS
Qty: 7 EACH | Refills: 3 | Status: SHIPPED | OUTPATIENT
Start: 2024-06-03

## 2024-06-03 RX ORDER — INSULIN GLARGINE 100 [IU]/ML
30 INJECTION, SOLUTION SUBCUTANEOUS NIGHTLY
Status: DISCONTINUED | OUTPATIENT
Start: 2024-06-03 | End: 2024-06-04

## 2024-06-03 RX ADMIN — ENOXAPARIN SODIUM 30 MG: 100 INJECTION SUBCUTANEOUS at 21:03

## 2024-06-03 RX ADMIN — VANCOMYCIN HYDROCHLORIDE 1500 MG: 1.5 INJECTION, POWDER, LYOPHILIZED, FOR SOLUTION INTRAVENOUS at 03:44

## 2024-06-03 RX ADMIN — FLUTICASONE PROPIONATE 2 SPRAY: 50 SPRAY, METERED NASAL at 08:59

## 2024-06-03 RX ADMIN — INSULIN LISPRO 5 UNITS: 100 INJECTION, SOLUTION INTRAVENOUS; SUBCUTANEOUS at 17:33

## 2024-06-03 RX ADMIN — HYDROMORPHONE HYDROCHLORIDE 1 MG: 1 INJECTION, SOLUTION INTRAMUSCULAR; INTRAVENOUS; SUBCUTANEOUS at 18:23

## 2024-06-03 RX ADMIN — AMLODIPINE BESYLATE 10 MG: 5 TABLET ORAL at 08:49

## 2024-06-03 RX ADMIN — INSULIN LISPRO 2 UNITS: 100 INJECTION, SOLUTION INTRAVENOUS; SUBCUTANEOUS at 17:33

## 2024-06-03 RX ADMIN — Medication 10 ML: at 08:59

## 2024-06-03 RX ADMIN — ESCITALOPRAM OXALATE 20 MG: 10 TABLET ORAL at 08:48

## 2024-06-03 RX ADMIN — SODIUM CHLORIDE 1250 MG: 9 INJECTION, SOLUTION INTRAVENOUS at 15:30

## 2024-06-03 RX ADMIN — INSULIN GLARGINE 30 UNITS: 100 INJECTION, SOLUTION SUBCUTANEOUS at 21:03

## 2024-06-03 RX ADMIN — Medication 10 ML: at 21:03

## 2024-06-03 RX ADMIN — ENOXAPARIN SODIUM 30 MG: 100 INJECTION SUBCUTANEOUS at 08:48

## 2024-06-03 RX ADMIN — INSULIN LISPRO 8 UNITS: 100 INJECTION, SOLUTION INTRAVENOUS; SUBCUTANEOUS at 12:00

## 2024-06-03 RX ADMIN — HYDROMORPHONE HYDROCHLORIDE 1 MG: 1 INJECTION, SOLUTION INTRAMUSCULAR; INTRAVENOUS; SUBCUTANEOUS at 13:10

## 2024-06-03 RX ADMIN — ZOLPIDEM TARTRATE 5 MG: 5 TABLET ORAL at 21:03

## 2024-06-03 RX ADMIN — HYDROMORPHONE HYDROCHLORIDE 1 MG: 1 INJECTION, SOLUTION INTRAMUSCULAR; INTRAVENOUS; SUBCUTANEOUS at 08:49

## 2024-06-03 RX ADMIN — HYDROMORPHONE HYDROCHLORIDE 1 MG: 1 INJECTION, SOLUTION INTRAMUSCULAR; INTRAVENOUS; SUBCUTANEOUS at 03:54

## 2024-06-03 RX ADMIN — CEFEPIME 2000 MG: 2 INJECTION, POWDER, FOR SOLUTION INTRAVENOUS at 13:27

## 2024-06-03 RX ADMIN — CEFEPIME 2000 MG: 2 INJECTION, POWDER, FOR SOLUTION INTRAVENOUS at 21:08

## 2024-06-03 RX ADMIN — POLYETHYLENE GLYCOL 3350 17 G: 17 POWDER, FOR SOLUTION ORAL at 08:48

## 2024-06-03 RX ADMIN — ATORVASTATIN CALCIUM 20 MG: 40 TABLET, FILM COATED ORAL at 08:49

## 2024-06-03 RX ADMIN — HYDROCHLOROTHIAZIDE 25 MG: 25 TABLET ORAL at 08:49

## 2024-06-03 RX ADMIN — CEFEPIME 2000 MG: 2 INJECTION, POWDER, FOR SOLUTION INTRAVENOUS at 06:37

## 2024-06-03 RX ADMIN — INSULIN LISPRO 4 UNITS: 100 INJECTION, SOLUTION INTRAVENOUS; SUBCUTANEOUS at 07:17

## 2024-06-03 RX ADMIN — VALSARTAN 320 MG: 160 TABLET, FILM COATED ORAL at 08:49

## 2024-06-03 RX ADMIN — METOPROLOL SUCCINATE 100 MG: 50 TABLET, EXTENDED RELEASE ORAL at 08:48

## 2024-06-03 ASSESSMENT — PAIN - FUNCTIONAL ASSESSMENT
PAIN_FUNCTIONAL_ASSESSMENT: ACTIVITIES ARE NOT PREVENTED
PAIN_FUNCTIONAL_ASSESSMENT: ACTIVITIES ARE NOT PREVENTED

## 2024-06-03 ASSESSMENT — PAIN SCALES - GENERAL
PAINLEVEL_OUTOF10: 7
PAINLEVEL_OUTOF10: 0
PAINLEVEL_OUTOF10: 8
PAINLEVEL_OUTOF10: 2
PAINLEVEL_OUTOF10: 8
PAINLEVEL_OUTOF10: 0

## 2024-06-03 ASSESSMENT — PAIN DESCRIPTION - DESCRIPTORS
DESCRIPTORS: ACHING;DISCOMFORT;DULL
DESCRIPTORS: ACHING
DESCRIPTORS: ACHING;DULL;POUNDING

## 2024-06-03 ASSESSMENT — PAIN DESCRIPTION - LOCATION
LOCATION: FOOT

## 2024-06-03 ASSESSMENT — PAIN DESCRIPTION - ORIENTATION
ORIENTATION: LEFT

## 2024-06-03 NOTE — CONSULTS
[facility]  [address], [address] [address]      Foot and ankle surgery consult       Assessment/Plan:  Osteomyelitis left great toe  Ulcer left foot to bone with necrosis  Cellulitis left foot  DM/neuropathy      due to the clinical findings including blanchable erythema,/ edema, non-warmth to the periwound, and noted drainage to the ulceration there is concern for the infection to progress further and become worse, The patient is currently on  broad spectrum abx being managed by medicine team.      We discussed multiple different treatment options with the patient including living with the condition, long term IV abx therapy and chronic woundcare, debridement of soft tissue and  chronic wound care and possible long term IV abx therapy, debridement of soft tissue, great toe amputation, open bone biopsies, staged with possible delayed primary closure with filet of toe flap transfer and even BKA. We discussed the risks/benefits/complications/ of each of the procedures particularly noted with poor blood sugar control, the required recovery expectations and potential long term sequela. After long discussion with the patient,has elected to move forward with left great toe amputation and open bone biopsies left foot    the planned surgical procedure will include incision and drainage with removal of nonviable soft tissue, and open bone biopsies and great toe amputation to the left foot.       We discussed the surgery including all risks/benefits possible complications and alternative treatment options. We discussed the postoperative requirements, rehabilitation including the need for AFO brace for life and types of anesthesia. I demonstrated the surgery to the patient with the use of: X-RAYS;  PICTURE DIAGRAMS      The potential risks and complications of the proposed surgery were also discussed, including but not limited to: nonunion, infection, return of the problem, 
[facility]  DIABETES MANAGEMENT CONSULT    Consulted by Provider for advanced nursing evaluation and care for inpatient blood glucose management.    Evaluation and Action Plan   Marti Larkin is a 64 y.o. female admitted 5/31 for worsening left great toe diabetic infection after initial injury with a nail.  Since admission, podiatry consulted and seen and recommending left great toe amputation.  Slated for surgery 6/4.    Patient verbalized long standing T2D >20yrs. On oral agents for prolonged period of time.  She verbalized her fear of insulin taking her blood sugar low if she does not eat, especially while at work.  Explained that long acting insulin is not dosed based on meal intake but on her weight.  She verbalized understanding.  Her diet recall is described as a \"nibbler\" throughout the day-checks FSBG during the day and endorses it's been higher lately. Patient is now amenable to starting insulin at discharge as well as trying a CGM to monitor BG./     6/3: BG trends consistently above target of 180 since admission.  Current infection insufficient endogenous insulin contributing to elevated BG currently and PTA.    This patient meets the criteria mandated by CMS for CGM use by virtue of:    -In-person evaluation of diabetes control by our service within the past 6 months.  -Having Type 1 Diabetes, Type 2 Diabetes or Gestational Diabetes.  -Is insulin treated or has a history of problematic hypoglycemia with documentation of at least more than one level 2 hypoglycemic event (BG below 54) characterized by altered mental/physical status that persist despite multiple (more than one) attempts to adjust medication(s) and/or modify the diabetes treatment plan; or a history of one level 3 hypoglycemic event (glucose <54mg/dL (3.0mmol/L)) characterized by altered mental and/or physical state requiring third-party assistance for treatment of hypoglycemia.  -Requiring frequent adjustments 
SOLOSTAR) 100 UNIT/ML injection pen Inject 30 Units into the skin nightly 6/3/24  Yes Breanna Villatoro APRN - CNS   Continuous Glucose Sensor (FREESTYLE TIM 3 SENSOR) [facility] to monitor blood sugar 6/3/24  Yes Breanna Villatoro APRN - CNS   Continuous Glucose  (FREESTYLE TIM 3 READER) PHILLIP Utilize to monitor blood sugar 6/3/24  Yes Breanna Villatoro APRN - CNS   valsartan-hydroCHLOROthiazide (DIOVAN-HCT) 320-25 MG per tablet Take 1 tablet by mouth daily 4/11/24   Mila Tabor MD   zolpidem (AMBIEN) 5 MG tablet TAKE 1 TABLET BY MOUTH ONCE DAILY AT NIGHT AS NEEDED FOR  SLEEP 4/1/24 6/3/24  Mila Tabor MD   atorvastatin (LIPITOR) 20 MG tablet Take 1 tablet by mouth once daily 2/7/24   Mila Tabor MD   amLODIPine (NORVASC) 10 MG tablet Take 1 tablet by mouth once daily 1/25/24   Mila Tabor MD   escitalopram (LEXAPRO) 20 MG tablet Take 1 tablet by mouth once daily 1/12/24   Mila Tabor MD   metoprolol succinate (TOPROL XL) 100 MG extended release tablet TAKE 1 TABLET BY MOUTH DAILY FOR HIGH BLOOD PRESSURE 11/2/23   Mila Tabor MD   metFORMIN (GLUCOPHAGE) 1000 MG tablet Take 1 tablet by mouth 2 times daily (with meals) 10/23/23   Mila Tabor MD   glimepiride (AMARYL) 4 MG tablet Take 2 tablets by mouth daily 10/23/23   Mila Tabor MD   diclofenac (VOLTAREN) 50 MG EC tablet Take 1 tablet by mouth 3 times daily 11/1/22   Automatic Reconciliation, Ar   fluticasone (FLONASE) 50 MCG/ACT nasal spray  1/11/22   Automatic Reconciliation, Ar   loratadine-pseudoephedrine (CLARITIN-D 12HR) 5-120 MG per extended release tablet Take 1 tablet by mouth 2 times daily 10/9/22   Automatic Reconciliation, Ar   meclizine (ANTIVERT) 25 MG tablet Take 1 tablet by mouth 3 times daily as needed 10/26/22   Automatic Reconciliation, Ar     a  Allergies   Allergen Reactions    Aspirin Other (See Comments)     Pt reports having a h/o gastric ulcers.  Pt was on

## 2024-06-04 ENCOUNTER — APPOINTMENT (OUTPATIENT)
Facility: HOSPITAL | Age: 64
DRG: 617 | End: 2024-06-04
Payer: MEDICARE

## 2024-06-04 ENCOUNTER — ANESTHESIA (OUTPATIENT)
Facility: HOSPITAL | Age: 64
End: 2024-06-04
Payer: MEDICARE

## 2024-06-04 LAB
ANION GAP SERPL CALC-SCNC: 5 MMOL/L (ref 5–15)
BUN SERPL-MCNC: 11 MG/DL (ref 6–20)
BUN/CREAT SERPL: 14 (ref 12–20)
CALCIUM SERPL-MCNC: 9.9 MG/DL (ref 8.5–10.1)
CHLORIDE SERPL-SCNC: 99 MMOL/L (ref 97–108)
CO2 SERPL-SCNC: 34 MMOL/L (ref 21–32)
CREAT SERPL-MCNC: 0.78 MG/DL (ref 0.55–1.02)
GLUCOSE BLD STRIP.AUTO-MCNC: 126 MG/DL (ref 65–117)
GLUCOSE BLD STRIP.AUTO-MCNC: 161 MG/DL (ref 65–117)
GLUCOSE BLD STRIP.AUTO-MCNC: 193 MG/DL (ref 65–117)
GLUCOSE BLD STRIP.AUTO-MCNC: 395 MG/DL (ref 65–117)
GLUCOSE BLD STRIP.AUTO-MCNC: 417 MG/DL (ref 65–117)
GLUCOSE SERPL-MCNC: 294 MG/DL (ref 65–100)
POTASSIUM SERPL-SCNC: 3.7 MMOL/L (ref 3.5–5.1)
SERVICE CMNT-IMP: ABNORMAL
SODIUM SERPL-SCNC: 138 MMOL/L (ref 136–145)

## 2024-06-04 PROCEDURE — 6370000000 HC RX 637 (ALT 250 FOR IP): Performed by: CLINICAL NURSE SPECIALIST

## 2024-06-04 PROCEDURE — 1100000000 HC RM PRIVATE

## 2024-06-04 PROCEDURE — 0QBR0ZX EXCISION OF LEFT TOE PHALANX, OPEN APPROACH, DIAGNOSTIC: ICD-10-PCS | Performed by: PODIATRIST

## 2024-06-04 PROCEDURE — C1713 ANCHOR/SCREW BN/BN,TIS/BN: HCPCS | Performed by: PODIATRIST

## 2024-06-04 PROCEDURE — 6360000002 HC RX W HCPCS

## 2024-06-04 PROCEDURE — 6360000002 HC RX W HCPCS: Performed by: HOSPITALIST

## 2024-06-04 PROCEDURE — 3600000002 HC SURGERY LEVEL 2 BASE: Performed by: PODIATRIST

## 2024-06-04 PROCEDURE — 2580000003 HC RX 258

## 2024-06-04 PROCEDURE — 87205 SMEAR GRAM STAIN: CPT

## 2024-06-04 PROCEDURE — 3600000012 HC SURGERY LEVEL 2 ADDTL 15MIN: Performed by: PODIATRIST

## 2024-06-04 PROCEDURE — 36415 COLL VENOUS BLD VENIPUNCTURE: CPT

## 2024-06-04 PROCEDURE — 87102 FUNGUS ISOLATION CULTURE: CPT

## 2024-06-04 PROCEDURE — 6360000002 HC RX W HCPCS: Performed by: FAMILY MEDICINE

## 2024-06-04 PROCEDURE — 80048 BASIC METABOLIC PNL TOTAL CA: CPT

## 2024-06-04 PROCEDURE — 6370000000 HC RX 637 (ALT 250 FOR IP)

## 2024-06-04 PROCEDURE — 2709999900 HC NON-CHARGEABLE SUPPLY: Performed by: PODIATRIST

## 2024-06-04 PROCEDURE — 87077 CULTURE AEROBIC IDENTIFY: CPT

## 2024-06-04 PROCEDURE — 82962 GLUCOSE BLOOD TEST: CPT

## 2024-06-04 PROCEDURE — 2500000003 HC RX 250 WO HCPCS

## 2024-06-04 PROCEDURE — 73630 X-RAY EXAM OF FOOT: CPT

## 2024-06-04 PROCEDURE — 87176 TISSUE HOMOGENIZATION CULTR: CPT

## 2024-06-04 PROCEDURE — 2580000003 HC RX 258: Performed by: FAMILY MEDICINE

## 2024-06-04 PROCEDURE — 99222 1ST HOSP IP/OBS MODERATE 55: CPT | Performed by: INTERNAL MEDICINE

## 2024-06-04 PROCEDURE — 6370000000 HC RX 637 (ALT 250 FOR IP): Performed by: FAMILY MEDICINE

## 2024-06-04 PROCEDURE — 3700000000 HC ANESTHESIA ATTENDED CARE: Performed by: PODIATRIST

## 2024-06-04 PROCEDURE — 88304 TISSUE EXAM BY PATHOLOGIST: CPT

## 2024-06-04 PROCEDURE — 2580000003 HC RX 258: Performed by: STUDENT IN AN ORGANIZED HEALTH CARE EDUCATION/TRAINING PROGRAM

## 2024-06-04 PROCEDURE — 7100000000 HC PACU RECOVERY - FIRST 15 MIN: Performed by: PODIATRIST

## 2024-06-04 PROCEDURE — 87075 CULTR BACTERIA EXCEPT BLOOD: CPT

## 2024-06-04 PROCEDURE — 87070 CULTURE OTHR SPECIMN AEROBIC: CPT

## 2024-06-04 PROCEDURE — 87186 SC STD MICRODIL/AGAR DIL: CPT

## 2024-06-04 PROCEDURE — 2580000003 HC RX 258: Performed by: HOSPITALIST

## 2024-06-04 PROCEDURE — 88311 DECALCIFY TISSUE: CPT

## 2024-06-04 PROCEDURE — 6370000000 HC RX 637 (ALT 250 FOR IP): Performed by: NURSE PRACTITIONER

## 2024-06-04 PROCEDURE — 99231 SBSQ HOSP IP/OBS SF/LOW 25: CPT | Performed by: CLINICAL NURSE SPECIALIST

## 2024-06-04 PROCEDURE — 88305 TISSUE EXAM BY PATHOLOGIST: CPT

## 2024-06-04 PROCEDURE — 3700000001 HC ADD 15 MINUTES (ANESTHESIA): Performed by: PODIATRIST

## 2024-06-04 PROCEDURE — 7100000001 HC PACU RECOVERY - ADDTL 15 MIN: Performed by: PODIATRIST

## 2024-06-04 PROCEDURE — 0Y6Q0Z0 DETACHMENT AT LEFT 1ST TOE, COMPLETE, OPEN APPROACH: ICD-10-PCS | Performed by: PODIATRIST

## 2024-06-04 RX ORDER — HYDRALAZINE HYDROCHLORIDE 20 MG/ML
10 INJECTION INTRAMUSCULAR; INTRAVENOUS
Status: DISCONTINUED | OUTPATIENT
Start: 2024-06-04 | End: 2024-06-04 | Stop reason: HOSPADM

## 2024-06-04 RX ORDER — INSULIN GLARGINE 100 [IU]/ML
30 INJECTION, SOLUTION SUBCUTANEOUS NIGHTLY
Qty: 5 ADJUSTABLE DOSE PRE-FILLED PEN SYRINGE | Refills: 3 | Status: CANCELLED | OUTPATIENT
Start: 2024-06-04

## 2024-06-04 RX ORDER — SODIUM CHLORIDE 9 MG/ML
INJECTION, SOLUTION INTRAVENOUS PRN
Status: DISCONTINUED | OUTPATIENT
Start: 2024-06-04 | End: 2024-06-04 | Stop reason: HOSPADM

## 2024-06-04 RX ORDER — OXYCODONE HYDROCHLORIDE 5 MG/1
5 TABLET ORAL
Status: DISCONTINUED | OUTPATIENT
Start: 2024-06-04 | End: 2024-06-04 | Stop reason: HOSPADM

## 2024-06-04 RX ORDER — HYDROMORPHONE HYDROCHLORIDE 1 MG/ML
0.5 INJECTION, SOLUTION INTRAMUSCULAR; INTRAVENOUS; SUBCUTANEOUS EVERY 5 MIN PRN
Status: DISCONTINUED | OUTPATIENT
Start: 2024-06-04 | End: 2024-06-04 | Stop reason: HOSPADM

## 2024-06-04 RX ORDER — ONDANSETRON 2 MG/ML
4 INJECTION INTRAMUSCULAR; INTRAVENOUS
Status: DISCONTINUED | OUTPATIENT
Start: 2024-06-04 | End: 2024-06-04 | Stop reason: HOSPADM

## 2024-06-04 RX ORDER — SODIUM CHLORIDE 0.9 % (FLUSH) 0.9 %
5-40 SYRINGE (ML) INJECTION PRN
Status: DISCONTINUED | OUTPATIENT
Start: 2024-06-04 | End: 2024-06-04 | Stop reason: HOSPADM

## 2024-06-04 RX ORDER — ACETAMINOPHEN 500 MG
1000 TABLET ORAL ONCE
Status: DISCONTINUED | OUTPATIENT
Start: 2024-06-04 | End: 2024-06-04 | Stop reason: HOSPADM

## 2024-06-04 RX ORDER — FENTANYL CITRATE 50 UG/ML
25 INJECTION, SOLUTION INTRAMUSCULAR; INTRAVENOUS EVERY 5 MIN PRN
Status: DISCONTINUED | OUTPATIENT
Start: 2024-06-04 | End: 2024-06-04 | Stop reason: HOSPADM

## 2024-06-04 RX ORDER — NALOXONE HYDROCHLORIDE 0.4 MG/ML
INJECTION, SOLUTION INTRAMUSCULAR; INTRAVENOUS; SUBCUTANEOUS PRN
Status: DISCONTINUED | OUTPATIENT
Start: 2024-06-04 | End: 2024-06-04 | Stop reason: HOSPADM

## 2024-06-04 RX ORDER — TRAMADOL HYDROCHLORIDE 50 MG/1
50 TABLET ORAL EVERY 6 HOURS PRN
Status: DISCONTINUED | OUTPATIENT
Start: 2024-06-04 | End: 2024-06-10 | Stop reason: HOSPADM

## 2024-06-04 RX ORDER — LIDOCAINE HYDROCHLORIDE 20 MG/ML
INJECTION, SOLUTION EPIDURAL; INFILTRATION; INTRACAUDAL; PERINEURAL PRN
Status: DISCONTINUED | OUTPATIENT
Start: 2024-06-04 | End: 2024-06-04 | Stop reason: SDUPTHER

## 2024-06-04 RX ORDER — ONDANSETRON 2 MG/ML
INJECTION INTRAMUSCULAR; INTRAVENOUS PRN
Status: DISCONTINUED | OUTPATIENT
Start: 2024-06-04 | End: 2024-06-04 | Stop reason: SDUPTHER

## 2024-06-04 RX ORDER — PHENYLEPHRINE HCL IN 0.9% NACL 0.4MG/10ML
SYRINGE (ML) INTRAVENOUS PRN
Status: DISCONTINUED | OUTPATIENT
Start: 2024-06-04 | End: 2024-06-04 | Stop reason: SDUPTHER

## 2024-06-04 RX ORDER — SODIUM CHLORIDE, SODIUM LACTATE, POTASSIUM CHLORIDE, CALCIUM CHLORIDE 600; 310; 30; 20 MG/100ML; MG/100ML; MG/100ML; MG/100ML
INJECTION, SOLUTION INTRAVENOUS CONTINUOUS
Status: DISCONTINUED | OUTPATIENT
Start: 2024-06-04 | End: 2024-06-04 | Stop reason: HOSPADM

## 2024-06-04 RX ORDER — LIDOCAINE HYDROCHLORIDE 10 MG/ML
1 INJECTION, SOLUTION EPIDURAL; INFILTRATION; INTRACAUDAL; PERINEURAL
Status: DISCONTINUED | OUTPATIENT
Start: 2024-06-04 | End: 2024-06-04 | Stop reason: HOSPADM

## 2024-06-04 RX ORDER — SODIUM CHLORIDE 0.9 % (FLUSH) 0.9 %
5-40 SYRINGE (ML) INJECTION PRN
Status: DISCONTINUED | OUTPATIENT
Start: 2024-06-04 | End: 2024-06-10 | Stop reason: HOSPADM

## 2024-06-04 RX ORDER — INSULIN GLARGINE 100 [IU]/ML
40 INJECTION, SOLUTION SUBCUTANEOUS NIGHTLY
Status: DISCONTINUED | OUTPATIENT
Start: 2024-06-04 | End: 2024-06-05

## 2024-06-04 RX ORDER — DEXAMETHASONE SODIUM PHOSPHATE 4 MG/ML
INJECTION, SOLUTION INTRA-ARTICULAR; INTRALESIONAL; INTRAMUSCULAR; INTRAVENOUS; SOFT TISSUE PRN
Status: DISCONTINUED | OUTPATIENT
Start: 2024-06-04 | End: 2024-06-04 | Stop reason: SDUPTHER

## 2024-06-04 RX ORDER — DEXMEDETOMIDINE HYDROCHLORIDE 100 UG/ML
INJECTION, SOLUTION INTRAVENOUS PRN
Status: DISCONTINUED | OUTPATIENT
Start: 2024-06-04 | End: 2024-06-04 | Stop reason: SDUPTHER

## 2024-06-04 RX ORDER — FENTANYL CITRATE 50 UG/ML
100 INJECTION, SOLUTION INTRAMUSCULAR; INTRAVENOUS
Status: DISCONTINUED | OUTPATIENT
Start: 2024-06-04 | End: 2024-06-04 | Stop reason: HOSPADM

## 2024-06-04 RX ORDER — SODIUM CHLORIDE 0.9 % (FLUSH) 0.9 %
5-40 SYRINGE (ML) INJECTION EVERY 12 HOURS SCHEDULED
Status: DISCONTINUED | OUTPATIENT
Start: 2024-06-04 | End: 2024-06-04 | Stop reason: HOSPADM

## 2024-06-04 RX ORDER — SODIUM CHLORIDE, SODIUM LACTATE, POTASSIUM CHLORIDE, CALCIUM CHLORIDE 600; 310; 30; 20 MG/100ML; MG/100ML; MG/100ML; MG/100ML
INJECTION, SOLUTION INTRAVENOUS CONTINUOUS PRN
Status: DISCONTINUED | OUTPATIENT
Start: 2024-06-04 | End: 2024-06-04 | Stop reason: SDUPTHER

## 2024-06-04 RX ORDER — PROCHLORPERAZINE EDISYLATE 5 MG/ML
5 INJECTION INTRAMUSCULAR; INTRAVENOUS
Status: DISCONTINUED | OUTPATIENT
Start: 2024-06-04 | End: 2024-06-04 | Stop reason: HOSPADM

## 2024-06-04 RX ORDER — HYDRALAZINE HYDROCHLORIDE 20 MG/ML
10 INJECTION INTRAMUSCULAR; INTRAVENOUS EVERY 6 HOURS PRN
Status: DISCONTINUED | OUTPATIENT
Start: 2024-06-04 | End: 2024-06-10 | Stop reason: HOSPADM

## 2024-06-04 RX ORDER — MIDAZOLAM HYDROCHLORIDE 2 MG/2ML
2 INJECTION, SOLUTION INTRAMUSCULAR; INTRAVENOUS
Status: DISCONTINUED | OUTPATIENT
Start: 2024-06-04 | End: 2024-06-04 | Stop reason: HOSPADM

## 2024-06-04 RX ADMIN — HYDROMORPHONE HYDROCHLORIDE 1 MG: 1 INJECTION, SOLUTION INTRAMUSCULAR; INTRAVENOUS; SUBCUTANEOUS at 18:04

## 2024-06-04 RX ADMIN — Medication 80 MCG: at 14:06

## 2024-06-04 RX ADMIN — HYDROMORPHONE HYDROCHLORIDE 1 MG: 1 INJECTION, SOLUTION INTRAMUSCULAR; INTRAVENOUS; SUBCUTANEOUS at 08:54

## 2024-06-04 RX ADMIN — CEFEPIME 2000 MG: 2 INJECTION, POWDER, FOR SOLUTION INTRAVENOUS at 06:37

## 2024-06-04 RX ADMIN — Medication 10 ML: at 21:09

## 2024-06-04 RX ADMIN — INSULIN LISPRO 4 UNITS: 100 INJECTION, SOLUTION INTRAVENOUS; SUBCUTANEOUS at 21:05

## 2024-06-04 RX ADMIN — CEFEPIME 2000 MG: 2 INJECTION, POWDER, FOR SOLUTION INTRAVENOUS at 13:52

## 2024-06-04 RX ADMIN — DEXAMETHASONE SODIUM PHOSPHATE 4 MG: 4 INJECTION, SOLUTION INTRAMUSCULAR; INTRAVENOUS at 13:43

## 2024-06-04 RX ADMIN — ATORVASTATIN CALCIUM 20 MG: 40 TABLET, FILM COATED ORAL at 08:44

## 2024-06-04 RX ADMIN — FLUTICASONE PROPIONATE 2 SPRAY: 50 SPRAY, METERED NASAL at 08:47

## 2024-06-04 RX ADMIN — HYDROMORPHONE HYDROCHLORIDE 1 MG: 1 INJECTION, SOLUTION INTRAMUSCULAR; INTRAVENOUS; SUBCUTANEOUS at 00:02

## 2024-06-04 RX ADMIN — ZOLPIDEM TARTRATE 5 MG: 5 TABLET ORAL at 20:59

## 2024-06-04 RX ADMIN — Medication 40 MCG: at 14:00

## 2024-06-04 RX ADMIN — ONDANSETRON 4 MG: 2 INJECTION INTRAMUSCULAR; INTRAVENOUS at 14:06

## 2024-06-04 RX ADMIN — PROPOFOL 50 MG: 10 INJECTION, EMULSION INTRAVENOUS at 13:43

## 2024-06-04 RX ADMIN — HYDROMORPHONE HYDROCHLORIDE 1 MG: 1 INJECTION, SOLUTION INTRAMUSCULAR; INTRAVENOUS; SUBCUTANEOUS at 05:15

## 2024-06-04 RX ADMIN — HYDROCHLOROTHIAZIDE 25 MG: 25 TABLET ORAL at 08:44

## 2024-06-04 RX ADMIN — Medication 80 MCG: at 13:55

## 2024-06-04 RX ADMIN — ENOXAPARIN SODIUM 30 MG: 100 INJECTION SUBCUTANEOUS at 21:00

## 2024-06-04 RX ADMIN — AMLODIPINE BESYLATE 10 MG: 5 TABLET ORAL at 08:44

## 2024-06-04 RX ADMIN — CEFEPIME 2000 MG: 2 INJECTION, POWDER, FOR SOLUTION INTRAVENOUS at 21:07

## 2024-06-04 RX ADMIN — Medication 80 MCG: at 13:51

## 2024-06-04 RX ADMIN — SODIUM CHLORIDE, POTASSIUM CHLORIDE, SODIUM LACTATE AND CALCIUM CHLORIDE: 600; 310; 30; 20 INJECTION, SOLUTION INTRAVENOUS at 13:24

## 2024-06-04 RX ADMIN — DEXMEDETOMIDINE HYDROCHLORIDE 8 MCG: 100 INJECTION, SOLUTION, CONCENTRATE INTRAVENOUS at 13:43

## 2024-06-04 RX ADMIN — Medication 10 ML: at 08:48

## 2024-06-04 RX ADMIN — TRAMADOL HYDROCHLORIDE 50 MG: 50 TABLET ORAL at 20:59

## 2024-06-04 RX ADMIN — METOPROLOL SUCCINATE 100 MG: 50 TABLET, EXTENDED RELEASE ORAL at 08:43

## 2024-06-04 RX ADMIN — ESCITALOPRAM OXALATE 20 MG: 10 TABLET ORAL at 08:44

## 2024-06-04 RX ADMIN — SODIUM CHLORIDE, POTASSIUM CHLORIDE, SODIUM LACTATE AND CALCIUM CHLORIDE: 600; 310; 30; 20 INJECTION, SOLUTION INTRAVENOUS at 13:37

## 2024-06-04 RX ADMIN — INSULIN GLARGINE 40 UNITS: 100 INJECTION, SOLUTION SUBCUTANEOUS at 21:00

## 2024-06-04 RX ADMIN — PROPOFOL 150 MCG/KG/MIN: 10 INJECTION, EMULSION INTRAVENOUS at 13:42

## 2024-06-04 RX ADMIN — Medication 40 MCG: at 14:12

## 2024-06-04 RX ADMIN — LIDOCAINE HYDROCHLORIDE 60 MG: 20 INJECTION, SOLUTION EPIDURAL; INFILTRATION; INTRACAUDAL; PERINEURAL at 13:41

## 2024-06-04 RX ADMIN — VALSARTAN 320 MG: 160 TABLET, FILM COATED ORAL at 08:43

## 2024-06-04 RX ADMIN — SODIUM CHLORIDE 1250 MG: 9 INJECTION, SOLUTION INTRAVENOUS at 17:00

## 2024-06-04 RX ADMIN — PROPOFOL 50 MG: 10 INJECTION, EMULSION INTRAVENOUS at 13:41

## 2024-06-04 RX ADMIN — SODIUM CHLORIDE 1250 MG: 9 INJECTION, SOLUTION INTRAVENOUS at 04:51

## 2024-06-04 ASSESSMENT — PAIN SCALES - GENERAL
PAINLEVEL_OUTOF10: 10
PAINLEVEL_OUTOF10: 4
PAINLEVEL_OUTOF10: 5
PAINLEVEL_OUTOF10: 4
PAINLEVEL_OUTOF10: 8
PAINLEVEL_OUTOF10: 3
PAINLEVEL_OUTOF10: 0
PAINLEVEL_OUTOF10: 6
PAINLEVEL_OUTOF10: 0
PAINLEVEL_OUTOF10: 10
PAINLEVEL_OUTOF10: 8
PAINLEVEL_OUTOF10: 5
PAINLEVEL_OUTOF10: 4

## 2024-06-04 ASSESSMENT — PAIN DESCRIPTION - ORIENTATION
ORIENTATION: LEFT

## 2024-06-04 ASSESSMENT — PAIN DESCRIPTION - DESCRIPTORS
DESCRIPTORS: ACHING
DESCRIPTORS: ACHING
DESCRIPTORS: ACHING;DULL
DESCRIPTORS: ACHING;DULL
DESCRIPTORS: ACHING;CRUSHING

## 2024-06-04 ASSESSMENT — PAIN - FUNCTIONAL ASSESSMENT
PAIN_FUNCTIONAL_ASSESSMENT: PREVENTS OR INTERFERES SOME ACTIVE ACTIVITIES AND ADLS
PAIN_FUNCTIONAL_ASSESSMENT: ACTIVITIES ARE NOT PREVENTED

## 2024-06-04 ASSESSMENT — PAIN DESCRIPTION - LOCATION
LOCATION: TOE (COMMENT WHICH ONE)
LOCATION: FOOT
LOCATION: FOOT;LEG
LOCATION: FOOT

## 2024-06-04 NOTE — PERIOP NOTE
TRANSFER - IN REPORT:    Verbal report received from Odilia(name) on Marti Larkin  being received from [facility](unit) for ordered procedure      Report consisted of patient’s Situation, Background, Assessment and   Recommendations(SBAR).     Information from the following report(s) Pre Procedure Checklist was reviewed with the receiving nurse.    Opportunity for questions and clarification was provided.

## 2024-06-04 NOTE — BRIEF OP NOTE
Brief Postoperative Note      Patient: Marti Larkin  YOB: 1960  MRN: [id number]    Date of Procedure: 6/4/2024    Pre-Op Diagnosis Codes:     * Acute osteomyelitis of left ankle or foot ([facility]) [M86.172]    Post-Op Diagnosis: Same       Procedure(s):  GREAT TOE AMPUTATION AT METATARSALPHALANGEAL JOINT LEFT, OPEN BONE BIOPSIES LEFT (MAC WITH LOCAL)    Surgeon(s):  Amarjit Ramirez DPM    Assistant:  * No surgical staff found *    Anesthesia: Monitor Anesthesia Care    Estimated Blood Loss (mL): Minimal    Complications: None    Specimens:   ID Type Source Tests Collected by Time Destination   1 : LEFT GREAT TOE Bone Foot SURGICAL PATHOLOGY, CULTURE, FUNGUS Amarjit Ramirez DPM 6/4/2024 1400    2 : LEFT GREAT TOE PROXIMAL MARGIN Bone Foot SURGICAL PATHOLOGY, CULTURE, FUNGUS Amarjit Ramirez DPM 6/4/2024 1401    A : LEFT FOOT WOUND Swab Foot CULTURE, FUNGUS, GRAM STAIN, CULTURE, WOUND, SUSCEPT, AER + ANAER REFL Amarjit Ramirez DPM 6/4/2024 1358        Implants:  * No implants in log *      Drains: * No LDAs found *    Findings:  Infection Present At Time Of Surgery (PATOS) (choose all levels that have infection present):  - Deep Infection (muscle/fascia) present as evidenced by abscess  Other Findings: noted nonviable soft tissue envelope with soft bone to the distal aspect of the left great toe to the level of the IP joint, small abscess identified dorsally, the skin margin was compromised distally so in order to close the wound entirely the proximal phalanx bone was removed and a proximal margin biopsy was performed. This appeared to have clean normal appearing anatomy and good cortical rim, I suspect a surgical cure     Electronically signed by Amarjit Ramirez DPM on 6/4/2024 at 2:20 PM

## 2024-06-04 NOTE — ANESTHESIA POSTPROCEDURE EVALUATION
Department of Anesthesiology  Postprocedure Note    Patient: Marti Larkin  MRN: [id number]  YOB: 1960  Date of evaluation: 6/4/2024    Procedure Summary       Date: 06/04/24 Room / Location: [facility] MAIN OR [address] [place] [address] MAIN OR    Anesthesia Start: 1337 Anesthesia Stop: 1428    Procedure: GREAT TOE AMPUTATION AT METATARSALPHALANGEAL JOINT LEFT, OPEN BONE BIOPSIES LEFT (MAC WITH LOCAL) (Left: First Toe) Diagnosis:       Acute osteomyelitis of left ankle or foot ([facility])      (Acute osteomyelitis of left ankle or foot ([facility]) [M86.172])    Providers: Amarjit Ramirez DPM Responsible Provider: Alexa Cortez DO    Anesthesia Type: MAC ASA Status: 3            Anesthesia Type: MAC    Estela Phase I: Estela Score: 8    Estela Phase II:      Anesthesia Post Evaluation    Patient location during evaluation: PACU  Patient participation: complete - patient participated  Level of consciousness: awake and alert  Pain score: 0  Airway patency: patent  Nausea & Vomiting: no nausea and no vomiting  Cardiovascular status: blood pressure returned to baseline and hemodynamically stable  Respiratory status: acceptable and room air  Hydration status: euvolemic  Pain management: adequate    No notable events documented.

## 2024-06-04 NOTE — ANESTHESIA PRE PROCEDURE
Department of Anesthesiology  Preprocedure Note       Name:  Marti Larkin   Age:  64 y.o.  :  1960                                          MRN:  [id number]         Date:  2024      Surgeon: Surgeon(s):  Amarjit Ramirez DPM    Procedure: Procedure(s):  GREAT TOE AMPUTATION AT METATARSALPHALANGEAL JOINT LEFT, OPEN BONE BIOPSIES LEFT (MAC WITH LOCAL)    Medications prior to admission:   Prior to Admission medications    Medication Sig Start Date End Date Taking? Authorizing Provider   insulin glargine (LANTUS SOLOSTAR) 100 UNIT/ML injection pen Inject 30 Units into the skin nightly 6/3/24  Yes Breanna Villatoro APRN - CNS   Continuous Glucose Sensor (FREESTYLE TIM 3 SENSOR) MISC Utilize to monitor blood sugar 6/3/24  Yes Breanna Villatoro APRN - CNS   Continuous Glucose  (FREESTYLE TIM 3 READER) PHILLIP Utilize to monitor blood sugar 6/3/24  Yes Breanna Villatoro APRN - CNS   valsartan-hydroCHLOROthiazide (DIOVAN-HCT) 320-25 MG per tablet Take 1 tablet by mouth daily 24   Mila Tabor MD   atorvastatin (LIPITOR) 20 MG tablet Take 1 tablet by mouth once daily 24   Mila Tabor MD   amLODIPine (NORVASC) 10 MG tablet Take 1 tablet by mouth once daily 24   Mila Tabor MD   escitalopram (LEXAPRO) 20 MG tablet Take 1 tablet by mouth once daily 24   Mila Tabor MD   metoprolol succinate (TOPROL XL) 100 MG extended release tablet TAKE 1 TABLET BY MOUTH DAILY FOR HIGH BLOOD PRESSURE 23   Mila Tabor MD   metFORMIN (GLUCOPHAGE) 1000 MG tablet Take 1 tablet by mouth 2 times daily (with meals) 10/23/23   Mila Tabor MD   glimepiride (AMARYL) 4 MG tablet Take 2 tablets by mouth daily 10/23/23   Mila Tabor MD   diclofenac (VOLTAREN) 50 MG EC tablet Take 1 tablet by mouth 3 times daily 22   Automatic Reconciliation, Ar   fluticasone (FLONASE) 50 MCG/ACT nasal spray  22   Automatic Reconciliation, Ar

## 2024-06-04 NOTE — PERIOP NOTE
TRANSFER - OUT REPORT:    Verbal report given to Odilia (name) on Marti Larkin  being transferred to [facility] (unit) for routine post-op       Report consisted of patient’s Situation, Background, Assessment and   Recommendations(SBAR).     Time Pre op antibiotic given:1400  Anesthesia Stop time: 1426    Information from the following report(s) SBAR, OR Summary, Procedure Summary, Intake/Output, MAR, and Cardiac Rhythm SR  was reviewed with the receiving nurse.    Opportunity for questions and clarification was provided.     Is the patient on 02? Yes       L/Min 2       Other N/A    Is the patient on a monitor? No    Is the nurse transporting with the patient? No    Surgical Waiting Area notified of patient's transfer from PACU? Yes

## 2024-06-05 LAB
ANION GAP SERPL CALC-SCNC: 6 MMOL/L (ref 5–15)
BACTERIA SPEC CULT: NORMAL
BACTERIA SPEC CULT: NORMAL
BUN SERPL-MCNC: 14 MG/DL (ref 6–20)
BUN/CREAT SERPL: 17 (ref 12–20)
CHLORIDE SERPL-SCNC: 101 MMOL/L (ref 97–108)
CO2 SERPL-SCNC: 31 MMOL/L (ref 21–32)
CREAT SERPL-MCNC: 0.81 MG/DL (ref 0.55–1.02)
GLUCOSE BLD STRIP.AUTO-MCNC: 185 MG/DL (ref 65–117)
GLUCOSE BLD STRIP.AUTO-MCNC: 242 MG/DL (ref 65–117)
GLUCOSE BLD STRIP.AUTO-MCNC: 255 MG/DL (ref 65–117)
GLUCOSE BLD STRIP.AUTO-MCNC: 263 MG/DL (ref 65–117)
GLUCOSE SERPL-MCNC: 234 MG/DL (ref 65–100)
POTASSIUM SERPL-SCNC: 4.4 MMOL/L (ref 3.5–5.1)
SERVICE CMNT-IMP: ABNORMAL
SERVICE CMNT-IMP: NORMAL
SERVICE CMNT-IMP: NORMAL
SODIUM SERPL-SCNC: 138 MMOL/L (ref 136–145)
VANCOMYCIN SERPL-MCNC: 12.1 UG/ML

## 2024-06-05 PROCEDURE — 6370000000 HC RX 637 (ALT 250 FOR IP)

## 2024-06-05 PROCEDURE — 6370000000 HC RX 637 (ALT 250 FOR IP): Performed by: NURSE PRACTITIONER

## 2024-06-05 PROCEDURE — 6360000002 HC RX W HCPCS: Performed by: HOSPITALIST

## 2024-06-05 PROCEDURE — 80048 BASIC METABOLIC PNL TOTAL CA: CPT

## 2024-06-05 PROCEDURE — 99232 SBSQ HOSP IP/OBS MODERATE 35: CPT | Performed by: CLINICAL NURSE SPECIALIST

## 2024-06-05 PROCEDURE — 82962 GLUCOSE BLOOD TEST: CPT

## 2024-06-05 PROCEDURE — 6360000002 HC RX W HCPCS: Performed by: FAMILY MEDICINE

## 2024-06-05 PROCEDURE — 1100000000 HC RM PRIVATE

## 2024-06-05 PROCEDURE — 36415 COLL VENOUS BLD VENIPUNCTURE: CPT

## 2024-06-05 PROCEDURE — 6370000000 HC RX 637 (ALT 250 FOR IP): Performed by: FAMILY MEDICINE

## 2024-06-05 PROCEDURE — 99232 SBSQ HOSP IP/OBS MODERATE 35: CPT | Performed by: INTERNAL MEDICINE

## 2024-06-05 PROCEDURE — 6370000000 HC RX 637 (ALT 250 FOR IP): Performed by: CLINICAL NURSE SPECIALIST

## 2024-06-05 PROCEDURE — 80202 ASSAY OF VANCOMYCIN: CPT

## 2024-06-05 PROCEDURE — 2580000003 HC RX 258: Performed by: FAMILY MEDICINE

## 2024-06-05 PROCEDURE — 2580000003 HC RX 258: Performed by: HOSPITALIST

## 2024-06-05 RX ORDER — INSULIN GLARGINE 100 [IU]/ML
45 INJECTION, SOLUTION SUBCUTANEOUS NIGHTLY
Status: DISCONTINUED | OUTPATIENT
Start: 2024-06-05 | End: 2024-06-07

## 2024-06-05 RX ORDER — INSULIN LISPRO 100 [IU]/ML
8 INJECTION, SOLUTION INTRAVENOUS; SUBCUTANEOUS
Status: DISCONTINUED | OUTPATIENT
Start: 2024-06-05 | End: 2024-06-09

## 2024-06-05 RX ADMIN — CEFEPIME 2000 MG: 2 INJECTION, POWDER, FOR SOLUTION INTRAVENOUS at 21:48

## 2024-06-05 RX ADMIN — INSULIN LISPRO 5 UNITS: 100 INJECTION, SOLUTION INTRAVENOUS; SUBCUTANEOUS at 13:17

## 2024-06-05 RX ADMIN — Medication 10 ML: at 09:40

## 2024-06-05 RX ADMIN — ESCITALOPRAM OXALATE 20 MG: 10 TABLET ORAL at 09:40

## 2024-06-05 RX ADMIN — HYDROMORPHONE HYDROCHLORIDE 1 MG: 1 INJECTION, SOLUTION INTRAMUSCULAR; INTRAVENOUS; SUBCUTANEOUS at 17:30

## 2024-06-05 RX ADMIN — INSULIN LISPRO 4 UNITS: 100 INJECTION, SOLUTION INTRAVENOUS; SUBCUTANEOUS at 13:17

## 2024-06-05 RX ADMIN — Medication 10 ML: at 21:43

## 2024-06-05 RX ADMIN — SODIUM CHLORIDE 1250 MG: 9 INJECTION, SOLUTION INTRAVENOUS at 17:38

## 2024-06-05 RX ADMIN — AMLODIPINE BESYLATE 10 MG: 5 TABLET ORAL at 09:39

## 2024-06-05 RX ADMIN — SODIUM CHLORIDE 1250 MG: 9 INJECTION, SOLUTION INTRAVENOUS at 05:34

## 2024-06-05 RX ADMIN — INSULIN LISPRO 2 UNITS: 100 INJECTION, SOLUTION INTRAVENOUS; SUBCUTANEOUS at 06:39

## 2024-06-05 RX ADMIN — METOPROLOL SUCCINATE 100 MG: 50 TABLET, EXTENDED RELEASE ORAL at 09:40

## 2024-06-05 RX ADMIN — ATORVASTATIN CALCIUM 20 MG: 40 TABLET, FILM COATED ORAL at 09:39

## 2024-06-05 RX ADMIN — INSULIN LISPRO 5 UNITS: 100 INJECTION, SOLUTION INTRAVENOUS; SUBCUTANEOUS at 06:40

## 2024-06-05 RX ADMIN — HYDROMORPHONE HYDROCHLORIDE 1 MG: 1 INJECTION, SOLUTION INTRAMUSCULAR; INTRAVENOUS; SUBCUTANEOUS at 05:19

## 2024-06-05 RX ADMIN — HYDROCHLOROTHIAZIDE 25 MG: 25 TABLET ORAL at 09:40

## 2024-06-05 RX ADMIN — SODIUM CHLORIDE: 900 INJECTION INTRAVENOUS at 21:47

## 2024-06-05 RX ADMIN — CEFEPIME 2000 MG: 2 INJECTION, POWDER, FOR SOLUTION INTRAVENOUS at 07:28

## 2024-06-05 RX ADMIN — INSULIN GLARGINE 45 UNITS: 100 INJECTION, SOLUTION SUBCUTANEOUS at 21:42

## 2024-06-05 RX ADMIN — ENOXAPARIN SODIUM 30 MG: 100 INJECTION SUBCUTANEOUS at 09:39

## 2024-06-05 RX ADMIN — INSULIN LISPRO 8 UNITS: 100 INJECTION, SOLUTION INTRAVENOUS; SUBCUTANEOUS at 17:40

## 2024-06-05 RX ADMIN — HYDROMORPHONE HYDROCHLORIDE 1 MG: 1 INJECTION, SOLUTION INTRAMUSCULAR; INTRAVENOUS; SUBCUTANEOUS at 21:44

## 2024-06-05 RX ADMIN — INSULIN LISPRO 4 UNITS: 100 INJECTION, SOLUTION INTRAVENOUS; SUBCUTANEOUS at 17:40

## 2024-06-05 RX ADMIN — VALSARTAN 320 MG: 160 TABLET, FILM COATED ORAL at 09:40

## 2024-06-05 RX ADMIN — HYDROMORPHONE HYDROCHLORIDE 1 MG: 1 INJECTION, SOLUTION INTRAMUSCULAR; INTRAVENOUS; SUBCUTANEOUS at 13:20

## 2024-06-05 RX ADMIN — HYDROMORPHONE HYDROCHLORIDE 1 MG: 1 INJECTION, SOLUTION INTRAMUSCULAR; INTRAVENOUS; SUBCUTANEOUS at 09:51

## 2024-06-05 RX ADMIN — CEFEPIME 2000 MG: 2 INJECTION, POWDER, FOR SOLUTION INTRAVENOUS at 13:21

## 2024-06-05 RX ADMIN — ENOXAPARIN SODIUM 30 MG: 100 INJECTION SUBCUTANEOUS at 21:43

## 2024-06-05 ASSESSMENT — PAIN DESCRIPTION - ORIENTATION
ORIENTATION: LEFT
ORIENTATION: LEFT
ORIENTATION: RIGHT

## 2024-06-05 ASSESSMENT — PAIN SCALES - GENERAL
PAINLEVEL_OUTOF10: 4
PAINLEVEL_OUTOF10: 10
PAINLEVEL_OUTOF10: 4
PAINLEVEL_OUTOF10: 10
PAINLEVEL_OUTOF10: 10
PAINLEVEL_OUTOF10: 8
PAINLEVEL_OUTOF10: 10
PAINLEVEL_OUTOF10: 6

## 2024-06-05 ASSESSMENT — PAIN DESCRIPTION - DESCRIPTORS
DESCRIPTORS: ACHING;SORE
DESCRIPTORS: ACHING

## 2024-06-05 ASSESSMENT — PAIN DESCRIPTION - LOCATION
LOCATION: FOOT

## 2024-06-05 ASSESSMENT — PAIN DESCRIPTION - ONSET: ONSET: ON-GOING

## 2024-06-05 ASSESSMENT — PAIN DESCRIPTION - PAIN TYPE: TYPE: SURGICAL PAIN

## 2024-06-05 ASSESSMENT — PAIN DESCRIPTION - FREQUENCY: FREQUENCY: CONTINUOUS

## 2024-06-05 NOTE — CARE COORDINATION
Transition of Care Plan:    RUR: 8%   Prior Level of Functioning: Independent \"The pt lives in a two level home with her sister. The pt reported that she lives on the second level and reported that there are 15 steps to access her room.  The pt reported that she has hx with [facility]. The pt reported that she would be open to returning if recommended.   Disposition: Home with Family Assistance \"Pending therapies recs\"   Follow up appointments: PCP, Per Podiatry: Bandage change to take place in office in one week, stitches will be removed in two weeks,   DME needed: None   Transportation at discharge: BLS  IM/IMM Medicare/ letter given: Received   Caregiver Contact: Vanessa Bolanos Sister [phone]  Discharge Caregiver contacted prior to discharge? N/A   Care Conference needed? N/A   Barriers to discharge: medical, podiatry clearance, Cultyures pending, ABX, ID recs, PT/OT orders needed?,    ABX: IV cefepime 5/31-IV vancomycin  Procedure: S/p left great toe amputation (6/4)      06/04/24 1621   Service Assessment   Patient Orientation Alert and Oriented   Cognition Alert   History Provided By Patient   Primary Caregiver Self   Support Systems Family Members;Friends/Neighbors   PCP Verified by CM Yes   Last Visit to PCP Within last 3 months   Prior Functional Level Independent in ADLs/IADLs   Current Functional Level Independent in ADLs/IADLs   Can patient return to prior living arrangement Unknown at present   Ability to make needs known: Good   Family able to assist with home care needs: Yes   Financial Resources Medicare   Social/Functional History   Type of Home House   Home Layout Two level   Home Access Stairs to enter with rails   Entrance Stairs - Number of Steps 5   Bathroom Shower/Tub Tub/Shower unit   Bathroom Toilet Standard   Receives Help From Family   ADL Assistance Independent   Homemaking Assistance Independent   Ambulation Assistance Independent   Transfer Assistance

## 2024-06-06 LAB
ANION GAP SERPL CALC-SCNC: 1 MMOL/L (ref 5–15)
BACTERIA SPEC CULT: ABNORMAL
BACTERIA SPEC CULT: ABNORMAL
BACTERIA SPEC CULT: NORMAL
BUN SERPL-MCNC: 16 MG/DL (ref 6–20)
BUN/CREAT SERPL: 22 (ref 12–20)
CALCIUM SERPL-MCNC: 9.9 MG/DL (ref 8.5–10.1)
CHLORIDE SERPL-SCNC: 102 MMOL/L (ref 97–108)
CO2 SERPL-SCNC: 35 MMOL/L (ref 21–32)
CREAT SERPL-MCNC: 0.73 MG/DL (ref 0.55–1.02)
GLUCOSE BLD STRIP.AUTO-MCNC: 107 MG/DL (ref 65–117)
GLUCOSE BLD STRIP.AUTO-MCNC: 130 MG/DL (ref 65–117)
GLUCOSE BLD STRIP.AUTO-MCNC: 226 MG/DL (ref 65–117)
GLUCOSE BLD STRIP.AUTO-MCNC: 237 MG/DL (ref 65–117)
GLUCOSE BLD STRIP.AUTO-MCNC: 257 MG/DL (ref 65–117)
GLUCOSE SERPL-MCNC: 165 MG/DL (ref 65–100)
GRAM STN SPEC: ABNORMAL
POTASSIUM SERPL-SCNC: 4.3 MMOL/L (ref 3.5–5.1)
SERVICE CMNT-IMP: ABNORMAL
SERVICE CMNT-IMP: NORMAL
SERVICE CMNT-IMP: NORMAL
SODIUM SERPL-SCNC: 138 MMOL/L (ref 136–145)

## 2024-06-06 PROCEDURE — 6360000002 HC RX W HCPCS: Performed by: HOSPITALIST

## 2024-06-06 PROCEDURE — 82962 GLUCOSE BLOOD TEST: CPT

## 2024-06-06 PROCEDURE — 2580000003 HC RX 258: Performed by: HOSPITALIST

## 2024-06-06 PROCEDURE — 6370000000 HC RX 637 (ALT 250 FOR IP): Performed by: NURSE PRACTITIONER

## 2024-06-06 PROCEDURE — 36415 COLL VENOUS BLD VENIPUNCTURE: CPT

## 2024-06-06 PROCEDURE — 6370000000 HC RX 637 (ALT 250 FOR IP): Performed by: FAMILY MEDICINE

## 2024-06-06 PROCEDURE — 6360000002 HC RX W HCPCS: Performed by: FAMILY MEDICINE

## 2024-06-06 PROCEDURE — 1100000000 HC RM PRIVATE

## 2024-06-06 PROCEDURE — 6370000000 HC RX 637 (ALT 250 FOR IP): Performed by: CLINICAL NURSE SPECIALIST

## 2024-06-06 PROCEDURE — 2580000003 HC RX 258: Performed by: FAMILY MEDICINE

## 2024-06-06 PROCEDURE — 80048 BASIC METABOLIC PNL TOTAL CA: CPT

## 2024-06-06 PROCEDURE — 6370000000 HC RX 637 (ALT 250 FOR IP): Performed by: INTERNAL MEDICINE

## 2024-06-06 RX ORDER — AMOXICILLIN AND CLAVULANATE POTASSIUM 875; 125 MG/1; MG/1
1 TABLET, FILM COATED ORAL EVERY 12 HOURS SCHEDULED
Status: DISCONTINUED | OUTPATIENT
Start: 2024-06-06 | End: 2024-06-10 | Stop reason: HOSPADM

## 2024-06-06 RX ORDER — OXYCODONE HYDROCHLORIDE AND ACETAMINOPHEN 5; 325 MG/1; MG/1
1 TABLET ORAL EVERY 4 HOURS PRN
Status: DISCONTINUED | OUTPATIENT
Start: 2024-06-06 | End: 2024-06-10 | Stop reason: HOSPADM

## 2024-06-06 RX ADMIN — HYDROMORPHONE HYDROCHLORIDE 1 MG: 1 INJECTION, SOLUTION INTRAMUSCULAR; INTRAVENOUS; SUBCUTANEOUS at 05:27

## 2024-06-06 RX ADMIN — HYDROMORPHONE HYDROCHLORIDE 1 MG: 1 INJECTION, SOLUTION INTRAMUSCULAR; INTRAVENOUS; SUBCUTANEOUS at 09:48

## 2024-06-06 RX ADMIN — FLUTICASONE PROPIONATE 2 SPRAY: 50 SPRAY, METERED NASAL at 09:50

## 2024-06-06 RX ADMIN — ENOXAPARIN SODIUM 30 MG: 100 INJECTION SUBCUTANEOUS at 21:49

## 2024-06-06 RX ADMIN — VALSARTAN 320 MG: 160 TABLET, FILM COATED ORAL at 09:54

## 2024-06-06 RX ADMIN — ENOXAPARIN SODIUM 30 MG: 100 INJECTION SUBCUTANEOUS at 09:48

## 2024-06-06 RX ADMIN — Medication 10 ML: at 09:50

## 2024-06-06 RX ADMIN — ZOLPIDEM TARTRATE 5 MG: 5 TABLET ORAL at 21:49

## 2024-06-06 RX ADMIN — ESCITALOPRAM OXALATE 20 MG: 10 TABLET ORAL at 09:47

## 2024-06-06 RX ADMIN — HYDROMORPHONE HYDROCHLORIDE 1 MG: 1 INJECTION, SOLUTION INTRAMUSCULAR; INTRAVENOUS; SUBCUTANEOUS at 18:56

## 2024-06-06 RX ADMIN — CEFEPIME 2000 MG: 2 INJECTION, POWDER, FOR SOLUTION INTRAVENOUS at 06:52

## 2024-06-06 RX ADMIN — INSULIN LISPRO 2 UNITS: 100 INJECTION, SOLUTION INTRAVENOUS; SUBCUTANEOUS at 13:51

## 2024-06-06 RX ADMIN — AMOXICILLIN AND CLAVULANATE POTASSIUM 1 TABLET: 875; 125 TABLET, FILM COATED ORAL at 21:49

## 2024-06-06 RX ADMIN — HYDROMORPHONE HYDROCHLORIDE 1 MG: 1 INJECTION, SOLUTION INTRAMUSCULAR; INTRAVENOUS; SUBCUTANEOUS at 01:25

## 2024-06-06 RX ADMIN — INSULIN LISPRO 8 UNITS: 100 INJECTION, SOLUTION INTRAVENOUS; SUBCUTANEOUS at 06:52

## 2024-06-06 RX ADMIN — ATORVASTATIN CALCIUM 20 MG: 40 TABLET, FILM COATED ORAL at 09:48

## 2024-06-06 RX ADMIN — OXYCODONE HYDROCHLORIDE AND ACETAMINOPHEN 1 TABLET: 5; 325 TABLET ORAL at 17:41

## 2024-06-06 RX ADMIN — INSULIN LISPRO 8 UNITS: 100 INJECTION, SOLUTION INTRAVENOUS; SUBCUTANEOUS at 17:40

## 2024-06-06 RX ADMIN — HYDROMORPHONE HYDROCHLORIDE 1 MG: 1 INJECTION, SOLUTION INTRAMUSCULAR; INTRAVENOUS; SUBCUTANEOUS at 13:56

## 2024-06-06 RX ADMIN — INSULIN LISPRO 8 UNITS: 100 INJECTION, SOLUTION INTRAVENOUS; SUBCUTANEOUS at 13:52

## 2024-06-06 RX ADMIN — INSULIN LISPRO 2 UNITS: 100 INJECTION, SOLUTION INTRAVENOUS; SUBCUTANEOUS at 17:40

## 2024-06-06 RX ADMIN — METOPROLOL SUCCINATE 100 MG: 50 TABLET, EXTENDED RELEASE ORAL at 09:47

## 2024-06-06 RX ADMIN — HYDROCHLOROTHIAZIDE 25 MG: 25 TABLET ORAL at 09:48

## 2024-06-06 RX ADMIN — SODIUM CHLORIDE 1250 MG: 9 INJECTION, SOLUTION INTRAVENOUS at 05:06

## 2024-06-06 RX ADMIN — OXYCODONE HYDROCHLORIDE AND ACETAMINOPHEN 1 TABLET: 5; 325 TABLET ORAL at 21:49

## 2024-06-06 RX ADMIN — AMLODIPINE BESYLATE 10 MG: 5 TABLET ORAL at 09:48

## 2024-06-06 ASSESSMENT — PAIN DESCRIPTION - ORIENTATION
ORIENTATION: LEFT

## 2024-06-06 ASSESSMENT — PAIN DESCRIPTION - DESCRIPTORS
DESCRIPTORS: ACHING
DESCRIPTORS: ACHING
DESCRIPTORS: THROBBING
DESCRIPTORS: ACHING;SHARP

## 2024-06-06 ASSESSMENT — PAIN SCALES - GENERAL
PAINLEVEL_OUTOF10: 7
PAINLEVEL_OUTOF10: 9
PAINLEVEL_OUTOF10: 3
PAINLEVEL_OUTOF10: 9
PAINLEVEL_OUTOF10: 8
PAINLEVEL_OUTOF10: 1
PAINLEVEL_OUTOF10: 8
PAINLEVEL_OUTOF10: 10

## 2024-06-06 ASSESSMENT — PAIN DESCRIPTION - LOCATION
LOCATION: FOOT
LOCATION: INCISION
LOCATION: FOOT

## 2024-06-06 ASSESSMENT — PAIN - FUNCTIONAL ASSESSMENT: PAIN_FUNCTIONAL_ASSESSMENT: ACTIVITIES ARE NOT PREVENTED

## 2024-06-07 LAB
GLUCOSE BLD STRIP.AUTO-MCNC: 136 MG/DL (ref 65–117)
GLUCOSE BLD STRIP.AUTO-MCNC: 183 MG/DL (ref 65–117)
GLUCOSE BLD STRIP.AUTO-MCNC: 205 MG/DL (ref 65–117)
GLUCOSE BLD STRIP.AUTO-MCNC: 264 MG/DL (ref 65–117)
SERVICE CMNT-IMP: ABNORMAL

## 2024-06-07 PROCEDURE — 2580000003 HC RX 258: Performed by: FAMILY MEDICINE

## 2024-06-07 PROCEDURE — 6370000000 HC RX 637 (ALT 250 FOR IP): Performed by: CLINICAL NURSE SPECIALIST

## 2024-06-07 PROCEDURE — 6370000000 HC RX 637 (ALT 250 FOR IP): Performed by: INTERNAL MEDICINE

## 2024-06-07 PROCEDURE — 1100000000 HC RM PRIVATE

## 2024-06-07 PROCEDURE — 97165 OT EVAL LOW COMPLEX 30 MIN: CPT

## 2024-06-07 PROCEDURE — 6370000000 HC RX 637 (ALT 250 FOR IP): Performed by: FAMILY MEDICINE

## 2024-06-07 PROCEDURE — 97530 THERAPEUTIC ACTIVITIES: CPT

## 2024-06-07 PROCEDURE — 6360000002 HC RX W HCPCS: Performed by: FAMILY MEDICINE

## 2024-06-07 PROCEDURE — 6370000000 HC RX 637 (ALT 250 FOR IP): Performed by: NURSE PRACTITIONER

## 2024-06-07 PROCEDURE — 97535 SELF CARE MNGMENT TRAINING: CPT

## 2024-06-07 PROCEDURE — 97161 PT EVAL LOW COMPLEX 20 MIN: CPT

## 2024-06-07 PROCEDURE — 82962 GLUCOSE BLOOD TEST: CPT

## 2024-06-07 RX ORDER — INSULIN GLARGINE 100 [IU]/ML
35 INJECTION, SOLUTION SUBCUTANEOUS ONCE
Status: COMPLETED | OUTPATIENT
Start: 2024-06-07 | End: 2024-06-07

## 2024-06-07 RX ORDER — INSULIN GLARGINE 100 [IU]/ML
35 INJECTION, SOLUTION SUBCUTANEOUS NIGHTLY
Status: DISCONTINUED | OUTPATIENT
Start: 2024-06-08 | End: 2024-06-10

## 2024-06-07 RX ORDER — METFORMIN HYDROCHLORIDE 500 MG/1
500 TABLET, EXTENDED RELEASE ORAL 2 TIMES DAILY WITH MEALS
Status: DISCONTINUED | OUTPATIENT
Start: 2024-06-07 | End: 2024-06-10 | Stop reason: HOSPADM

## 2024-06-07 RX ORDER — INSULIN GLARGINE 100 [IU]/ML
35 INJECTION, SOLUTION SUBCUTANEOUS NIGHTLY
Status: DISCONTINUED | OUTPATIENT
Start: 2024-06-07 | End: 2024-06-07

## 2024-06-07 RX ORDER — INSULIN GLARGINE 100 [IU]/ML
35 INJECTION, SOLUTION SUBCUTANEOUS NIGHTLY
Qty: 5 ADJUSTABLE DOSE PRE-FILLED PEN SYRINGE | Refills: 3 | Status: CANCELLED | OUTPATIENT
Start: 2024-06-07

## 2024-06-07 RX ORDER — INSULIN GLARGINE 100 [IU]/ML
45 INJECTION, SOLUTION SUBCUTANEOUS NIGHTLY
Status: DISCONTINUED | OUTPATIENT
Start: 2024-06-07 | End: 2024-06-07

## 2024-06-07 RX ORDER — METFORMIN HYDROCHLORIDE 500 MG/1
1000 TABLET, EXTENDED RELEASE ORAL 2 TIMES DAILY WITH MEALS
Status: DISCONTINUED | OUTPATIENT
Start: 2024-06-07 | End: 2024-06-07

## 2024-06-07 RX ORDER — GLIPIZIDE 5 MG/1
5 TABLET ORAL
Status: CANCELLED | OUTPATIENT
Start: 2024-06-07

## 2024-06-07 RX ADMIN — AMOXICILLIN AND CLAVULANATE POTASSIUM 1 TABLET: 875; 125 TABLET, FILM COATED ORAL at 21:09

## 2024-06-07 RX ADMIN — HYDROMORPHONE HYDROCHLORIDE 1 MG: 1 INJECTION, SOLUTION INTRAMUSCULAR; INTRAVENOUS; SUBCUTANEOUS at 10:12

## 2024-06-07 RX ADMIN — HYDROMORPHONE HYDROCHLORIDE 1 MG: 1 INJECTION, SOLUTION INTRAMUSCULAR; INTRAVENOUS; SUBCUTANEOUS at 07:03

## 2024-06-07 RX ADMIN — ZOLPIDEM TARTRATE 5 MG: 5 TABLET ORAL at 21:09

## 2024-06-07 RX ADMIN — INSULIN LISPRO 4 UNITS: 100 INJECTION, SOLUTION INTRAVENOUS; SUBCUTANEOUS at 12:54

## 2024-06-07 RX ADMIN — METFORMIN HYDROCHLORIDE 500 MG: 500 TABLET, EXTENDED RELEASE ORAL at 17:30

## 2024-06-07 RX ADMIN — HYDROCHLOROTHIAZIDE 25 MG: 25 TABLET ORAL at 10:11

## 2024-06-07 RX ADMIN — HYDROMORPHONE HYDROCHLORIDE 1 MG: 1 INJECTION, SOLUTION INTRAMUSCULAR; INTRAVENOUS; SUBCUTANEOUS at 14:48

## 2024-06-07 RX ADMIN — AMLODIPINE BESYLATE 10 MG: 5 TABLET ORAL at 10:10

## 2024-06-07 RX ADMIN — HYDROMORPHONE HYDROCHLORIDE 1 MG: 1 INJECTION, SOLUTION INTRAMUSCULAR; INTRAVENOUS; SUBCUTANEOUS at 23:22

## 2024-06-07 RX ADMIN — OXYCODONE HYDROCHLORIDE AND ACETAMINOPHEN 1 TABLET: 5; 325 TABLET ORAL at 12:57

## 2024-06-07 RX ADMIN — ESCITALOPRAM OXALATE 20 MG: 10 TABLET ORAL at 10:10

## 2024-06-07 RX ADMIN — INSULIN LISPRO 8 UNITS: 100 INJECTION, SOLUTION INTRAVENOUS; SUBCUTANEOUS at 07:02

## 2024-06-07 RX ADMIN — METOPROLOL SUCCINATE 100 MG: 50 TABLET, EXTENDED RELEASE ORAL at 10:11

## 2024-06-07 RX ADMIN — Medication 10 ML: at 23:24

## 2024-06-07 RX ADMIN — AMOXICILLIN AND CLAVULANATE POTASSIUM 1 TABLET: 875; 125 TABLET, FILM COATED ORAL at 10:10

## 2024-06-07 RX ADMIN — HYDROMORPHONE HYDROCHLORIDE 1 MG: 1 INJECTION, SOLUTION INTRAMUSCULAR; INTRAVENOUS; SUBCUTANEOUS at 19:13

## 2024-06-07 RX ADMIN — ATORVASTATIN CALCIUM 20 MG: 40 TABLET, FILM COATED ORAL at 10:11

## 2024-06-07 RX ADMIN — ENOXAPARIN SODIUM 30 MG: 100 INJECTION SUBCUTANEOUS at 21:08

## 2024-06-07 RX ADMIN — OXYCODONE HYDROCHLORIDE AND ACETAMINOPHEN 1 TABLET: 5; 325 TABLET ORAL at 17:30

## 2024-06-07 RX ADMIN — INSULIN LISPRO 8 UNITS: 100 INJECTION, SOLUTION INTRAVENOUS; SUBCUTANEOUS at 12:54

## 2024-06-07 RX ADMIN — ENOXAPARIN SODIUM 30 MG: 100 INJECTION SUBCUTANEOUS at 10:10

## 2024-06-07 RX ADMIN — Medication 10 ML: at 10:16

## 2024-06-07 RX ADMIN — INSULIN GLARGINE 35 UNITS: 100 INJECTION, SOLUTION SUBCUTANEOUS at 15:45

## 2024-06-07 RX ADMIN — FLUTICASONE PROPIONATE 2 SPRAY: 50 SPRAY, METERED NASAL at 10:12

## 2024-06-07 RX ADMIN — VALSARTAN 320 MG: 160 TABLET, FILM COATED ORAL at 09:34

## 2024-06-07 ASSESSMENT — PAIN DESCRIPTION - LOCATION
LOCATION: LEG
LOCATION: LEG
LOCATION: FOOT
LOCATION: FOOT
LOCATION: LEG
LOCATION: FOOT

## 2024-06-07 ASSESSMENT — PAIN - FUNCTIONAL ASSESSMENT
PAIN_FUNCTIONAL_ASSESSMENT: ACTIVITIES ARE NOT PREVENTED
PAIN_FUNCTIONAL_ASSESSMENT: ACTIVITIES ARE NOT PREVENTED
PAIN_FUNCTIONAL_ASSESSMENT: PREVENTS OR INTERFERES SOME ACTIVE ACTIVITIES AND ADLS
PAIN_FUNCTIONAL_ASSESSMENT: ACTIVITIES ARE NOT PREVENTED
PAIN_FUNCTIONAL_ASSESSMENT: ACTIVITIES ARE NOT PREVENTED

## 2024-06-07 ASSESSMENT — PAIN SCALES - GENERAL
PAINLEVEL_OUTOF10: 7
PAINLEVEL_OUTOF10: 7
PAINLEVEL_OUTOF10: 8
PAINLEVEL_OUTOF10: 7
PAINLEVEL_OUTOF10: 7
PAINLEVEL_OUTOF10: 8
PAINLEVEL_OUTOF10: 0
PAINLEVEL_OUTOF10: 8

## 2024-06-07 ASSESSMENT — PAIN DESCRIPTION - ORIENTATION
ORIENTATION: LEFT

## 2024-06-07 ASSESSMENT — PAIN DESCRIPTION - DESCRIPTORS
DESCRIPTORS: ACHING;DULL
DESCRIPTORS: ACHING;NUMBNESS;TINGLING
DESCRIPTORS: ACHING;DULL
DESCRIPTORS: ACHING;DULL

## 2024-06-07 NOTE — CARE COORDINATION
Transition of Care Plan:     RUR: 8%   Prior Level of Functioning: Independent \"The pt lives in a two level home with her sister. The pt reported that she lives on the second level and reported that there are 15 steps to access her room.  Disposition: SNF  SNF Pending:   Tyrone Desirs faxed to [facility] to initiate Insurance Auth ; P:[phone]; F:[phone]; Pending   Follow up appointments: PCP, Per Podiatry: Bandage change to take place office in one week, stitches will be removed in two weeks,   DME needed: None \"Surgical shoe at the bedside\"  Transportation at discharge: BLS  IM/[facility] Medicare/[facility] letter given: Received   Caregiver Contact: Vanessa Bolanos Sister [phone]  Discharge Caregiver contacted prior to discharge? N/A   Care Conference needed? N/A   Barriers to discharge: medical, Cultures pending, ABX, ID recs,Placement, AUTH,   ABX: IV cefepime 5/31-IV vancomycin  Procedure: S/p left great toe amputation (6/4)  Following: ID, Diabetes Management

## 2024-06-08 LAB
BACTERIA SPEC CULT: NORMAL
GLUCOSE BLD STRIP.AUTO-MCNC: 111 MG/DL (ref 65–117)
GLUCOSE BLD STRIP.AUTO-MCNC: 222 MG/DL (ref 65–117)
GLUCOSE BLD STRIP.AUTO-MCNC: 222 MG/DL (ref 65–117)
GLUCOSE BLD STRIP.AUTO-MCNC: 231 MG/DL (ref 65–117)
GRAM STN SPEC: NORMAL
GRAM STN SPEC: NORMAL
SERVICE CMNT-IMP: ABNORMAL
SERVICE CMNT-IMP: NORMAL
SERVICE CMNT-IMP: NORMAL

## 2024-06-08 PROCEDURE — 6370000000 HC RX 637 (ALT 250 FOR IP): Performed by: FAMILY MEDICINE

## 2024-06-08 PROCEDURE — 2580000003 HC RX 258: Performed by: FAMILY MEDICINE

## 2024-06-08 PROCEDURE — 6370000000 HC RX 637 (ALT 250 FOR IP): Performed by: INTERNAL MEDICINE

## 2024-06-08 PROCEDURE — 82962 GLUCOSE BLOOD TEST: CPT

## 2024-06-08 PROCEDURE — 6360000002 HC RX W HCPCS: Performed by: FAMILY MEDICINE

## 2024-06-08 PROCEDURE — 6370000000 HC RX 637 (ALT 250 FOR IP): Performed by: CLINICAL NURSE SPECIALIST

## 2024-06-08 PROCEDURE — 6370000000 HC RX 637 (ALT 250 FOR IP): Performed by: NURSE PRACTITIONER

## 2024-06-08 PROCEDURE — 1100000000 HC RM PRIVATE

## 2024-06-08 RX ADMIN — INSULIN LISPRO 2 UNITS: 100 INJECTION, SOLUTION INTRAVENOUS; SUBCUTANEOUS at 12:15

## 2024-06-08 RX ADMIN — METFORMIN HYDROCHLORIDE 500 MG: 500 TABLET, EXTENDED RELEASE ORAL at 18:24

## 2024-06-08 RX ADMIN — Medication 10 ML: at 07:28

## 2024-06-08 RX ADMIN — METFORMIN HYDROCHLORIDE 500 MG: 500 TABLET, EXTENDED RELEASE ORAL at 09:27

## 2024-06-08 RX ADMIN — HYDROMORPHONE HYDROCHLORIDE 1 MG: 1 INJECTION, SOLUTION INTRAMUSCULAR; INTRAVENOUS; SUBCUTANEOUS at 20:03

## 2024-06-08 RX ADMIN — ZOLPIDEM TARTRATE 5 MG: 5 TABLET ORAL at 22:23

## 2024-06-08 RX ADMIN — ENOXAPARIN SODIUM 30 MG: 100 INJECTION SUBCUTANEOUS at 09:28

## 2024-06-08 RX ADMIN — INSULIN LISPRO 2 UNITS: 100 INJECTION, SOLUTION INTRAVENOUS; SUBCUTANEOUS at 09:26

## 2024-06-08 RX ADMIN — SODIUM CHLORIDE, PRESERVATIVE FREE 10 ML: 5 INJECTION INTRAVENOUS at 03:22

## 2024-06-08 RX ADMIN — HYDROCHLOROTHIAZIDE 25 MG: 25 TABLET ORAL at 09:28

## 2024-06-08 RX ADMIN — ENOXAPARIN SODIUM 30 MG: 100 INJECTION SUBCUTANEOUS at 20:05

## 2024-06-08 RX ADMIN — OXYCODONE HYDROCHLORIDE AND ACETAMINOPHEN 1 TABLET: 5; 325 TABLET ORAL at 18:24

## 2024-06-08 RX ADMIN — ATORVASTATIN CALCIUM 20 MG: 40 TABLET, FILM COATED ORAL at 09:28

## 2024-06-08 RX ADMIN — AMOXICILLIN AND CLAVULANATE POTASSIUM 1 TABLET: 875; 125 TABLET, FILM COATED ORAL at 09:27

## 2024-06-08 RX ADMIN — Medication 10 ML: at 20:14

## 2024-06-08 RX ADMIN — AMLODIPINE BESYLATE 10 MG: 5 TABLET ORAL at 09:27

## 2024-06-08 RX ADMIN — HYDROMORPHONE HYDROCHLORIDE 1 MG: 1 INJECTION, SOLUTION INTRAMUSCULAR; INTRAVENOUS; SUBCUTANEOUS at 07:25

## 2024-06-08 RX ADMIN — INSULIN LISPRO 8 UNITS: 100 INJECTION, SOLUTION INTRAVENOUS; SUBCUTANEOUS at 09:26

## 2024-06-08 RX ADMIN — ESCITALOPRAM OXALATE 20 MG: 10 TABLET ORAL at 09:28

## 2024-06-08 RX ADMIN — HYDROMORPHONE HYDROCHLORIDE 1 MG: 1 INJECTION, SOLUTION INTRAMUSCULAR; INTRAVENOUS; SUBCUTANEOUS at 03:21

## 2024-06-08 RX ADMIN — AMOXICILLIN AND CLAVULANATE POTASSIUM 1 TABLET: 875; 125 TABLET, FILM COATED ORAL at 20:06

## 2024-06-08 RX ADMIN — FLUTICASONE PROPIONATE 2 SPRAY: 50 SPRAY, METERED NASAL at 09:28

## 2024-06-08 RX ADMIN — HYDROMORPHONE HYDROCHLORIDE 1 MG: 1 INJECTION, SOLUTION INTRAMUSCULAR; INTRAVENOUS; SUBCUTANEOUS at 15:58

## 2024-06-08 RX ADMIN — METOPROLOL SUCCINATE 100 MG: 50 TABLET, EXTENDED RELEASE ORAL at 09:27

## 2024-06-08 RX ADMIN — VALSARTAN 320 MG: 160 TABLET, FILM COATED ORAL at 09:27

## 2024-06-08 RX ADMIN — HYDROMORPHONE HYDROCHLORIDE 1 MG: 1 INJECTION, SOLUTION INTRAMUSCULAR; INTRAVENOUS; SUBCUTANEOUS at 11:43

## 2024-06-08 RX ADMIN — INSULIN LISPRO 8 UNITS: 100 INJECTION, SOLUTION INTRAVENOUS; SUBCUTANEOUS at 12:14

## 2024-06-08 ASSESSMENT — PAIN DESCRIPTION - DESCRIPTORS
DESCRIPTORS: NUMBNESS;SHARP;TINGLING
DESCRIPTORS: NUMBNESS;TINGLING;SHARP
DESCRIPTORS: ACHING
DESCRIPTORS: SHARP
DESCRIPTORS: NUMBNESS;TINGLING
DESCRIPTORS: NUMBNESS;TINGLING;SHARP
DESCRIPTORS: SHARP;NUMBNESS;TINGLING

## 2024-06-08 ASSESSMENT — PAIN SCALES - GENERAL
PAINLEVEL_OUTOF10: 9
PAINLEVEL_OUTOF10: 8
PAINLEVEL_OUTOF10: 8
PAINLEVEL_OUTOF10: 9
PAINLEVEL_OUTOF10: 8
PAINLEVEL_OUTOF10: 8

## 2024-06-08 ASSESSMENT — PAIN DESCRIPTION - ORIENTATION
ORIENTATION: LEFT
ORIENTATION: LEFT
ORIENTATION: LEFT;RIGHT
ORIENTATION: RIGHT;LEFT
ORIENTATION: LEFT
ORIENTATION: RIGHT;LEFT
ORIENTATION: LEFT;RIGHT

## 2024-06-08 ASSESSMENT — PAIN DESCRIPTION - LOCATION
LOCATION: FOOT
LOCATION: FOOT;LEG
LOCATION: FOOT
LOCATION: FOOT

## 2024-06-09 LAB
GLUCOSE BLD STRIP.AUTO-MCNC: 184 MG/DL (ref 65–117)
GLUCOSE BLD STRIP.AUTO-MCNC: 196 MG/DL (ref 65–117)
GLUCOSE BLD STRIP.AUTO-MCNC: 207 MG/DL (ref 65–117)
GLUCOSE BLD STRIP.AUTO-MCNC: 225 MG/DL (ref 65–117)
SERVICE CMNT-IMP: ABNORMAL

## 2024-06-09 PROCEDURE — 6370000000 HC RX 637 (ALT 250 FOR IP): Performed by: INTERNAL MEDICINE

## 2024-06-09 PROCEDURE — 6370000000 HC RX 637 (ALT 250 FOR IP): Performed by: FAMILY MEDICINE

## 2024-06-09 PROCEDURE — 6370000000 HC RX 637 (ALT 250 FOR IP): Performed by: CLINICAL NURSE SPECIALIST

## 2024-06-09 PROCEDURE — 2580000003 HC RX 258: Performed by: FAMILY MEDICINE

## 2024-06-09 PROCEDURE — 6360000002 HC RX W HCPCS: Performed by: FAMILY MEDICINE

## 2024-06-09 PROCEDURE — 82962 GLUCOSE BLOOD TEST: CPT

## 2024-06-09 PROCEDURE — 6370000000 HC RX 637 (ALT 250 FOR IP): Performed by: NURSE PRACTITIONER

## 2024-06-09 PROCEDURE — 1100000000 HC RM PRIVATE

## 2024-06-09 RX ORDER — INSULIN LISPRO 100 [IU]/ML
10 INJECTION, SOLUTION INTRAVENOUS; SUBCUTANEOUS
Status: DISCONTINUED | OUTPATIENT
Start: 2024-06-09 | End: 2024-06-10 | Stop reason: HOSPADM

## 2024-06-09 RX ORDER — DEXTROSE MONOHYDRATE 100 MG/ML
INJECTION, SOLUTION INTRAVENOUS
Status: DISPENSED
Start: 2024-06-09 | End: 2024-06-10

## 2024-06-09 RX ADMIN — HYDROCHLOROTHIAZIDE 25 MG: 25 TABLET ORAL at 08:32

## 2024-06-09 RX ADMIN — INSULIN LISPRO 2 UNITS: 100 INJECTION, SOLUTION INTRAVENOUS; SUBCUTANEOUS at 06:41

## 2024-06-09 RX ADMIN — AMOXICILLIN AND CLAVULANATE POTASSIUM 1 TABLET: 875; 125 TABLET, FILM COATED ORAL at 08:32

## 2024-06-09 RX ADMIN — OXYCODONE HYDROCHLORIDE AND ACETAMINOPHEN 1 TABLET: 5; 325 TABLET ORAL at 14:28

## 2024-06-09 RX ADMIN — OXYCODONE HYDROCHLORIDE AND ACETAMINOPHEN 1 TABLET: 5; 325 TABLET ORAL at 18:43

## 2024-06-09 RX ADMIN — FLUTICASONE PROPIONATE 2 SPRAY: 50 SPRAY, METERED NASAL at 08:33

## 2024-06-09 RX ADMIN — Medication 10 ML: at 08:33

## 2024-06-09 RX ADMIN — ENOXAPARIN SODIUM 30 MG: 100 INJECTION SUBCUTANEOUS at 21:42

## 2024-06-09 RX ADMIN — METFORMIN HYDROCHLORIDE 500 MG: 500 TABLET, EXTENDED RELEASE ORAL at 17:21

## 2024-06-09 RX ADMIN — HYDROMORPHONE HYDROCHLORIDE 1 MG: 1 INJECTION, SOLUTION INTRAMUSCULAR; INTRAVENOUS; SUBCUTANEOUS at 20:04

## 2024-06-09 RX ADMIN — ZOLPIDEM TARTRATE 5 MG: 5 TABLET ORAL at 21:43

## 2024-06-09 RX ADMIN — HYDROMORPHONE HYDROCHLORIDE 1 MG: 1 INJECTION, SOLUTION INTRAMUSCULAR; INTRAVENOUS; SUBCUTANEOUS at 08:02

## 2024-06-09 RX ADMIN — METOPROLOL SUCCINATE 100 MG: 50 TABLET, EXTENDED RELEASE ORAL at 08:32

## 2024-06-09 RX ADMIN — HYDROMORPHONE HYDROCHLORIDE 1 MG: 1 INJECTION, SOLUTION INTRAMUSCULAR; INTRAVENOUS; SUBCUTANEOUS at 03:55

## 2024-06-09 RX ADMIN — Medication 10 ML: at 21:43

## 2024-06-09 RX ADMIN — ENOXAPARIN SODIUM 30 MG: 100 INJECTION SUBCUTANEOUS at 08:32

## 2024-06-09 RX ADMIN — INSULIN LISPRO 10 UNITS: 100 INJECTION, SOLUTION INTRAVENOUS; SUBCUTANEOUS at 17:21

## 2024-06-09 RX ADMIN — HYDROMORPHONE HYDROCHLORIDE 1 MG: 1 INJECTION, SOLUTION INTRAMUSCULAR; INTRAVENOUS; SUBCUTANEOUS at 12:01

## 2024-06-09 RX ADMIN — ESCITALOPRAM OXALATE 20 MG: 10 TABLET ORAL at 08:32

## 2024-06-09 RX ADMIN — AMLODIPINE BESYLATE 10 MG: 5 TABLET ORAL at 08:32

## 2024-06-09 RX ADMIN — VALSARTAN 320 MG: 160 TABLET, FILM COATED ORAL at 08:32

## 2024-06-09 RX ADMIN — ATORVASTATIN CALCIUM 20 MG: 40 TABLET, FILM COATED ORAL at 08:32

## 2024-06-09 RX ADMIN — INSULIN LISPRO 8 UNITS: 100 INJECTION, SOLUTION INTRAVENOUS; SUBCUTANEOUS at 06:41

## 2024-06-09 RX ADMIN — INSULIN LISPRO 8 UNITS: 100 INJECTION, SOLUTION INTRAVENOUS; SUBCUTANEOUS at 12:40

## 2024-06-09 RX ADMIN — AMOXICILLIN AND CLAVULANATE POTASSIUM 1 TABLET: 875; 125 TABLET, FILM COATED ORAL at 21:42

## 2024-06-09 RX ADMIN — HYDROMORPHONE HYDROCHLORIDE 1 MG: 1 INJECTION, SOLUTION INTRAMUSCULAR; INTRAVENOUS; SUBCUTANEOUS at 16:02

## 2024-06-09 RX ADMIN — METFORMIN HYDROCHLORIDE 500 MG: 500 TABLET, EXTENDED RELEASE ORAL at 06:42

## 2024-06-09 ASSESSMENT — PAIN DESCRIPTION - DESCRIPTORS
DESCRIPTORS: ACHING
DESCRIPTORS: NUMBNESS;TINGLING;THROBBING
DESCRIPTORS: TINGLING;NUMBNESS;THROBBING
DESCRIPTORS: TINGLING;NUMBNESS;THROBBING
DESCRIPTORS: NUMBNESS;TINGLING;THROBBING
DESCRIPTORS: ACHING;THROBBING
DESCRIPTORS: NUMBNESS;TINGLING;THROBBING

## 2024-06-09 ASSESSMENT — PAIN DESCRIPTION - ORIENTATION
ORIENTATION: LEFT;RIGHT
ORIENTATION: LEFT
ORIENTATION: LEFT
ORIENTATION: LEFT;RIGHT

## 2024-06-09 ASSESSMENT — PAIN DESCRIPTION - LOCATION
LOCATION: FOOT
LOCATION: TOE (COMMENT WHICH ONE)
LOCATION: FOOT

## 2024-06-09 ASSESSMENT — PAIN SCALES - GENERAL
PAINLEVEL_OUTOF10: 9
PAINLEVEL_OUTOF10: 8
PAINLEVEL_OUTOF10: 10
PAINLEVEL_OUTOF10: 7
PAINLEVEL_OUTOF10: 8
PAINLEVEL_OUTOF10: 7
PAINLEVEL_OUTOF10: 9
PAINLEVEL_OUTOF10: 2
PAINLEVEL_OUTOF10: 9

## 2024-06-10 VITALS
TEMPERATURE: 97.9 F | DIASTOLIC BLOOD PRESSURE: 71 MMHG | RESPIRATION RATE: 16 BRPM | SYSTOLIC BLOOD PRESSURE: 140 MMHG | WEIGHT: 255.07 LBS | HEART RATE: 74 BPM | BODY MASS INDEX: 40.99 KG/M2 | HEIGHT: 66 IN | OXYGEN SATURATION: 95 %

## 2024-06-10 LAB
BACTERIA SPEC CULT: NORMAL
GLUCOSE BLD STRIP.AUTO-MCNC: 160 MG/DL (ref 65–117)
GLUCOSE BLD STRIP.AUTO-MCNC: 306 MG/DL (ref 65–117)
SERVICE CMNT-IMP: ABNORMAL
SERVICE CMNT-IMP: ABNORMAL
SERVICE CMNT-IMP: NORMAL

## 2024-06-10 PROCEDURE — 6370000000 HC RX 637 (ALT 250 FOR IP): Performed by: CLINICAL NURSE SPECIALIST

## 2024-06-10 PROCEDURE — 6360000002 HC RX W HCPCS: Performed by: FAMILY MEDICINE

## 2024-06-10 PROCEDURE — 6370000000 HC RX 637 (ALT 250 FOR IP): Performed by: INTERNAL MEDICINE

## 2024-06-10 PROCEDURE — 82962 GLUCOSE BLOOD TEST: CPT

## 2024-06-10 PROCEDURE — 99231 SBSQ HOSP IP/OBS SF/LOW 25: CPT | Performed by: CLINICAL NURSE SPECIALIST

## 2024-06-10 PROCEDURE — 2580000003 HC RX 258: Performed by: FAMILY MEDICINE

## 2024-06-10 PROCEDURE — 6370000000 HC RX 637 (ALT 250 FOR IP): Performed by: FAMILY MEDICINE

## 2024-06-10 PROCEDURE — 6370000000 HC RX 637 (ALT 250 FOR IP): Performed by: NURSE PRACTITIONER

## 2024-06-10 RX ORDER — AMOXICILLIN AND CLAVULANATE POTASSIUM 875; 125 MG/1; MG/1
1 TABLET, FILM COATED ORAL EVERY 12 HOURS SCHEDULED
Qty: 9 TABLET | Refills: 0 | Status: SHIPPED
Start: 2024-06-10 | End: 2024-06-15

## 2024-06-10 RX ORDER — ZOLPIDEM TARTRATE 5 MG/1
5 TABLET ORAL NIGHTLY
Qty: 14 TABLET | Refills: 0 | Status: SHIPPED | OUTPATIENT
Start: 2024-06-10 | End: 2024-06-24

## 2024-06-10 RX ORDER — INSULIN GLARGINE 100 [IU]/ML
25 INJECTION, SOLUTION SUBCUTANEOUS NIGHTLY
Qty: 5 ADJUSTABLE DOSE PRE-FILLED PEN SYRINGE | Refills: 0 | Status: SHIPPED
Start: 2024-06-10

## 2024-06-10 RX ORDER — INSULIN GLARGINE 100 [IU]/ML
25 INJECTION, SOLUTION SUBCUTANEOUS DAILY
Status: DISCONTINUED | OUTPATIENT
Start: 2024-06-10 | End: 2024-06-10 | Stop reason: HOSPADM

## 2024-06-10 RX ORDER — HYDROCODONE BITARTRATE AND ACETAMINOPHEN 10; 325 MG/1; MG/1
1 TABLET ORAL EVERY 6 HOURS PRN
Qty: 16 TABLET | Refills: 0 | Status: SHIPPED | OUTPATIENT
Start: 2024-06-10 | End: 2024-06-14

## 2024-06-10 RX ORDER — INSULIN LISPRO 100 [IU]/ML
0-8 INJECTION, SOLUTION INTRAVENOUS; SUBCUTANEOUS
Qty: 1 EACH | Refills: 0 | Status: SHIPPED | OUTPATIENT
Start: 2024-06-10

## 2024-06-10 RX ADMIN — INSULIN LISPRO 6 UNITS: 100 INJECTION, SOLUTION INTRAVENOUS; SUBCUTANEOUS at 12:37

## 2024-06-10 RX ADMIN — INSULIN GLARGINE 25 UNITS: 100 INJECTION, SOLUTION SUBCUTANEOUS at 08:55

## 2024-06-10 RX ADMIN — METOPROLOL SUCCINATE 100 MG: 50 TABLET, EXTENDED RELEASE ORAL at 08:55

## 2024-06-10 RX ADMIN — METFORMIN HYDROCHLORIDE 500 MG: 500 TABLET, EXTENDED RELEASE ORAL at 08:56

## 2024-06-10 RX ADMIN — AMOXICILLIN AND CLAVULANATE POTASSIUM 1 TABLET: 875; 125 TABLET, FILM COATED ORAL at 08:56

## 2024-06-10 RX ADMIN — VALSARTAN 320 MG: 160 TABLET, FILM COATED ORAL at 08:55

## 2024-06-10 RX ADMIN — HYDROMORPHONE HYDROCHLORIDE 1 MG: 1 INJECTION, SOLUTION INTRAMUSCULAR; INTRAVENOUS; SUBCUTANEOUS at 00:25

## 2024-06-10 RX ADMIN — ESCITALOPRAM OXALATE 20 MG: 10 TABLET ORAL at 08:56

## 2024-06-10 RX ADMIN — Medication 10 ML: at 08:57

## 2024-06-10 RX ADMIN — OXYCODONE HYDROCHLORIDE AND ACETAMINOPHEN 1 TABLET: 5; 325 TABLET ORAL at 14:31

## 2024-06-10 RX ADMIN — ENOXAPARIN SODIUM 30 MG: 100 INJECTION SUBCUTANEOUS at 08:56

## 2024-06-10 RX ADMIN — FLUTICASONE PROPIONATE 2 SPRAY: 50 SPRAY, METERED NASAL at 08:57

## 2024-06-10 RX ADMIN — AMLODIPINE BESYLATE 10 MG: 5 TABLET ORAL at 08:56

## 2024-06-10 RX ADMIN — HYDROMORPHONE HYDROCHLORIDE 1 MG: 1 INJECTION, SOLUTION INTRAMUSCULAR; INTRAVENOUS; SUBCUTANEOUS at 12:37

## 2024-06-10 RX ADMIN — HYDROCHLOROTHIAZIDE 25 MG: 25 TABLET ORAL at 08:56

## 2024-06-10 RX ADMIN — ATORVASTATIN CALCIUM 20 MG: 40 TABLET, FILM COATED ORAL at 08:56

## 2024-06-10 RX ADMIN — HYDROMORPHONE HYDROCHLORIDE 1 MG: 1 INJECTION, SOLUTION INTRAMUSCULAR; INTRAVENOUS; SUBCUTANEOUS at 04:22

## 2024-06-10 RX ADMIN — HYDROMORPHONE HYDROCHLORIDE 1 MG: 1 INJECTION, SOLUTION INTRAMUSCULAR; INTRAVENOUS; SUBCUTANEOUS at 08:55

## 2024-06-10 ASSESSMENT — PAIN - FUNCTIONAL ASSESSMENT
PAIN_FUNCTIONAL_ASSESSMENT: ACTIVITIES ARE NOT PREVENTED

## 2024-06-10 ASSESSMENT — PAIN SCALES - GENERAL
PAINLEVEL_OUTOF10: 8
PAINLEVEL_OUTOF10: 7
PAINLEVEL_OUTOF10: 0
PAINLEVEL_OUTOF10: 3
PAINLEVEL_OUTOF10: 7
PAINLEVEL_OUTOF10: 3
PAINLEVEL_OUTOF10: 7
PAINLEVEL_OUTOF10: 7
PAINLEVEL_OUTOF10: 0

## 2024-06-10 ASSESSMENT — PAIN DESCRIPTION - DESCRIPTORS
DESCRIPTORS: ACHING
DESCRIPTORS: ACHING;DULL
DESCRIPTORS: ACHING

## 2024-06-10 ASSESSMENT — PAIN DESCRIPTION - ORIENTATION
ORIENTATION: LEFT

## 2024-06-10 ASSESSMENT — PAIN SCALES - WONG BAKER: WONGBAKER_NUMERICALRESPONSE: NO HURT

## 2024-06-10 ASSESSMENT — PAIN DESCRIPTION - LOCATION
LOCATION: FOOT;LEG
LOCATION: FOOT
LOCATION: FOOT;LEG
LOCATION: FOOT
LOCATION: FOOT

## 2024-06-10 NOTE — PROGRESS NOTES
Hospitalist Progress Note  ABHIJEET Donovan - CNP  Answering service: [phone]        Date of Service:  2024  NAME:  Marti Larkin  :  1960  MRN:  [id number]      Admission Summary:   From H&P : Marti Larkin is a 64 y.o. female with a past medical history of DM2, hypertension, GERD, hypothyroidism, peptic ulcer disease, and past pancreatitis who presents with worsening diabetic foot infection of the L first toe.   She sustained an injury to her toe prior to presentation where the nail lifted off of the nailbed.  Since this time she has been having worsening swelling, erythema, and purulent drainage for the past week.     In the ED, vital signs are stable.  Labs showed potassium 3.4, sodium 132, glucose 292, CRP 19.9, and ESR 11.  Left foot x-ray showed osteomyelitis of the great toe distal phalanx.  Left lower extremity venous duplex has been ordered, and is pending.     In the ED, she received Zosyn, vancomycin, and Dilaudid.       Interval history / Subjective:     Patient was tearful today, pain management has been a big issue overnight.  She is very pleased that she has the freestyle mirella system to help her monitor her glucose levels and was very happy to show me how it worked on her phone.  She likely needs SNF rehab placement given her minimal weightbearing status and the fact she lives in second-floor and will need to use walker extensively.  These orders have been placed and I have discussed with case management and have also placed a case management order for likely SNF rehab placement.     Assessment & Plan:     Left first toe osteomyelitis  POD #1 s/p great toe amputation   - Left foot x-ray shows osteomyelitis of first distal phalanx on the left  - Labs show CRP 19.9  -Continue antibiotic therapy with vancomycin and cefepime : podiatry believes surgical cure but awaiting operative cultures 
                                                                                                Hospitalist Progress Note  ABHIJEET Jefferson - NP  Answering service: [phone]        Date of Service:  2024  NAME:  Marti Larkin  :  1960  MRN:  [id number]      Admission Summary:     Ms. Larkin is a 64 y.o. female with a past medical history of DM2, hypertension, GERD, hypothyroidism, peptic ulcer disease, and past pancreatitis who presented to the ED with worsening diabetic foot infection of the L first toe.   She sustained an injury to her toe prior to presentation where the toenail lifted off of the nailbed.  Since then, she has been having worsening swelling, erythema, and purulent drainage for the past week.  Left foot x-ray showed osteomyelitis of the great toe distal phalanx.       Interval history / Subjective:     Patient was returning to her bed from using the bedside commode, moving around without problem, she still remains to be minimally weightbearing on her left heel.  Reports no new complaints overnight, pain is currently managed on present regimen, no new changes were made.     Assessment & Plan:     Left first toe osteomyelitis   s/p great toe amputation   - Left foot x-ray: osteomyelitis of first distal phalanx on the left  - CRP 19.9  - Intra-op cx () MSSA; Pathology () proximal margin negative for OM  - ID following - changed abx to po augmentin for remainder of course (end date )  - Per podiatry: Minimally weightbearing status on heel only with flat postoperative shoe and walker, bandage to be changed in office in 1 wk with stitches to come out in 2 weeks. Plan to reach out to Podiatry tomorrow as pt remains hospitalized and may need bandage change in house prior to her discharge to SNF.  - Pain control improved now: Percocet added to regimen, continue Dilaudid as needed.  Patient is aware that Dilaudid will not be available to her on discharge.  - SNF is pending   
                                                                                                Hospitalist Progress Note  ABHIJEET Jefferson NP  Answering service: [phone]        Date of Service:  2024  NAME:  Marti Larkin  :  1960  MRN:  [id number]      Admission Summary:     Ms. Larkin is a 64 y.o. female with a past medical history of DM2, hypertension, GERD, hypothyroidism, peptic ulcer disease, and past pancreatitis who presented to the ED with worsening diabetic foot infection of the L first toe.   She sustained an injury to her toe prior to presentation where the toenail lifted off of the nailbed.  Since then, she has been having worsening swelling, erythema, and purulent drainage for the past week.  Left foot x-ray showed osteomyelitis of the great toe distal phalanx.       Interval history / Subjective:     Patient was seen and examined this morning, she was sitting up in a chair with her legs elevated in no acute distress, cooperative and interactive with assessment.  Reports that she is still having pain in her left foot but has improved since pain regimen was adjusted yesterday.  Discussed plan of care: Awaiting SNF.     Assessment & Plan:     Left first toe osteomyelitis  / s/p great toe amputation   - Left foot x-ray shows osteomyelitis of first distal phalanx on the left  - CRP 19.9  - Intra-op cx () MSSA; Pathology () proximal margin negative for OM  - ID following - changed abx to po augmentin for remainder of course  - Per podiatry: Minimally weightbearing status on heel only with flat postoperative shoe and walker, bandage to be changed in office in 1 wk with stitches to come out in 2 weeks  - Pain control improved now: Percocet added to regimen, continue Dilaudid as needed  - SNF is pending     Uncontrolled DM2  - SSI  - A1c has been running greater than 10 for quite some time  - discussed with patient importance of tight glucose control given diabetic foot wound  - 
                                                                                                Hospitalist Progress Note  ABHIJEET Jefferson NP  Answering service: [phone]        Date of Service:  2024  NAME:  Marti Larkin  :  1960  MRN:  [id number]      Admission Summary:     Ms. Larkin is a 64 y.o. female with a past medical history of DM2, hypertension, GERD, hypothyroidism, peptic ulcer disease, and past pancreatitis who presented to the ED with worsening diabetic foot infection of the L first toe.   She sustained an injury to her toe prior to presentation where the toenail lifted off of the nailbed.  Since then, she has been having worsening swelling, erythema, and purulent drainage for the past week.  Left foot x-ray showed osteomyelitis of the great toe distal phalanx.       Interval history / Subjective:     Patient was seen on rounds this morning, she was lying in bed in no acute distress, cooperative and interactive with assessment.  Has no new complaints overnight, still taking pain medications for left lower extremity discomfort.  Reports she had a bowel movement today, has been eating and drinking well.  Discussed plan of care: Still awaiting authorization for SNF placement     Assessment & Plan:     Left first toe osteomyelitis   s/p great toe amputation   - Left foot x-ray: osteomyelitis of first distal phalanx on the left  - CRP 19.9  - Intra-op cx () MSSA; Pathology () proximal margin negative for OM  - ID following - changed abx to po augmentin for remainder of course (end date )  - Per podiatry: Minimally weightbearing status on heel only with flat postoperative shoe and walker, bandage to be changed in office in 1 wk with stitches to come out in 2 weeks  - Pain control improved now: Percocet added to regimen, continue Dilaudid as needed  - SNF is pending     Uncontrolled DM2  - SSI  - A1c has been running greater than 10 for quite some time  - discussed with patient 
                                                                                                Hospitalist Progress Note  Esmer Regan PA-C  Answering service: [phone]        Date of Service:  6/3/2024  NAME:  Marti Larkin  :  1960  MRN:  [id number]      Admission Summary:   From H&P : Marti Larkin is a 64 y.o. female with a past medical history of DM2, hypertension, GERD, hypothyroidism, peptic ulcer disease, and past pancreatitis who presents with worsening diabetic foot infection of the L first toe.   She sustained an injury to her toe prior to presentation where the nail lifted off of the nailbed.  Since this time she has been having worsening swelling, erythema, and purulent drainage for the past week.     In the ED, vital signs are stable.  Labs showed potassium 3.4, sodium 132, glucose 292, CRP 19.9, and ESR 11.  Left foot x-ray showed osteomyelitis of the great toe distal phalanx.  Left lower extremity venous duplex has been ordered, and is pending.     In the ED, she received Zosyn, vancomycin, and Dilaudid.       Interval history / Subjective:     Friendly patient seen resting in bed, swelling and discoloration of the L great toe.    Will undergo MRI and L great toe amputation likely on Tuesday.    Will consult ID     Assessment & Plan:     Left first toe osteomyelitis  - Left foot x-ray shows osteomyelitis of first distal phalanx on the left  - Labs show CRP 19.9  -Continue antibiotic therapy with vancomycin and cefepime  - Podiatry has evaluated : pt to undergo L great toe amputation likely on Tuesday, MRI ordered as well      Uncontrolled DM2  - ISS : this was changed from low to medium dosing today  - A1c has been running greater than 10 for quite some time  - add Lantus 15u daily (refused last night but agreed to take starting today) -- adjusted to 30 units, starting nutritional insulin 5 units by DTC 6/3  - discussed with patient importance of tight glucose control given diabetic foot 
[facility]  [address], [address] [place] [address]    Foot and Ankle Surgery - Progress Note                                                   Assessment/Plan:  Osteomyelitis left great toe  Ulcer left foot to bone with necrosis  Cellulitis left foot  DM/neuropathy     Pt is S/P left great toe amputation and open bone biopsies left foot DOS: 06-     Pt doing well this am, resting in NAD. Bandage taken down and incision evaluated, looks perfect, skin edges well coapted with sutures intact,     No further plans for surgery per foot and ankle, patient cleared for DC onced cleared by all other specialties, proximal margin pathology returned negative for OM     Patient should remain minimal weightbearing heel only with FLAT post operative shoe and walker  PT to evaluate     Bandage change complete, stitches will be removed in one week in office, patient should schedule appt for next wednesday     Labs/imaging reviewed  Abx per ID team- discussed case and appreciate recs     Elevate and float left foot off edge off bed     Foot and ankle to follow      HPI:  Patient resting in NAD, no new pedal complaints      Objective:  Vitals: Patient Vitals for the past 12 hrs:   BP Temp Temp src Pulse Resp SpO2   06/10/24 0452 -- -- -- -- 16 --   06/10/24 0055 -- -- -- -- 16 --   06/09/24 2026 (!) 141/75 98.4 °F (36.9 °C) Oral 68 16 99 %        Dermatological:  Dressing to the left foot clean dry and intact, remaining toes of normal color and texture,  Bandage taken down and incision evaluated, looks perfect, skin edges well coapted with sutures intact,    Imaging:  XR FOOT LEFT (MIN 3 VIEWS)   Final Result   Status post left great toe amputation, with no evidence of operative   complication.      CT FOOT LEFT W CONTRAST   Final Result   Acute osteomyelitis at the tip of the first distal phalanx, with adjacent soft   tissue abscess and ulceration.         XR FOOT LEFT (MIN 3 VIEWS) 
[facility]  [address], [address] [place] [address]    Foot and Ankle Surgery - Progress Note                                                   Assessment/Plan:  Osteomyelitis left great toe  Ulcer left foot to bone with necrosis  Cellulitis left foot  DM/neuropathy     Pt is S/P left great toe amputation and open bone biopsies left foot DOS: 06-    Pt doing well this am, resting in NAD.   No further plans for surgery per foot and ankle, patient cleared for DC onced cleared by all other specialties, I suspect a surgical cure but surgical path pending.    Patient should remaini minimal weightbearing heel only with FLAT post operative shoe and walker  PT to evaluate    Bandage change to take place in office in one week, stitches will be removed in two weeks    Labs/imaging reviewed  Abx per ID team    Elevate and float left foot off edge off bed    Foot and ankle to follow     HPI:  Patient resting in NAD, no new pedal complaints     Objective:  Vitals:   Patient Vitals for the past 12 hrs:   BP Temp Temp src Pulse Resp SpO2   06/05/24 0848 (!) 148/75 97.9 °F (36.6 °C) Axillary 73 18 99 %   06/05/24 0549 -- -- -- -- 18 --   06/05/24 0519 (!) 147/81 98 °F (36.7 °C) Oral 76 16 94 %       Dermatological:  Dressing to the left foot clean dry and intact, remaining toes of normal color and texture     Imaging:  XR FOOT LEFT (MIN 3 VIEWS)   Final Result   Status post left great toe amputation, with no evidence of operative   complication.      CT FOOT LEFT W CONTRAST   Final Result   Acute osteomyelitis at the tip of the first distal phalanx, with adjacent soft   tissue abscess and ulceration.         XR FOOT LEFT (MIN 3 VIEWS)   Final Result   Osteomyelitis of the great toe distal phalanx.      Vascular duplex lower extremity venous left   Final Result           Labs:  Recent Labs     06/05/24  0310   BUN 14      K 4.4      CO2 31         Amarjit Ramirez 
Bedside and Verbal shift change report given to SOHEILA Vergara (oncoming nurse) by SOHEILA Biggs (offgoing nurse). Report included the following information Nurse Handoff Report, Index, ED Encounter Summary, ED SBAR, Surgery Report, Intake/Output, MAR, Recent Results, Med Rec Status, Quality Measures, Neuro Assessment, and Event Log.    
Day #1 of cefepime  Indication:  Bone and joint infection  Current regimen:  2g q12h  Abx regimen: cefepime/vanc  Recent Labs     24  1748   WBC 8.4   CREATININE 0.94   BUN 17     Est CrCl: 76 ml/min;   Temp (24hrs), Av.6 °F (37 °C), Min:98.6 °F (37 °C), Max:98.6 °F (37 °C)    Cultures: blood cx drawn    Plan: Change to 2g q8h based on indication and BMI >40       
Day #2 of Vancomycin - Dosing Update  Indication:  Left first toe osteomyelitis  Current regimen:  1500 mg IV Q 24 hr  Abx regimen:  Cefepime + Vancomycin  ID Following ?: NO  Concomitant nephrotoxic drugs (requires more frequent monitoring): None  Frequency of BMP?: Daily through 6/3    Recent Labs     24  1748 24  0002   WBC 8.4 6.5   CREATININE 0.94 0.70   BUN 17 13     Est CrCl: ~100 ml/min; UO: -- ml/kg/hr  Temp (24hrs), Av.6 °F (37 °C), Min:98.2 °F (36.8 °C), Max:98.8 °F (37.1 °C)    Cultures:    Blood: NGTD    MRSA Swab ordered (if applicable)? N/A    Goal target range AUC/-600    Recent level history:  Date/Time Dose & Interval Measured Level (mcg/mL) Associated AUC/SAM Dose Adjustment                                                 Plan: The current regimen is now predicting a subtherapeutic AUC/SAM (likely 2/2 improving Scr).  Will preemptively adjust the dose to 1500 mg IV Q 18 hr for a predicted AUC/SAM of 521 and will order a random level for tomorrow with am labs.  Pharmacy will continue to monitor patient daily and will make dosage adjustments based upon changing renal function.      
Day #3 of Vancomycin - Level Update  Indication:  Left first toe osteomyelitis  Current regimen:  1500 mg IV Q 18 hr  Abx regimen:  Cefepime + Vancomycin  ID Following ?: NO  Concomitant nephrotoxic drugs (requires more frequent monitoring): None  Frequency of BMP?: Daily through 6/3    Recent Labs     24  1748 24  0002 24  0326   WBC 8.4 6.5 5.2   CREATININE 0.94 0.70 0.87   BUN 17 13 17     Est CrCl: ~80-85 ml/min; UO: -- ml/kg/hr  Temp (24hrs), Av.9 °F (37.2 °C), Min:98.1 °F (36.7 °C), Max:100.2 °F (37.9 °C)    Cultures:    Blood: NGTD    MRSA Swab ordered (if applicable)? N/A    Goal target range AUC/-600    Recent level history:  Date/Time Dose & Interval Measured Level (mcg/mL) Associated AUC/SAM Dose Adjustment     @ 0326 1500 mg IV Q18H 11.7 (~12 hr post-dose) 431 No change                                         Plan: The random vancomycin level drawn this morning correlates with an AUC/SAM of 431, which is within the goal range.  Plan will be to continue the current dose.  Pharmacy will continue to monitor patient daily and will make dosage adjustments based upon changing renal function.    *Random vancomycin levels are used to calculate AUC/SAM, this level should not be interpreted as a trough. Vancomycin has been dosed using Bayesian kinetics software to target an AUC24:SAM of 400-600, which provides adequate exposure for as assumed infection due to MRSA with an SAM of 1 or less while reducing the risk of nephrotoxicity as seen with traditional trough based dosing goals.          
Day #4 of Vancomycin  Indication:  Left first toe osteomyelitis  Current regimen:  1500 mg IV Q 18 hr  Abx regimen:  Cefepime + Vancomycin  ID Following ?: NO    Recent Labs     24  1748 24  0002 24  0326 24  0403   WBC 8.4 6.5 5.2  --    CREATININE 0.94 0.70 0.87 0.74   BUN 17 13 17 12     Est CrCl: 97 ml/min; UO: -- ml/kg/hr  Temp (24hrs), Av.5 °F (36.9 °C), Min:98.2 °F (36.8 °C), Max:98.8 °F (37.1 °C)    Cultures:    Blood: NGTD    MRSA Swab ordered (if applicable)? N/A    Goal target range AUC/-600    Plan: Current regimen subtherapeutic with an AUC of 369.     Change to 1250 mg q12 hours for an expected AUC of 461 (77%). Patient afebrile with a normal WBC.   Pharmacy will continue to monitor patient daily and will make dosage adjustments based upon changing renal function.    *Random vancomycin levels are used to calculate AUC/SAM, this level should not be interpreted as a trough. Vancomycin has been dosed using Bayesian kinetics software to target an AUC24:SAM of 400-600, which provides adequate exposure for as assumed infection due to MRSA with an SAM of 1 or less while reducing the risk of nephrotoxicity as seen with traditional trough based dosing goals.            
EKG faxed.   
ID Progress Note    INFECTIOUS DISEASE Attending:     I agree with the above infectious disease daily progress note in its entirety as authored by and discussed in detail with the nurse practitioner.   I have reviewed pertinent laboratory studies, microbiology cultures, radiologic reports with review of the consultations and progress notes as appropriate.   I agree with today's subjective findings, physical examination, assessment and plan of care as described above and discussed extensively with the nurse practitioner.       Augmentin until 6/14/24, inclusive.    Will sign off, please call with questions.     Admission Summary:   Patient is a 64 y.o. female with medical hx of hypothyroidism, GERD, hypertension, PUD, and type II DM presented to ER on 5/31 with left toe pain. No injury or trauma.     In ER, temp was 98.6, wbc 8.4, crp 19.90, CXR of left foot revealed Osteomyelitis of the great toe distal phalanx, no DVT per duplex study of lower extremities. Pt was started on IV cefepime and vancomycin.     Pt was evaluated by Dr. Shipley, podiatry team. Dr. Shipley is planning for surgical intervention; I &  with removal of nonviable soft tissue, open bone bx, and left foot amputation.     During visit, pt c/o left great toe pain. No fever, chills, sob, norton, nausea, vomiting, chest pain, dysuria, or abd pain.     ID team was consulted for OM of left toe.     Denies smoking, alcohol consumption, or substance abuse.  Pt works as a  at the [employer].       Interval history    Voices feeling ok  Waiting to go to rehab  Antimicrobial therapy history      IV cefepime 5/31-6/6  IV vancomycin 5/31-6/6  PO augmentin  Assessment   OM of left great toe  Diabetic foot infection  S/p left great toe amputation (6/4)  - afebrile, wbc 8.4    Blood cx (5/31) no growth so far    CRP 19.9, ESR 11    Intra-op cx (6/4) MSSA    Pathology (6/4) proximal margin negative for OM     Type II DM  - A1C 9.8    Continue with 
ID Progress Note    INFECTIOUS DISEASE Attending:     I agree with the above infectious disease daily progress note in its entirety as authored by and discussed in detail with the nurse practitioner.   I have reviewed pertinent laboratory studies, microbiology cultures, radiologic reports with review of the consultations and progress notes as appropriate.   I agree with today's subjective findings, physical examination, assessment and plan of care as described above and discussed extensively with the nurse practitioner.       Continue Vanco/Cefepime until tomorrow then stop if cultures at margin site negative.    Follow pathology from margin.      Admission Summary:   Patient is a 64 y.o. female with medical hx of hypothyroidism, GERD, hypertension, PUD, and type II DM presented to ER on 5/31 with left toe pain. No injury or trauma.     In ER, temp was 98.6, wbc 8.4, crp 19.90, CXR of left foot revealed Osteomyelitis of the great toe distal phalanx, no DVT per duplex study of lower extremities. Pt was started on IV cefepime and vancomycin.     Pt was evaluated by Dr. Shipley, podiatry team. Dr. Shipley is planning for surgical intervention; I &  with removal of nonviable soft tissue, open bone bx, and left foot amputation.     During visit, pt c/o left great toe pain. No fever, chills, sob, norton, nausea, vomiting, chest pain, dysuria, or abd pain.     ID team was consulted for OM of left toe.     Denies smoking, alcohol consumption, or substance abuse.  Pt works as a  at the [employer].       Interval history    Voices feeling ok  No complaints other than challenges when attempt to ambulate  Antimicrobial therapy history      IV cefepime 5/31-  IV vancomycin 5/31-  Assessment   OM of left great toe  Diabetic foot infection  S/p left great toe amputation (6/4)  - afebrile, wbc 8.4    Blood cx (5/31) no growth so far    CRP 19.9, ESR 11    Intra-op cx (6/4) GPC in clusters/pairs from gram stain    
Patient currently refusing oral pain medication ordered PRN and only requesting IV dilaudid. Educated patient that IV medication is for breakthrough pain and that IV will not be available when she discharges. Patient verbalizes understanding and still only wants IV medication.     
Patient is refusing lantus stated she would like to talk to the doctor about insulin before taking 35units thinks that is too much.  
Pharmacist Note - Vancomycin Dosing    Consult provided for this 64 y.o. female for indication of OM Lt toe.  Antibiotic regimen(s): Vanc+Cefepime  Patient on vancomycin PTA? NO     Recent Labs     24  1748   WBC 8.4   CREATININE 0.94   BUN 17     Frequency of BMP: Daily  Height: 167.6 cm  Weight: 115.7 kg  Est CrCl: 76 ml/min; UO: --- ml/kg/hr  Temp (24hrs), Av.6 °F (37 °C), Min:98.6 °F (37 °C), Max:98.6 °F (37 °C)    Cultures:  Blood-NGTD      MRSA Swab ordered (if applicable)? N/A    The plan below is expected to result in a target range of AUC/-600    Therapy will be initiated with a loading dose of 2500 mg IV x 1 to be followed by a maintenance dose of 1500 mg IV every 24 hours.  Pharmacy to follow patient daily and order levels / make dose adjustments as appropriate.    Zenon Martinez, PharmD    *Vancomycin has been dosed used Bayesian kinetics software to target an AUC/SAM of 400-600, which provides adequate exposure for an assumed infection due to MRSA with an SAM of 1 or less while reducing the risk of nephrotoxicity as seen with traditional trough based dosing goals.     
Pharmacist Note - Vancomycin Dosing  Therapy day 6  Indication: Left first toe osteomyelitis (diabetic foot infection)  Current regimen: 1250mg q12h    Recent Labs     06/03/24  0403 06/04/24  0005 06/05/24  0310   CREATININE 0.74 0.78 0.81   BUN 12 11 14       A random vancomycin level of 12.1 mcg/mL was obtained and from this level, the patient's AUC24 is calculated to be 484 with the current regimen.     Goal target range AUC/-600      Plan: Continue current regimen. Pharmacy will continue to monitor this patient daily for changes in clinical status and renal function.    *Random vancomycin levels are used to calculate AUC/SAM, this level should not be interpreted as a trough. Vancomycin has been dosed using Bayesian kinetics software to target an AUC24:SAM of 400-600, which provides adequate exposure for as assumed infection due to MRSA with an SAM of 1 or less while reducing the risk of nephrotoxicity as seen with traditional trough based dosing goals.    Miri Mir, PharmD, Clinical Pharmacist  
Pt has many concerns about taking insulin instead of her usual metformin & glipizide. Night Blood glucose 294. Informed patient that insulin is our usual protocol in the hospital for diabetic patients and that PO meds will most likely not be added overnight.    Spoke to on-call NP, passed along message that insulin is the protocol. Explained to patient that metformin is held in case of scans & contrast typically. Educated patient to speak with day attending for more clarification about insulin orders.   
clinical/nonclinical/nursing services involved in patient's clinical care. Care coordination discussions were held with appropriate clinical/nonclinical/ nursing providers based on care coordination needs.     Labs:     Recent Labs     06/02/24 0326   WBC 5.2   HGB 11.5   HCT 35.6          Recent Labs     06/02/24  0326 06/03/24  0403 06/04/24  0005    136 138   K 3.9 3.6 3.7    100 99   CO2 34* 33* 34*   BUN 17 12 11       No results for input(s): \"ALT\", \"TP\", \"GLOB\", \"GGT\" in the last 72 hours.    Invalid input(s): \"SGOT\", \"GPT\", \"AP\", \"TBIL\", \"TBILI\", \"ALB\", \"AML\", \"AMYP\", \"LPSE\", \"HLPSE\"    No results for input(s): \"INR\", \"APTT\" in the last 72 hours.    Invalid input(s): \"PTP\"   No results for input(s): \"TIBC\" in the last 72 hours.    Invalid input(s): \"FE\", \"PSAT\", \"FERR\"   No results found for: \"RBCF\"   No results for input(s): \"PH\", \"PCO2\", \"PO2\" in the last 72 hours.  No results for input(s): \"CPK\" in the last 72 hours.    Invalid input(s): \"CPKMB\", \"CKNDX\", \"TROIQ\"  Lab Results   Component Value Date/Time    CHOL 128 07/13/2021 04:20 PM    HDL 37 07/13/2021 04:20 PM    LDL 33.4 07/13/2021 04:20 PM     Lab Results   Component Value Date/Time    GLUCPOC 264 03/29/2024 02:53 PM    GLUCPOC hhh 10/23/2023 09:33 AM    GLUCPOC 255 03/22/2023 03:01 PM    GLUCPOC 197 07/19/2022 11:11 AM    GLUCPOC 373 02/11/2022 12:23 PM     [unfilled]      Medications Reviewed:     Current Facility-Administered Medications   Medication Dose Route Frequency    lidocaine PF 1 % injection 1 mL  1 mL IntraDERmal Once PRN    acetaminophen (TYLENOL) tablet 1,000 mg  1,000 mg Oral Once    fentaNYL (SUBLIMAZE) injection 100 mcg  100 mcg IntraVENous Once PRN    lactated ringers IV soln infusion   IntraVENous Continuous    sodium chloride flush 0.9 % injection 5-40 mL  5-40 mL IntraVENous 2 times per day    sodium chloride flush 0.9 % injection 5-40 mL  5-40 mL IntraVENous PRN    0.9 % sodium chloride infusion   IntraVENous 
 650 mg Oral Q6H PRN    Or    acetaminophen (TYLENOL) suppository 650 mg  650 mg Rectal Q6H PRN    HYDROmorphone HCl PF (DILAUDID) injection 1 mg  1 mg IntraVENous Q4H PRN    naloxone (NARCAN) injection 0.4 mg  0.4 mg IntraVENous PRN    ceFEPIme (MAXIPIME) 2,000 mg in sodium chloride 0.9 % 100 mL IVPB (mini-bag)  2,000 mg IntraVENous Q8H    vancomycin (VANCOCIN) 1,500 mg in sodium chloride 0.9 % 250 mL IVPB ([id number])  1,500 mg IntraVENous Q24H    Vancomycin - Pharmacy to Dose   Other RX Placeholder    amLODIPine (NORVASC) tablet 10 mg  10 mg Oral Daily    atorvastatin (LIPITOR) tablet 20 mg  20 mg Oral Daily    escitalopram (LEXAPRO) tablet 20 mg  20 mg Oral Daily    zolpidem (AMBIEN) tablet 5 mg  5 mg Oral Nightly    metoprolol succinate (TOPROL XL) extended release tablet 100 mg  100 mg Oral Daily    fluticasone (FLONASE) 50 MCG/ACT nasal spray 2 spray  2 spray Each Nostril Daily    valsartan (DIOVAN) tablet 320 mg  320 mg Oral Daily    And    hydroCHLOROthiazide (HYDRODIURIL) tablet 25 mg  25 mg Oral Daily    glucose chewable tablet 16 g  4 tablet Oral PRN    dextrose bolus 10% 125 mL  125 mL IntraVENous PRN    Or    dextrose bolus 10% 250 mL  250 mL IntraVENous PRN    glucagon injection 1 mg  1 mg SubCUTAneous PRN    dextrose 10 % infusion   IntraVENous Continuous PRN    insulin lispro (HUMALOG,ADMELOG) injection vial 0-4 Units  0-4 Units SubCUTAneous TID WC    insulin lispro (HUMALOG,ADMELOG) injection vial 0-4 Units  0-4 Units SubCUTAneous Nightly     ______________________________________________________________________  EXPECTED LENGTH OF STAY: 3  ACTUAL LENGTH OF STAY:          1                 Carolyn Rubi, APRN - CNP    
0-8 Units  0-8 Units SubCUTAneous TID WC    insulin lispro (HUMALOG,ADMELOG) injection vial 0-4 Units  0-4 Units SubCUTAneous Nightly    vancomycin (VANCOCIN) 1,500 mg in sodium chloride 0.9 % 250 mL IVPB ([id number])  1,500 mg IntraVENous Q18H    insulin glargine (LANTUS) injection vial 15 Units  15 Units SubCUTAneous Nightly    sodium chloride flush 0.9 % injection 5-40 mL  5-40 mL IntraVENous 2 times per day    sodium chloride flush 0.9 % injection 5-40 mL  5-40 mL IntraVENous PRN    0.9 % sodium chloride infusion   IntraVENous PRN    potassium chloride (KLOR-CON) extended release tablet 40 mEq  40 mEq Oral PRN    Or    potassium bicarb-citric acid (EFFER-K) effervescent tablet 40 mEq  40 mEq Oral PRN    Or    potassium chloride 10 mEq/100 mL IVPB (Peripheral Line)  10 mEq IntraVENous PRN    magnesium sulfate 2000 mg in 50 mL IVPB premix  2,000 mg IntraVENous PRN    enoxaparin Sodium (LOVENOX) injection 30 mg  30 mg SubCUTAneous BID    ondansetron (ZOFRAN-ODT) disintegrating tablet 4 mg  4 mg Oral Q8H PRN    Or    ondansetron (ZOFRAN) injection 4 mg  4 mg IntraVENous Q6H PRN    polyethylene glycol (GLYCOLAX) packet 17 g  17 g Oral Daily PRN    acetaminophen (TYLENOL) tablet 650 mg  650 mg Oral Q6H PRN    Or    acetaminophen (TYLENOL) suppository 650 mg  650 mg Rectal Q6H PRN    HYDROmorphone HCl PF (DILAUDID) injection 1 mg  1 mg IntraVENous Q4H PRN    naloxone (NARCAN) injection 0.4 mg  0.4 mg IntraVENous PRN    ceFEPIme (MAXIPIME) 2,000 mg in sodium chloride 0.9 % 100 mL IVPB (mini-bag)  2,000 mg IntraVENous Q8H    Vancomycin - Pharmacy to Dose   Other RX Placeholder    amLODIPine (NORVASC) tablet 10 mg  10 mg Oral Daily    atorvastatin (LIPITOR) tablet 20 mg  20 mg Oral Daily    escitalopram (LEXAPRO) tablet 20 mg  20 mg Oral Daily    zolpidem (AMBIEN) tablet 5 mg  5 mg Oral Nightly    metoprolol succinate (TOPROL XL) extended release tablet 100 mg  100 mg Oral Daily    fluticasone (FLONASE) 50 MCG/ACT nasal 
Blood Culture 2 [[id number]]     Order Status: Canceled Specimen: Blood     Culture, Blood 2 [[id number]] Collected: 05/31/24 1748    Order Status: Completed Specimen: Blood Updated: 06/05/24 1815     Special Requests --        NO SPECIAL REQUESTS  RIGHT  Antecubital       Culture NO GROWTH 5 DAYS       Culture, Blood 1 [[id number]] Collected: 05/31/24 1748    Order Status: Completed Specimen: Blood Updated: 06/05/24 1815     Special Requests --        NO SPECIAL REQUESTS  LEFT  Antecubital       Culture NO GROWTH 5 DAYS                Labs:   Labs:   Lab Results   Component Value Date/Time    WBC 5.2 06/02/2024 03:26 AM    HGB 11.5 06/02/2024 03:26 AM    HCT 35.6 06/02/2024 03:26 AM     06/02/2024 03:26 AM    MCV 88.6 06/02/2024 03:26 AM     Lab Results   Component Value Date/Time     06/06/2024 03:44 AM    K 4.3 06/06/2024 03:44 AM     06/06/2024 03:44 AM    CO2 35 06/06/2024 03:44 AM    BUN 16 06/06/2024 03:44 AM    GFRAA >60 07/26/2022 06:07 PM    GLOB 4.4 05/31/2024 05:48 PM    ALT 23 05/31/2024 05:48 PM         Images:     XR FOOT LEFT (MIN 3 VIEWS) 06/04/2024    Narrative  EXAM: XR FOOT LEFT (MIN 3 VIEWS)    INDICATION: postop left great toe amp. Osteomyelitis, unspecified / Left Foot, 3  Views, Non-Weight Bearing.    COMPARISON: 5/31/2024    FINDINGS: Three views of the left foot demonstrate status post interval great  toe amputation. No acute osseous or articular abnormality is evident.  Postoperative changes are seen in the soft tissues.    Impression  Status post left great toe amputation, with no evidence of operative  complication.      XR FOOT LEFT (MIN 3 VIEWS)    Result Date: 6/4/2024  EXAM: XR FOOT LEFT (MIN 3 VIEWS) INDICATION: postop left great toe amp. Osteomyelitis, unspecified / Left Foot, 3 Views, Non-Weight Bearing. COMPARISON: 5/31/2024 FINDINGS: Three views of the left foot demonstrate status post interval great toe amputation. No acute osseous or articular abnormality is 
tablet 100 mg  100 mg Oral Daily    fluticasone (FLONASE) 50 MCG/ACT nasal spray 2 spray  2 spray Each Nostril Daily    valsartan (DIOVAN) tablet 320 mg  320 mg Oral Daily    And    hydroCHLOROthiazide (HYDRODIURIL) tablet 25 mg  25 mg Oral Daily    glucose chewable tablet 16 g  4 tablet Oral PRN    dextrose bolus 10% 125 mL  125 mL IntraVENous PRN    Or    dextrose bolus 10% 250 mL  250 mL IntraVENous PRN    glucagon injection 1 mg  1 mg SubCUTAneous PRN    dextrose 10 % infusion   IntraVENous Continuous PRN     ______________________________________________________________________  EXPECTED LENGTH OF STAY: 6  ACTUAL LENGTH OF STAY:          5                 Carolyn Rubi, APRN - CNP

## 2024-06-10 NOTE — FLOWSHEET NOTE
Called report to [facility], spoke to receiving nurse Odilia the rehab facility.  SBAR, discharge notes, Current AVS, Face sheet and hard scripts all included in the discharge paperwork. [facility]  transport company to  patient at 3 pm this afternoon.

## 2024-06-10 NOTE — DIABETES MGMT
[facility]  [facility]  DIABETES MANAGEMENT CONSULT    Consulted by Provider for advanced nursing evaluation and care for inpatient blood glucose management.    Evaluation and Action Plan   Marti Larkin is a 64 y.o. female admitted 5/31 for worsening left great toe diabetic infection after initial injury with a nail.  Since admission, podiatry consulted and seen and recommending left great toe amputation.    S/p GREAT TOE AMPUTATION AT METATARSALPHALANGEAL JOINT LEFT, OPEN BONE BIOPSIES LEFT on 6/4.     Patient verbalized long standing T2D >20yrs. On oral agents for prolonged period of time.  She verbalized her fear of insulin taking her blood sugar low if she does not eat, especially while at work.  Explained that long acting insulin is not dosed based on meal intake but on her weight.  She verbalized understanding.  Her diet recall is described as a \"nibbler\" throughout the day-checks FSBG during the day and endorses it's been higher lately. Patient is now amenable to starting insulin at discharge as well as trying a CGM to monitor BG./       This patient meets the criteria mandated by CMS for CGM use by virtue of:    -In-person evaluation of diabetes control by our service within the past 6 months.  -Having Type 1 Diabetes, Type 2 Diabetes or Gestational Diabetes.  -Is insulin treated or has a history of problematic hypoglycemia with documentation of at least more than one level 2 hypoglycemic event (BG below 54) characterized by altered mental/physical status that persist despite multiple (more than one) attempts to adjust medication(s) and/or modify the diabetes treatment plan; or a history of one level 3 hypoglycemic event (glucose <54mg/dL (3.0mmol/L)) characterized by altered mental and/or physical state requiring third-party assistance for treatment of hypoglycemia.  -Requiring frequent adjustments to their treatment regimen based on BGM or CGM testing results.   -Has been trained by our 
  [] Hepatitis:    []         Social determinants of health impacting diabetes self-management practices    Concerned that you need to know more about how to stay healthy with diabetes    Overall evaluation:    [x] Achieving A1c ABOVE target with drug therapy & lacking self-care practices    Subjective   ”I'm afraid that when I take insulin and I'm not eating during the day my levels will go low and I'll be out.”     Objective   Physical exam  General Obese/AA  female/ in no acute distress. Conversant and cooperative  Neuro  Alert, oriented   Vital Signs   Vitals:    06/10/24 0830   BP: (!) 149/75   Pulse: 74   Resp: 12   Temp: 98.4 °F (36.9 °C)   SpO2: 97%     Skin  Warm and dry.  Heart   Regular rate and rhythm. No murmurs, rubs or gallops  Lungs  Clear to auscultation without rales or rhonchi  Extremities No foot wounds    Diabetic foot exam:    Left Foot     Visual Exam:ulcer- left great toe/ redness / edema   Pulse DP: Present   Filament test: reduced sensation     Right Foot   Visual Exam: normal   Pulse DP: Present   Filament test: reduced sensation-likely       Laboratory  No results for input(s): \"WBC\", \"HGB\", \"HCT\", \"MCV\", \"PLT\" in the last 72 hours.    No results for input(s): \"NA\", \"K\", \"CL\", \"CO2\", \"PHOS\", \"BUN\", \"CREATININE\" in the last 72 hours.    Invalid input(s): \"CA\"    Lab Results   Component Value Date    ALT 23 05/31/2024    AST 14 (L) 05/31/2024    ALKPHOS 87 05/31/2024    BILITOT 0.9 05/31/2024     No results found for: \"TSH\", \"TSHFT4\", \"TSHELE\", \"BQC5BHR\", \"TSHHS\"  Lab Results   Component Value Date    LABA1C 9.8 (H) 06/01/2024    LABA1C 9.6 (H) 07/13/2021       Factors impacting BG management  Factor Dose Comments   Nutrition:  Standard meals     60 grams/meal      Infection Left great toe wound    Other:   Kidney function  Liver function     WNL  WNL      Blood glucose pattern past 48h    Significant diabetes-related events over the past 24-72 hours  5/31-6/3: persistent hyperglycemia / 
adjusted on 6/3/ mealtime insulin added 6/3  6/4: Adjusted basal insulin /     Assessment and Nursing Intervention   Nursing Diagnosis Risk for unstable blood glucose pattern   Nursing Intervention Domain 5250 Decision-making Support   Nursing Interventions Examined current inpatient diabetes/blood glucose control   Explored factors facilitating and impeding inpatient management  Explored corrective strategies with patient and responsible inpatient provider   Informed patient of rational for insulin strategy while hospitalized     Nursing Diagnosis 69495 Ineffective Health Management   Nursing Intervention Domain 5250 Decision-making Support   Nursing Interventions Identified diabetes self-management practices impeding diabetes control  Discussed diabetes survival skills related to  Healthy Plate eating plan; given handouts  Role of physical activity in improving insulin sensitivity and action  Procedure for blood glucose monitoring & options for low-cost products  Medications plan at discharge     Billing Code(s)   71563    Before making these care recommendations, I personally reviewed the hospitalization record, including notes, laboratory & diagnostic data and current medications, and examined the patient at the bedside.  Total minutes: 25    ABHIJEET SELLERS - CNS  Diabetes Clinical Nurse Specialist  [facility]  Access via [employer]

## 2024-06-10 NOTE — DISCHARGE SUMMARY
Discharge Summary     PATIENT ID: Marti Larkin  MRN: [id number]   YOB: 1960    DATE OF ADMISSION: 5/31/2024  7:47 PM    DATE OF DISCHARGE: 6/10/2024   PRIMARY CARE PROVIDER: Mila Tabor MD   ATTENDING PHYSICIAN: Thomas Shaikh MD  DISCHARGING PROVIDER: ABHIJEET Jefferson - NP      CONSULTATIONS: IP CONSULT TO PODIATRY  IP CONSULT TO DIABETES MANAGEMENT  IP CONSULT TO INFECTIOUS DISEASES  IP CONSULT TO CASE MANAGEMENT    PROCEDURES/SURGERIES: Procedure(s):  GREAT TOE AMPUTATION AT METATARSALPHALANGEAL JOINT LEFT, OPEN BONE BIOPSIES LEFT (MAC WITH LOCAL)     ADMITTING DIAGNOSES & HOSPITAL COURSE:     Ms. Larkin is a 64 y.o. female with a past medical history of DM2, hypertension, GERD, hypothyroidism, peptic ulcer disease, and past pancreatitis who presented to the ED with worsening diabetic foot infection of the L first toe.   She sustained an injury to her toe prior to presentation where the toenail lifted off of the nailbed.  Since then, she has been having worsening swelling, erythema, and purulent drainage for the past week.Left foot x-ray showed osteomyelitis of the great toe distal phalanx.       DISCHARGE DIAGNOSES / PLAN:      Left first toe osteomyelitis  6/5 s/p great toe amputation   - Left foot x-ray: osteomyelitis of first distal phalanx on the left  - Intra-op cx (6/4) MSSA; Pathology (6/4) proximal margin negative for OM  - ID following - changed abx to po augmentin for remainder of course (end date 6/14)  - Per podiatry: Minimally weightbearing status on heel only with flat postoperative shoe and walker, bandage changed 6/10; stitches to come out next week, see Dr. Ramirez in office next Wednesday.  - Pain control: switch to hydrocodone at her home dose  - SNF today     Uncontrolled DM2  - A1c has been running greater than 10 for quite some time  - consistent carb diet   - d/c with lantus, SSI and metformin  - DTC to see prior to d/c today  - Patient now with a freestyle mirella

## 2024-06-10 NOTE — DISCHARGE INSTRUCTIONS
mouth every 6 hours as needed for Pain for up to 4 days. Intended supply: 30 days Max Daily Amount: 4 tablets      insulin lispro 100 UNIT/ML Soln injection vial  Commonly known as: HUMALOG,ADMELOG  Inject 0-8 Units into the skin 3 times daily (with meals) Glucose   No Insulin; 200-249       2 Units;  250-299 4 Units; 300-349 6 Units >3498 Units and notify physician      Tracey SoloStar 100 UNIT/ML injection pen  Generic drug: insulin glargine  Inject 25 Units into the skin nightly                CHANGE how you take these medications       zolpidem 5 MG tablet  Commonly known as: AMBIEN  Take 1 tablet by mouth nightly for 14 days. Max Daily Amount: 5 mg  What changed:   how much to take  how to take this  when to take this  additional instructions                CONTINUE taking these medications       amLODIPine 10 MG tablet  Commonly known as: NORVASC  Take 1 tablet by mouth once daily      atorvastatin 20 MG tablet  Commonly known as: LIPITOR  Take 1 tablet by mouth once daily      escitalopram 20 MG tablet  Commonly known as: LEXAPRO  Take 1 tablet by mouth once daily      fluticasone 50 MCG/ACT nasal spray  Commonly known as: FLONASE      metFORMIN 1000 MG tablet  Commonly known as: GLUCOPHAGE  Take 1 tablet by mouth 2 times daily (with meals)      metoprolol succinate 100 MG extended release tablet  Commonly known as: TOPROL XL  TAKE 1 TABLET BY MOUTH DAILY FOR HIGH BLOOD PRESSURE      valsartan-hydroCHLOROthiazide 320-25 MG per tablet  Commonly known as: DIOVAN-HCT  Take 1 tablet by mouth daily                STOP taking these medications       diclofenac 50 MG EC tablet  Commonly known as: VOLTAREN      loratadine-pseudoephedrine 5-120 MG per extended release tablet  Commonly known as: CLARITIN-D 12HR      meclizine 25 MG tablet  Commonly known as: ANTIVERT                ASK your doctor about these medications       glimepiride 4 MG tablet  Commonly known as: AMARYL  Take 2 tablets by mouth daily

## 2024-06-10 NOTE — PLAN OF CARE
Problem: Discharge Planning  Goal: Discharge to home or other facility with appropriate resources  6/10/2024 1055 by Odilia Malik RN  Outcome: Progressing  Flowsheets (Taken 6/10/2024 0830)  Discharge to home or other facility with appropriate resources:   Identify barriers to discharge with patient and caregiver   Arrange for needed discharge resources and transportation as appropriate   Identify discharge learning needs (meds, wound care, etc)  6/9/2024 2348 by Dian Nguyen RN  Outcome: Progressing  Flowsheets (Taken 6/9/2024 2015)  Discharge to home or other facility with appropriate resources: Identify barriers to discharge with patient and caregiver     Problem: Pain  Goal: Verbalizes/displays adequate comfort level or baseline comfort level  6/10/2024 1055 by Odilia Malik RN  Outcome: Progressing  Flowsheets (Taken 6/10/2024 0830)  Verbalizes/displays adequate comfort level or baseline comfort level:   Encourage patient to monitor pain and request assistance   Assess pain using appropriate pain scale   Implement non-pharmacological measures as appropriate and evaluate response  6/9/2024 2348 by Dian Nguyen RN  Outcome: Progressing     Problem: Safety - Adult  Goal: Free from fall injury  6/10/2024 1055 by Odilia Malik RN  Outcome: Progressing  Flowsheets (Taken 6/10/2024 0830)  Free From Fall Injury:   Based on caregiver fall risk screen, instruct family/caregiver to ask for assistance with transferring infant if caregiver noted to have fall risk factors   Instruct family/caregiver on patient safety  6/9/2024 2348 by Dian Nguyen RN  Outcome: Progressing     Problem: Chronic Conditions and Co-morbidities  Goal: Patient's chronic conditions and co-morbidity symptoms are monitored and maintained or improved  6/10/2024 1055 by Odilia Malik RN  Outcome: Progressing  Flowsheets (Taken 6/10/2024 0830)  Care Plan - Patient's Chronic Conditions and 
  Problem: Discharge Planning  Goal: Discharge to home or other facility with appropriate resources  6/5/2024 0656 by Lucrecia Bonner RN  Discharge to home or other facility with appropriate resources:   Identify barriers to discharge with patient and caregiver   Arrange for needed discharge resources and transportation as appropriate   Identify discharge learning needs (meds, wound care, etc)    Outcome: Progressing  Flowsheets  Taken 6/4/2024 1556  Discharge to home or other facility with appropriate resources:   Identify barriers to discharge with patient and caregiver   Arrange for needed discharge resources and transportation as appropriate   Identify discharge learning needs (meds, wound care, etc)  Taken 6/4/2024 0829  Discharge to home or other facility with appropriate resources:   Identify barriers to discharge with patient and caregiver   Arrange for needed discharge resources and transportation as appropriate     Problem: Pain  Goal: Verbalizes/displays adequate comfort level or baseline comfort level  6/5/2024 0656 by Lucrecia Bonner RN  Flowsheets (Taken 6/5/2024 0656)  Verbalizes/displays adequate comfort level or baseline comfort level:   Consider cultural and social influences on pain and pain management   Administer analgesics based on type and severity of pain and evaluate response   Assess pain using appropriate pain scale   Encourage patient to monitor pain and request assistance   Implement non-pharmacological measures as appropriate and evaluate response  Outcome: Progressing  Flowsheets  Taken 6/4/2024 1556 by Odilia Malik RN  Verbalizes/displays adequate comfort level or baseline comfort level:   Assess pain using appropriate pain scale   Encourage patient to monitor pain and request assistance   Implement non-pharmacological measures as appropriate and evaluate response   Administer analgesics based on type and severity of pain and evaluate response  Taken 6/4/2024 1426 by 
  Problem: Discharge Planning  Goal: Discharge to home or other facility with appropriate resources  6/8/2024 2019 by Dian Nguyen RN  Outcome: Progressing  6/8/2024 1302 by Kiana Lorenzo  Outcome: Progressing     Problem: Pain  Goal: Verbalizes/displays adequate comfort level or baseline comfort level  6/8/2024 2019 by Dian Nguyen RN  Outcome: Progressing  6/8/2024 1302 by Kiana Lorenzo  Outcome: Progressing     Problem: Safety - Adult  Goal: Free from fall injury  6/8/2024 2019 by Dian Nguyen RN  Outcome: Progressing  6/8/2024 1302 by Kiana Lorenzo  Outcome: Progressing     Problem: Chronic Conditions and Co-morbidities  Goal: Patient's chronic conditions and co-morbidity symptoms are monitored and maintained or improved  Outcome: Progressing     Problem: Skin/Tissue Integrity  Goal: Absence of new skin breakdown  Description: 1.  Monitor for areas of redness and/or skin breakdown  2.  Assess vascular access sites hourly  3.  Every 4-6 hours minimum:  Change oxygen saturation probe site  4.  Every 4-6 hours:  If on nasal continuous positive airway pressure, respiratory therapy assess nares and determine need for appliance change or resting period.  6/8/2024 2019 by Dian Nguyen RN  Outcome: Progressing  6/8/2024 1302 by Kiana Lorenzo  Outcome: Progressing     
  Problem: Discharge Planning  Goal: Discharge to home or other facility with appropriate resources  Outcome: Progressing     Problem: Pain  Goal: Verbalizes/displays adequate comfort level or baseline comfort level  6/8/2024 1302 by Kiana Lorenzo  Outcome: Progressing  6/8/2024 0551 by Odilia Gonzalez, RN  Outcome: Progressing     Problem: Safety - Adult  Goal: Free from fall injury  6/8/2024 1302 by Kiana Lorenzo  Outcome: Progressing  6/8/2024 0551 by Odilia Gonzalez, RN  Outcome: Progressing     Problem: Skin/Tissue Integrity  Goal: Absence of new skin breakdown  Description: 1.  Monitor for areas of redness and/or skin breakdown  2.  Assess vascular access sites hourly  3.  Every 4-6 hours minimum:  Change oxygen saturation probe site  4.  Every 4-6 hours:  If on nasal continuous positive airway pressure, respiratory therapy assess nares and determine need for appliance change or resting period.  6/8/2024 1302 by Kiana Lorenzo  Outcome: Progressing  6/8/2024 0551 by Odilia Gonzalez, RN  Outcome: Progressing     
  Problem: Discharge Planning  Goal: Discharge to home or other facility with appropriate resources  Outcome: Progressing  Flowsheets (Taken 6/3/2024 0824)  Discharge to home or other facility with appropriate resources:   Identify barriers to discharge with patient and caregiver   Arrange for needed discharge resources and transportation as appropriate     Problem: Pain  Goal: Verbalizes/displays adequate comfort level or baseline comfort level  6/3/2024 1455 by Odilia Malik RN  Outcome: Progressing  Flowsheets (Taken 6/3/2024 0824)  Verbalizes/displays adequate comfort level or baseline comfort level:   Encourage patient to monitor pain and request assistance   Assess pain using appropriate pain scale   Administer analgesics based on type and severity of pain and evaluate response   Implement non-pharmacological measures as appropriate and evaluate response  6/3/2024 0130 by Rody Mccollum RN  Outcome: Progressing     Problem: Safety - Adult  Goal: Free from fall injury  6/3/2024 1455 by Odilia Malik RN  Outcome: Progressing  Flowsheets (Taken 6/3/2024 0824)  Free From Fall Injury:   Based on caregiver fall risk screen, instruct family/caregiver to ask for assistance with transferring infant if caregiver noted to have fall risk factors   Instruct family/caregiver on patient safety  6/3/2024 0130 by Rody Mccollum RN  Outcome: Progressing     Problem: Chronic Conditions and Co-morbidities  Goal: Patient's chronic conditions and co-morbidity symptoms are monitored and maintained or improved  Outcome: Progressing  Flowsheets (Taken 6/3/2024 0824)  Care Plan - Patient's Chronic Conditions and Co-Morbidity Symptoms are Monitored and Maintained or Improved:   Monitor and assess patient's chronic conditions and comorbid symptoms for stability, deterioration, or improvement   Collaborate with multidisciplinary team to address chronic and comorbid conditions and prevent exacerbation or deterioration   
  Problem: Discharge Planning  Goal: Discharge to home or other facility with appropriate resources  Outcome: Progressing  Flowsheets (Taken 6/5/2024 2133)  Discharge to home or other facility with appropriate resources: Identify barriers to discharge with patient and caregiver     Problem: Pain  Goal: Verbalizes/displays adequate comfort level or baseline comfort level  Outcome: Progressing     Problem: Safety - Adult  Goal: Free from fall injury  Outcome: Progressing     Problem: Chronic Conditions and Co-morbidities  Goal: Patient's chronic conditions and co-morbidity symptoms are monitored and maintained or improved  Outcome: Progressing  Flowsheets (Taken 6/5/2024 2133)  Care Plan - Patient's Chronic Conditions and Co-Morbidity Symptoms are Monitored and Maintained or Improved: Monitor and assess patient's chronic conditions and comorbid symptoms for stability, deterioration, or improvement     Problem: Skin/Tissue Integrity  Goal: Absence of new skin breakdown  Description: 1.  Monitor for areas of redness and/or skin breakdown  2.  Assess vascular access sites hourly  3.  Every 4-6 hours minimum:  Change oxygen saturation probe site  4.  Every 4-6 hours:  If on nasal continuous positive airway pressure, respiratory therapy assess nares and determine need for appliance change or resting period.  Outcome: Progressing     
  Problem: Discharge Planning  Goal: Discharge to home or other facility with appropriate resources  Outcome: Progressing  Flowsheets (Taken 6/6/2024 0945)  Discharge to home or other facility with appropriate resources:   Identify barriers to discharge with patient and caregiver   Refer to discharge planning if patient needs post-hospital services based on physician order or complex needs related to functional status, cognitive ability or social support system     Problem: Safety - Adult  Goal: Free from fall injury  Outcome: Progressing  Flowsheets (Taken 6/6/2024 0930)  Free From Fall Injury: Instruct family/caregiver on patient safety     
  Problem: Pain  Goal: Verbalizes/displays adequate comfort level or baseline comfort level  6/9/2024 2348 by Dian Nguyen RN  Outcome: Progressing  6/9/2024 1517 by Ursula Barron RN  Outcome: Progressing     Problem: Safety - Adult  Goal: Free from fall injury  6/9/2024 2348 by Dian Nguyen RN  Outcome: Progressing  6/9/2024 1517 by Ursula Barron RN  Outcome: Progressing     Problem: Chronic Conditions and Co-morbidities  Goal: Patient's chronic conditions and co-morbidity symptoms are monitored and maintained or improved  Outcome: Progressing  Flowsheets (Taken 6/9/2024 2015)  Care Plan - Patient's Chronic Conditions and Co-Morbidity Symptoms are Monitored and Maintained or Improved: Monitor and assess patient's chronic conditions and comorbid symptoms for stability, deterioration, or improvement     
  Problem: Pain  Goal: Verbalizes/displays adequate comfort level or baseline comfort level  Outcome: Progressing     Problem: Safety - Adult  Goal: Free from fall injury  Outcome: Progressing     
  Problem: Pain  Goal: Verbalizes/displays adequate comfort level or baseline comfort level  Outcome: Progressing     Problem: Safety - Adult  Goal: Free from fall injury  Outcome: Progressing     Problem: Skin/Tissue Integrity  Goal: Absence of new skin breakdown  Description: 1.  Monitor for areas of redness and/or skin breakdown  2.  Assess vascular access sites hourly  3.  Every 4-6 hours minimum:  Change oxygen saturation probe site  4.  Every 4-6 hours:  If on nasal continuous positive airway pressure, respiratory therapy assess nares and determine need for appliance change or resting period.  Outcome: Progressing     
  Problem: Pain  Goal: Verbalizes/displays adequate comfort level or baseline comfort level  Outcome: Progressing     Problem: Safety - Adult  Goal: Free from fall injury  Outcome: Progressing     Problem: Skin/Tissue Integrity  Goal: Absence of new skin breakdown  Description: 1.  Monitor for areas of redness and/or skin breakdown  2.  Assess vascular access sites hourly  3.  Every 4-6 hours minimum:  Change oxygen saturation probe site  4.  Every 4-6 hours:  If on nasal continuous positive airway pressure, respiratory therapy assess nares and determine need for appliance change or resting period.  Outcome: Progressing     
Problem: Physical Therapy - Adult  Goal: By Discharge: Performs mobility at highest level of function for planned discharge setting.  See evaluation for individualized goals.  Description: FUNCTIONAL STATUS PRIOR TO ADMISSION: Patient was independent and active without use of DME. Working part time as Pre-K teacher.    HOME SUPPORT PRIOR TO ADMISSION: The patient lived alone with sister to provide intermittent assistance.    Physical Therapy Goals  Initiated 6/7/2024  1.  Patient will move from supine to sit and sit to supine, scoot up and down, and roll side to side in bed with modified independence within 7 day(s).    2.  Patient will perform sit to stand with modified independence within 7 day(s).  3.  Patient will transfer from bed to chair and chair to bed with modified independence using the least restrictive device within 7 day(s).  4.  Patient will ambulate with modified independence for 100 feet with the least restrictive device within 7 day(s).   5.  Patient will ascend/descend 14 stairs with handrail(s) with supervision/set-up within 7 day(s).  Outcome: Progressing     PHYSICAL THERAPY EVALUATION    Patient: Marti Larkin (64 y.o. female)  Date: 6/7/2024  Primary Diagnosis: Osteomyelitis of toe (HCC) [M86.9]  Osteomyelitis of toe of left foot (HCC) [M86.9]  Procedure(s) (LRB):  GREAT TOE AMPUTATION AT METATARSALPHALANGEAL JOINT LEFT, OPEN BONE BIOPSIES LEFT (MAC WITH LOCAL) (Left) 3 Days Post-Op   Precautions: Restrictions/Precautions: Fall Risk (heel weightbearing in flat post-op shoe L LE)                    ASSESSMENT :   DEFICITS/IMPAIRMENTS:   The patient is limited by decreased functional mobility, independence in ADLs, high-level IADLs, strength, sensation, activity tolerance, endurance, balance, increased pain levels. Patient is mobilizing well below independent baseline in setting of new weightbearing status of \"Minimal weightbearing heel only with FLAT post operative shoe and walker for help\" 
severity of pain and evaluate response   Implement non-pharmacological measures as appropriate and evaluate response     Problem: Safety - Adult  Goal: Free from fall injury  6/4/2024 0454 by Lucrecia Bonner RN  Flowsheets (Taken 6/4/2024 0454)  Free From Fall Injury:   Based on caregiver fall risk screen, instruct family/caregiver to ask for assistance with transferring infant if caregiver noted to have fall risk factors   Instruct family/caregiver on patient safety  Outcome: Progressing  Flowsheets (Taken 6/3/2024 0824)  Free From Fall Injury:   Based on caregiver fall risk screen, instruct family/caregiver to ask for assistance with transferring infant if caregiver noted to have fall risk factors   Instruct family/caregiver on patient safety     Problem: Chronic Conditions and Co-morbidities  Goal: Patient's chronic conditions and co-morbidity symptoms are monitored and maintained or improved  Outcome: Progressing  Flowsheets (Taken 6/3/2024 0824)  Care Plan - Patient's Chronic Conditions and Co-Morbidity Symptoms are Monitored and Maintained or Improved:   Monitor and assess patient's chronic conditions and comorbid symptoms for stability, deterioration, or improvement   Collaborate with multidisciplinary team to address chronic and comorbid conditions and prevent exacerbation or deterioration   Update acute care plan with appropriate goals if chronic or comorbid symptoms are exacerbated and prevent overall improvement and discharge     
Ambulation Assistance: Independent, Transfer Assistance: Independent, Active : Yes     HOME SUPPORT: Patient lived alone with family to provide assistance if needed.    Occupational Therapy Goals:  Initiated 6/7/2024  1.  Patient will perform bathing with Minimal Assist within 7 day(s).  2.  Patient will perform lower body dressing with Moderate Assist within 7 day(s).  3.  Patient will perform upper body dressing with Set-up within 7 day(s).  4.  Patient will perform toilet transfers with Minimal Assist  within 7 day(s).  5.  Patient will perform all aspects of toileting with Minimal Assist within 7 day(s).  6.  Patient will utilize energy conservation techniques during functional activities with verbal cues within 7 day(s).  7. Patient will demonstrate with verbal teach back weight bearing precautions for surgical leg independently within 7 days.     6/7/2024 0952 by Ching Blake  Outcome: Progressing     Problem: Physical Therapy - Adult  Goal: By Discharge: Performs mobility at highest level of function for planned discharge setting.  See evaluation for individualized goals.  Description: FUNCTIONAL STATUS PRIOR TO ADMISSION: Patient was independent and active without use of DME. Working part time as Pre-K teacher.    HOME SUPPORT PRIOR TO ADMISSION: The patient lived alone with sister to provide intermittent assistance.    Physical Therapy Goals  Initiated 6/7/2024  1.  Patient will move from supine to sit and sit to supine, scoot up and down, and roll side to side in bed with modified independence within 7 day(s).    2.  Patient will perform sit to stand with modified independence within 7 day(s).  3.  Patient will transfer from bed to chair and chair to bed with modified independence using the least restrictive device within 7 day(s).  4.  Patient will ambulate with modified independence for 100 feet with the least restrictive device within 7 day(s).   5.  Patient will ascend/descend 14 stairs with 
period.  Outcome: Progressing     
limits  PROM: Within functional limits      Strength:  Strength: Generally decreased, functional      Coordination:  Coordination: Within functional limits            Tone & Sensation:   Tone: Normal             Functional Mobility and Transfers for ADLs:    Bed Mobility:          Transfers:      Transfer Training  Transfer Training: Yes  Overall Level of Assistance: Moderate assistance  Interventions: Tactile cues;Safety awareness training;Verbal cues (WB precautions with surgical shoe)  Sit to Stand: Moderate assistance  Stand to Sit: Moderate assistance  Stand Pivot Transfers: Moderate assistance;Assist X1 (assist x1 with RW)  Toilet Transfer: Moderate assistance          Functional Mobility: Moderate assistance (due to WB status)          Balance:      Balance  Sitting: Intact  Standing: With support;Impaired  Standing - Static: Fair  Standing - Dynamic: Fair      ADL Assessment:          Feeding: Setup  Feeding Skilled Clinical Factors: simulated with ADL activities    Grooming: Independent  Grooming Skilled Clinical Factors: simulated with ADL activities    UE Bathing: Supervision;Setup  UE Bathing Skilled Clinical Factors: simulated with ADL activities    Skin Care: Bath wipes    LE Bathing: Moderate assistance  LE Bathing Skilled Clinical Factors: simulated with ADL activities    UE Dressing: Minimal assistance  UE Dressing Skilled Clinical Factors: simulated with ADL activities    LE Dressing: Maximum assistance (with surgical shoe)  LE Dressing Skilled Clinical Factors: simulated with ADL activities    Toileting: Moderate assistance (assist with hygeine and clothing management)            Functional Mobility: Moderate assistance (due to WB status)            ADL Intervention and task modifications:    Pt was very agreeable to therapy and wanted to get up and moving. Pt completed bed mobility to return to bedside and performed sit <> stand assist x1 to stand <> pivot to BSC. Completed toileting and toilet

## 2024-06-11 ENCOUNTER — OFFICE VISIT (OUTPATIENT)
Facility: CLINIC | Age: 64
End: 2024-06-11
Payer: MEDICARE

## 2024-06-11 VITALS — HEART RATE: 76 BPM | SYSTOLIC BLOOD PRESSURE: 130 MMHG | DIASTOLIC BLOOD PRESSURE: 80 MMHG | RESPIRATION RATE: 16 BRPM

## 2024-06-11 DIAGNOSIS — E11.59 HYPERTENSION COMPLICATING DIABETES (HCC): Chronic | ICD-10-CM

## 2024-06-11 DIAGNOSIS — Z91.119 DIETARY NONCOMPLIANCE: Chronic | ICD-10-CM

## 2024-06-11 DIAGNOSIS — E11.8 TYPE 2 DIABETES MELLITUS WITH COMPLICATION, WITH LONG-TERM CURRENT USE OF INSULIN (HCC): Primary | Chronic | ICD-10-CM

## 2024-06-11 DIAGNOSIS — Z79.4 TYPE 2 DIABETES MELLITUS WITH COMPLICATION, WITH LONG-TERM CURRENT USE OF INSULIN (HCC): Primary | Chronic | ICD-10-CM

## 2024-06-11 DIAGNOSIS — E78.00 PURE HYPERCHOLESTEROLEMIA: Chronic | ICD-10-CM

## 2024-06-11 DIAGNOSIS — I15.2 HYPERTENSION COMPLICATING DIABETES (HCC): Chronic | ICD-10-CM

## 2024-06-11 DIAGNOSIS — E66.01 MORBID OBESITY (HCC): Chronic | ICD-10-CM

## 2024-06-11 DIAGNOSIS — F41.8 ANXIETY WITH DEPRESSION: Chronic | ICD-10-CM

## 2024-06-11 DIAGNOSIS — M86.9 OSTEOMYELITIS OF TOE OF LEFT FOOT (HCC): ICD-10-CM

## 2024-06-11 PROBLEM — F51.04 CHRONIC INSOMNIA: Chronic | Status: ACTIVE | Noted: 2019-09-20

## 2024-06-11 PROBLEM — E66.9 OBESITY (BMI 30-39.9): Chronic | Status: ACTIVE | Noted: 2017-10-23

## 2024-06-11 PROBLEM — Z96.652 HISTORY OF TOTAL LEFT KNEE REPLACEMENT (TKR): Chronic | Status: ACTIVE | Noted: 2022-10-26

## 2024-06-11 PROCEDURE — 3046F HEMOGLOBIN A1C LEVEL >9.0%: CPT | Performed by: FAMILY MEDICINE

## 2024-06-11 PROCEDURE — 1123F ACP DISCUSS/DSCN MKR DOCD: CPT | Performed by: FAMILY MEDICINE

## 2024-06-11 PROCEDURE — 99306 1ST NF CARE HIGH MDM 50: CPT | Performed by: FAMILY MEDICINE

## 2024-06-12 ENCOUNTER — OFFICE VISIT (OUTPATIENT)
Facility: CLINIC | Age: 64
End: 2024-06-12

## 2024-06-12 ENCOUNTER — OFFICE VISIT (OUTPATIENT)
Facility: CLINIC | Age: 64
End: 2024-06-12
Payer: MEDICARE

## 2024-06-12 DIAGNOSIS — F41.8 ANXIETY WITH DEPRESSION: Chronic | ICD-10-CM

## 2024-06-12 DIAGNOSIS — F51.04 CHRONIC INSOMNIA: Chronic | ICD-10-CM

## 2024-06-12 DIAGNOSIS — M86.9 OSTEOMYELITIS OF TOE OF LEFT FOOT (HCC): ICD-10-CM

## 2024-06-12 DIAGNOSIS — S98.132A AMPUTATION OF TOE OF LEFT FOOT (HCC): ICD-10-CM

## 2024-06-12 DIAGNOSIS — Z79.4 TYPE 2 DIABETES MELLITUS WITH COMPLICATION, WITH LONG-TERM CURRENT USE OF INSULIN (HCC): Primary | Chronic | ICD-10-CM

## 2024-06-12 DIAGNOSIS — Z76.89 ENCOUNTER FOR SOCIAL WORK INTERVENTION: Primary | ICD-10-CM

## 2024-06-12 DIAGNOSIS — E11.8 TYPE 2 DIABETES MELLITUS WITH COMPLICATION, WITH LONG-TERM CURRENT USE OF INSULIN (HCC): Primary | Chronic | ICD-10-CM

## 2024-06-12 DIAGNOSIS — I10 HYPERTENSION, UNSPECIFIED TYPE: ICD-10-CM

## 2024-06-12 PROCEDURE — 3046F HEMOGLOBIN A1C LEVEL >9.0%: CPT | Performed by: NURSE PRACTITIONER

## 2024-06-12 PROCEDURE — 1123F ACP DISCUSS/DSCN MKR DOCD: CPT | Performed by: NURSE PRACTITIONER

## 2024-06-12 PROCEDURE — 99309 SBSQ NF CARE MODERATE MDM 30: CPT | Performed by: NURSE PRACTITIONER

## 2024-06-12 NOTE — PROGRESS NOTES
replacement    PARATHYROIDECTOMY  9    TOE AMPUTATION Left 2024    GREAT TOE AMPUTATION AT METATARSALPHALANGEAL JOINT LEFT, OPEN BONE BIOPSIES LEFT (MAC WITH LOCAL) performed by Amarjit Ramirez DPM at [facility] MAIN OR       Family History:  Family History   Problem Relation Age of Onset    Hypertension Brother     Diabetes Brother     Hypertension Sister     Cancer Mother     Diabetes Sister     Hypertension Father        Allergies:  Allergies   Allergen Reactions    Aspirin Other (See Comments)     Pt reports having a h/o gastric ulcers.  Pt was on IBU when she was dx'd.       Social History:  Social History     Tobacco Use    Smoking status: Former     Current packs/day: 0.00     Types: Cigarettes     Quit date: 2008     Years since quittin.3    Smokeless tobacco: Never   Substance Use Topics    Alcohol use: No     Alcohol/week: 0.0 standard drinks of alcohol    Drug use: No       Current Medications:  Current Outpatient Medications on File Prior to Visit   Medication Sig Dispense Refill    amoxicillin-clavulanate (AUGMENTIN) 875-125 MG per tablet Take 1 tablet by mouth every 12 hours for 9 doses 9 tablet 0    insulin lispro (HUMALOG,ADMELOG) 100 UNIT/ML SOLN injection vial Inject 0-8 Units into the skin 3 times daily (with meals) Glucose   No Insulin; 200-249   2 Units;  250-299 4 Units; 300-349 6 Units >3498 Units and notify physician 1 each 0    insulin glargine (LANTUS SOLOSTAR) 100 UNIT/ML injection pen Inject 25 Units into the skin nightly 5 Adjustable Dose Pre-filled Pen Syringe 0    valsartan-hydroCHLOROthiazide (DIOVAN-HCT) 320-25 MG per tablet Take 1 tablet by mouth daily 90 tablet 3    atorvastatin (LIPITOR) 20 MG tablet Take 1 tablet by mouth once daily 90 tablet 3    amLODIPine (NORVASC) 10 MG tablet Take 1 tablet by mouth once daily 90 tablet 1    escitalopram (LEXAPRO) 20 MG tablet Take 1 tablet by mouth once daily 90 tablet 3    metoprolol succinate (TOPROL XL) 100 MG extended

## 2024-06-12 NOTE — PROGRESS NOTES
paulette Thompson MSW, LMSW, [facility]    [facility]  [facility]   [address]   [place] [address]  Cell [phone]

## 2024-06-13 NOTE — PROGRESS NOTES
PLACE OF SERVICE:  [facility] [address] [place] [address]    SKILLED VISIT        Chief Complaint: Status post left great toe amputation secondary to osteomyelitis.    HPI : Marti Larkin is a 64 y.o. female patient seen today for follow-up. Patient was sitting on the bed she is alert and oriented able to make her needs known. She denies symptoms of respiratory distress lungs clear upon assessment. Dressing clean and dry to left foot patient status post amputation of left great toe secondary to osteomyelitis. She continues on Augmentin 1 tablet every 12 hours until 6/14/2024 as per ID recommendations. Follow-up visit and suture removal with  is scheduled for 6/16/2024. Patient is scheduled for pain management appointment tomorrow at 9 AM family members will escort patient to appointment. Patient continues on Hydrocodone-Acetaminophen  mg every 6 hours as needed for pain. Patient has history of diabetes she continues on  Lantus 25 units at bedtime and sliding scale with Humalog coverage. Patient also continues on Glucophage 1000 mg twice a day, will check glucose continues to fluctuate we will continue to monitor and adjust medication as needed.  For history of hypertension she continues on amlodipine 10 mg, metoprolol 100 mg and valsartan hydrochlorothiazide 320-25 mg daily.  Blood pressure today was 173/83 nursing staff to repeat blood pressure notify provider. She continues on atorvastatin 20 mg at bedtime for hyperlipidemia. Patient continues on Ambien 5 mg at bedtime for insomnia  For history of depression she continues on Lexapro 20 mg, in-house psych NP to evaluate and treat as needed. Patient continues to work with OT/PT notes reviewed, podiatry recommendations: Minimally weightbearing status on heel only with flat postoperative shoe and walker.  No further concerns reported from patient at this time remains hemodynamically stable at present we will

## 2024-06-13 NOTE — ASSESSMENT & PLAN NOTE
Patient status post amputation of left great toe, dressing clean dry and intact.  Follow-up appointment and  suture removal scheduled for 6/16/2024 with Dr. Ramirez.

## 2024-06-13 NOTE — ASSESSMENT & PLAN NOTE
Continues on  Lantus 25 units at bedtime and sliding scale with Humalog coverage. Patient also continues on Glucophage 1000 mg twice a day,

## 2024-06-13 NOTE — ASSESSMENT & PLAN NOTE
Patient remained stable on current dose of Amlodipine 10 mg, Metoprolol and  Valsartan hydrochlorothiazide 320-25 mg daily.  Current blood pressure 140/80 heart rate of 76 and stable

## 2024-06-17 ENCOUNTER — OFFICE VISIT (OUTPATIENT)
Facility: CLINIC | Age: 64
End: 2024-06-17
Payer: MEDICARE

## 2024-06-17 ENCOUNTER — HOSPITAL ENCOUNTER (EMERGENCY)
Facility: HOSPITAL | Age: 64
Discharge: HOME | End: 2024-06-17
Attending: EMERGENCY MEDICINE
Payer: MEDICARE

## 2024-06-17 VITALS
TEMPERATURE: 99.4 F | RESPIRATION RATE: 18 BRPM | WEIGHT: 258.16 LBS | HEART RATE: 94 BPM | SYSTOLIC BLOOD PRESSURE: 133 MMHG | OXYGEN SATURATION: 92 % | HEIGHT: 69 IN | DIASTOLIC BLOOD PRESSURE: 72 MMHG | BODY MASS INDEX: 38.24 KG/M2

## 2024-06-17 DIAGNOSIS — B36.0 TINEA VERSICOLOR: ICD-10-CM

## 2024-06-17 DIAGNOSIS — Z79.4 TYPE 2 DIABETES MELLITUS WITH COMPLICATION, WITH LONG-TERM CURRENT USE OF INSULIN (HCC): Chronic | ICD-10-CM

## 2024-06-17 DIAGNOSIS — E11.59 HYPERTENSION COMPLICATING DIABETES (HCC): ICD-10-CM

## 2024-06-17 DIAGNOSIS — I10 PRIMARY HYPERTENSION: ICD-10-CM

## 2024-06-17 DIAGNOSIS — I15.2 HYPERTENSION COMPLICATING DIABETES (HCC): ICD-10-CM

## 2024-06-17 DIAGNOSIS — S98.132A AMPUTATION OF TOE OF LEFT FOOT (HCC): ICD-10-CM

## 2024-06-17 DIAGNOSIS — F51.04 CHRONIC INSOMNIA: Primary | Chronic | ICD-10-CM

## 2024-06-17 DIAGNOSIS — F41.8 ANXIETY WITH DEPRESSION: Chronic | ICD-10-CM

## 2024-06-17 DIAGNOSIS — R21 RASH AND OTHER NONSPECIFIC SKIN ERUPTION: Primary | ICD-10-CM

## 2024-06-17 DIAGNOSIS — E11.8 TYPE 2 DIABETES MELLITUS WITH COMPLICATION, WITH LONG-TERM CURRENT USE OF INSULIN (HCC): Chronic | ICD-10-CM

## 2024-06-17 LAB
BACTERIA SPEC CULT: NORMAL
SERVICE CMNT-IMP: NORMAL

## 2024-06-17 PROCEDURE — 1123F ACP DISCUSS/DSCN MKR DOCD: CPT | Performed by: NURSE PRACTITIONER

## 2024-06-17 PROCEDURE — 6360000002 HC RX W HCPCS

## 2024-06-17 PROCEDURE — 6370000000 HC RX 637 (ALT 250 FOR IP)

## 2024-06-17 PROCEDURE — 99309 SBSQ NF CARE MODERATE MDM 30: CPT | Performed by: NURSE PRACTITIONER

## 2024-06-17 PROCEDURE — 99283 EMERGENCY DEPT VISIT LOW MDM: CPT

## 2024-06-17 PROCEDURE — 3046F HEMOGLOBIN A1C LEVEL >9.0%: CPT | Performed by: NURSE PRACTITIONER

## 2024-06-17 RX ORDER — DEXAMETHASONE 4 MG/1
10 TABLET ORAL
Status: COMPLETED | OUTPATIENT
Start: 2024-06-17 | End: 2024-06-17

## 2024-06-17 RX ORDER — METOPROLOL SUCCINATE 100 MG/1
100 TABLET, EXTENDED RELEASE ORAL DAILY
Qty: 90 TABLET | Refills: 1 | Status: SHIPPED | OUTPATIENT
Start: 2024-06-17

## 2024-06-17 RX ORDER — CETIRIZINE HYDROCHLORIDE 10 MG/1
10 TABLET ORAL DAILY
Qty: 30 TABLET | Refills: 0 | Status: SHIPPED | OUTPATIENT
Start: 2024-06-17

## 2024-06-17 RX ORDER — DIPHENHYDRAMINE HCL 25 MG
50 CAPSULE ORAL
Status: COMPLETED | OUTPATIENT
Start: 2024-06-17 | End: 2024-06-17

## 2024-06-17 RX ORDER — FAMOTIDINE 20 MG/1
40 TABLET, FILM COATED ORAL
Status: COMPLETED | OUTPATIENT
Start: 2024-06-17 | End: 2024-06-17

## 2024-06-17 RX ORDER — FAMOTIDINE 20 MG/1
20 TABLET, FILM COATED ORAL 2 TIMES DAILY
Qty: 60 TABLET | Refills: 0 | Status: SHIPPED | OUTPATIENT
Start: 2024-06-17

## 2024-06-17 RX ADMIN — FAMOTIDINE 40 MG: 20 TABLET, FILM COATED ORAL at 20:35

## 2024-06-17 RX ADMIN — DEXAMETHASONE 10 MG: 4 TABLET ORAL at 20:36

## 2024-06-17 RX ADMIN — DIPHENHYDRAMINE HYDROCHLORIDE 50 MG: 25 CAPSULE ORAL at 20:36

## 2024-06-17 ASSESSMENT — PAIN - FUNCTIONAL ASSESSMENT: PAIN_FUNCTIONAL_ASSESSMENT: NONE - DENIES PAIN

## 2024-06-17 NOTE — BSMART NOTE
BSMART assessment completed, and suicide risk level noted to be low risk.  Charge Nurse Miri and NP Siri Lehman notified. Concerns not observed. Patient does not need a 1:1 sitter in the ED.    This clinician met with Marti face to face in the ED. She was appropriately dressed and neatly groomed. She was using a walker and had her left foot wrapped due to an amputation of her big toe last week. She presented with a euthymic mood and was calm, cooperative, and pleasant during the assessment. She was oriented x4. She denies current and historical SI/HI and states \"I don't know why I even said that. I would never do that. I mean everyone has felt that way before at some point.\" She has no history of suicide attempts or inpatient U admissions. Marti reports a history of depression and is receiving medication management through her PCP, Dr. Mila Tabor. She does not see a therapist. Marti denies substance use, AH/VH, and changes in her sleep/appetite. She reports some increased stressors over the past week to include the amputation of her big toe, needing to live at [facility] until she is healed, her friend's sister just  on Saturday, and she misses going to work. She is future oriented about getting home and returning to work, which she loves. She is a  at a local [employer]. She reports having many family and friends who are supportive and her sister accompanied her to the ED today. Marti does not meet U inpatient criteria and is not seeking admission. This clinician consulted with her ED provider, ERWIN Lehman who agrees with this disposition.

## 2024-06-17 NOTE — ASSESSMENT & PLAN NOTE
Itchy rash rash to upper neck face groin and arms.  New order placed for Lotrimin cream and hydroxyzine every 8 hours as needed

## 2024-06-17 NOTE — PROGRESS NOTES
PLACE OF SERVICE:  [facility] [address] [place] [address]    SKILLED VISIT        Chief Complaint: Status post left great toe amputation secondary to osteomyelitis.    HPI : Marti Larkin is a 64 y.o. female patient seen today for follow-up.  Nursing staff reported patient noted with lysed body rash.  Patient at sitting bed bed she is alert and oriented able to make her needs known. Patient noted with generalized itching /welts, swelling to lower lips.  Patient stated itching started yesterday morning, she was given Benadryl as ordered with minimal relief.  New order placed for HydrOXYzine 25 mg every 8 hours as needed, labs to be drawn for CBC CMP and  immunoglobulin E.  Patient noted that she has not done anything different since arriving to facility, stated she ate a hotdog Saturday night and woke up Sunday morning with generalized rash. She denies symptoms of respiratory distress lungs clear upon assessment.  Nursing staff reported low-grade temperature of 100.6 Tylenol 650 mg given will recheck temperature in an hour. Dressing clean and dry to left foot patient status post amputation of left great toe secondary to osteomyelitis.  Status post ABT therapy as per ID recommendations. Follow-up visit and suture removal with  is scheduled for 6/19/2024. Patient status post pain management appointment recommends to continue Hydrocodone-Acetaminophen  mg every 6 hours as needed for pain. Patient has history of diabetes she continues on Lantus 25 units at bedtime and sliding scale with Humalog coverage. Patient also continues on Glucophage 1000 mg twice a day. For history of hypertension she continues on amlodipine 10 mg, metoprolol 100 mg and valsartan hydrochlorothiazide 320-25 mg daily. She continues on atorvastatin 20 mg at bedtime for hyperlipidemia. Patient continues on Ambien 5 mg at bedtime for insomnia. For history of depression she continues on Lexapro

## 2024-06-17 NOTE — ED TRIAGE NOTES
Patient arrives to ED reports itching starting last night with rash and lip swelling starting this morning. Reports \"a little trouble breathing\", denies trouble swallowing.

## 2024-06-17 NOTE — ASSESSMENT & PLAN NOTE
Patient status post amputation of left great toe, dressing clean dry and intact.  Follow-up appointment and  suture removal scheduled for 6/19/2024 with Dr. Ramirez.

## 2024-06-17 NOTE — ED PROVIDER NOTES
consulted and patient cleared for discharge. Patient states she is feeling better.  Improvement in rash.  Stable for discharge.  Warning signs and return precautions discussed.  She is in agreement discharge plan of care, verbalized understanding, questions answered.  Agrees to follow-up closely with PCP in the next 24 hours.  Prescriptions provided with instruction.    ED COURSE       Sepsis Reassessment: Sepsis reassessment not applicable    CONSULTS:  IP CONSULT TO BSMART    Patient was given the following medications:  Medications   famotidine (PEPCID) tablet 40 mg (40 mg Oral Given 6/17/24 2035)   diphenhydrAMINE (BENADRYL) capsule 50 mg (50 mg Oral Given 6/17/24 2036)   dexAMETHasone (DECADRON) tablet 10 mg (10 mg Oral Given 6/17/24 2036)       Social Determinants affecting Dx or Tx: None    Smoking Cessation: Not Applicable    Records Reviewed (source and summary of external notes): Prior medical records and Nursing notes.     CLINICAL DECISION TOOLS                   PROCEDURES   Unless otherwise noted above, none  Procedures      CRITICAL CARE TIME   Patient does not meet Critical Care Time, 0 minutes    FINAL IMPRESSION     1. Rash and other nonspecific skin eruption          DISPOSITION / PLAN     DISPOSITION Decision To Discharge 06/17/2024 09:22:08 PM    Discharge Note: The patient is stable for discharge home. The signs, symptoms, diagnosis, and discharge instructions have been discussed, understanding conveyed, and agreed upon. The patient is to follow up as recommended or return to ER should their symptoms worsen.     PATIENT REFERRED TO:  [facility] EMERGENCY DEP  [address] [facility] [address] [phone]    If symptoms worsen    Mila Tabor MD  [address] [place] [id number] [phone]    Call in 1 day        DISCHARGE MEDICATIONS:  New Prescriptions    CETIRIZINE (ZYRTEC) 10 MG TABLET    Take 1 tablet by mouth daily    FAMOTIDINE (PEPCID) 20 MG TABLET    Take 1

## 2024-06-18 PROBLEM — I10 HYPERTENSION: Status: RESOLVED | Noted: 2024-06-12 | Resolved: 2024-06-18

## 2024-06-18 PROBLEM — Z79.891 CHRONIC PRESCRIPTION OPIATE USE: Status: RESOLVED | Noted: 2017-11-21 | Resolved: 2024-06-18

## 2024-06-19 ENCOUNTER — OFFICE VISIT (OUTPATIENT)
Facility: CLINIC | Age: 64
End: 2024-06-19
Payer: MEDICARE

## 2024-06-19 DIAGNOSIS — F51.04 CHRONIC INSOMNIA: Chronic | ICD-10-CM

## 2024-06-19 DIAGNOSIS — Z79.4 TYPE 2 DIABETES MELLITUS WITH COMPLICATION, WITH LONG-TERM CURRENT USE OF INSULIN (HCC): Chronic | ICD-10-CM

## 2024-06-19 DIAGNOSIS — E11.8 TYPE 2 DIABETES MELLITUS WITH COMPLICATION, WITH LONG-TERM CURRENT USE OF INSULIN (HCC): Chronic | ICD-10-CM

## 2024-06-19 DIAGNOSIS — B36.0 TINEA VERSICOLOR: ICD-10-CM

## 2024-06-19 DIAGNOSIS — S98.132A AMPUTATION OF TOE OF LEFT FOOT (HCC): Primary | ICD-10-CM

## 2024-06-19 DIAGNOSIS — M86.9 OSTEOMYELITIS OF TOE OF LEFT FOOT (HCC): ICD-10-CM

## 2024-06-19 PROCEDURE — 99309 SBSQ NF CARE MODERATE MDM 30: CPT | Performed by: NURSE PRACTITIONER

## 2024-06-19 PROCEDURE — 1123F ACP DISCUSS/DSCN MKR DOCD: CPT | Performed by: NURSE PRACTITIONER

## 2024-06-19 NOTE — PROGRESS NOTES
PLACE OF SERVICE:  [facility] [address] [place] [address]    SKILLED VISIT        Chief Complaint: Status post left great toe amputation secondary to osteomyelitis.    HPI : Marti Larkin is a 64 y.o. female patient seen today for follow-up.  Nursing staff reported patient noted with lysed body rash. Patient at sitting bed bed she is alert and oriented able to make her needs known. At previous visit patient noted with generalized itching /welts, swelling to lower lips, was started on HydrOXYzine 25 mg every 8 hours as needed.  Patient was taken to the hospital by family members secondary to generalized body rash. Patient was treated and released back to facility. Started on Zyrtec and famotidine daily. Today rash noted with improvement, patient complained of slight itching around the neck and face. She denies symptoms of respiratory distress lungs clear upon assessment. Dressing clean and dry to left foot patient status post amputation of left great toe secondary to osteomyelitis. Follow-up visit and suture removal with  is scheduled for 6/19/2024 at 2:45 PM. Patient has history of diabetes she continues on Lantus 25 units at bedtime and sliding scale with Humalog coverage. Patient also continues on Glucophage 1000 mg twice a day. For history of hypertension she continues on amlodipine 10 mg, metoprolol 100 mg and valsartan hydrochlorothiazide 320-25 mg daily. She remains stable on atorvastatin 20 mg at bedtime for hyperlipidemia. For history of insomnia she continues on on Ambien 5 mg at bedtime. For history of depression she continues on Lexapro 20 mg, in-house psych NP to evaluate and treat as needed. Patient complaining of pain and discomfort to left shoulder, new order for x-ray to rule out fracture. Patient denies trauma to site.  Patient continues to work with OT/PT notes reviewed, podiatry recommendations: Minimally weightbearing status on heel only with

## 2024-06-19 NOTE — ASSESSMENT & PLAN NOTE
Continues on Lantus 25 units at bedtime and sliding scale with Humalog coverage. Patient also continues on Glucophage 1000 mg twice a day.   Hemoglobin A1c 11.4 as of 6/18/2024

## 2024-06-21 ENCOUNTER — OFFICE VISIT (OUTPATIENT)
Facility: CLINIC | Age: 64
End: 2024-06-21
Payer: MEDICARE

## 2024-06-21 DIAGNOSIS — I15.2 HYPERTENSION COMPLICATING DIABETES (HCC): Chronic | ICD-10-CM

## 2024-06-21 DIAGNOSIS — E11.8 TYPE 2 DIABETES MELLITUS WITH COMPLICATION, WITH LONG-TERM CURRENT USE OF INSULIN (HCC): Chronic | ICD-10-CM

## 2024-06-21 DIAGNOSIS — E11.59 HYPERTENSION COMPLICATING DIABETES (HCC): Chronic | ICD-10-CM

## 2024-06-21 DIAGNOSIS — F51.04 CHRONIC INSOMNIA: Chronic | ICD-10-CM

## 2024-06-21 DIAGNOSIS — Z79.4 TYPE 2 DIABETES MELLITUS WITH COMPLICATION, WITH LONG-TERM CURRENT USE OF INSULIN (HCC): Chronic | ICD-10-CM

## 2024-06-21 DIAGNOSIS — F41.8 ANXIETY WITH DEPRESSION: Primary | Chronic | ICD-10-CM

## 2024-06-21 DIAGNOSIS — M86.9 OSTEOMYELITIS OF TOE OF LEFT FOOT (HCC): ICD-10-CM

## 2024-06-21 PROCEDURE — 99316 NF DSCHRG MGMT 30 MIN+: CPT | Performed by: NURSE PRACTITIONER

## 2024-06-21 PROCEDURE — 3046F HEMOGLOBIN A1C LEVEL >9.0%: CPT | Performed by: NURSE PRACTITIONER

## 2024-06-21 NOTE — PROGRESS NOTES
Place of Service:  [facility] [address] [place] [address]                                                                     Discharge Summary       Admit Dx: Left great toe osteomyelitis and status post amputation    Disposition: Patient scheduled to be discharged home on Sunday, 6/23/2024.  [facility] including PT, OT, and skilled nursing. Patient  has all DME required for safe discharge home.     Presentation: Marti Larkin is a 64 y.o. female who presents for admission to [facility] following a recent admission to the hospital for several medical issues including DM, PAD and a Diabetic Foot Infection requiring amputation of the L great toe. Her hospital note is as follows:     \"Ms. Larkin is a 64 y.o. female with a past medical history of DM2, hypertension, GERD, hypothyroidism, peptic ulcer disease, and past pancreatitis who presented to the ED with worsening diabetic foot infection of the L first toe. She sustained an injury to her toe prior to presentation where the toenail lifted off of the nailbed. Since then, she has been having worsening swelling, erythema, and purulent drainage for the past week.Left foot x-ray showed osteomyelitis of the great toe distal phalanx.\"        The history is obtained from the patient, family and previous records and is felt to be satisfactory to establish an acceptable plan of care. Health status indicators were reviewed and there are no changes except as noted above. The past medical history, family history, social history and ethnic considerations have been updated as needed. The patient denies any constitutional, ENT, cardiopulmonary, gastrointestinal, or genitourinary issues otherwise as review in the ROS.    Discharge time : Patient scheduled a facility by 1:30 PM    Brief SNF Course:  This is an

## 2024-06-24 LAB
BACTERIA SPEC CULT: NORMAL
SERVICE CMNT-IMP: NORMAL

## 2024-07-01 ENCOUNTER — OFFICE VISIT (OUTPATIENT)
Facility: CLINIC | Age: 64
End: 2024-07-01
Payer: MEDICARE

## 2024-07-01 VITALS
OXYGEN SATURATION: 96 % | SYSTOLIC BLOOD PRESSURE: 136 MMHG | WEIGHT: 249.5 LBS | TEMPERATURE: 98.1 F | HEART RATE: 83 BPM | BODY MASS INDEX: 36.95 KG/M2 | RESPIRATION RATE: 19 BRPM | HEIGHT: 69 IN | DIASTOLIC BLOOD PRESSURE: 64 MMHG

## 2024-07-01 DIAGNOSIS — N76.0 ACUTE VAGINITIS: ICD-10-CM

## 2024-07-01 DIAGNOSIS — S98.132A AMPUTATION OF TOE OF LEFT FOOT (HCC): ICD-10-CM

## 2024-07-01 DIAGNOSIS — E11.40 TYPE 2 DIABETES MELLITUS WITH DIABETIC NEUROPATHY, WITH LONG-TERM CURRENT USE OF INSULIN (HCC): Primary | ICD-10-CM

## 2024-07-01 DIAGNOSIS — Z79.4 LONG TERM (CURRENT) USE OF INSULIN (HCC): ICD-10-CM

## 2024-07-01 DIAGNOSIS — Z79.4 TYPE 2 DIABETES MELLITUS WITH DIABETIC NEUROPATHY, WITH LONG-TERM CURRENT USE OF INSULIN (HCC): Primary | ICD-10-CM

## 2024-07-01 LAB
BACTERIA SPEC CULT: NORMAL
GLUCOSE, POC: 362 MG/DL
SERVICE CMNT-IMP: NORMAL

## 2024-07-01 PROCEDURE — 1036F TOBACCO NON-USER: CPT | Performed by: INTERNAL MEDICINE

## 2024-07-01 PROCEDURE — 1123F ACP DISCUSS/DSCN MKR DOCD: CPT | Performed by: INTERNAL MEDICINE

## 2024-07-01 PROCEDURE — 2022F DILAT RTA XM EVC RTNOPTHY: CPT | Performed by: INTERNAL MEDICINE

## 2024-07-01 PROCEDURE — 82962 GLUCOSE BLOOD TEST: CPT | Performed by: INTERNAL MEDICINE

## 2024-07-01 PROCEDURE — 3075F SYST BP GE 130 - 139MM HG: CPT | Performed by: INTERNAL MEDICINE

## 2024-07-01 PROCEDURE — G8417 CALC BMI ABV UP PARAM F/U: HCPCS | Performed by: INTERNAL MEDICINE

## 2024-07-01 PROCEDURE — 99214 OFFICE O/P EST MOD 30 MIN: CPT | Performed by: INTERNAL MEDICINE

## 2024-07-01 PROCEDURE — G8400 PT W/DXA NO RESULTS DOC: HCPCS | Performed by: INTERNAL MEDICINE

## 2024-07-01 PROCEDURE — 1090F PRES/ABSN URINE INCON ASSESS: CPT | Performed by: INTERNAL MEDICINE

## 2024-07-01 PROCEDURE — 3078F DIAST BP <80 MM HG: CPT | Performed by: INTERNAL MEDICINE

## 2024-07-01 PROCEDURE — 3046F HEMOGLOBIN A1C LEVEL >9.0%: CPT | Performed by: INTERNAL MEDICINE

## 2024-07-01 PROCEDURE — G8427 DOCREV CUR MEDS BY ELIG CLIN: HCPCS | Performed by: INTERNAL MEDICINE

## 2024-07-01 PROCEDURE — 3017F COLORECTAL CA SCREEN DOC REV: CPT | Performed by: INTERNAL MEDICINE

## 2024-07-01 PROCEDURE — 1111F DSCHRG MED/CURRENT MED MERGE: CPT | Performed by: INTERNAL MEDICINE

## 2024-07-01 RX ORDER — INSULIN GLARGINE 100 [IU]/ML
25 INJECTION, SOLUTION SUBCUTANEOUS NIGHTLY
Qty: 5 ADJUSTABLE DOSE PRE-FILLED PEN SYRINGE | Refills: 5 | Status: SHIPPED | OUTPATIENT
Start: 2024-07-01

## 2024-07-01 RX ORDER — ZOLPIDEM TARTRATE 5 MG/1
5 TABLET ORAL NIGHTLY PRN
COMMUNITY
Start: 2023-03-23

## 2024-07-01 RX ORDER — PEN NEEDLE, DIABETIC 30 GX3/16"
NEEDLE, DISPOSABLE MISCELLANEOUS
Qty: 100 EACH | Refills: 11 | Status: SHIPPED | OUTPATIENT
Start: 2024-07-01

## 2024-07-01 RX ORDER — GLIMEPIRIDE 4 MG/1
4 TABLET ORAL
COMMUNITY
End: 2024-07-01

## 2024-07-01 RX ORDER — BLOOD-GLUCOSE SENSOR
EACH MISCELLANEOUS
Qty: 7 EACH | Refills: 3 | Status: SHIPPED | OUTPATIENT
Start: 2024-07-01

## 2024-07-01 RX ORDER — FLUCONAZOLE 150 MG/1
150 TABLET ORAL ONCE
Qty: 1 TABLET | Refills: 0 | Status: SHIPPED | OUTPATIENT
Start: 2024-07-01 | End: 2024-07-01

## 2024-07-01 SDOH — ECONOMIC STABILITY: FOOD INSECURITY: WITHIN THE PAST 12 MONTHS, THE FOOD YOU BOUGHT JUST DIDN'T LAST AND YOU DIDN'T HAVE MONEY TO GET MORE.: NEVER TRUE

## 2024-07-01 SDOH — ECONOMIC STABILITY: HOUSING INSECURITY
IN THE LAST 12 MONTHS, WAS THERE A TIME WHEN YOU DID NOT HAVE A STEADY PLACE TO SLEEP OR SLEPT IN A SHELTER (INCLUDING NOW)?: NO

## 2024-07-01 SDOH — ECONOMIC STABILITY: FOOD INSECURITY: WITHIN THE PAST 12 MONTHS, YOU WORRIED THAT YOUR FOOD WOULD RUN OUT BEFORE YOU GOT MONEY TO BUY MORE.: NEVER TRUE

## 2024-07-01 SDOH — ECONOMIC STABILITY: INCOME INSECURITY: HOW HARD IS IT FOR YOU TO PAY FOR THE VERY BASICS LIKE FOOD, HOUSING, MEDICAL CARE, AND HEATING?: NOT HARD AT ALL

## 2024-07-01 ASSESSMENT — PATIENT HEALTH QUESTIONNAIRE - PHQ9
SUM OF ALL RESPONSES TO PHQ QUESTIONS 1-9: 11
SUM OF ALL RESPONSES TO PHQ QUESTIONS 1-9: 11
5. POOR APPETITE OR OVEREATING: MORE THAN HALF THE DAYS
7. TROUBLE CONCENTRATING ON THINGS, SUCH AS READING THE NEWSPAPER OR WATCHING TELEVISION: MORE THAN HALF THE DAYS
9. THOUGHTS THAT YOU WOULD BE BETTER OFF DEAD, OR OF HURTING YOURSELF: NOT AT ALL
10. IF YOU CHECKED OFF ANY PROBLEMS, HOW DIFFICULT HAVE THESE PROBLEMS MADE IT FOR YOU TO DO YOUR WORK, TAKE CARE OF THINGS AT HOME, OR GET ALONG WITH OTHER PEOPLE: VERY DIFFICULT
SUM OF ALL RESPONSES TO PHQ QUESTIONS 1-9: 11
8. MOVING OR SPEAKING SO SLOWLY THAT OTHER PEOPLE COULD HAVE NOTICED. OR THE OPPOSITE, BEING SO FIGETY OR RESTLESS THAT YOU HAVE BEEN MOVING AROUND A LOT MORE THAN USUAL: NOT AT ALL
1. LITTLE INTEREST OR PLEASURE IN DOING THINGS: NOT AT ALL
6. FEELING BAD ABOUT YOURSELF - OR THAT YOU ARE A FAILURE OR HAVE LET YOURSELF OR YOUR FAMILY DOWN: SEVERAL DAYS
SUM OF ALL RESPONSES TO PHQ9 QUESTIONS 1 & 2: 2
3. TROUBLE FALLING OR STAYING ASLEEP: MORE THAN HALF THE DAYS
SUM OF ALL RESPONSES TO PHQ QUESTIONS 1-9: 11
2. FEELING DOWN, DEPRESSED OR HOPELESS: MORE THAN HALF THE DAYS
4. FEELING TIRED OR HAVING LITTLE ENERGY: MORE THAN HALF THE DAYS

## 2024-07-01 ASSESSMENT — ANXIETY QUESTIONNAIRES
IF YOU CHECKED OFF ANY PROBLEMS ON THIS QUESTIONNAIRE, HOW DIFFICULT HAVE THESE PROBLEMS MADE IT FOR YOU TO DO YOUR WORK, TAKE CARE OF THINGS AT HOME, OR GET ALONG WITH OTHER PEOPLE: VERY DIFFICULT
GAD7 TOTAL SCORE: 13
2. NOT BEING ABLE TO STOP OR CONTROL WORRYING: MORE THAN HALF THE DAYS
4. TROUBLE RELAXING: MORE THAN HALF THE DAYS
1. FEELING NERVOUS, ANXIOUS, OR ON EDGE: NEARLY EVERY DAY
3. WORRYING TOO MUCH ABOUT DIFFERENT THINGS: MORE THAN HALF THE DAYS
7. FEELING AFRAID AS IF SOMETHING AWFUL MIGHT HAPPEN: MORE THAN HALF THE DAYS
5. BEING SO RESTLESS THAT IT IS HARD TO SIT STILL: NOT AT ALL
6. BECOMING EASILY ANNOYED OR IRRITABLE: MORE THAN HALF THE DAYS

## 2024-07-01 NOTE — PROGRESS NOTES
Chief Complaint   Patient presents with    Diabetes     \"Have you been to the ER, urgent care clinic since your last visit?  Hospitalized since your last visit?\"    YES - When: approximately 1 months ago.  Where and Why[facility].    “Have you seen or consulted any other health care providers outside of [facility] since your last visit?”    NO    Have you had a mammogram?”   NO    Date of last Mammogram: 4/6/2018         “Have you had a colorectal cancer screening such as a colonoscopy/FIT/Cologuard?    NO    Date of last Colonoscopy: 6/14/2010  No cologuard on file  No FIT/FOBT on file   No flexible sigmoidoscopy on file         Click Here for Release of Records Request  HIPPA confirmed by two patient identifiers.

## 2024-07-01 NOTE — PROGRESS NOTES
Marti Larkin is a 64 y.o. female and presents with   Chief Complaint   Patient presents with    Diabetes       .  Subjective:    Pt was admitted to hospital for left great toe wound 5/31/2024-6/10/2024. She is s/p amputation. She was transferred to SNF. Pt was on lantus while in the hospital and SNF, but was d/c'ed w/o med or her freestyle mirella.  Pt reports her wound has healed. She was instructed to f/u w foot doctor \"as needed\".  Pts glucose >300 today  Pt w c/o abn vaginal d/c- thick and itchy. She would like top be tested for STI's      PMH:  Hypertension Review:  The patient has essential hypertension  Diet and Lifestyle: generally follows a  low sodium diet, exercises never  Home BP Monitoring: is not measured at home.  Pertinent ROS: taking medications as instructed, no medication side effects noted, no TIA's, no chest pain on exertion, no dyspnea on exertion, no swelling of ankles.   BP Readings from Last 3 Encounters:   07/01/24 136/64   06/17/24 133/72   06/11/24 130/80      Type 2 DM-on metformin, and glimepiride. Could not tolerate/afford januvia/jardiance   -pt admits to dietary indiscretions and elevated blood sugars     Hemoglobin A1C   Date Value Ref Range Status   06/01/2024 9.8 (H) 4.0 - 5.6 % Final     Comment:     (NOTE)  HbA1C Interpretive Ranges  <5.7              Normal  5.7 - 6.4         Consider Prediabetes  >6.5              Consider Diabetes       Hemoglobin A1C, POC   Date Value Ref Range Status   03/29/2024 10.1 % Final        Hyperlipidemia-on atorvastatin 20mg  Lab Results   Component Value Date    CHOL 128 07/13/2021    TRIG 288 (H) 07/13/2021    HDL 37 07/13/2021    VLDL 57.6 07/13/2021    CHOLHDLRATIO 3.5 07/13/2021          Morbid obesity-pt reports she is thin enough and does not want to lose much more weight  Wt Readings from Last 3 Encounters:   03/22/23 251 lb (113.9 kg)   02/19/23 250 lb (113.4 kg)   12/16/22 255 lb (115.7 kg)     Chronic Pain-pt is seeing Pain Mngmnt and

## 2024-07-04 LAB
A VAGINAE DNA VAG QL NAA+PROBE: NORMAL SCORE
BACTERIAL VAGINOSIS, NAA: NORMAL
BVAB2 DNA VAG QL NAA+PROBE: NORMAL SCORE
C ALBICANS DNA VAG QL NAA+PROBE: NEGATIVE
C GLABRATA DNA VAG QL NAA+PROBE: NEGATIVE
CANDIDA KRUSEI: NEGATIVE
CANDIDA LUSITANIAE, NAA: NEGATIVE
CANDIDA PARAPSILOSIS/TROPICALIS: NEGATIVE
MEGA1 DNA VAG QL NAA+PROBE: NORMAL SCORE
SPECIMEN SOURCE: NORMAL
T VAGINALIS RRNA SPEC QL NAA+PROBE: NEGATIVE

## 2024-07-05 ENCOUNTER — TELEPHONE (OUTPATIENT)
Facility: CLINIC | Age: 64
End: 2024-07-05

## 2024-07-05 NOTE — RESULT ENCOUNTER NOTE
Your STD screening was normal. You do NOT have gonorrhea, chlamydia, or trich. Continue to practice safe sex by using condoms with every sexual encounter, being abstinent, or by practicing monogamy.

## 2024-07-05 NOTE — TELEPHONE ENCOUNTER
Pt experiencing dizziness, head and chest congestion. She was wondering if you can send her some mediation. Pt has a low grade fever of 99.6 temp.     [facility] [address]

## 2024-07-08 LAB
BACTERIA SPEC CULT: NORMAL
SERVICE CMNT-IMP: NORMAL

## 2024-07-09 ENCOUNTER — APPOINTMENT (OUTPATIENT)
Facility: HOSPITAL | Age: 64
End: 2024-07-09
Payer: MEDICARE

## 2024-07-09 ENCOUNTER — HOSPITAL ENCOUNTER (EMERGENCY)
Facility: HOSPITAL | Age: 64
Discharge: HOME | End: 2024-07-09
Payer: MEDICARE

## 2024-07-09 VITALS
DIASTOLIC BLOOD PRESSURE: 78 MMHG | HEART RATE: 72 BPM | OXYGEN SATURATION: 97 % | RESPIRATION RATE: 18 BRPM | BODY MASS INDEX: 37.13 KG/M2 | WEIGHT: 250.66 LBS | SYSTOLIC BLOOD PRESSURE: 124 MMHG | TEMPERATURE: 98.4 F | HEIGHT: 69 IN

## 2024-07-09 DIAGNOSIS — J06.9 VIRAL URI WITH COUGH: Primary | ICD-10-CM

## 2024-07-09 LAB
GLUCOSE BLD STRIP.AUTO-MCNC: 213 MG/DL (ref 65–117)
SERVICE CMNT-IMP: ABNORMAL

## 2024-07-09 PROCEDURE — 99283 EMERGENCY DEPT VISIT LOW MDM: CPT

## 2024-07-09 PROCEDURE — 82962 GLUCOSE BLOOD TEST: CPT

## 2024-07-09 PROCEDURE — 71046 X-RAY EXAM CHEST 2 VIEWS: CPT

## 2024-07-09 RX ORDER — PREDNISONE 20 MG/1
40 TABLET ORAL DAILY
Qty: 10 TABLET | Refills: 0 | Status: SHIPPED | OUTPATIENT
Start: 2024-07-09 | End: 2024-07-14

## 2024-07-09 RX ORDER — FLUTICASONE PROPIONATE 50 MCG
1 SPRAY, SUSPENSION (ML) NASAL DAILY
Qty: 32 G | Refills: 0 | Status: SHIPPED | OUTPATIENT
Start: 2024-07-09

## 2024-07-09 RX ORDER — BENZONATATE 200 MG/1
200 CAPSULE ORAL 3 TIMES DAILY PRN
Qty: 20 CAPSULE | Refills: 0 | Status: SHIPPED | OUTPATIENT
Start: 2024-07-09 | End: 2024-07-16

## 2024-07-09 RX ORDER — GUAIFENESIN 600 MG/1
600 TABLET, EXTENDED RELEASE ORAL 2 TIMES DAILY
Qty: 14 TABLET | Refills: 0 | Status: SHIPPED | OUTPATIENT
Start: 2024-07-09 | End: 2024-07-16

## 2024-07-09 ASSESSMENT — PAIN - FUNCTIONAL ASSESSMENT: PAIN_FUNCTIONAL_ASSESSMENT: 0-10

## 2024-07-09 ASSESSMENT — PAIN SCALES - GENERAL: PAINLEVEL_OUTOF10: 0

## 2024-07-09 NOTE — DISCHARGE INSTRUCTIONS
Thank you for choosing our Emergency Department for your care.  It is our privilege to care for you in your time of need.  In the next several days, you may receive a survey via email or mailed to your home about your experience with our team.  We would greatly appreciate you taking a few minutes to complete the survey, as we use this information to learn what we have done well and what we could be doing better. Thank you for trusting us with your care!    Below you will find a list of your tests from today's visit.   Labs  Recent Results (from the past 12 hour(s))   POCT Glucose    Collection Time: 07/09/24 10:11 AM   Result Value Ref Range    POC Glucose 213 (H) 65 - 117 mg/dL    Performed by: William Nagy EDT        Radiologic Studies  XR CHEST (2 VW)   Final Result   No acute cardiopulmonary disease.         Electronically signed by Merrill Amezcua MD        ------------------------------------------------------------------------------------------------------------  The evaluation and treatment you received in the Emergency Department were for an urgent problem. It is important that you follow-up with a doctor, nurse practitioner, or physician assistant to:  (1) confirm your diagnosis,  (2) re-evaluation of changes in your illness and treatment, and (3) for ongoing care. Please take your discharge instructions with you when you go to your follow-up appointment.     If you have any problem arranging a follow-up appointment, contact us!  If your symptoms become worse or you do not improve as expected, please return to us. We are available 24 hours a day.     If a prescription has been provided, please fill it as soon as possible to prevent a delay in treatment. If you have any questions or reservations about taking the medication due to side effects or interactions with other medications, please call your primary care provider or contact us directly.  Again, THANK YOU for choosing us to care for YOU!

## 2024-07-09 NOTE — ED PROVIDER NOTES
once daily 90 tablet 1    valsartan-hydroCHLOROthiazide (DIOVAN-HCT) 320-25 MG per tablet Take 1 tablet by mouth daily 90 tablet 3    atorvastatin (LIPITOR) 20 MG tablet Take 1 tablet by mouth once daily 90 tablet 3    amLODIPine (NORVASC) 10 MG tablet Take 1 tablet by mouth once daily 90 tablet 1    escitalopram (LEXAPRO) 20 MG tablet Take 1 tablet by mouth once daily 90 tablet 3    fluticasone (FLONASE) 50 MCG/ACT nasal spray          Social Determinants of Health:   Social Determinants of Health     Tobacco Use: Medium Risk (7/1/2024)    Patient History     Smoking Tobacco Use: Former     Smokeless Tobacco Use: Never     Passive Exposure: Not on file   Alcohol Use: Not on file   Financial Resource Strain: Low Risk  (7/1/2024)    Overall Financial Resource Strain (CARDIA)     Difficulty of Paying Living Expenses: Not hard at all   Food Insecurity: No Food Insecurity (7/1/2024)    Hunger Vital Sign     Worried About Running Out of Food in the Last Year: Never true     Ran Out of Food in the Last Year: Never true   Transportation Needs: No Transportation Needs (7/1/2024)    PRAPARE - Transportation     Lack of Transportation (Medical): No     Lack of Transportation (Non-Medical): No   Physical Activity: Not on file   Stress: Not on file   Social Connections: Not on file   Intimate Partner Violence: Not on file   Depression: Not at risk (7/8/2024)    PHQ-2     PHQ-2 Score: 0   Recent Concern: Depression - At risk (7/1/2024)    PHQ-2     PHQ-2 Score: 11   Housing Stability: Low Risk  (7/1/2024)    Housing Stability Vital Sign     Unable to Pay for Housing in the Last Year: No     Number of Places Lived in the Last Year: 1     Unstable Housing in the Last Year: No   Interpersonal Safety: Not At Risk (6/17/2024)    Interpersonal Safety Domain Source: IP Abuse Screening     Physical abuse: Denies     Verbal abuse: Denies     Emotional abuse: Denies     Financial abuse: Denies     Sexual abuse: Denies   Utilities: Not At

## 2024-07-12 ENCOUNTER — TELEPHONE (OUTPATIENT)
Facility: CLINIC | Age: 64
End: 2024-07-12

## 2024-07-12 NOTE — TELEPHONE ENCOUNTER
Left message for patient to schedule mammogram ordered by PCP  Requested patient to return call to panel manager at [phone] to schedule mammogram or notify that mammogram   has been completed at an outside facility.

## 2024-07-15 ENCOUNTER — OFFICE VISIT (OUTPATIENT)
Facility: CLINIC | Age: 64
End: 2024-07-15
Payer: MEDICARE

## 2024-07-15 VITALS
OXYGEN SATURATION: 97 % | HEIGHT: 69 IN | HEART RATE: 78 BPM | SYSTOLIC BLOOD PRESSURE: 129 MMHG | TEMPERATURE: 97.7 F | BODY MASS INDEX: 37.06 KG/M2 | DIASTOLIC BLOOD PRESSURE: 61 MMHG | RESPIRATION RATE: 18 BRPM | WEIGHT: 250.2 LBS

## 2024-07-15 DIAGNOSIS — N94.9 VAGINAL DISCOMFORT: ICD-10-CM

## 2024-07-15 DIAGNOSIS — R30.0 DYSURIA: Primary | ICD-10-CM

## 2024-07-15 DIAGNOSIS — R30.0 DYSURIA: ICD-10-CM

## 2024-07-15 LAB
BILIRUBIN, URINE, POC: NEGATIVE
BLOOD URINE, POC: ABNORMAL
GLUCOSE URINE, POC: NEGATIVE
KETONES, URINE, POC: NEGATIVE
LEUKOCYTE ESTERASE, URINE, POC: ABNORMAL
NITRITE, URINE, POC: NEGATIVE
PH, URINE, POC: 5.5 (ref 4.6–8)
PROTEIN,URINE, POC: 30
SPECIFIC GRAVITY, URINE, POC: 1.02 (ref 1–1.03)
URINALYSIS CLARITY, POC: ABNORMAL
URINALYSIS COLOR, POC: YELLOW
UROBILINOGEN, POC: ABNORMAL

## 2024-07-15 PROCEDURE — G8427 DOCREV CUR MEDS BY ELIG CLIN: HCPCS | Performed by: INTERNAL MEDICINE

## 2024-07-15 PROCEDURE — 3074F SYST BP LT 130 MM HG: CPT | Performed by: INTERNAL MEDICINE

## 2024-07-15 PROCEDURE — 1090F PRES/ABSN URINE INCON ASSESS: CPT | Performed by: INTERNAL MEDICINE

## 2024-07-15 PROCEDURE — 99213 OFFICE O/P EST LOW 20 MIN: CPT | Performed by: INTERNAL MEDICINE

## 2024-07-15 PROCEDURE — G8417 CALC BMI ABV UP PARAM F/U: HCPCS | Performed by: INTERNAL MEDICINE

## 2024-07-15 PROCEDURE — 81003 URINALYSIS AUTO W/O SCOPE: CPT | Performed by: INTERNAL MEDICINE

## 2024-07-15 PROCEDURE — 3017F COLORECTAL CA SCREEN DOC REV: CPT | Performed by: INTERNAL MEDICINE

## 2024-07-15 PROCEDURE — 1123F ACP DISCUSS/DSCN MKR DOCD: CPT | Performed by: INTERNAL MEDICINE

## 2024-07-15 PROCEDURE — 1036F TOBACCO NON-USER: CPT | Performed by: INTERNAL MEDICINE

## 2024-07-15 PROCEDURE — 3078F DIAST BP <80 MM HG: CPT | Performed by: INTERNAL MEDICINE

## 2024-07-15 PROCEDURE — G8400 PT W/DXA NO RESULTS DOC: HCPCS | Performed by: INTERNAL MEDICINE

## 2024-07-15 ASSESSMENT — ANXIETY QUESTIONNAIRES
GAD7 TOTAL SCORE: 12
1. FEELING NERVOUS, ANXIOUS, OR ON EDGE: MORE THAN HALF THE DAYS
IF YOU CHECKED OFF ANY PROBLEMS ON THIS QUESTIONNAIRE, HOW DIFFICULT HAVE THESE PROBLEMS MADE IT FOR YOU TO DO YOUR WORK, TAKE CARE OF THINGS AT HOME, OR GET ALONG WITH OTHER PEOPLE: VERY DIFFICULT
7. FEELING AFRAID AS IF SOMETHING AWFUL MIGHT HAPPEN: MORE THAN HALF THE DAYS
4. TROUBLE RELAXING: SEVERAL DAYS
6. BECOMING EASILY ANNOYED OR IRRITABLE: MORE THAN HALF THE DAYS
5. BEING SO RESTLESS THAT IT IS HARD TO SIT STILL: SEVERAL DAYS
3. WORRYING TOO MUCH ABOUT DIFFERENT THINGS: MORE THAN HALF THE DAYS
2. NOT BEING ABLE TO STOP OR CONTROL WORRYING: MORE THAN HALF THE DAYS

## 2024-07-15 ASSESSMENT — PATIENT HEALTH QUESTIONNAIRE - PHQ9
SUM OF ALL RESPONSES TO PHQ QUESTIONS 1-9: 0
2. FEELING DOWN, DEPRESSED OR HOPELESS: NOT AT ALL
SUM OF ALL RESPONSES TO PHQ QUESTIONS 1-9: 0

## 2024-07-15 NOTE — PROGRESS NOTES
Marti Larkin is a 64 y.o. female and presents with   Chief Complaint   Patient presents with    Urinary Tract Infection       .  Subjective:    Pt w c/o vaginal discomfort and fullness with urination. Pt denies hematuria/urinary frequency.         Component  Ref Range & Units 7/15/24 1340 9/19/19 1533   Color (UA POC) Yellow Yellow   Clarity (UA POC) Slightly Cloudy Cloudy   Glucose, Urine, POC Negative 3+ R   Bilirubin, Urine, POC Negative    KETONES, Urine, POC Negative    Specific Gravity, Urine, POC  1.001 - 1.035 1.025 1.020 R   Blood (UA POC) Small Trace R   pH, Urine, POC  4.6 - 8.0 5.5 5.5 R   Protein, Urine, POC 30 Trace R   Urobilinogen, POC 0.2 mg/dL 0.2 mg/dL R   Nitrite, Urine, POC Negative Negative R   Leukocyte Esterase, Urine, POC Small    Resulting Agency            PMH:  Hypertension Review:  The patient has essential hypertension  Diet and Lifestyle: generally follows a  low sodium diet, exercises never  Home BP Monitoring: is not measured at home.  Pertinent ROS: taking medications as instructed, no medication side effects noted, no TIA's, no chest pain on exertion, no dyspnea on exertion, no swelling of ankles.   BP Readings from Last 3 Encounters:   07/15/24 129/61   07/09/24 124/78   07/01/24 136/64      Type 2 DM-on metformin, and glimepiride. Could not tolerate/afford januvia/jardiance   -pt admits to dietary indiscretions and elevated blood sugars     Hemoglobin A1C   Date Value Ref Range Status   06/01/2024 9.8 (H) 4.0 - 5.6 % Final     Comment:     (NOTE)  HbA1C Interpretive Ranges  <5.7              Normal  5.7 - 6.4         Consider Prediabetes  >6.5              Consider Diabetes       Hemoglobin A1C, POC   Date Value Ref Range Status   03/29/2024 10.1 % Final        Hyperlipidemia-on atorvastatin 20mg  Lab Results   Component Value Date    CHOL 128 07/13/2021    TRIG 288 (H) 07/13/2021    HDL 37 07/13/2021    VLDL 57.6 07/13/2021    CHOLHDLRATIO 3.5 07/13/2021          Morbid

## 2024-07-15 NOTE — PROGRESS NOTES
Chief Complaint   Patient presents with    Urinary Tract Infection     \"Have you been to the ER, urgent care clinic since your last visit?  Hospitalized since your last visit?\"    NO    “Have you seen or consulted any other health care providers outside of [facility] since your last visit?”    NO    Have you had a mammogram?”   NO    Date of last Mammogram: 4/6/2018         “Have you had a colorectal cancer screening such as a colonoscopy/FIT/Cologuard?    NO    Date of last Colonoscopy: 6/14/2010  No cologuard on file  No FIT/FOBT on file   No flexible sigmoidoscopy on file         Click Here for Release of Records Request  HIPPA confirmed by two patient identifiers.

## 2024-07-17 ENCOUNTER — TELEPHONE (OUTPATIENT)
Facility: CLINIC | Age: 64
End: 2024-07-17

## 2024-07-17 DIAGNOSIS — N30.00 ACUTE CYSTITIS WITHOUT HEMATURIA: Primary | ICD-10-CM

## 2024-07-17 LAB
BACTERIA SPEC CULT: ABNORMAL
CC UR VC: ABNORMAL
SERVICE CMNT-IMP: ABNORMAL

## 2024-07-17 RX ORDER — SULFAMETHOXAZOLE AND TRIMETHOPRIM 800; 160 MG/1; MG/1
1 TABLET ORAL 2 TIMES DAILY
Qty: 14 TABLET | Refills: 0 | Status: SHIPPED | OUTPATIENT
Start: 2024-07-17 | End: 2024-07-24

## 2024-07-25 DIAGNOSIS — F51.04 CHRONIC INSOMNIA: Primary | Chronic | ICD-10-CM

## 2024-07-29 ENCOUNTER — OFFICE VISIT (OUTPATIENT)
Facility: CLINIC | Age: 64
End: 2024-07-29
Payer: MEDICARE

## 2024-07-29 VITALS
SYSTOLIC BLOOD PRESSURE: 134 MMHG | OXYGEN SATURATION: 98 % | WEIGHT: 249.3 LBS | HEART RATE: 68 BPM | BODY MASS INDEX: 36.92 KG/M2 | HEIGHT: 69 IN | TEMPERATURE: 97.9 F | RESPIRATION RATE: 19 BRPM | DIASTOLIC BLOOD PRESSURE: 66 MMHG

## 2024-07-29 DIAGNOSIS — E11.8 TYPE 2 DIABETES MELLITUS WITH COMPLICATION, WITH LONG-TERM CURRENT USE OF INSULIN (HCC): Primary | Chronic | ICD-10-CM

## 2024-07-29 DIAGNOSIS — Z79.4 TYPE 2 DIABETES MELLITUS WITH COMPLICATION, WITH LONG-TERM CURRENT USE OF INSULIN (HCC): Primary | Chronic | ICD-10-CM

## 2024-07-29 PROCEDURE — 3046F HEMOGLOBIN A1C LEVEL >9.0%: CPT | Performed by: INTERNAL MEDICINE

## 2024-07-29 PROCEDURE — G8400 PT W/DXA NO RESULTS DOC: HCPCS | Performed by: INTERNAL MEDICINE

## 2024-07-29 PROCEDURE — 1090F PRES/ABSN URINE INCON ASSESS: CPT | Performed by: INTERNAL MEDICINE

## 2024-07-29 PROCEDURE — 1036F TOBACCO NON-USER: CPT | Performed by: INTERNAL MEDICINE

## 2024-07-29 PROCEDURE — 3078F DIAST BP <80 MM HG: CPT | Performed by: INTERNAL MEDICINE

## 2024-07-29 PROCEDURE — 3017F COLORECTAL CA SCREEN DOC REV: CPT | Performed by: INTERNAL MEDICINE

## 2024-07-29 PROCEDURE — G8417 CALC BMI ABV UP PARAM F/U: HCPCS | Performed by: INTERNAL MEDICINE

## 2024-07-29 PROCEDURE — 2022F DILAT RTA XM EVC RTNOPTHY: CPT | Performed by: INTERNAL MEDICINE

## 2024-07-29 PROCEDURE — 99213 OFFICE O/P EST LOW 20 MIN: CPT | Performed by: INTERNAL MEDICINE

## 2024-07-29 PROCEDURE — G8427 DOCREV CUR MEDS BY ELIG CLIN: HCPCS | Performed by: INTERNAL MEDICINE

## 2024-07-29 PROCEDURE — 3075F SYST BP GE 130 - 139MM HG: CPT | Performed by: INTERNAL MEDICINE

## 2024-07-29 PROCEDURE — 1123F ACP DISCUSS/DSCN MKR DOCD: CPT | Performed by: INTERNAL MEDICINE

## 2024-07-29 RX ORDER — ZOLPIDEM TARTRATE 5 MG/1
5 TABLET ORAL NIGHTLY PRN
Status: CANCELLED | OUTPATIENT
Start: 2024-07-29

## 2024-07-29 RX ORDER — ZOLPIDEM TARTRATE 5 MG/1
TABLET ORAL
Qty: 30 TABLET | Refills: 1 | Status: SHIPPED | OUTPATIENT
Start: 2024-07-29 | End: 2024-09-27

## 2024-07-29 ASSESSMENT — PATIENT HEALTH QUESTIONNAIRE - PHQ9
SUM OF ALL RESPONSES TO PHQ QUESTIONS 1-9: 13
SUM OF ALL RESPONSES TO PHQ QUESTIONS 1-9: 13
8. MOVING OR SPEAKING SO SLOWLY THAT OTHER PEOPLE COULD HAVE NOTICED. OR THE OPPOSITE, BEING SO FIGETY OR RESTLESS THAT YOU HAVE BEEN MOVING AROUND A LOT MORE THAN USUAL: NOT AT ALL
SUM OF ALL RESPONSES TO PHQ QUESTIONS 1-9: 13
6. FEELING BAD ABOUT YOURSELF - OR THAT YOU ARE A FAILURE OR HAVE LET YOURSELF OR YOUR FAMILY DOWN: SEVERAL DAYS
3. TROUBLE FALLING OR STAYING ASLEEP: MORE THAN HALF THE DAYS
9. THOUGHTS THAT YOU WOULD BE BETTER OFF DEAD, OR OF HURTING YOURSELF: NOT AT ALL
10. IF YOU CHECKED OFF ANY PROBLEMS, HOW DIFFICULT HAVE THESE PROBLEMS MADE IT FOR YOU TO DO YOUR WORK, TAKE CARE OF THINGS AT HOME, OR GET ALONG WITH OTHER PEOPLE: VERY DIFFICULT
7. TROUBLE CONCENTRATING ON THINGS, SUCH AS READING THE NEWSPAPER OR WATCHING TELEVISION: SEVERAL DAYS
2. FEELING DOWN, DEPRESSED OR HOPELESS: MORE THAN HALF THE DAYS
SUM OF ALL RESPONSES TO PHQ9 QUESTIONS 1 & 2: 5
5. POOR APPETITE OR OVEREATING: MORE THAN HALF THE DAYS
SUM OF ALL RESPONSES TO PHQ QUESTIONS 1-9: 13
1. LITTLE INTEREST OR PLEASURE IN DOING THINGS: NEARLY EVERY DAY
4. FEELING TIRED OR HAVING LITTLE ENERGY: MORE THAN HALF THE DAYS

## 2024-07-29 ASSESSMENT — ANXIETY QUESTIONNAIRES
3. WORRYING TOO MUCH ABOUT DIFFERENT THINGS: MORE THAN HALF THE DAYS
7. FEELING AFRAID AS IF SOMETHING AWFUL MIGHT HAPPEN: MORE THAN HALF THE DAYS
1. FEELING NERVOUS, ANXIOUS, OR ON EDGE: MORE THAN HALF THE DAYS
5. BEING SO RESTLESS THAT IT IS HARD TO SIT STILL: NOT AT ALL
2. NOT BEING ABLE TO STOP OR CONTROL WORRYING: MORE THAN HALF THE DAYS
GAD7 TOTAL SCORE: 12
6. BECOMING EASILY ANNOYED OR IRRITABLE: MORE THAN HALF THE DAYS
4. TROUBLE RELAXING: MORE THAN HALF THE DAYS
IF YOU CHECKED OFF ANY PROBLEMS ON THIS QUESTIONNAIRE, HOW DIFFICULT HAVE THESE PROBLEMS MADE IT FOR YOU TO DO YOUR WORK, TAKE CARE OF THINGS AT HOME, OR GET ALONG WITH OTHER PEOPLE: VERY DIFFICULT

## 2024-07-29 NOTE — PROGRESS NOTES
Chief Complaint   Patient presents with    Diabetes     \"Have you been to the ER, urgent care clinic since your last visit?  Hospitalized since your last visit?\"    YES - When: approximately 3  weeks ago.  Where and Why: [facility] for URI.    “Have you seen or consulted any other health care providers outside of [facility] since your last visit?”    NO    Have you had a mammogram?”   NO    Date of last Mammogram: 4/6/2018         “Have you had a colorectal cancer screening such as a colonoscopy/FIT/Cologuard?    NO    Date of last Colonoscopy: 6/14/2010  No cologuard on file  No FIT/FOBT on file   No flexible sigmoidoscopy on file         Click Here for Release of Records Request  HIPPA confirmed by two patient identifiers.

## 2024-07-29 NOTE — PROGRESS NOTES
Marti Lakrin is a 64 y.o. female and presents with   Chief Complaint   Patient presents with    Diabetes       .  Subjective:    Pts sugars have been in the 100s on insulin.  Pt requests rtw letter      PMH:  Hypertension Review:  The patient has essential hypertension  Diet and Lifestyle: generally follows a  low sodium diet, exercises never  Home BP Monitoring: is not measured at home.  Pertinent ROS: taking medications as instructed, no medication side effects noted, no TIA's, no chest pain on exertion, no dyspnea on exertion, no swelling of ankles.   BP Readings from Last 3 Encounters:   07/29/24 134/66   07/15/24 129/61   07/09/24 124/78      Type 2 DM-on metformin, and lantus     Hemoglobin A1C   Date Value Ref Range Status   06/01/2024 9.8 (H) 4.0 - 5.6 % Final     Comment:     (NOTE)  HbA1C Interpretive Ranges  <5.7              Normal  5.7 - 6.4         Consider Prediabetes  >6.5              Consider Diabetes       Hemoglobin A1C, POC   Date Value Ref Range Status   03/29/2024 10.1 % Final        Hyperlipidemia-on atorvastatin 20mg  Lab Results   Component Value Date    CHOL 128 07/13/2021    TRIG 288 (H) 07/13/2021    HDL 37 07/13/2021    VLDL 57.6 07/13/2021    CHOLHDLRATIO 3.5 07/13/2021          Morbid obesity-pt reports she is thin enough and does not want to lose much more weight  Wt Readings from Last 3 Encounters:   07/29/24 113.1 kg (249 lb 4.8 oz)   07/15/24 113.5 kg (250 lb 3.2 oz)   07/09/24 113.7 kg (250 lb 10.6 oz)       Chronic Pain-pt is seeing Pain Mngmnt and is on a regimen of gabapentin, tramadol and oxycodone and percocet   -pt request a refill of her painmed, until she sees her pain mngmnt provider in 2 days      Chronic insomnia- on zolpidem 5mg      No results found for: \"TSH\", \"Y0HXAFT\", \"THYROIDAB\", \"FT3\", \"T4FREE\"     Review of Systems  Constitutional: negative for fevers, chills, anorexia and weight loss  Respiratory:  negative for cough, hemoptysis, dyspnea,wheezing  CV:

## 2024-08-29 ENCOUNTER — TELEPHONE (OUTPATIENT)
Facility: CLINIC | Age: 64
End: 2024-08-29

## 2024-08-29 NOTE — TELEPHONE ENCOUNTER
Patient called stating that her BS was 280 around 10am, she states that she took her medicine, wants a call back

## 2024-09-03 ENCOUNTER — TELEPHONE (OUTPATIENT)
Facility: CLINIC | Age: 64
End: 2024-09-03

## 2024-09-04 DIAGNOSIS — Z79.4 LONG TERM (CURRENT) USE OF INSULIN (HCC): ICD-10-CM

## 2024-09-04 DIAGNOSIS — E11.40 TYPE 2 DIABETES MELLITUS WITH DIABETIC NEUROPATHY, WITH LONG-TERM CURRENT USE OF INSULIN (HCC): ICD-10-CM

## 2024-09-04 DIAGNOSIS — Z79.4 TYPE 2 DIABETES MELLITUS WITH DIABETIC NEUROPATHY, WITH LONG-TERM CURRENT USE OF INSULIN (HCC): ICD-10-CM

## 2024-09-04 RX ORDER — BLOOD-GLUCOSE SENSOR
EACH MISCELLANEOUS
Qty: 7 EACH | Refills: 3 | Status: SHIPPED | OUTPATIENT
Start: 2024-09-04

## 2024-09-06 ENCOUNTER — APPOINTMENT (OUTPATIENT)
Facility: HOSPITAL | Age: 64
End: 2024-09-06
Payer: MEDICARE

## 2024-09-06 ENCOUNTER — HOSPITAL ENCOUNTER (EMERGENCY)
Facility: HOSPITAL | Age: 64
Discharge: HOME | End: 2024-09-06
Attending: EMERGENCY MEDICINE
Payer: MEDICARE

## 2024-09-06 VITALS
HEART RATE: 77 BPM | TEMPERATURE: 98 F | DIASTOLIC BLOOD PRESSURE: 80 MMHG | RESPIRATION RATE: 18 BRPM | OXYGEN SATURATION: 99 % | SYSTOLIC BLOOD PRESSURE: 142 MMHG

## 2024-09-06 DIAGNOSIS — S86.001A INJURY OF RIGHT ACHILLES TENDON, INITIAL ENCOUNTER: Primary | ICD-10-CM

## 2024-09-06 PROCEDURE — 73630 X-RAY EXAM OF FOOT: CPT

## 2024-09-06 PROCEDURE — 73590 X-RAY EXAM OF LOWER LEG: CPT

## 2024-09-06 PROCEDURE — 99283 EMERGENCY DEPT VISIT LOW MDM: CPT

## 2024-09-06 RX ORDER — MELOXICAM 15 MG/1
15 TABLET ORAL DAILY PRN
Qty: 10 TABLET | Refills: 0 | Status: SHIPPED | OUTPATIENT
Start: 2024-09-06

## 2024-09-06 ASSESSMENT — PAIN DESCRIPTION - ORIENTATION: ORIENTATION: RIGHT

## 2024-09-06 ASSESSMENT — PAIN DESCRIPTION - LOCATION: LOCATION: LEG

## 2024-09-06 ASSESSMENT — PAIN SCALES - GENERAL: PAINLEVEL_OUTOF10: 10

## 2024-09-06 ASSESSMENT — PAIN - FUNCTIONAL ASSESSMENT: PAIN_FUNCTIONAL_ASSESSMENT: 0-10

## 2024-09-06 NOTE — ED TRIAGE NOTES
Pt arrives from home with cc of right leg/calf pain since yesterday while working. Denies falls, trauma, or injury. She states \"it felt like something tore\". She states she thinks she broke her leg or ankle \"or something. Something is wrong with this leg\".     Able to ambulate.

## 2024-09-06 NOTE — ED PROVIDER NOTES
[facility] EMERGENCY DEP  EMERGENCY DEPARTMENT ENCOUNTER      Pt Name: Marti Larkin  MRN: [id number]  Birthdate 1960  Date of evaluation: 9/6/2024  Provider: Raul Jaffe MD    CHIEF COMPLAINT       Chief Complaint   Patient presents with    Leg Pain         HISTORY OF PRESENT ILLNESS   (Location/Symptom, Timing/Onset, Context/Setting, Quality, Duration, Modifying Factors, Severity)  Note limiting factors.   HPI      Review of External Medical Records:     Nursing Notes were reviewed.    REVIEW OF SYSTEMS    (2-9 systems for level 4, 10 or more for level 5)     Review of Systems    Except as noted above the remainder of the review of systems was reviewed and negative.       PAST MEDICAL HISTORY     Past Medical History:   Diagnosis Date    Acute pancreatitis 1/21/2011    Acute pancreatitis 8/9/2012    Arthritis     both knees    Chronic pain     knees    Delivery normal     x1    Diabetes (HCC)     DJD (degenerative joint disease) of knee     Fatty liver 1/22/2011    GERD (gastroesophageal reflux disease)     Hypertension     Hypothyroidism     Obesity     PUD (peptic ulcer disease)     UTI (urinary tract infection) 1/21/2011         SURGICAL HISTORY       Past Surgical History:   Procedure Laterality Date    BREAST BIOPSY Left     Surgical Bx 1990 - BENIGN    BREAST LUMPECTOMY      left breast    GYN      hysterectomy    ORTHOPEDIC SURGERY  6/16/15    RIGHT TOTAL KNEE ARTHROPLASTY     ORTHOPEDIC SURGERY      left total knee replacement    PARATHYROIDECTOMY  01/27/11    TOE AMPUTATION Left 6/4/2024    GREAT TOE AMPUTATION AT METATARSALPHALANGEAL JOINT LEFT, OPEN BONE BIOPSIES LEFT (MAC WITH LOCAL) performed by Amarjit Ramirez DPM at [facility] MAIN OR         CURRENT MEDICATIONS       Previous Medications    AMLODIPINE (NORVASC) 10 MG TABLET    Take 1 tablet by mouth once daily    ATORVASTATIN (LIPITOR) 20 MG TABLET    Take 1 tablet by mouth once daily    CONTINUOUS GLUCOSE SENSOR (FREESTYLE TIM 3 SENSOR) MISC

## 2024-09-06 NOTE — ED NOTES
Walking boot applied to right foot. PMS intact. Instructions on crutches use given and pt verbalized understanding.

## 2024-09-06 NOTE — DISCHARGE INSTRUCTIONS
You have been evaluated in the Emergency Department today for achilles tendon injury and pain. Your evaluation did not find evidence of medical conditions requiring emergent intervention at this time.    You may take acetaminophen (Tylenol), either 3 regular strength (325mg) tablets or 2 extra-strength (500mg) tablets every 6 hours as well as an anti-inflammatory, either prescribed (Voltaren, Mobic) or over-the-counter (2 naproxen aka Aleve every 12 hours OR 3 ibuprofen aka Motrin every 6 hours) as needed for pain. Taking both the Tylenol as well as anti-inflammatory medications in combination can be especially effective.    Avoid lifting/twisting over the next 48 hours.   Attempt to move and stretch as able.  Apply ice every 6-8 hours for 20 minutes as needed for pain.     Return to the Emergency Department if you experience worsening pain, numbness, tingling, change of color in your limbs, or any other concerning symptoms.

## 2024-09-11 ENCOUNTER — TELEPHONE (OUTPATIENT)
Facility: CLINIC | Age: 64
End: 2024-09-11

## 2024-09-30 ENCOUNTER — OFFICE VISIT (OUTPATIENT)
Facility: CLINIC | Age: 64
End: 2024-09-30
Payer: MEDICARE

## 2024-09-30 VITALS
SYSTOLIC BLOOD PRESSURE: 157 MMHG | TEMPERATURE: 98 F | BODY MASS INDEX: 37.64 KG/M2 | HEART RATE: 74 BPM | RESPIRATION RATE: 19 BRPM | HEIGHT: 69 IN | DIASTOLIC BLOOD PRESSURE: 77 MMHG | WEIGHT: 254.1 LBS | OXYGEN SATURATION: 98 %

## 2024-09-30 DIAGNOSIS — I10 HYPERTENSION COMPLICATING DIABETES (HCC): Chronic | ICD-10-CM

## 2024-09-30 DIAGNOSIS — E11.9 HYPERTENSION COMPLICATING DIABETES (HCC): Chronic | ICD-10-CM

## 2024-09-30 DIAGNOSIS — F51.04 CHRONIC INSOMNIA: ICD-10-CM

## 2024-09-30 DIAGNOSIS — E11.8 TYPE 2 DIABETES MELLITUS WITH COMPLICATION, WITH LONG-TERM CURRENT USE OF INSULIN (HCC): ICD-10-CM

## 2024-09-30 DIAGNOSIS — E11.40 TYPE 2 DIABETES MELLITUS WITH DIABETIC NEUROPATHY, WITH LONG-TERM CURRENT USE OF INSULIN (HCC): Primary | ICD-10-CM

## 2024-09-30 DIAGNOSIS — Z79.4 TYPE 2 DIABETES MELLITUS WITH DIABETIC NEUROPATHY, WITH LONG-TERM CURRENT USE OF INSULIN (HCC): Primary | ICD-10-CM

## 2024-09-30 DIAGNOSIS — Z79.4 TYPE 2 DIABETES MELLITUS WITH COMPLICATION, WITH LONG-TERM CURRENT USE OF INSULIN (HCC): ICD-10-CM

## 2024-09-30 DIAGNOSIS — S98.132A AMPUTATION OF TOE OF LEFT FOOT (HCC): ICD-10-CM

## 2024-09-30 LAB
GLUCOSE, POC: 249 MG/DL
HBA1C MFR BLD: 8.5 %

## 2024-09-30 PROCEDURE — G8417 CALC BMI ABV UP PARAM F/U: HCPCS | Performed by: INTERNAL MEDICINE

## 2024-09-30 PROCEDURE — 1036F TOBACCO NON-USER: CPT | Performed by: INTERNAL MEDICINE

## 2024-09-30 PROCEDURE — 2022F DILAT RTA XM EVC RTNOPTHY: CPT | Performed by: INTERNAL MEDICINE

## 2024-09-30 PROCEDURE — 99214 OFFICE O/P EST MOD 30 MIN: CPT | Performed by: INTERNAL MEDICINE

## 2024-09-30 PROCEDURE — 1123F ACP DISCUSS/DSCN MKR DOCD: CPT | Performed by: INTERNAL MEDICINE

## 2024-09-30 PROCEDURE — 1090F PRES/ABSN URINE INCON ASSESS: CPT | Performed by: INTERNAL MEDICINE

## 2024-09-30 PROCEDURE — 83036 HEMOGLOBIN GLYCOSYLATED A1C: CPT | Performed by: INTERNAL MEDICINE

## 2024-09-30 PROCEDURE — 3046F HEMOGLOBIN A1C LEVEL >9.0%: CPT | Performed by: INTERNAL MEDICINE

## 2024-09-30 PROCEDURE — 3017F COLORECTAL CA SCREEN DOC REV: CPT | Performed by: INTERNAL MEDICINE

## 2024-09-30 PROCEDURE — G8400 PT W/DXA NO RESULTS DOC: HCPCS | Performed by: INTERNAL MEDICINE

## 2024-09-30 PROCEDURE — G8427 DOCREV CUR MEDS BY ELIG CLIN: HCPCS | Performed by: INTERNAL MEDICINE

## 2024-09-30 PROCEDURE — 3078F DIAST BP <80 MM HG: CPT | Performed by: INTERNAL MEDICINE

## 2024-09-30 PROCEDURE — 82962 GLUCOSE BLOOD TEST: CPT | Performed by: INTERNAL MEDICINE

## 2024-09-30 PROCEDURE — 3077F SYST BP >= 140 MM HG: CPT | Performed by: INTERNAL MEDICINE

## 2024-09-30 RX ORDER — ZOLPIDEM TARTRATE 5 MG/1
5 TABLET ORAL NIGHTLY PRN
COMMUNITY
End: 2024-09-30 | Stop reason: SDUPTHER

## 2024-09-30 RX ORDER — ZOLPIDEM TARTRATE 5 MG/1
5 TABLET ORAL NIGHTLY PRN
Qty: 30 TABLET | Refills: 2 | Status: SHIPPED | OUTPATIENT
Start: 2024-09-30 | End: 2024-12-01

## 2024-09-30 ASSESSMENT — PATIENT HEALTH QUESTIONNAIRE - PHQ9
6. FEELING BAD ABOUT YOURSELF - OR THAT YOU ARE A FAILURE OR HAVE LET YOURSELF OR YOUR FAMILY DOWN: SEVERAL DAYS
5. POOR APPETITE OR OVEREATING: SEVERAL DAYS
SUM OF ALL RESPONSES TO PHQ QUESTIONS 1-9: 6
7. TROUBLE CONCENTRATING ON THINGS, SUCH AS READING THE NEWSPAPER OR WATCHING TELEVISION: SEVERAL DAYS
3. TROUBLE FALLING OR STAYING ASLEEP: SEVERAL DAYS
8. MOVING OR SPEAKING SO SLOWLY THAT OTHER PEOPLE COULD HAVE NOTICED. OR THE OPPOSITE, BEING SO FIGETY OR RESTLESS THAT YOU HAVE BEEN MOVING AROUND A LOT MORE THAN USUAL: NOT AT ALL
SUM OF ALL RESPONSES TO PHQ QUESTIONS 1-9: 6
4. FEELING TIRED OR HAVING LITTLE ENERGY: SEVERAL DAYS
SUM OF ALL RESPONSES TO PHQ9 QUESTIONS 1 & 2: 1
10. IF YOU CHECKED OFF ANY PROBLEMS, HOW DIFFICULT HAVE THESE PROBLEMS MADE IT FOR YOU TO DO YOUR WORK, TAKE CARE OF THINGS AT HOME, OR GET ALONG WITH OTHER PEOPLE: SOMEWHAT DIFFICULT
SUM OF ALL RESPONSES TO PHQ QUESTIONS 1-9: 6
2. FEELING DOWN, DEPRESSED OR HOPELESS: SEVERAL DAYS
9. THOUGHTS THAT YOU WOULD BE BETTER OFF DEAD, OR OF HURTING YOURSELF: NOT AT ALL
1. LITTLE INTEREST OR PLEASURE IN DOING THINGS: NOT AT ALL
SUM OF ALL RESPONSES TO PHQ QUESTIONS 1-9: 6

## 2024-09-30 ASSESSMENT — ANXIETY QUESTIONNAIRES
GAD7 TOTAL SCORE: 14
2. NOT BEING ABLE TO STOP OR CONTROL WORRYING: MORE THAN HALF THE DAYS
3. WORRYING TOO MUCH ABOUT DIFFERENT THINGS: MORE THAN HALF THE DAYS
7. FEELING AFRAID AS IF SOMETHING AWFUL MIGHT HAPPEN: MORE THAN HALF THE DAYS
5. BEING SO RESTLESS THAT IT IS HARD TO SIT STILL: MORE THAN HALF THE DAYS
1. FEELING NERVOUS, ANXIOUS, OR ON EDGE: MORE THAN HALF THE DAYS
6. BECOMING EASILY ANNOYED OR IRRITABLE: MORE THAN HALF THE DAYS
4. TROUBLE RELAXING: MORE THAN HALF THE DAYS
IF YOU CHECKED OFF ANY PROBLEMS ON THIS QUESTIONNAIRE, HOW DIFFICULT HAVE THESE PROBLEMS MADE IT FOR YOU TO DO YOUR WORK, TAKE CARE OF THINGS AT HOME, OR GET ALONG WITH OTHER PEOPLE: VERY DIFFICULT

## 2024-09-30 NOTE — PROGRESS NOTES
Chief Complaint   Patient presents with    Diabetes     \"Have you been to the ER, urgent care clinic since your last visit?  Hospitalized since your last visit?\"    NO    “Have you seen or consulted any other health care providers outside our system since your last visit?”    NO    Have you had a mammogram?”   NO    Date of last Mammogram: 4/6/2018       “Have you had a colorectal cancer screening such as a colonoscopy/FIT/Cologuard?    NO    Date of last Colonoscopy: 6/14/2010  No cologuard on file  No FIT/FOBT on file   No flexible sigmoidoscopy on file     “Have you had a diabetic eye exam?”    NO     Date of last diabetic eye exam: 3/31/2015          HIPPA confirmed by two patient identifiers.    
History   Problem Relation Age of Onset    Cancer Mother     Hypertension Father     Diabetes Sister     Hypertension Sister     Diabetes Brother     Hypertension Brother        Current Outpatient Medications:     zolpidem (AMBIEN) 5 MG tablet, Take 1 tablet by mouth nightly as needed for Sleep for up to 90 doses. Max Daily Amount: 5 mg, Disp: 30 tablet, Rfl: 2    dulaglutide (TRULICITY) 0.75 MG/0.5ML SOPN SC injection, Inject 0.5 mLs into the skin once a week, Disp: 4 Adjustable Dose Pre-filled Pen Syringe, Rfl: 1    meloxicam (MOBIC) 15 MG tablet, Take 1 tablet by mouth daily as needed for Pain, Disp: 10 tablet, Rfl: 0    Continuous Glucose Sensor (FREESTYLE TIM 3 SENSOR) [facility], Utilize to monitor blood sugar, Disp: 7 each, Rfl: 3    fluticasone (FLONASE) 50 MCG/ACT nasal spray, 1 spray by Each Nostril route daily, Disp: 32 g, Rfl: 0    insulin glargine (LANTUS SOLOSTAR) 100 UNIT/ML injection pen, Inject 25 Units into the skin nightly, Disp: 5 Adjustable Dose Pre-filled Pen Syringe, Rfl: 5    Insulin Pen Needle (PEN NEEDLES) 32G X 4 MM MISC, Once daily, Disp: 100 each, Rfl: 11    metFORMIN (GLUCOPHAGE) 1000 MG tablet, Take 1 tablet by mouth 2 times daily (with meals), Disp: 180 tablet, Rfl: 1    metoprolol succinate (TOPROL XL) 100 MG extended release tablet, Take 1 tablet by mouth once daily, Disp: 90 tablet, Rfl: 1    valsartan-hydroCHLOROthiazide (DIOVAN-HCT) 320-25 MG per tablet, Take 1 tablet by mouth daily, Disp: 90 tablet, Rfl: 3    atorvastatin (LIPITOR) 20 MG tablet, Take 1 tablet by mouth once daily, Disp: 90 tablet, Rfl: 3    amLODIPine (NORVASC) 10 MG tablet, Take 1 tablet by mouth once daily, Disp: 90 tablet, Rfl: 1    escitalopram (LEXAPRO) 20 MG tablet, Take 1 tablet by mouth once daily, Disp: 90 tablet, Rfl: 3    fluticasone (FLONASE) 50 MCG/ACT nasal spray, , Disp: , Rfl:         Objective:  Visit Vitals  Vitals:    09/30/24 0817   BP: (!) 157/77   Pulse: 74   Resp:    Temp:    SpO2:

## 2024-11-22 DIAGNOSIS — I10 HYPERTENSION COMPLICATING DIABETES (HCC): ICD-10-CM

## 2024-11-22 DIAGNOSIS — E11.9 HYPERTENSION COMPLICATING DIABETES (HCC): ICD-10-CM

## 2024-11-25 ENCOUNTER — OFFICE VISIT (OUTPATIENT)
Facility: CLINIC | Age: 64
End: 2024-11-25
Payer: COMMERCIAL

## 2024-11-25 VITALS
OXYGEN SATURATION: 98 % | WEIGHT: 252 LBS | SYSTOLIC BLOOD PRESSURE: 136 MMHG | HEART RATE: 82 BPM | RESPIRATION RATE: 18 BRPM | HEIGHT: 69 IN | DIASTOLIC BLOOD PRESSURE: 77 MMHG | BODY MASS INDEX: 37.33 KG/M2 | TEMPERATURE: 98 F

## 2024-11-25 DIAGNOSIS — N39.41 URGE INCONTINENCE OF URINE: Primary | ICD-10-CM

## 2024-11-25 DIAGNOSIS — E11.40 TYPE 2 DIABETES MELLITUS WITH DIABETIC NEUROPATHY, WITH LONG-TERM CURRENT USE OF INSULIN (HCC): ICD-10-CM

## 2024-11-25 DIAGNOSIS — Z79.4 LONG TERM (CURRENT) USE OF INSULIN (HCC): ICD-10-CM

## 2024-11-25 DIAGNOSIS — Z79.4 TYPE 2 DIABETES MELLITUS WITH DIABETIC NEUROPATHY, WITH LONG-TERM CURRENT USE OF INSULIN (HCC): ICD-10-CM

## 2024-11-25 PROCEDURE — 99213 OFFICE O/P EST LOW 20 MIN: CPT | Performed by: INTERNAL MEDICINE

## 2024-11-25 PROCEDURE — 1036F TOBACCO NON-USER: CPT | Performed by: INTERNAL MEDICINE

## 2024-11-25 PROCEDURE — 3078F DIAST BP <80 MM HG: CPT | Performed by: INTERNAL MEDICINE

## 2024-11-25 PROCEDURE — G8400 PT W/DXA NO RESULTS DOC: HCPCS | Performed by: INTERNAL MEDICINE

## 2024-11-25 PROCEDURE — 2022F DILAT RTA XM EVC RTNOPTHY: CPT | Performed by: INTERNAL MEDICINE

## 2024-11-25 PROCEDURE — 3017F COLORECTAL CA SCREEN DOC REV: CPT | Performed by: INTERNAL MEDICINE

## 2024-11-25 PROCEDURE — 0509F URINE INCON PLAN DOCD: CPT | Performed by: INTERNAL MEDICINE

## 2024-11-25 PROCEDURE — 3075F SYST BP GE 130 - 139MM HG: CPT | Performed by: INTERNAL MEDICINE

## 2024-11-25 PROCEDURE — 3046F HEMOGLOBIN A1C LEVEL >9.0%: CPT | Performed by: INTERNAL MEDICINE

## 2024-11-25 PROCEDURE — 1123F ACP DISCUSS/DSCN MKR DOCD: CPT | Performed by: INTERNAL MEDICINE

## 2024-11-25 PROCEDURE — G8484 FLU IMMUNIZE NO ADMIN: HCPCS | Performed by: INTERNAL MEDICINE

## 2024-11-25 PROCEDURE — G8417 CALC BMI ABV UP PARAM F/U: HCPCS | Performed by: INTERNAL MEDICINE

## 2024-11-25 PROCEDURE — G8427 DOCREV CUR MEDS BY ELIG CLIN: HCPCS | Performed by: INTERNAL MEDICINE

## 2024-11-25 PROCEDURE — 1090F PRES/ABSN URINE INCON ASSESS: CPT | Performed by: INTERNAL MEDICINE

## 2024-11-25 RX ORDER — AMLODIPINE BESYLATE 10 MG/1
10 TABLET ORAL DAILY
Qty: 90 TABLET | Refills: 1 | Status: SHIPPED | OUTPATIENT
Start: 2024-11-25

## 2024-11-25 RX ORDER — INSULIN GLARGINE 100 [IU]/ML
15 INJECTION, SOLUTION SUBCUTANEOUS NIGHTLY
Qty: 5 ADJUSTABLE DOSE PRE-FILLED PEN SYRINGE | Refills: 5
Start: 2024-11-25

## 2024-11-25 RX ORDER — DULAGLUTIDE 1.5 MG/.5ML
1.5 INJECTION, SOLUTION SUBCUTANEOUS WEEKLY
Qty: 4 ADJUSTABLE DOSE PRE-FILLED PEN SYRINGE | Refills: 1 | Status: SHIPPED | OUTPATIENT
Start: 2024-11-25

## 2024-11-25 RX ORDER — ACYCLOVIR 800 MG/1
TABLET ORAL
Qty: 7 EACH | Refills: 3 | Status: SHIPPED | OUTPATIENT
Start: 2024-11-25

## 2024-11-25 ASSESSMENT — ANXIETY QUESTIONNAIRES
GAD7 TOTAL SCORE: 0
7. FEELING AFRAID AS IF SOMETHING AWFUL MIGHT HAPPEN: NOT AT ALL
IF YOU CHECKED OFF ANY PROBLEMS ON THIS QUESTIONNAIRE, HOW DIFFICULT HAVE THESE PROBLEMS MADE IT FOR YOU TO DO YOUR WORK, TAKE CARE OF THINGS AT HOME, OR GET ALONG WITH OTHER PEOPLE: NOT DIFFICULT AT ALL
3. WORRYING TOO MUCH ABOUT DIFFERENT THINGS: NOT AT ALL
1. FEELING NERVOUS, ANXIOUS, OR ON EDGE: NOT AT ALL
5. BEING SO RESTLESS THAT IT IS HARD TO SIT STILL: NOT AT ALL
4. TROUBLE RELAXING: NOT AT ALL
6. BECOMING EASILY ANNOYED OR IRRITABLE: NOT AT ALL
2. NOT BEING ABLE TO STOP OR CONTROL WORRYING: NOT AT ALL

## 2024-11-25 ASSESSMENT — PATIENT HEALTH QUESTIONNAIRE - PHQ9
SUM OF ALL RESPONSES TO PHQ QUESTIONS 1-9: 2
SUM OF ALL RESPONSES TO PHQ QUESTIONS 1-9: 2
1. LITTLE INTEREST OR PLEASURE IN DOING THINGS: NOT AT ALL
SUM OF ALL RESPONSES TO PHQ QUESTIONS 1-9: 2
2. FEELING DOWN, DEPRESSED OR HOPELESS: MORE THAN HALF THE DAYS
SUM OF ALL RESPONSES TO PHQ9 QUESTIONS 1 & 2: 2
SUM OF ALL RESPONSES TO PHQ QUESTIONS 1-9: 2

## 2024-11-25 NOTE — PROGRESS NOTES
Marti Larkin is a 64 y.o. female and presents with   Chief Complaint   Patient presents with    Medication Check     Trulicity, Patient states she may need a lower dose. Patient believe her BS is too low.       .  Subjective:    Pt Is concerned re:\"low\" blood sugar on trulicity/lantus and metformin. Glucoses have been in the 90s    PMH:  Hypertension Review:  The patient has essential hypertension  Diet and Lifestyle: generally follows a  low sodium diet, exercises never  Home BP Monitoring: is not measured at home.  Pertinent ROS: taking medications as instructed, no medication side effects noted, no TIA's, no chest pain on exertion, no dyspnea on exertion, no swelling of ankles.   BP Readings from Last 3 Encounters:   11/25/24 136/77   09/30/24 (!) 157/77   09/06/24 (!) 142/80      Type 2 DM-on metformin, lantus and trulicity     Hemoglobin A1C   Date Value Ref Range Status   06/01/2024 9.8 (H) 4.0 - 5.6 % Final     Comment:     (NOTE)  HbA1C Interpretive Ranges  <5.7              Normal  5.7 - 6.4         Consider Prediabetes  >6.5              Consider Diabetes       Hemoglobin A1C, POC   Date Value Ref Range Status   09/30/2024 8.5 % Final        Hyperlipidemia-on atorvastatin 20mg  Lab Results   Component Value Date    CHOL 128 07/13/2021    TRIG 288 (H) 07/13/2021    HDL 37 07/13/2021    VLDL 57.6 07/13/2021    CHOLHDLRATIO 3.5 07/13/2021          Morbid obesity-pt reports she is thin enough and does not want to lose much more weight  Wt Readings from Last 3 Encounters:   11/25/24 114.3 kg (252 lb)   09/30/24 115.3 kg (254 lb 1.6 oz)   07/29/24 113.1 kg (249 lb 4.8 oz)       Chronic Pain-pt is seeing Pain Mngmnt and is on a regimen of gabapentin, tramadol and oxycodone and percocet   -pt request a refill of her painmed, until she sees her pain mngmnt provider in 2 days      Chronic insomnia- on zolpidem 5mg      No results found for: \"TSH\", \"S4CKTWZ\", \"THYROIDAB\", \"FT3\", \"T4FREE\"     Review of

## 2024-11-25 NOTE — PROGRESS NOTES
Chief Complaint   Patient presents with    Medication Check     Trulicity, Patient states she may need a lower dose. Patient believe her BS is too low.     \"Have you been to the ER, urgent care clinic since your last visit?  Hospitalized since your last visit?\"    NO    “Have you seen or consulted any other health care providers outside our system since your last visit?”    NO    Have you had a mammogram?”   NO    Date of last Mammogram: 4/6/2018       “Have you had a colorectal cancer screening such as a colonoscopy/FIT/Cologuard?    NO    Date of last Colonoscopy: 6/14/2010  No cologuard on file  No FIT/FOBT on file   No flexible sigmoidoscopy on file     “Have you had a diabetic eye exam?”    NO     Date of last diabetic eye exam: 3/31/2015          HIPPA confirmed by two patient identifiers.

## 2024-12-13 ENCOUNTER — TELEPHONE (OUTPATIENT)
Facility: CLINIC | Age: 64
End: 2024-12-13

## 2024-12-27 DIAGNOSIS — F51.04 CHRONIC INSOMNIA: ICD-10-CM

## 2024-12-27 RX ORDER — ZOLPIDEM TARTRATE 5 MG/1
TABLET ORAL
Qty: 30 TABLET | Refills: 0 | Status: SHIPPED | OUTPATIENT
Start: 2024-12-27 | End: 2025-12-27

## 2025-01-06 ENCOUNTER — TELEMEDICINE (OUTPATIENT)
Facility: CLINIC | Age: 65
End: 2025-01-06
Payer: COMMERCIAL

## 2025-01-06 DIAGNOSIS — E11.40 TYPE 2 DIABETES MELLITUS WITH DIABETIC NEUROPATHY, WITH LONG-TERM CURRENT USE OF INSULIN (HCC): ICD-10-CM

## 2025-01-06 DIAGNOSIS — Z79.4 TYPE 2 DIABETES MELLITUS WITH DIABETIC NEUROPATHY, WITH LONG-TERM CURRENT USE OF INSULIN (HCC): ICD-10-CM

## 2025-01-06 PROCEDURE — 3046F HEMOGLOBIN A1C LEVEL >9.0%: CPT | Performed by: INTERNAL MEDICINE

## 2025-01-06 PROCEDURE — 2022F DILAT RTA XM EVC RTNOPTHY: CPT | Performed by: INTERNAL MEDICINE

## 2025-01-06 PROCEDURE — 1090F PRES/ABSN URINE INCON ASSESS: CPT | Performed by: INTERNAL MEDICINE

## 2025-01-06 PROCEDURE — G8427 DOCREV CUR MEDS BY ELIG CLIN: HCPCS | Performed by: INTERNAL MEDICINE

## 2025-01-06 PROCEDURE — 3017F COLORECTAL CA SCREEN DOC REV: CPT | Performed by: INTERNAL MEDICINE

## 2025-01-06 PROCEDURE — 1123F ACP DISCUSS/DSCN MKR DOCD: CPT | Performed by: INTERNAL MEDICINE

## 2025-01-06 PROCEDURE — G8400 PT W/DXA NO RESULTS DOC: HCPCS | Performed by: INTERNAL MEDICINE

## 2025-01-06 PROCEDURE — 99213 OFFICE O/P EST LOW 20 MIN: CPT | Performed by: INTERNAL MEDICINE

## 2025-01-06 RX ORDER — INSULIN GLARGINE 100 [IU]/ML
10 INJECTION, SOLUTION SUBCUTANEOUS NIGHTLY
Qty: 5 ADJUSTABLE DOSE PRE-FILLED PEN SYRINGE | Refills: 5
Start: 2025-01-06

## 2025-01-06 RX ORDER — DULAGLUTIDE 3 MG/.5ML
3 INJECTION, SOLUTION SUBCUTANEOUS WEEKLY
Qty: 2 ML | Refills: 1 | Status: SHIPPED | OUTPATIENT
Start: 2025-01-06

## 2025-01-06 ASSESSMENT — PATIENT HEALTH QUESTIONNAIRE - PHQ9
5. POOR APPETITE OR OVEREATING: MORE THAN HALF THE DAYS
SUM OF ALL RESPONSES TO PHQ QUESTIONS 1-9: 11
SUM OF ALL RESPONSES TO PHQ9 QUESTIONS 1 & 2: 2
9. THOUGHTS THAT YOU WOULD BE BETTER OFF DEAD, OR OF HURTING YOURSELF: NOT AT ALL
SUM OF ALL RESPONSES TO PHQ QUESTIONS 1-9: 11
6. FEELING BAD ABOUT YOURSELF - OR THAT YOU ARE A FAILURE OR HAVE LET YOURSELF OR YOUR FAMILY DOWN: MORE THAN HALF THE DAYS
2. FEELING DOWN, DEPRESSED OR HOPELESS: MORE THAN HALF THE DAYS
7. TROUBLE CONCENTRATING ON THINGS, SUCH AS READING THE NEWSPAPER OR WATCHING TELEVISION: SEVERAL DAYS
8. MOVING OR SPEAKING SO SLOWLY THAT OTHER PEOPLE COULD HAVE NOTICED. OR THE OPPOSITE, BEING SO FIGETY OR RESTLESS THAT YOU HAVE BEEN MOVING AROUND A LOT MORE THAN USUAL: NOT AT ALL
4. FEELING TIRED OR HAVING LITTLE ENERGY: MORE THAN HALF THE DAYS
3. TROUBLE FALLING OR STAYING ASLEEP: MORE THAN HALF THE DAYS
10. IF YOU CHECKED OFF ANY PROBLEMS, HOW DIFFICULT HAVE THESE PROBLEMS MADE IT FOR YOU TO DO YOUR WORK, TAKE CARE OF THINGS AT HOME, OR GET ALONG WITH OTHER PEOPLE: SOMEWHAT DIFFICULT
1. LITTLE INTEREST OR PLEASURE IN DOING THINGS: NOT AT ALL
SUM OF ALL RESPONSES TO PHQ QUESTIONS 1-9: 11
SUM OF ALL RESPONSES TO PHQ QUESTIONS 1-9: 11

## 2025-01-06 ASSESSMENT — ANXIETY QUESTIONNAIRES
2. NOT BEING ABLE TO STOP OR CONTROL WORRYING: MORE THAN HALF THE DAYS
4. TROUBLE RELAXING: MORE THAN HALF THE DAYS
5. BEING SO RESTLESS THAT IT IS HARD TO SIT STILL: MORE THAN HALF THE DAYS
3. WORRYING TOO MUCH ABOUT DIFFERENT THINGS: MORE THAN HALF THE DAYS
GAD7 TOTAL SCORE: 14
7. FEELING AFRAID AS IF SOMETHING AWFUL MIGHT HAPPEN: MORE THAN HALF THE DAYS
IF YOU CHECKED OFF ANY PROBLEMS ON THIS QUESTIONNAIRE, HOW DIFFICULT HAVE THESE PROBLEMS MADE IT FOR YOU TO DO YOUR WORK, TAKE CARE OF THINGS AT HOME, OR GET ALONG WITH OTHER PEOPLE: SOMEWHAT DIFFICULT
6. BECOMING EASILY ANNOYED OR IRRITABLE: MORE THAN HALF THE DAYS
1. FEELING NERVOUS, ANXIOUS, OR ON EDGE: MORE THAN HALF THE DAYS

## 2025-01-06 NOTE — PROGRESS NOTES
Chief Complaint   Patient presents with    Medication Check     Trulicity     \"Have you been to the ER, urgent care clinic since your last visit?  Hospitalized since your last visit?\"    NO    “Have you seen or consulted any other health care providers outside our system since your last visit?”    NO    Have you had a mammogram?”   NO    Date of last Mammogram: 4/6/2018       “Have you had a colorectal cancer screening such as a colonoscopy/FIT/Cologuard?    NO    Date of last Colonoscopy: 6/14/2010  No cologuard on file  No FIT/FOBT on file   No flexible sigmoidoscopy on file     “Have you had a diabetic eye exam?”    NO     Date of last diabetic eye exam: 3/31/2015          HIPPA confirmed by two patient identifiers.    
    ASSESSMENT/PLAN:  1. Type 2 diabetes mellitus with diabetic neuropathy, with long-term current use of insulin (HCC)  - insulin glargine (LANTUS SOLOSTAR) 100 UNIT/ML injection pen; Inject 10 Units into the skin nightly  Dispense: 5 Adjustable Dose Pre-filled Pen Syringe; Refill: 5  - Dulaglutide (TRULICITY) 3 MG/0.5ML SOAJ; Inject 3 mg into the skin once a week  Dispense: 2 mL; Refill: 1  - metFORMIN (GLUCOPHAGE) 1000 MG tablet; Take 1 tablet by mouth daily  Dispense: 90 tablet; Refill: 1      Return in about 4 weeks (around 2/3/2025) for DM.    Marti Larkin, was evaluated through a synchronous (real-time) audio-video encounter. The patient (or guardian if applicable) is aware that this is a billable service, which includes applicable co-pays. This Virtual Visit was conducted with patient's (and/or legal guardian's) consent. Patient identification was verified, and a caregiver was present when appropriate.   The patient was located at Home: [address] [place] [id number]  Provider was located at Home (Appt Dept State): [place]  Confirm you are appropriately licensed, registered, or certified to deliver care in the state where the patient is located as indicated above. If you are not or unsure, please re-schedule the visit: Yes, I confirm.       Total time spent on this encounter: Not billed by time    --Mila Tabor MD on 1/6/2025 at 2:34 PM    An electronic signature was used to authenticate this note.

## 2025-01-14 ENCOUNTER — TELEPHONE (OUTPATIENT)
Facility: CLINIC | Age: 65
End: 2025-01-14

## 2025-01-14 DIAGNOSIS — E11.40 TYPE 2 DIABETES MELLITUS WITH DIABETIC NEUROPATHY, WITH LONG-TERM CURRENT USE OF INSULIN (HCC): ICD-10-CM

## 2025-01-14 DIAGNOSIS — Z79.4 TYPE 2 DIABETES MELLITUS WITH DIABETIC NEUROPATHY, WITH LONG-TERM CURRENT USE OF INSULIN (HCC): ICD-10-CM

## 2025-01-14 RX ORDER — INSULIN GLARGINE 100 [IU]/ML
10 INJECTION, SOLUTION SUBCUTANEOUS NIGHTLY
Qty: 2 ADJUSTABLE DOSE PRE-FILLED PEN SYRINGE | Refills: 1 | Status: SHIPPED | OUTPATIENT
Start: 2025-01-14

## 2025-01-14 NOTE — TELEPHONE ENCOUNTER
Patient states she is out of rx below. Requests refill to [facility] on [address]      insulin glargine (LANTUS SOLOSTAR) 100 UNIT/ML injection pen

## 2025-01-24 ENCOUNTER — TELEPHONE (OUTPATIENT)
Facility: CLINIC | Age: 65
End: 2025-01-24

## 2025-01-24 NOTE — TELEPHONE ENCOUNTER
Patient want to know why her Trulicity was increase for 1.5mg to 3mg , she was about to take it but once she saw the mg she didn't

## 2025-01-30 DIAGNOSIS — F51.04 CHRONIC INSOMNIA: ICD-10-CM

## 2025-01-30 NOTE — TELEPHONE ENCOUNTER
Last appointment: 01/06/2025 Virtual visit MD Tabor   Next appointment: 02/11/2025 MD Tabor   Previous refill encounter(s):   12/27/2024 Ambien #[id number]     No access to PDMP     For Pharmacy Admin Tracking Only    Program: Medication Refill  Intervention Detail: New Rx: 1, reason: Patient Preference  Time Spent (min): 5    Requested Prescriptions     Pending Prescriptions Disp Refills    zolpidem (AMBIEN) 5 MG tablet [Pharmacy Med Name: Zolpidem Tartrate 5 MG Oral Tablet] 30 tablet 0     Sig: TAKE 1 TABLET BY MOUTH ONCE DAILY AT BEDTIME AS NEEDED FOR SLEEP . DO NOT EXCEED 1 PER 24 HOURS

## 2025-02-03 RX ORDER — ZOLPIDEM TARTRATE 5 MG/1
TABLET ORAL
Qty: 30 TABLET | Refills: 0 | Status: SHIPPED | OUTPATIENT
Start: 2025-02-03 | End: 2026-02-03

## 2025-02-09 DIAGNOSIS — I10 HYPERTENSION COMPLICATING DIABETES (HCC): ICD-10-CM

## 2025-02-09 DIAGNOSIS — E11.9 HYPERTENSION COMPLICATING DIABETES (HCC): ICD-10-CM

## 2025-02-10 RX ORDER — METOPROLOL SUCCINATE 100 MG/1
100 TABLET, EXTENDED RELEASE ORAL DAILY
Qty: 90 TABLET | Refills: 3 | Status: SHIPPED | OUTPATIENT
Start: 2025-02-10

## 2025-02-11 ENCOUNTER — OFFICE VISIT (OUTPATIENT)
Facility: CLINIC | Age: 65
End: 2025-02-11
Payer: MEDICARE

## 2025-02-11 VITALS
RESPIRATION RATE: 18 BRPM | OXYGEN SATURATION: 98 % | HEART RATE: 85 BPM | BODY MASS INDEX: 36.76 KG/M2 | TEMPERATURE: 97.8 F | HEIGHT: 69 IN | DIASTOLIC BLOOD PRESSURE: 78 MMHG | WEIGHT: 248.2 LBS | SYSTOLIC BLOOD PRESSURE: 132 MMHG

## 2025-02-11 DIAGNOSIS — Z79.4 TYPE 2 DIABETES MELLITUS WITH DIABETIC NEUROPATHY, WITH LONG-TERM CURRENT USE OF INSULIN (HCC): ICD-10-CM

## 2025-02-11 DIAGNOSIS — Z13.29 THYROID DISORDER SCREEN: ICD-10-CM

## 2025-02-11 DIAGNOSIS — S98.132A AMPUTATION OF TOE OF LEFT FOOT (HCC): ICD-10-CM

## 2025-02-11 DIAGNOSIS — E11.40 TYPE 2 DIABETES MELLITUS WITH DIABETIC NEUROPATHY, WITH LONG-TERM CURRENT USE OF INSULIN (HCC): ICD-10-CM

## 2025-02-11 DIAGNOSIS — E78.00 PURE HYPERCHOLESTEROLEMIA, UNSPECIFIED: ICD-10-CM

## 2025-02-11 DIAGNOSIS — M85.89 OSTEOPENIA OF MULTIPLE SITES: ICD-10-CM

## 2025-02-11 DIAGNOSIS — I10 HYPERTENSION COMPLICATING DIABETES (HCC): Primary | ICD-10-CM

## 2025-02-11 DIAGNOSIS — Z12.11 COLON CANCER SCREENING: ICD-10-CM

## 2025-02-11 DIAGNOSIS — E11.9 HYPERTENSION COMPLICATING DIABETES (HCC): Primary | ICD-10-CM

## 2025-02-11 DIAGNOSIS — Z12.31 ENCOUNTER FOR SCREENING MAMMOGRAM FOR MALIGNANT NEOPLASM OF BREAST: ICD-10-CM

## 2025-02-11 DIAGNOSIS — Z79.4 LONG TERM (CURRENT) USE OF INSULIN (HCC): ICD-10-CM

## 2025-02-11 PROCEDURE — 1125F AMNT PAIN NOTED PAIN PRSNT: CPT | Performed by: INTERNAL MEDICINE

## 2025-02-11 PROCEDURE — 3075F SYST BP GE 130 - 139MM HG: CPT | Performed by: INTERNAL MEDICINE

## 2025-02-11 PROCEDURE — 99214 OFFICE O/P EST MOD 30 MIN: CPT | Performed by: INTERNAL MEDICINE

## 2025-02-11 PROCEDURE — 1123F ACP DISCUSS/DSCN MKR DOCD: CPT | Performed by: INTERNAL MEDICINE

## 2025-02-11 PROCEDURE — 3017F COLORECTAL CA SCREEN DOC REV: CPT | Performed by: INTERNAL MEDICINE

## 2025-02-11 PROCEDURE — 1160F RVW MEDS BY RX/DR IN RCRD: CPT | Performed by: INTERNAL MEDICINE

## 2025-02-11 PROCEDURE — 1159F MED LIST DOCD IN RCRD: CPT | Performed by: INTERNAL MEDICINE

## 2025-02-11 PROCEDURE — G8400 PT W/DXA NO RESULTS DOC: HCPCS | Performed by: INTERNAL MEDICINE

## 2025-02-11 PROCEDURE — 3046F HEMOGLOBIN A1C LEVEL >9.0%: CPT | Performed by: INTERNAL MEDICINE

## 2025-02-11 PROCEDURE — 1036F TOBACCO NON-USER: CPT | Performed by: INTERNAL MEDICINE

## 2025-02-11 PROCEDURE — 3078F DIAST BP <80 MM HG: CPT | Performed by: INTERNAL MEDICINE

## 2025-02-11 PROCEDURE — 1090F PRES/ABSN URINE INCON ASSESS: CPT | Performed by: INTERNAL MEDICINE

## 2025-02-11 PROCEDURE — G8417 CALC BMI ABV UP PARAM F/U: HCPCS | Performed by: INTERNAL MEDICINE

## 2025-02-11 PROCEDURE — 2022F DILAT RTA XM EVC RTNOPTHY: CPT | Performed by: INTERNAL MEDICINE

## 2025-02-11 PROCEDURE — G8427 DOCREV CUR MEDS BY ELIG CLIN: HCPCS | Performed by: INTERNAL MEDICINE

## 2025-02-11 ASSESSMENT — PATIENT HEALTH QUESTIONNAIRE - PHQ9
SUM OF ALL RESPONSES TO PHQ QUESTIONS 1-9: 12
7. TROUBLE CONCENTRATING ON THINGS, SUCH AS READING THE NEWSPAPER OR WATCHING TELEVISION: MORE THAN HALF THE DAYS
1. LITTLE INTEREST OR PLEASURE IN DOING THINGS: NOT AT ALL
6. FEELING BAD ABOUT YOURSELF - OR THAT YOU ARE A FAILURE OR HAVE LET YOURSELF OR YOUR FAMILY DOWN: MORE THAN HALF THE DAYS
9. THOUGHTS THAT YOU WOULD BE BETTER OFF DEAD, OR OF HURTING YOURSELF: NOT AT ALL
3. TROUBLE FALLING OR STAYING ASLEEP: MORE THAN HALF THE DAYS
4. FEELING TIRED OR HAVING LITTLE ENERGY: MORE THAN HALF THE DAYS
8. MOVING OR SPEAKING SO SLOWLY THAT OTHER PEOPLE COULD HAVE NOTICED. OR THE OPPOSITE, BEING SO FIGETY OR RESTLESS THAT YOU HAVE BEEN MOVING AROUND A LOT MORE THAN USUAL: NOT AT ALL
5. POOR APPETITE OR OVEREATING: MORE THAN HALF THE DAYS
10. IF YOU CHECKED OFF ANY PROBLEMS, HOW DIFFICULT HAVE THESE PROBLEMS MADE IT FOR YOU TO DO YOUR WORK, TAKE CARE OF THINGS AT HOME, OR GET ALONG WITH OTHER PEOPLE: SOMEWHAT DIFFICULT
2. FEELING DOWN, DEPRESSED OR HOPELESS: MORE THAN HALF THE DAYS
SUM OF ALL RESPONSES TO PHQ QUESTIONS 1-9: 12
SUM OF ALL RESPONSES TO PHQ9 QUESTIONS 1 & 2: 2
SUM OF ALL RESPONSES TO PHQ QUESTIONS 1-9: 12
SUM OF ALL RESPONSES TO PHQ QUESTIONS 1-9: 12

## 2025-02-11 ASSESSMENT — ANXIETY QUESTIONNAIRES
4. TROUBLE RELAXING: MORE THAN HALF THE DAYS
GAD7 TOTAL SCORE: 14
6. BECOMING EASILY ANNOYED OR IRRITABLE: MORE THAN HALF THE DAYS
5. BEING SO RESTLESS THAT IT IS HARD TO SIT STILL: MORE THAN HALF THE DAYS
7. FEELING AFRAID AS IF SOMETHING AWFUL MIGHT HAPPEN: MORE THAN HALF THE DAYS
2. NOT BEING ABLE TO STOP OR CONTROL WORRYING: MORE THAN HALF THE DAYS
3. WORRYING TOO MUCH ABOUT DIFFERENT THINGS: MORE THAN HALF THE DAYS
IF YOU CHECKED OFF ANY PROBLEMS ON THIS QUESTIONNAIRE, HOW DIFFICULT HAVE THESE PROBLEMS MADE IT FOR YOU TO DO YOUR WORK, TAKE CARE OF THINGS AT HOME, OR GET ALONG WITH OTHER PEOPLE: NOT DIFFICULT AT ALL
1. FEELING NERVOUS, ANXIOUS, OR ON EDGE: MORE THAN HALF THE DAYS

## 2025-02-11 NOTE — PROGRESS NOTES
Chief Complaint   Patient presents with    Diabetes     \"Have you been to the ER, urgent care clinic since your last visit?  Hospitalized since your last visit?\"    NO    “Have you seen or consulted any other health care providers outside our system since your last visit?”    NO    Have you had a mammogram?”   NO    Date of last Mammogram: 4/6/2018       “Have you had a colorectal cancer screening such as a colonoscopy/FIT/Cologuard?    NO    Date of last Colonoscopy: 6/14/2010  No cologuard on file  No FIT/FOBT on file   No flexible sigmoidoscopy on file     “Have you had a diabetic eye exam?”    NO     Date of last diabetic eye exam: 3/31/2015          HIPPA confirmed by two patient identifiers.    
loss  Respiratory:  negative for cough, hemoptysis, dyspnea,wheezing  CV:   negative for chest pain, palpitations, lower extremity edema  GI:   negative for nausea, vomiting, diarrhea, abdominal pain,melena  Musculoskel: positive for myalgias, arthralgias, back pain, joint pain  Neurological:  negative for headaches, dizziness, vertigo, memory problems and gait   Behavl/Psych: negative for feelings of anxiety, depression, mood changes    Past Medical History:   Diagnosis Date    Acute pancreatitis 8/9/2012    Acute pancreatitis 1/21/2011    Arthritis     both knees    Chronic pain     knees    Delivery normal     x1    Diabetes (HCC)     DJD (degenerative joint disease) of knee     Fatty liver 1/22/2011    GERD (gastroesophageal reflux disease)     Hypertension     Hypothyroidism     Obesity     PUD (peptic ulcer disease)     UTI (urinary tract infection) 1/21/2011     Past Surgical History:   Procedure Laterality Date    HX BREAST BIOPSY Left     Surgical Bx 1990 - BENIGN    HX BREAST LUMPECTOMY      left breast    HX GYN      hysterectomy    HX ORTHOPAEDIC      left total knee replacement    HX ORTHOPAEDIC  6/16/15    RIGHT TOTAL KNEE ARTHROPLASTY     HX PARATHYROIDECTOMY  01/27/11     Social History     Socioeconomic History    Marital status:    Tobacco Use    Smoking status: Former     Packs/day: 0.25     Years: 25.00     Pack years: 6.25     Types: Cigarettes     Quit date: 1/24/2008     Years since quitting: 15.1    Smokeless tobacco: Never   Vaping Use    Vaping Use: Never used   Substance and Sexual Activity    Alcohol use: No     Alcohol/week: 0.0 standard drinks    Drug use: No    Sexual activity: Never     Social Determinants of Health     Financial Resource Strain: Low Risk     Difficulty of Paying Living Expenses: Not hard at all   Food Insecurity: No Food Insecurity    Worried About Running Out of Food in the Last Year: Never true    Ran Out of Food in the Last Year: Never true     Family

## 2025-02-12 DIAGNOSIS — I10 HYPERTENSION COMPLICATING DIABETES (HCC): ICD-10-CM

## 2025-02-12 DIAGNOSIS — E78.00 PURE HYPERCHOLESTEROLEMIA, UNSPECIFIED: ICD-10-CM

## 2025-02-12 DIAGNOSIS — Z13.29 THYROID DISORDER SCREEN: ICD-10-CM

## 2025-02-12 DIAGNOSIS — E11.9 HYPERTENSION COMPLICATING DIABETES (HCC): ICD-10-CM

## 2025-02-12 DIAGNOSIS — E11.40 TYPE 2 DIABETES MELLITUS WITH DIABETIC NEUROPATHY, WITH LONG-TERM CURRENT USE OF INSULIN (HCC): ICD-10-CM

## 2025-02-12 DIAGNOSIS — Z79.4 TYPE 2 DIABETES MELLITUS WITH DIABETIC NEUROPATHY, WITH LONG-TERM CURRENT USE OF INSULIN (HCC): ICD-10-CM

## 2025-02-12 LAB
ALBUMIN SERPL-MCNC: 4 G/DL (ref 3.5–5)
ALBUMIN/GLOB SERPL: 1.2 (ref 1.1–2.2)
ALP SERPL-CCNC: 69 U/L (ref 45–117)
ALT SERPL-CCNC: 28 U/L (ref 12–78)
ANION GAP SERPL CALC-SCNC: 7 MMOL/L (ref 2–12)
AST SERPL-CCNC: 13 U/L (ref 15–37)
BILIRUB SERPL-MCNC: 0.5 MG/DL (ref 0.2–1)
BUN SERPL-MCNC: 12 MG/DL (ref 6–20)
BUN/CREAT SERPL: 15 (ref 12–20)
CALCIUM SERPL-MCNC: 10.3 MG/DL (ref 8.5–10.1)
CHLORIDE SERPL-SCNC: 98 MMOL/L (ref 97–108)
CHOLEST SERPL-MCNC: 116 MG/DL
CO2 SERPL-SCNC: 33 MMOL/L (ref 21–32)
CREAT SERPL-MCNC: 0.78 MG/DL (ref 0.55–1.02)
CREAT UR-MCNC: 152 MG/DL
EST. AVERAGE GLUCOSE BLD GHB EST-MCNC: 154 MG/DL
GLOBULIN SER CALC-MCNC: 3.3 G/DL (ref 2–4)
GLUCOSE SERPL-MCNC: 200 MG/DL (ref 65–100)
HBA1C MFR BLD: 7 % (ref 4–5.6)
HDLC SERPL-MCNC: 38 MG/DL
HDLC SERPL: 3.1 (ref 0–5)
LDLC SERPL CALC-MCNC: 36.6 MG/DL (ref 0–100)
MICROALBUMIN UR-MCNC: 1.93 MG/DL
MICROALBUMIN/CREAT UR-RTO: 13 MG/G (ref 0–30)
POTASSIUM SERPL-SCNC: 4.6 MMOL/L (ref 3.5–5.1)
PROT SERPL-MCNC: 7.3 G/DL (ref 6.4–8.2)
SODIUM SERPL-SCNC: 138 MMOL/L (ref 136–145)
TRIGL SERPL-MCNC: 207 MG/DL
TSH SERPL DL<=0.05 MIU/L-ACNC: 1.65 UIU/ML (ref 0.36–3.74)
VLDLC SERPL CALC-MCNC: 41.4 MG/DL

## 2025-02-20 ENCOUNTER — TELEPHONE (OUTPATIENT)
Age: 65
End: 2025-02-20

## 2025-02-21 ENCOUNTER — TELEPHONE (OUTPATIENT)
Age: 65
End: 2025-02-21

## 2025-02-28 DIAGNOSIS — E78.00 PURE HYPERCHOLESTEROLEMIA, UNSPECIFIED: ICD-10-CM

## 2025-02-28 DIAGNOSIS — E11.40 TYPE 2 DIABETES MELLITUS WITH DIABETIC NEUROPATHY, WITH LONG-TERM CURRENT USE OF INSULIN (HCC): ICD-10-CM

## 2025-02-28 DIAGNOSIS — E11.9 HYPERTENSION COMPLICATING DIABETES (HCC): ICD-10-CM

## 2025-02-28 DIAGNOSIS — Z79.4 TYPE 2 DIABETES MELLITUS WITH DIABETIC NEUROPATHY, WITH LONG-TERM CURRENT USE OF INSULIN (HCC): ICD-10-CM

## 2025-02-28 DIAGNOSIS — I10 HYPERTENSION COMPLICATING DIABETES (HCC): ICD-10-CM

## 2025-02-28 RX ORDER — ATORVASTATIN CALCIUM 20 MG/1
20 TABLET, FILM COATED ORAL DAILY
Qty: 90 TABLET | Refills: 1 | Status: SHIPPED | OUTPATIENT
Start: 2025-02-28

## 2025-02-28 RX ORDER — VALSARTAN AND HYDROCHLOROTHIAZIDE 320; 25 MG/1; MG/1
1 TABLET, FILM COATED ORAL DAILY
Qty: 90 TABLET | Refills: 1 | Status: SHIPPED | OUTPATIENT
Start: 2025-02-28

## 2025-02-28 NOTE — TELEPHONE ENCOUNTER
[facility] is requesting a refill for the generic Diovan-HCT to be placed on pt's medication list so not to delay the pt in receiving the medication when needed.     Last appointment: 02/11/2025 MD Tabor   Next appointment: 05/21/2025 MD aTbor   Previous refill encounter(s):   02/07/2024 Lipitor #90 with 3 refills,   04/11/2024 Diovan-HCT #90 with 3 refills.     For Pharmacy Admin Tracking Only    Program: Medication Refill  Intervention Detail: New Rx: 2, reason: Patient Preference  Time Spent (min): 5    Requested Prescriptions     Pending Prescriptions Disp Refills    valsartan-hydroCHLOROthiazide (DIOVAN-HCT) 320-25 MG per tablet 90 tablet 0     Sig: Take 1 tablet by mouth daily    atorvastatin (LIPITOR) 20 MG tablet 90 tablet 0     Sig: Take 1 tablet by mouth daily

## 2025-03-03 DIAGNOSIS — F51.04 CHRONIC INSOMNIA: ICD-10-CM

## 2025-03-05 DIAGNOSIS — F33.1 MODERATE EPISODE OF RECURRENT MAJOR DEPRESSIVE DISORDER (HCC): ICD-10-CM

## 2025-03-06 RX ORDER — ZOLPIDEM TARTRATE 5 MG/1
TABLET ORAL
Qty: 30 TABLET | Refills: 0 | Status: SHIPPED | OUTPATIENT
Start: 2025-03-06 | End: 2026-03-06

## 2025-03-07 RX ORDER — ESCITALOPRAM OXALATE 20 MG/1
20 TABLET ORAL DAILY
Qty: 90 TABLET | Refills: 1 | Status: SHIPPED | OUTPATIENT
Start: 2025-03-07

## 2025-03-18 DIAGNOSIS — E11.40 TYPE 2 DIABETES MELLITUS WITH DIABETIC NEUROPATHY, WITH LONG-TERM CURRENT USE OF INSULIN (HCC): ICD-10-CM

## 2025-03-18 DIAGNOSIS — Z79.4 TYPE 2 DIABETES MELLITUS WITH DIABETIC NEUROPATHY, WITH LONG-TERM CURRENT USE OF INSULIN (HCC): ICD-10-CM

## 2025-03-19 RX ORDER — DULAGLUTIDE 3 MG/.5ML
INJECTION, SOLUTION SUBCUTANEOUS
Qty: 4 ML | Refills: 0 | Status: SHIPPED | OUTPATIENT
Start: 2025-03-19

## 2025-03-20 ENCOUNTER — TELEPHONE (OUTPATIENT)
Age: 65
End: 2025-03-20

## 2025-03-21 ENCOUNTER — TELEPHONE (OUTPATIENT)
Age: 65
End: 2025-03-21

## 2025-03-21 NOTE — TELEPHONE ENCOUNTER
Tried contacting patient to reschedule her missed appt, number on file not accepting calls. No message left.

## 2025-04-02 DIAGNOSIS — F51.04 CHRONIC INSOMNIA: ICD-10-CM

## 2025-04-07 RX ORDER — ZOLPIDEM TARTRATE 5 MG/1
TABLET ORAL
Qty: 30 TABLET | Refills: 2 | Status: SHIPPED | OUTPATIENT
Start: 2025-04-07 | End: 2026-04-07

## 2025-04-25 DIAGNOSIS — Z79.4 TYPE 2 DIABETES MELLITUS WITH DIABETIC NEUROPATHY, WITH LONG-TERM CURRENT USE OF INSULIN (HCC): ICD-10-CM

## 2025-04-25 DIAGNOSIS — E11.40 TYPE 2 DIABETES MELLITUS WITH DIABETIC NEUROPATHY, WITH LONG-TERM CURRENT USE OF INSULIN (HCC): ICD-10-CM

## 2025-04-25 RX ORDER — DULAGLUTIDE 3 MG/.5ML
INJECTION, SOLUTION SUBCUTANEOUS
Qty: 4 ML | Refills: 3 | Status: SHIPPED | OUTPATIENT
Start: 2025-04-25

## 2025-05-30 DIAGNOSIS — I10 HYPERTENSION COMPLICATING DIABETES (HCC): ICD-10-CM

## 2025-05-30 DIAGNOSIS — E11.9 HYPERTENSION COMPLICATING DIABETES (HCC): ICD-10-CM

## 2025-05-30 NOTE — TELEPHONE ENCOUNTER
Last appointment: 02/11/2025 MD Tabor   Next appointment: 06/05/2025 & 06/13/2025 MD Tabor   Previous refill encounter(s):   11/25/2024 Norvasc #90 with 1 refill.     For Pharmacy Admin Tracking Only    Program: Medication Refill  Intervention Detail: New Rx: 1, reason: Patient Preference  Time Spent (min): 5    Requested Prescriptions     Pending Prescriptions Disp Refills    amLODIPine (NORVASC) 10 MG tablet [Pharmacy Med Name: amLODIPine Besylate 10 MG Oral Tablet] 90 tablet 1     Sig: Take 1 tablet by mouth once daily

## 2025-06-02 RX ORDER — AMLODIPINE BESYLATE 10 MG/1
10 TABLET ORAL DAILY
Qty: 90 TABLET | Refills: 1 | Status: SHIPPED | OUTPATIENT
Start: 2025-06-02

## 2025-06-27 ENCOUNTER — OFFICE VISIT (OUTPATIENT)
Facility: CLINIC | Age: 65
End: 2025-06-27
Payer: MEDICARE

## 2025-06-27 VITALS
WEIGHT: 252.6 LBS | DIASTOLIC BLOOD PRESSURE: 74 MMHG | TEMPERATURE: 98.1 F | HEART RATE: 82 BPM | BODY MASS INDEX: 37.41 KG/M2 | OXYGEN SATURATION: 98 % | SYSTOLIC BLOOD PRESSURE: 131 MMHG | RESPIRATION RATE: 18 BRPM | HEIGHT: 69 IN

## 2025-06-27 DIAGNOSIS — T23.151A SUPERFICIAL BURN OF PALM OF RIGHT HAND, INITIAL ENCOUNTER: ICD-10-CM

## 2025-06-27 DIAGNOSIS — Z79.4 TYPE 2 DIABETES MELLITUS WITH DIABETIC NEUROPATHY, WITH LONG-TERM CURRENT USE OF INSULIN (HCC): Primary | ICD-10-CM

## 2025-06-27 DIAGNOSIS — F51.04 CHRONIC INSOMNIA: ICD-10-CM

## 2025-06-27 DIAGNOSIS — R53.82 CHRONIC FATIGUE: ICD-10-CM

## 2025-06-27 DIAGNOSIS — E66.01 MORBID OBESITY (HCC): ICD-10-CM

## 2025-06-27 DIAGNOSIS — E11.40 TYPE 2 DIABETES MELLITUS WITH DIABETIC NEUROPATHY, WITH LONG-TERM CURRENT USE OF INSULIN (HCC): Primary | ICD-10-CM

## 2025-06-27 LAB
GLUCOSE, POC: 163 MG/DL
HBA1C MFR BLD: 8.1 %

## 2025-06-27 PROCEDURE — 3075F SYST BP GE 130 - 139MM HG: CPT | Performed by: INTERNAL MEDICINE

## 2025-06-27 PROCEDURE — 1036F TOBACCO NON-USER: CPT | Performed by: INTERNAL MEDICINE

## 2025-06-27 PROCEDURE — G8417 CALC BMI ABV UP PARAM F/U: HCPCS | Performed by: INTERNAL MEDICINE

## 2025-06-27 PROCEDURE — G8400 PT W/DXA NO RESULTS DOC: HCPCS | Performed by: INTERNAL MEDICINE

## 2025-06-27 PROCEDURE — 3052F HG A1C>EQUAL 8.0%<EQUAL 9.0%: CPT | Performed by: INTERNAL MEDICINE

## 2025-06-27 PROCEDURE — G8427 DOCREV CUR MEDS BY ELIG CLIN: HCPCS | Performed by: INTERNAL MEDICINE

## 2025-06-27 PROCEDURE — 3078F DIAST BP <80 MM HG: CPT | Performed by: INTERNAL MEDICINE

## 2025-06-27 PROCEDURE — 1159F MED LIST DOCD IN RCRD: CPT | Performed by: INTERNAL MEDICINE

## 2025-06-27 PROCEDURE — 1126F AMNT PAIN NOTED NONE PRSNT: CPT | Performed by: INTERNAL MEDICINE

## 2025-06-27 PROCEDURE — 1160F RVW MEDS BY RX/DR IN RCRD: CPT | Performed by: INTERNAL MEDICINE

## 2025-06-27 PROCEDURE — 2022F DILAT RTA XM EVC RTNOPTHY: CPT | Performed by: INTERNAL MEDICINE

## 2025-06-27 PROCEDURE — 82962 GLUCOSE BLOOD TEST: CPT | Performed by: INTERNAL MEDICINE

## 2025-06-27 PROCEDURE — 3017F COLORECTAL CA SCREEN DOC REV: CPT | Performed by: INTERNAL MEDICINE

## 2025-06-27 PROCEDURE — 83036 HEMOGLOBIN GLYCOSYLATED A1C: CPT | Performed by: INTERNAL MEDICINE

## 2025-06-27 PROCEDURE — 99214 OFFICE O/P EST MOD 30 MIN: CPT | Performed by: INTERNAL MEDICINE

## 2025-06-27 PROCEDURE — 1090F PRES/ABSN URINE INCON ASSESS: CPT | Performed by: INTERNAL MEDICINE

## 2025-06-27 PROCEDURE — 1123F ACP DISCUSS/DSCN MKR DOCD: CPT | Performed by: INTERNAL MEDICINE

## 2025-06-27 ASSESSMENT — PATIENT HEALTH QUESTIONNAIRE - PHQ9
9. THOUGHTS THAT YOU WOULD BE BETTER OFF DEAD, OR OF HURTING YOURSELF: NOT AT ALL
3. TROUBLE FALLING OR STAYING ASLEEP: MORE THAN HALF THE DAYS
6. FEELING BAD ABOUT YOURSELF - OR THAT YOU ARE A FAILURE OR HAVE LET YOURSELF OR YOUR FAMILY DOWN: MORE THAN HALF THE DAYS
4. FEELING TIRED OR HAVING LITTLE ENERGY: MORE THAN HALF THE DAYS
SUM OF ALL RESPONSES TO PHQ QUESTIONS 1-9: 10
7. TROUBLE CONCENTRATING ON THINGS, SUCH AS READING THE NEWSPAPER OR WATCHING TELEVISION: MORE THAN HALF THE DAYS
5. POOR APPETITE OR OVEREATING: MORE THAN HALF THE DAYS
10. IF YOU CHECKED OFF ANY PROBLEMS, HOW DIFFICULT HAVE THESE PROBLEMS MADE IT FOR YOU TO DO YOUR WORK, TAKE CARE OF THINGS AT HOME, OR GET ALONG WITH OTHER PEOPLE: VERY DIFFICULT
2. FEELING DOWN, DEPRESSED OR HOPELESS: NOT AT ALL
8. MOVING OR SPEAKING SO SLOWLY THAT OTHER PEOPLE COULD HAVE NOTICED. OR THE OPPOSITE, BEING SO FIGETY OR RESTLESS THAT YOU HAVE BEEN MOVING AROUND A LOT MORE THAN USUAL: NOT AT ALL

## 2025-06-27 NOTE — PROGRESS NOTES
Chief Complaint   Patient presents with    Diabetes    Hypertension    Bloated     Have you been to the ER, urgent care clinic since your last visit?  Hospitalized since your last visit?   NO    Have you seen or consulted any other health care providers outside our system since your last visit?   NO    Have you had a mammogram?”   NO    Date of last Mammogram: 4/6/2018       “Have you had a colorectal cancer screening such as a colonoscopy/FIT/Cologuard?    NO    Date of last Colonoscopy: 6/14/2010  No cologuard on file  No FIT/FOBT on file   No flexible sigmoidoscopy on file     “Have you had a diabetic eye exam?”    NO     Date of last diabetic eye exam: 3/31/2015          HIPPA confirmed by two patient identifiers.

## 2025-06-27 NOTE — PROGRESS NOTES
Marti Larkin is a 65 y.o. female and presents with   Chief Complaint   Patient presents with    Diabetes    Hypertension    Bloated     Patient states her stomach has been bloated for a month.    Burn     Patient states she grabbed a hot pan after she took it out the oven.    Fatigue       .  Subjective:    Pt w above concerns    PMH:  Hypertension Review:  The patient has essential hypertension  Diet and Lifestyle: generally follows a  low sodium diet, exercises never  Home BP Monitoring: is not measured at home.  Pertinent ROS: taking medications as instructed, no medication side effects noted, no TIA's, no chest pain on exertion, no dyspnea on exertion, no swelling of ankles.   BP Readings from Last 3 Encounters:   06/27/25 131/74   02/11/25 132/78   11/25/24 136/77      Type 2 DM-on metformin, lantus-5U and trulicity     Hemoglobin A1C   Date Value Ref Range Status   02/12/2025 7.0 (H) 4.0 - 5.6 % Final     Comment:     (NOTE)  HbA1C Interpretive Ranges  <5.7              Normal  5.7 - 6.4         Consider Prediabetes  >6.5              Consider Diabetes       Hemoglobin A1C, POC   Date Value Ref Range Status   06/27/2025 8.1 % Final        Hyperlipidemia-on atorvastatin 20mg  Lab Results   Component Value Date    CHOL 116 02/12/2025    TRIG 207 (H) 02/12/2025    HDL 38 02/12/2025    VLDL 41.4 02/12/2025    CHOLHDLRATIO 3.1 02/12/2025          Morbid obesity-pt reports she is thin enough and does not want to lose much more weight  Wt Readings from Last 3 Encounters:   06/27/25 114.6 kg (252 lb 9.6 oz)   02/11/25 112.6 kg (248 lb 3.2 oz)   11/25/24 114.3 kg (252 lb)       Chronic Pain-pt is seeing Pain Mngmnt and is on a regimen of oxycodone      Chronic insomnia- on zolpidem 5mg      Lab Results   Component Value Date    TSH 1.65 02/12/2025        Review of Systems  Constitutional: negative for fevers, chills, anorexia and weight loss  Respiratory:  negative for cough, hemoptysis, dyspnea,wheezing  CV:

## 2025-06-28 LAB
BASOPHILS # BLD: 0.02 K/UL (ref 0–0.1)
BASOPHILS NFR BLD: 0.4 % (ref 0–1)
DIFFERENTIAL METHOD BLD: NORMAL
EOSINOPHIL # BLD: 0.15 K/UL (ref 0–0.4)
EOSINOPHIL NFR BLD: 2.7 % (ref 0–7)
ERYTHROCYTE [DISTWIDTH] IN BLOOD BY AUTOMATED COUNT: 12 % (ref 11.5–14.5)
HCT VFR BLD AUTO: 42 % (ref 35–47)
HGB BLD-MCNC: 13.2 G/DL (ref 11.5–16)
IMM GRANULOCYTES # BLD AUTO: 0.03 K/UL (ref 0–0.04)
IMM GRANULOCYTES NFR BLD AUTO: 0.5 % (ref 0–0.5)
LYMPHOCYTES # BLD: 1.72 K/UL (ref 0.8–3.5)
LYMPHOCYTES NFR BLD: 31.5 % (ref 12–49)
MCH RBC QN AUTO: 28.5 PG (ref 26–34)
MCHC RBC AUTO-ENTMCNC: 31.4 G/DL (ref 30–36.5)
MCV RBC AUTO: 90.7 FL (ref 80–99)
MONOCYTES # BLD: 0.48 K/UL (ref 0–1)
MONOCYTES NFR BLD: 8.8 % (ref 5–13)
NEUTS SEG # BLD: 3.06 K/UL (ref 1.8–8)
NEUTS SEG NFR BLD: 56.1 % (ref 32–75)
NRBC # BLD: 0 K/UL (ref 0–0.01)
NRBC BLD-RTO: 0 PER 100 WBC
PLATELET # BLD AUTO: 394 K/UL (ref 150–400)
PMV BLD AUTO: 9.3 FL (ref 8.9–12.9)
RBC # BLD AUTO: 4.63 M/UL (ref 3.8–5.2)
WBC # BLD AUTO: 5.5 K/UL (ref 3.6–11)

## 2025-06-30 ENCOUNTER — RESULTS FOLLOW-UP (OUTPATIENT)
Facility: CLINIC | Age: 65
End: 2025-06-30

## 2025-07-05 DIAGNOSIS — F51.04 CHRONIC INSOMNIA: ICD-10-CM

## 2025-07-06 DIAGNOSIS — F51.04 CHRONIC INSOMNIA: ICD-10-CM

## 2025-07-07 RX ORDER — ZOLPIDEM TARTRATE 5 MG/1
TABLET ORAL
Qty: 30 TABLET | Refills: 2 | OUTPATIENT
Start: 2025-07-07 | End: 2026-07-07

## 2025-07-08 RX ORDER — ZOLPIDEM TARTRATE 5 MG/1
5 TABLET ORAL NIGHTLY PRN
Qty: 25 TABLET | Refills: 0 | Status: SHIPPED | OUTPATIENT
Start: 2025-07-08 | End: 2026-07-08

## 2025-08-05 DIAGNOSIS — F51.04 CHRONIC INSOMNIA: ICD-10-CM

## 2025-08-06 RX ORDER — ZOLPIDEM TARTRATE 5 MG/1
TABLET ORAL
Qty: 25 TABLET | Refills: 2 | Status: SHIPPED | OUTPATIENT
Start: 2025-08-06 | End: 2025-10-06

## 2025-08-18 DIAGNOSIS — E78.00 PURE HYPERCHOLESTEROLEMIA, UNSPECIFIED: ICD-10-CM

## 2025-08-19 RX ORDER — ATORVASTATIN CALCIUM 20 MG/1
20 TABLET, FILM COATED ORAL DAILY
Qty: 90 TABLET | Refills: 3 | Status: SHIPPED | OUTPATIENT
Start: 2025-08-19

## (undated) DEVICE — SPONGE GZ W4XL4IN COT 12 PLY TYP VII WVN C FLD DSGN STERILE

## (undated) DEVICE — PREMIUM WET SKIN PREP TRAY: Brand: MEDLINE INDUSTRIES, INC.

## (undated) DEVICE — GLOVE ORANGE PI 7   MSG9070

## (undated) DEVICE — SOLUTION IRRIG 3000ML 0.9% SOD CHL USP UROMATIC PLAS CONT

## (undated) DEVICE — STOCKINETTE,DOUBLE PLY,6X48,STERILE: Brand: MEDLINE

## (undated) DEVICE — SUTURE ETHILON SZ 3-0 L18IN NONABSORBABLE BLK PS-2 L19MM 3/8 1669H

## (undated) DEVICE — MINOR BASIN -SMH: Brand: MEDLINE INDUSTRIES, INC.

## (undated) DEVICE — HYPODERMIC SAFETY NEEDLE: Brand: MONOJECT

## (undated) DEVICE — SPONGE LAP 18X18

## (undated) DEVICE — BANDAGE,ELASTIC,ESMARK,STERILE,4"X9',LF: Brand: MEDLINE

## (undated) DEVICE — X-RAY DETECTABLE SPONGES,16 PLY: Brand: VISTEC

## (undated) DEVICE — SOLUTION IRRIG 500ML 0.9% SOD CHLO USP POUR PLAS BTL

## (undated) DEVICE — PENCIL ES L3M BTTN SWCH S STL HEX LOK BLDE ELECTRD HOLSTER

## (undated) DEVICE — TIP IRRIG FEM CNL BRSH W SUCT 9IN PULSAVAC

## (undated) DEVICE — BANDAGE,GAUZE,CONFORMING,4"X75",STRL,LF: Brand: MEDLINE

## (undated) DEVICE — SYRINGE IRRIG 60ML SFT PLIABLE BLB EZ TO GRP 1 HND USE W/

## (undated) DEVICE — BANDAGE,GAUZE,BULKEE II,4.5"X4.1YD,STRL: Brand: MEDLINE

## (undated) DEVICE — ELECTRODE PT RET AD L9FT HI MOIST COND ADH HYDRGEL CORDED

## (undated) DEVICE — SYRINGE MED 10ML LUERLOCK TIP W/O SFTY DISP

## (undated) DEVICE — GLOVE ORANGE PI 8   MSG9080

## (undated) DEVICE — COLLECTOR SPEC RAYON LIQ STUART DBL FOR THRT VAG SKIN WND

## (undated) DEVICE — PAD,ABDOMINAL,5"X9",ST,LF,25/BX: Brand: MEDLINE INDUSTRIES, INC.

## (undated) DEVICE — INTENT OT USE PROVIDES A STERILE INTERFACE BETWEEN THE OPERATING ROOM SURGICAL LAMPS (NON-STERILE) AND THE SURGEON OR STAFF WORKING IN THE STERILE FIELD.: Brand: ASPEN® ALC PLUS LIGHT HANDLE COVER

## (undated) DEVICE — DRESSING,GAUZE,XEROFORM,CURAD,1"X8",ST: Brand: CURAD

## (undated) DEVICE — SWAB CULT LIQ STUART AGR AERB MOD IN BRK SGL RAYON TIP PLAS

## (undated) DEVICE — DRAPE,EXTREMITY,89X128,STERILE: Brand: MEDLINE

## (undated) DEVICE — TUBING SUCT 10FR MAL ALUM SHFT FN CAP VENT UNIV CONN W/ OBT